# Patient Record
Sex: MALE | Race: WHITE | NOT HISPANIC OR LATINO | Employment: UNEMPLOYED | URBAN - METROPOLITAN AREA
[De-identification: names, ages, dates, MRNs, and addresses within clinical notes are randomized per-mention and may not be internally consistent; named-entity substitution may affect disease eponyms.]

---

## 2021-02-18 ENCOUNTER — HOSPITAL ENCOUNTER (INPATIENT)
Facility: HOSPITAL | Age: 41
LOS: 9 days | Discharge: HOME | DRG: 432 | End: 2021-02-27
Attending: EMERGENCY MEDICINE | Admitting: HOSPITALIST
Payer: MEDICAID

## 2021-02-18 DIAGNOSIS — Z01.818 ENCOUNTER FOR PRE-TRANSPLANT EVALUATION FOR LIVER TRANSPLANT: ICD-10-CM

## 2021-02-18 DIAGNOSIS — K70.11 ALCOHOLIC HEPATITIS WITH ASCITES: ICD-10-CM

## 2021-02-18 DIAGNOSIS — G89.29 CHRONIC BACK PAIN, UNSPECIFIED BACK LOCATION, UNSPECIFIED BACK PAIN LATERALITY: Chronic | ICD-10-CM

## 2021-02-18 DIAGNOSIS — K74.60 HEPATIC CIRRHOSIS, UNSPECIFIED HEPATIC CIRRHOSIS TYPE, UNSPECIFIED WHETHER ASCITES PRESENT: ICD-10-CM

## 2021-02-18 DIAGNOSIS — K72.90 DECOMPENSATED HEPATIC CIRRHOSIS: ICD-10-CM

## 2021-02-18 DIAGNOSIS — Z01.818 PRE-TRANSPLANT EVALUATION FOR CHRONIC LIVER DISEASE: ICD-10-CM

## 2021-02-18 DIAGNOSIS — K74.60 DECOMPENSATED HEPATIC CIRRHOSIS: ICD-10-CM

## 2021-02-18 DIAGNOSIS — R07.9 CHEST PAIN: ICD-10-CM

## 2021-02-18 DIAGNOSIS — F10.20 ALCOHOL USE DISORDER, SEVERE, DEPENDENCE: Chronic | ICD-10-CM

## 2021-02-18 DIAGNOSIS — J98.4 CAVITARY LESION OF LUNG: ICD-10-CM

## 2021-02-18 DIAGNOSIS — R10.13 EPIGASTRIC PAIN: ICD-10-CM

## 2021-02-18 DIAGNOSIS — M54.9 CHRONIC BACK PAIN, UNSPECIFIED BACK LOCATION, UNSPECIFIED BACK PAIN LATERALITY: Chronic | ICD-10-CM

## 2021-02-18 LAB
A1AT SERPL-MCNC: 182 MG/DL (ref 100–190)
ALBUMIN SERPL BCP-MCNC: 2.6 G/DL (ref 3.5–5.2)
ALP SERPL-CCNC: 183 U/L (ref 55–135)
ALT SERPL W/O P-5'-P-CCNC: 61 U/L (ref 10–44)
AMMONIA PLAS-SCNC: 47 UMOL/L (ref 10–50)
AMORPH CRY UR QL COMP ASSIST: ABNORMAL
ANION GAP SERPL CALC-SCNC: 11 MMOL/L (ref 8–16)
AST SERPL-CCNC: 154 U/L (ref 10–40)
BACTERIA #/AREA URNS AUTO: ABNORMAL /HPF
BASOPHILS # BLD AUTO: 0.04 K/UL (ref 0–0.2)
BASOPHILS NFR BLD: 0.4 % (ref 0–1.9)
BILIRUB SERPL-MCNC: 25.4 MG/DL (ref 0.1–1)
BILIRUB UR QL STRIP: ABNORMAL
BUN SERPL-MCNC: 18 MG/DL (ref 6–20)
CALCIUM SERPL-MCNC: 8.5 MG/DL (ref 8.7–10.5)
CERULOPLASMIN SERPL-MCNC: 27 MG/DL (ref 15–45)
CHLORIDE SERPL-SCNC: 87 MMOL/L (ref 95–110)
CK SERPL-CCNC: 19 U/L (ref 20–200)
CLARITY UR REFRACT.AUTO: ABNORMAL
CO2 SERPL-SCNC: 32 MMOL/L (ref 23–29)
COLOR UR AUTO: ABNORMAL
CREAT SERPL-MCNC: 0.9 MG/DL (ref 0.5–1.4)
CTP QC/QA: YES
DIFFERENTIAL METHOD: ABNORMAL
EOSINOPHIL # BLD AUTO: 0 K/UL (ref 0–0.5)
EOSINOPHIL NFR BLD: 0.3 % (ref 0–8)
ERYTHROCYTE [DISTWIDTH] IN BLOOD BY AUTOMATED COUNT: 18.8 % (ref 11.5–14.5)
EST. GFR  (AFRICAN AMERICAN): >60 ML/MIN/1.73 M^2
EST. GFR  (NON AFRICAN AMERICAN): >60 ML/MIN/1.73 M^2
ETHANOL SERPL-MCNC: <10 MG/DL
GLUCOSE SERPL-MCNC: 107 MG/DL (ref 70–110)
GLUCOSE UR QL STRIP: NEGATIVE
HCT VFR BLD AUTO: 34.9 % (ref 40–54)
HGB BLD-MCNC: 12.4 G/DL (ref 14–18)
HGB UR QL STRIP: NEGATIVE
IGG SERPL-MCNC: 1798 MG/DL (ref 650–1600)
IMM GRANULOCYTES # BLD AUTO: 0.05 K/UL (ref 0–0.04)
IMM GRANULOCYTES NFR BLD AUTO: 0.5 % (ref 0–0.5)
INR PPP: 1.5 (ref 0.8–1.2)
INR PPP: 1.6 (ref 0.8–1.2)
IRON SERPL-MCNC: 121 UG/DL (ref 45–160)
KETONES UR QL STRIP: NEGATIVE
LACTATE SERPL-SCNC: 0.7 MMOL/L (ref 0.5–2.2)
LEUKOCYTE ESTERASE UR QL STRIP: NEGATIVE
LIPASE SERPL-CCNC: 109 U/L (ref 4–60)
LYMPHOCYTES # BLD AUTO: 1.3 K/UL (ref 1–4.8)
LYMPHOCYTES NFR BLD: 14.6 % (ref 18–48)
MCH RBC QN AUTO: 37.5 PG (ref 27–31)
MCHC RBC AUTO-ENTMCNC: 35.5 G/DL (ref 32–36)
MCV RBC AUTO: 105 FL (ref 82–98)
MICROSCOPIC COMMENT: ABNORMAL
MONOCYTES # BLD AUTO: 1.8 K/UL (ref 0.3–1)
MONOCYTES NFR BLD: 19.6 % (ref 4–15)
NEUTROPHILS # BLD AUTO: 5.9 K/UL (ref 1.8–7.7)
NEUTROPHILS NFR BLD: 64.6 % (ref 38–73)
NITRITE UR QL STRIP: POSITIVE
NRBC BLD-RTO: 0 /100 WBC
PH UR STRIP: 6 [PH] (ref 5–8)
PLATELET # BLD AUTO: 85 K/UL (ref 150–350)
PMV BLD AUTO: 11.2 FL (ref 9.2–12.9)
POTASSIUM SERPL-SCNC: 3.5 MMOL/L (ref 3.5–5.1)
PROCALCITONIN SERPL IA-MCNC: 0.22 NG/ML
PROT SERPL-MCNC: 7.8 G/DL (ref 6–8.4)
PROT UR QL STRIP: NEGATIVE
PROTHROMBIN TIME: 16.4 SEC (ref 9–12.5)
PROTHROMBIN TIME: 16.9 SEC (ref 9–12.5)
RBC # BLD AUTO: 3.31 M/UL (ref 4.6–6.2)
SARS-COV-2 RDRP RESP QL NAA+PROBE: NEGATIVE
SATURATED IRON: 100 % (ref 20–50)
SODIUM SERPL-SCNC: 130 MMOL/L (ref 136–145)
SP GR UR STRIP: 1.02 (ref 1–1.03)
TOTAL IRON BINDING CAPACITY: 121 UG/DL (ref 250–450)
TRANSFERRIN SERPL-MCNC: 82 MG/DL (ref 200–375)
TSH SERPL DL<=0.005 MIU/L-ACNC: 1.18 UIU/ML (ref 0.4–4)
URN SPEC COLLECT METH UR: ABNORMAL
WBC # BLD AUTO: 9.15 K/UL (ref 3.9–12.7)

## 2021-02-18 PROCEDURE — 96374 THER/PROPH/DIAG INJ IV PUSH: CPT

## 2021-02-18 PROCEDURE — 84145 PROCALCITONIN (PCT): CPT

## 2021-02-18 PROCEDURE — 82550 ASSAY OF CK (CPK): CPT

## 2021-02-18 PROCEDURE — 81001 URINALYSIS AUTO W/SCOPE: CPT

## 2021-02-18 PROCEDURE — 83605 ASSAY OF LACTIC ACID: CPT

## 2021-02-18 PROCEDURE — 99285 EMERGENCY DEPT VISIT HI MDM: CPT | Mod: 25

## 2021-02-18 PROCEDURE — 82105 ALPHA-FETOPROTEIN SERUM: CPT

## 2021-02-18 PROCEDURE — 93005 ELECTROCARDIOGRAM TRACING: CPT

## 2021-02-18 PROCEDURE — 83540 ASSAY OF IRON: CPT

## 2021-02-18 PROCEDURE — 63600175 PHARM REV CODE 636 W HCPCS: Performed by: STUDENT IN AN ORGANIZED HEALTH CARE EDUCATION/TRAINING PROGRAM

## 2021-02-18 PROCEDURE — 86256 FLUORESCENT ANTIBODY TITER: CPT | Mod: 91

## 2021-02-18 PROCEDURE — 82103 ALPHA-1-ANTITRYPSIN TOTAL: CPT

## 2021-02-18 PROCEDURE — 93010 EKG 12-LEAD: ICD-10-PCS | Mod: ,,, | Performed by: INTERNAL MEDICINE

## 2021-02-18 PROCEDURE — 82390 ASSAY OF CERULOPLASMIN: CPT

## 2021-02-18 PROCEDURE — 85025 COMPLETE CBC W/AUTO DIFF WBC: CPT

## 2021-02-18 PROCEDURE — 82784 ASSAY IGA/IGD/IGG/IGM EACH: CPT

## 2021-02-18 PROCEDURE — 82140 ASSAY OF AMMONIA: CPT

## 2021-02-18 PROCEDURE — 82077 ASSAY SPEC XCP UR&BREATH IA: CPT

## 2021-02-18 PROCEDURE — 83690 ASSAY OF LIPASE: CPT

## 2021-02-18 PROCEDURE — U0002 COVID-19 LAB TEST NON-CDC: HCPCS | Performed by: STUDENT IN AN ORGANIZED HEALTH CARE EDUCATION/TRAINING PROGRAM

## 2021-02-18 PROCEDURE — 86703 HIV-1/HIV-2 1 RESULT ANTBDY: CPT

## 2021-02-18 PROCEDURE — 80074 ACUTE HEPATITIS PANEL: CPT

## 2021-02-18 PROCEDURE — 12000002 HC ACUTE/MED SURGE SEMI-PRIVATE ROOM

## 2021-02-18 PROCEDURE — 99284 EMERGENCY DEPT VISIT MOD MDM: CPT | Mod: CS,,, | Performed by: EMERGENCY MEDICINE

## 2021-02-18 PROCEDURE — 84443 ASSAY THYROID STIM HORMONE: CPT

## 2021-02-18 PROCEDURE — 93010 ELECTROCARDIOGRAM REPORT: CPT | Mod: ,,, | Performed by: INTERNAL MEDICINE

## 2021-02-18 PROCEDURE — 86038 ANTINUCLEAR ANTIBODIES: CPT

## 2021-02-18 PROCEDURE — 85610 PROTHROMBIN TIME: CPT

## 2021-02-18 PROCEDURE — 99284 PR EMERGENCY DEPT VISIT,LEVEL IV: ICD-10-PCS | Mod: CS,,, | Performed by: EMERGENCY MEDICINE

## 2021-02-18 PROCEDURE — 85610 PROTHROMBIN TIME: CPT | Mod: 91

## 2021-02-18 PROCEDURE — 80321 ALCOHOLS BIOMARKERS 1OR 2: CPT

## 2021-02-18 PROCEDURE — 82728 ASSAY OF FERRITIN: CPT

## 2021-02-18 PROCEDURE — 80053 COMPREHEN METABOLIC PANEL: CPT

## 2021-02-18 PROCEDURE — 86235 NUCLEAR ANTIGEN ANTIBODY: CPT

## 2021-02-18 PROCEDURE — 87040 BLOOD CULTURE FOR BACTERIA: CPT

## 2021-02-18 RX ORDER — GLUCAGON 1 MG
1 KIT INJECTION
Status: DISCONTINUED | OUTPATIENT
Start: 2021-02-18 | End: 2021-02-23

## 2021-02-18 RX ORDER — INSULIN ASPART 100 [IU]/ML
0-5 INJECTION, SOLUTION INTRAVENOUS; SUBCUTANEOUS
Status: DISCONTINUED | OUTPATIENT
Start: 2021-02-18 | End: 2021-02-23

## 2021-02-18 RX ORDER — IBUPROFEN 200 MG
24 TABLET ORAL
Status: DISCONTINUED | OUTPATIENT
Start: 2021-02-18 | End: 2021-02-23

## 2021-02-18 RX ORDER — CEFTRIAXONE 1 G/1
1 INJECTION, POWDER, FOR SOLUTION INTRAMUSCULAR; INTRAVENOUS
Status: COMPLETED | OUTPATIENT
Start: 2021-02-18 | End: 2021-02-18

## 2021-02-18 RX ORDER — CEFTRIAXONE 1 G/1
1 INJECTION, POWDER, FOR SOLUTION INTRAMUSCULAR; INTRAVENOUS
Status: DISCONTINUED | OUTPATIENT
Start: 2021-02-19 | End: 2021-02-19

## 2021-02-18 RX ORDER — LACTULOSE 10 G/15ML
20 SOLUTION ORAL 3 TIMES DAILY
Status: DISCONTINUED | OUTPATIENT
Start: 2021-02-19 | End: 2021-02-19

## 2021-02-18 RX ORDER — IBUPROFEN 200 MG
16 TABLET ORAL
Status: DISCONTINUED | OUTPATIENT
Start: 2021-02-18 | End: 2021-02-23

## 2021-02-18 RX ORDER — SODIUM CHLORIDE 0.9 % (FLUSH) 0.9 %
10 SYRINGE (ML) INJECTION
Status: DISCONTINUED | OUTPATIENT
Start: 2021-02-18 | End: 2021-02-27 | Stop reason: HOSPADM

## 2021-02-18 RX ADMIN — CEFTRIAXONE 1 G: 1 INJECTION, POWDER, FOR SOLUTION INTRAMUSCULAR; INTRAVENOUS at 09:02

## 2021-02-19 PROBLEM — G89.29 CHRONIC BACK PAIN: Chronic | Status: ACTIVE | Noted: 2021-02-19

## 2021-02-19 PROBLEM — K31.89 PORTAL HYPERTENSIVE GASTROPATHY: Status: ACTIVE | Noted: 2021-02-19

## 2021-02-19 PROBLEM — D69.6 THROMBOCYTOPENIA: Status: ACTIVE | Noted: 2021-02-19

## 2021-02-19 PROBLEM — I85.10 SECONDARY ESOPHAGEAL VARICES WITHOUT BLEEDING: Status: ACTIVE | Noted: 2021-02-19

## 2021-02-19 PROBLEM — K70.11 ALCOHOLIC HEPATITIS WITH ASCITES: Status: ACTIVE | Noted: 2021-02-19

## 2021-02-19 PROBLEM — R10.13 EPIGASTRIC PAIN: Status: RESOLVED | Noted: 2021-02-18 | Resolved: 2021-02-19

## 2021-02-19 PROBLEM — M54.9 CHRONIC BACK PAIN: Chronic | Status: ACTIVE | Noted: 2021-02-19

## 2021-02-19 PROBLEM — F10.20 ALCOHOL USE DISORDER, SEVERE, DEPENDENCE: Chronic | Status: ACTIVE | Noted: 2021-02-19

## 2021-02-19 PROBLEM — Z01.818 ENCOUNTER FOR PRE-TRANSPLANT EVALUATION FOR LIVER TRANSPLANT: Status: ACTIVE | Noted: 2021-02-19

## 2021-02-19 PROBLEM — D53.9 MACROCYTIC ANEMIA: Status: ACTIVE | Noted: 2021-02-19

## 2021-02-19 PROBLEM — F17.200 TOBACCO USE DISORDER: Status: ACTIVE | Noted: 2021-02-19

## 2021-02-19 PROBLEM — K76.6 PORTAL HYPERTENSIVE GASTROPATHY: Status: ACTIVE | Noted: 2021-02-19

## 2021-02-19 LAB
AFP SERPL-MCNC: 3.6 NG/ML (ref 0–8.4)
ALBUMIN SERPL BCP-MCNC: 2.3 G/DL (ref 3.5–5.2)
ALP SERPL-CCNC: 161 U/L (ref 55–135)
ALT SERPL W/O P-5'-P-CCNC: 57 U/L (ref 10–44)
ANA SER QL IF: NORMAL
ANION GAP SERPL CALC-SCNC: 10 MMOL/L (ref 8–16)
AST SERPL-CCNC: 131 U/L (ref 10–40)
BASOPHILS # BLD AUTO: 0.06 K/UL (ref 0–0.2)
BASOPHILS NFR BLD: 0.8 % (ref 0–1.9)
BILIRUB SERPL-MCNC: 24.5 MG/DL (ref 0.1–1)
BUN SERPL-MCNC: 16 MG/DL (ref 6–20)
CALCIUM SERPL-MCNC: 8.5 MG/DL (ref 8.7–10.5)
CHLORIDE SERPL-SCNC: 89 MMOL/L (ref 95–110)
CO2 SERPL-SCNC: 34 MMOL/L (ref 23–29)
CREAT SERPL-MCNC: 0.8 MG/DL (ref 0.5–1.4)
DIFFERENTIAL METHOD: ABNORMAL
EOSINOPHIL # BLD AUTO: 0.1 K/UL (ref 0–0.5)
EOSINOPHIL NFR BLD: 0.7 % (ref 0–8)
ERYTHROCYTE [DISTWIDTH] IN BLOOD BY AUTOMATED COUNT: 18.6 % (ref 11.5–14.5)
EST. GFR  (AFRICAN AMERICAN): >60 ML/MIN/1.73 M^2
EST. GFR  (NON AFRICAN AMERICAN): >60 ML/MIN/1.73 M^2
FERRITIN SERPL-MCNC: 1244 NG/ML (ref 20–300)
GLUCOSE SERPL-MCNC: 89 MG/DL (ref 70–110)
HAV IGM SERPL QL IA: NEGATIVE
HBV CORE IGM SERPL QL IA: NEGATIVE
HBV SURFACE AB SER-ACNC: POSITIVE M[IU]/ML
HBV SURFACE AG SERPL QL IA: NEGATIVE
HCT VFR BLD AUTO: 33.1 % (ref 40–54)
HCV AB SERPL QL IA: NEGATIVE
HEPATITIS A ANTIBODY, IGG: NEGATIVE
HGB BLD-MCNC: 11.8 G/DL (ref 14–18)
HIV 1+2 AB+HIV1 P24 AG SERPL QL IA: NEGATIVE
IMM GRANULOCYTES # BLD AUTO: 0.06 K/UL (ref 0–0.04)
IMM GRANULOCYTES NFR BLD AUTO: 0.8 % (ref 0–0.5)
LYMPHOCYTES # BLD AUTO: 1.2 K/UL (ref 1–4.8)
LYMPHOCYTES NFR BLD: 16.3 % (ref 18–48)
MAGNESIUM SERPL-MCNC: 1.8 MG/DL (ref 1.6–2.6)
MCH RBC QN AUTO: 37.8 PG (ref 27–31)
MCHC RBC AUTO-ENTMCNC: 35.6 G/DL (ref 32–36)
MCV RBC AUTO: 106 FL (ref 82–98)
MONOCYTES # BLD AUTO: 1.3 K/UL (ref 0.3–1)
MONOCYTES NFR BLD: 16.8 % (ref 4–15)
NEUTROPHILS # BLD AUTO: 4.8 K/UL (ref 1.8–7.7)
NEUTROPHILS NFR BLD: 64.6 % (ref 38–73)
NRBC BLD-RTO: 0 /100 WBC
PHOSPHATE SERPL-MCNC: 3.2 MG/DL (ref 2.7–4.5)
PLATELET # BLD AUTO: 74 K/UL (ref 150–350)
PMV BLD AUTO: 12.1 FL (ref 9.2–12.9)
POCT GLUCOSE: 107 MG/DL (ref 70–110)
POCT GLUCOSE: 112 MG/DL (ref 70–110)
POCT GLUCOSE: 96 MG/DL (ref 70–110)
POTASSIUM SERPL-SCNC: 3.3 MMOL/L (ref 3.5–5.1)
PROT SERPL-MCNC: 7.1 G/DL (ref 6–8.4)
RBC # BLD AUTO: 3.12 M/UL (ref 4.6–6.2)
SODIUM SERPL-SCNC: 133 MMOL/L (ref 136–145)
WBC # BLD AUTO: 7.48 K/UL (ref 3.9–12.7)

## 2021-02-19 PROCEDURE — 94761 N-INVAS EAR/PLS OXIMETRY MLT: CPT

## 2021-02-19 PROCEDURE — 86706 HEP B SURFACE ANTIBODY: CPT

## 2021-02-19 PROCEDURE — 86790 VIRUS ANTIBODY NOS: CPT

## 2021-02-19 PROCEDURE — 99232 PR SUBSEQUENT HOSPITAL CARE,LEVL II: ICD-10-PCS | Mod: ,,, | Performed by: HOSPITALIST

## 2021-02-19 PROCEDURE — 99223 1ST HOSP IP/OBS HIGH 75: CPT | Mod: AF,HB,, | Performed by: PSYCHIATRY & NEUROLOGY

## 2021-02-19 PROCEDURE — 99223 PR INITIAL HOSPITAL CARE,LEVL III: ICD-10-PCS | Mod: AF,HB,, | Performed by: PSYCHIATRY & NEUROLOGY

## 2021-02-19 PROCEDURE — S4991 NICOTINE PATCH NONLEGEND: HCPCS | Performed by: HOSPITALIST

## 2021-02-19 PROCEDURE — 87040 BLOOD CULTURE FOR BACTERIA: CPT

## 2021-02-19 PROCEDURE — 36415 COLL VENOUS BLD VENIPUNCTURE: CPT

## 2021-02-19 PROCEDURE — 11000001 HC ACUTE MED/SURG PRIVATE ROOM

## 2021-02-19 PROCEDURE — 80053 COMPREHEN METABOLIC PANEL: CPT

## 2021-02-19 PROCEDURE — 83735 ASSAY OF MAGNESIUM: CPT

## 2021-02-19 PROCEDURE — 25000003 PHARM REV CODE 250: Performed by: INTERNAL MEDICINE

## 2021-02-19 PROCEDURE — 25000003 PHARM REV CODE 250: Performed by: FAMILY MEDICINE

## 2021-02-19 PROCEDURE — 99232 SBSQ HOSP IP/OBS MODERATE 35: CPT | Mod: ,,, | Performed by: HOSPITALIST

## 2021-02-19 PROCEDURE — 84100 ASSAY OF PHOSPHORUS: CPT

## 2021-02-19 PROCEDURE — 25000003 PHARM REV CODE 250: Performed by: HOSPITALIST

## 2021-02-19 PROCEDURE — 85025 COMPLETE CBC W/AUTO DIFF WBC: CPT

## 2021-02-19 RX ORDER — IBUPROFEN 200 MG
1 TABLET ORAL DAILY
Status: DISCONTINUED | OUTPATIENT
Start: 2021-02-19 | End: 2021-02-27 | Stop reason: HOSPADM

## 2021-02-19 RX ORDER — OXYCODONE HYDROCHLORIDE 5 MG/1
5 TABLET ORAL ONCE
Status: COMPLETED | OUTPATIENT
Start: 2021-02-19 | End: 2021-02-19

## 2021-02-19 RX ORDER — MIDODRINE HYDROCHLORIDE 5 MG/1
10 TABLET ORAL 3 TIMES DAILY
Status: DISCONTINUED | OUTPATIENT
Start: 2021-02-19 | End: 2021-02-21

## 2021-02-19 RX ORDER — POTASSIUM CHLORIDE 20 MEQ/1
40 TABLET, EXTENDED RELEASE ORAL ONCE
Status: COMPLETED | OUTPATIENT
Start: 2021-02-19 | End: 2021-02-19

## 2021-02-19 RX ORDER — FUROSEMIDE 40 MG/1
40 TABLET ORAL DAILY
Status: DISCONTINUED | OUTPATIENT
Start: 2021-02-19 | End: 2021-02-23

## 2021-02-19 RX ORDER — LORAZEPAM 1 MG/1
1 TABLET ORAL EVERY 4 HOURS PRN
Status: DISCONTINUED | OUTPATIENT
Start: 2021-02-19 | End: 2021-02-22

## 2021-02-19 RX ORDER — THIAMINE HCL 100 MG
100 TABLET ORAL DAILY
Status: DISCONTINUED | OUTPATIENT
Start: 2021-02-19 | End: 2021-02-27 | Stop reason: HOSPADM

## 2021-02-19 RX ORDER — SPIRONOLACTONE 100 MG/1
100 TABLET, FILM COATED ORAL DAILY
Status: DISCONTINUED | OUTPATIENT
Start: 2021-02-19 | End: 2021-02-23

## 2021-02-19 RX ORDER — FOLIC ACID 1 MG/1
1 TABLET ORAL DAILY
Status: DISCONTINUED | OUTPATIENT
Start: 2021-02-19 | End: 2021-02-27 | Stop reason: HOSPADM

## 2021-02-19 RX ADMIN — MIDODRINE HYDROCHLORIDE 10 MG: 5 TABLET ORAL at 08:02

## 2021-02-19 RX ADMIN — SPIRONOLACTONE 100 MG: 100 TABLET ORAL at 05:02

## 2021-02-19 RX ADMIN — POTASSIUM CHLORIDE 40 MEQ: 1500 TABLET, EXTENDED RELEASE ORAL at 05:02

## 2021-02-19 RX ADMIN — LACTULOSE 20 G: 20 SOLUTION ORAL at 04:02

## 2021-02-19 RX ADMIN — Medication 100 MG: at 05:02

## 2021-02-19 RX ADMIN — MIDODRINE HYDROCHLORIDE 10 MG: 5 TABLET ORAL at 05:02

## 2021-02-19 RX ADMIN — FOLIC ACID 1 MG: 1 TABLET ORAL at 05:02

## 2021-02-19 RX ADMIN — FUROSEMIDE 40 MG: 40 TABLET ORAL at 05:02

## 2021-02-19 RX ADMIN — LACTULOSE 20 G: 20 SOLUTION ORAL at 08:02

## 2021-02-19 RX ADMIN — NICOTINE 1 PATCH: 14 PATCH TRANSDERMAL at 05:02

## 2021-02-19 RX ADMIN — OXYCODONE 5 MG: 5 TABLET ORAL at 12:02

## 2021-02-20 LAB
ALBUMIN SERPL BCP-MCNC: 2.1 G/DL (ref 3.5–5.2)
ALP SERPL-CCNC: 150 U/L (ref 55–135)
ALT SERPL W/O P-5'-P-CCNC: 50 U/L (ref 10–44)
AMPHET+METHAMPHET UR QL: NEGATIVE
ANION GAP SERPL CALC-SCNC: 7 MMOL/L (ref 8–16)
AST SERPL-CCNC: 121 U/L (ref 10–40)
BARBITURATES UR QL SCN>200 NG/ML: NEGATIVE
BASOPHILS # BLD AUTO: 0.08 K/UL (ref 0–0.2)
BASOPHILS NFR BLD: 1 % (ref 0–1.9)
BENZODIAZ UR QL SCN>200 NG/ML: NEGATIVE
BILIRUB SERPL-MCNC: 24.5 MG/DL (ref 0.1–1)
BUN SERPL-MCNC: 17 MG/DL (ref 6–20)
BZE UR QL SCN: NEGATIVE
CALCIUM SERPL-MCNC: 7.8 MG/DL (ref 8.7–10.5)
CANNABINOIDS UR QL SCN: NEGATIVE
CHLORIDE SERPL-SCNC: 92 MMOL/L (ref 95–110)
CO2 SERPL-SCNC: 31 MMOL/L (ref 23–29)
CREAT SERPL-MCNC: 0.8 MG/DL (ref 0.5–1.4)
CREAT UR-MCNC: 44 MG/DL (ref 23–375)
DIFFERENTIAL METHOD: ABNORMAL
EOSINOPHIL # BLD AUTO: 0.1 K/UL (ref 0–0.5)
EOSINOPHIL NFR BLD: 1 % (ref 0–8)
ERYTHROCYTE [DISTWIDTH] IN BLOOD BY AUTOMATED COUNT: 18.5 % (ref 11.5–14.5)
EST. GFR  (AFRICAN AMERICAN): >60 ML/MIN/1.73 M^2
EST. GFR  (NON AFRICAN AMERICAN): >60 ML/MIN/1.73 M^2
ETHANOL UR-MCNC: <10 MG/DL
GLUCOSE SERPL-MCNC: 91 MG/DL (ref 70–110)
HCT VFR BLD AUTO: 32.1 % (ref 40–54)
HGB BLD-MCNC: 11.5 G/DL (ref 14–18)
IMM GRANULOCYTES # BLD AUTO: 0.07 K/UL (ref 0–0.04)
IMM GRANULOCYTES NFR BLD AUTO: 0.9 % (ref 0–0.5)
INR PPP: 1.7 (ref 0.8–1.2)
LACTATE SERPL-SCNC: 1.2 MMOL/L (ref 0.5–2.2)
LYMPHOCYTES # BLD AUTO: 1.1 K/UL (ref 1–4.8)
LYMPHOCYTES NFR BLD: 14.2 % (ref 18–48)
MAGNESIUM SERPL-MCNC: 1.8 MG/DL (ref 1.6–2.6)
MCH RBC QN AUTO: 37.8 PG (ref 27–31)
MCHC RBC AUTO-ENTMCNC: 35.8 G/DL (ref 32–36)
MCV RBC AUTO: 106 FL (ref 82–98)
METHADONE UR QL SCN>300 NG/ML: NEGATIVE
MITOCHONDRIA AB TITR SER IF: NORMAL {TITER}
MONOCYTES # BLD AUTO: 1.6 K/UL (ref 0.3–1)
MONOCYTES NFR BLD: 20.4 % (ref 4–15)
NEUTROPHILS # BLD AUTO: 5 K/UL (ref 1.8–7.7)
NEUTROPHILS NFR BLD: 62.5 % (ref 38–73)
NRBC BLD-RTO: 0 /100 WBC
OPIATES UR QL SCN: NEGATIVE
PCP UR QL SCN>25 NG/ML: NEGATIVE
PHOSPHATE SERPL-MCNC: 3.1 MG/DL (ref 2.7–4.5)
PLATELET # BLD AUTO: 92 K/UL (ref 150–350)
PMV BLD AUTO: 11.2 FL (ref 9.2–12.9)
POCT GLUCOSE: 107 MG/DL (ref 70–110)
POCT GLUCOSE: 134 MG/DL (ref 70–110)
POTASSIUM SERPL-SCNC: 3.6 MMOL/L (ref 3.5–5.1)
PROT SERPL-MCNC: 6.5 G/DL (ref 6–8.4)
PROTHROMBIN TIME: 17.8 SEC (ref 9–12.5)
RBC # BLD AUTO: 3.04 M/UL (ref 4.6–6.2)
SODIUM SERPL-SCNC: 130 MMOL/L (ref 136–145)
TOXICOLOGY INFORMATION: NORMAL
WBC # BLD AUTO: 8.03 K/UL (ref 3.9–12.7)

## 2021-02-20 PROCEDURE — 63600175 PHARM REV CODE 636 W HCPCS: Performed by: HOSPITALIST

## 2021-02-20 PROCEDURE — 87040 BLOOD CULTURE FOR BACTERIA: CPT | Mod: 59

## 2021-02-20 PROCEDURE — 80053 COMPREHEN METABOLIC PANEL: CPT

## 2021-02-20 PROCEDURE — 83735 ASSAY OF MAGNESIUM: CPT

## 2021-02-20 PROCEDURE — 80307 DRUG TEST PRSMV CHEM ANLYZR: CPT

## 2021-02-20 PROCEDURE — 25000003 PHARM REV CODE 250: Performed by: HOSPITALIST

## 2021-02-20 PROCEDURE — S4991 NICOTINE PATCH NONLEGEND: HCPCS | Performed by: HOSPITALIST

## 2021-02-20 PROCEDURE — 84100 ASSAY OF PHOSPHORUS: CPT

## 2021-02-20 PROCEDURE — 99232 SBSQ HOSP IP/OBS MODERATE 35: CPT | Mod: ,,, | Performed by: HOSPITALIST

## 2021-02-20 PROCEDURE — 85025 COMPLETE CBC W/AUTO DIFF WBC: CPT

## 2021-02-20 PROCEDURE — 25000003 PHARM REV CODE 250: Performed by: FAMILY MEDICINE

## 2021-02-20 PROCEDURE — 85610 PROTHROMBIN TIME: CPT

## 2021-02-20 PROCEDURE — 83605 ASSAY OF LACTIC ACID: CPT

## 2021-02-20 PROCEDURE — 11000001 HC ACUTE MED/SURG PRIVATE ROOM

## 2021-02-20 PROCEDURE — 99232 PR SUBSEQUENT HOSPITAL CARE,LEVL II: ICD-10-PCS | Mod: ,,, | Performed by: HOSPITALIST

## 2021-02-20 PROCEDURE — 36415 COLL VENOUS BLD VENIPUNCTURE: CPT

## 2021-02-20 RX ORDER — ACETAMINOPHEN 500 MG
500 TABLET ORAL EVERY 8 HOURS PRN
Status: DISCONTINUED | OUTPATIENT
Start: 2021-02-20 | End: 2021-02-27 | Stop reason: HOSPADM

## 2021-02-20 RX ORDER — CALCIUM CARBONATE 200(500)MG
500 TABLET,CHEWABLE ORAL 3 TIMES DAILY PRN
Status: DISCONTINUED | OUTPATIENT
Start: 2021-02-20 | End: 2021-02-27 | Stop reason: HOSPADM

## 2021-02-20 RX ADMIN — Medication 100 MG: at 09:02

## 2021-02-20 RX ADMIN — LORAZEPAM 1 MG: 1 TABLET ORAL at 10:02

## 2021-02-20 RX ADMIN — MIDODRINE HYDROCHLORIDE 10 MG: 5 TABLET ORAL at 03:02

## 2021-02-20 RX ADMIN — NICOTINE 1 PATCH: 14 PATCH TRANSDERMAL at 09:02

## 2021-02-20 RX ADMIN — MIDODRINE HYDROCHLORIDE 10 MG: 5 TABLET ORAL at 08:02

## 2021-02-20 RX ADMIN — CEFTRIAXONE 2 G: 2 INJECTION, POWDER, FOR SOLUTION INTRAMUSCULAR; INTRAVENOUS at 11:02

## 2021-02-20 RX ADMIN — FUROSEMIDE 40 MG: 40 TABLET ORAL at 09:02

## 2021-02-20 RX ADMIN — CALCIUM CARBONATE (ANTACID) CHEW TAB 500 MG 500 MG: 500 CHEW TAB at 09:02

## 2021-02-20 RX ADMIN — SPIRONOLACTONE 100 MG: 100 TABLET ORAL at 09:02

## 2021-02-20 RX ADMIN — FOLIC ACID 1 MG: 1 TABLET ORAL at 09:02

## 2021-02-20 RX ADMIN — MIDODRINE HYDROCHLORIDE 10 MG: 5 TABLET ORAL at 09:02

## 2021-02-21 PROBLEM — K76.82 HEPATIC ENCEPHALOPATHY: Status: ACTIVE | Noted: 2021-02-21

## 2021-02-21 LAB
ALBUMIN SERPL BCP-MCNC: 2 G/DL (ref 3.5–5.2)
ALP SERPL-CCNC: 177 U/L (ref 55–135)
ALT SERPL W/O P-5'-P-CCNC: 54 U/L (ref 10–44)
ANION GAP SERPL CALC-SCNC: 9 MMOL/L (ref 8–16)
AST SERPL-CCNC: 121 U/L (ref 10–40)
BILIRUB SERPL-MCNC: 22.6 MG/DL (ref 0.1–1)
BUN SERPL-MCNC: 16 MG/DL (ref 6–20)
CALCIUM SERPL-MCNC: 8 MG/DL (ref 8.7–10.5)
CHLORIDE SERPL-SCNC: 93 MMOL/L (ref 95–110)
CO2 SERPL-SCNC: 29 MMOL/L (ref 23–29)
CREAT SERPL-MCNC: 0.8 MG/DL (ref 0.5–1.4)
EST. GFR  (AFRICAN AMERICAN): >60 ML/MIN/1.73 M^2
EST. GFR  (NON AFRICAN AMERICAN): >60 ML/MIN/1.73 M^2
GLUCOSE SERPL-MCNC: 109 MG/DL (ref 70–110)
INR PPP: 1.6 (ref 0.8–1.2)
MAGNESIUM SERPL-MCNC: 1.9 MG/DL (ref 1.6–2.6)
PHOSPHATE SERPL-MCNC: 2.6 MG/DL (ref 2.7–4.5)
POCT GLUCOSE: 123 MG/DL (ref 70–110)
POCT GLUCOSE: 127 MG/DL (ref 70–110)
POCT GLUCOSE: 203 MG/DL (ref 70–110)
POTASSIUM SERPL-SCNC: 3.3 MMOL/L (ref 3.5–5.1)
PROT SERPL-MCNC: 6.7 G/DL (ref 6–8.4)
PROTHROMBIN TIME: 16.9 SEC (ref 9–12.5)
SODIUM SERPL-SCNC: 131 MMOL/L (ref 136–145)

## 2021-02-21 PROCEDURE — 99232 SBSQ HOSP IP/OBS MODERATE 35: CPT | Mod: ,,, | Performed by: HOSPITALIST

## 2021-02-21 PROCEDURE — S4991 NICOTINE PATCH NONLEGEND: HCPCS | Performed by: HOSPITALIST

## 2021-02-21 PROCEDURE — 36415 COLL VENOUS BLD VENIPUNCTURE: CPT

## 2021-02-21 PROCEDURE — 11000001 HC ACUTE MED/SURG PRIVATE ROOM

## 2021-02-21 PROCEDURE — 25500020 PHARM REV CODE 255: Performed by: HOSPITALIST

## 2021-02-21 PROCEDURE — 99232 PR SUBSEQUENT HOSPITAL CARE,LEVL II: ICD-10-PCS | Mod: ,,, | Performed by: HOSPITALIST

## 2021-02-21 PROCEDURE — 85610 PROTHROMBIN TIME: CPT

## 2021-02-21 PROCEDURE — 25000003 PHARM REV CODE 250: Performed by: HOSPITALIST

## 2021-02-21 PROCEDURE — 84100 ASSAY OF PHOSPHORUS: CPT

## 2021-02-21 PROCEDURE — 80053 COMPREHEN METABOLIC PANEL: CPT

## 2021-02-21 PROCEDURE — 63600175 PHARM REV CODE 636 W HCPCS: Performed by: HOSPITALIST

## 2021-02-21 PROCEDURE — 83735 ASSAY OF MAGNESIUM: CPT

## 2021-02-21 PROCEDURE — 25000003 PHARM REV CODE 250: Performed by: FAMILY MEDICINE

## 2021-02-21 RX ORDER — POTASSIUM CHLORIDE 20 MEQ/1
40 TABLET, EXTENDED RELEASE ORAL ONCE
Status: COMPLETED | OUTPATIENT
Start: 2021-02-21 | End: 2021-02-21

## 2021-02-21 RX ORDER — MIDODRINE HYDROCHLORIDE 5 MG/1
15 TABLET ORAL 3 TIMES DAILY
Status: DISCONTINUED | OUTPATIENT
Start: 2021-02-21 | End: 2021-02-27 | Stop reason: HOSPADM

## 2021-02-21 RX ORDER — TRAMADOL HYDROCHLORIDE 50 MG/1
50 TABLET ORAL ONCE
Status: DISCONTINUED | OUTPATIENT
Start: 2021-02-21 | End: 2021-02-22

## 2021-02-21 RX ORDER — LACTULOSE 10 G/15ML
20 SOLUTION ORAL 3 TIMES DAILY
Status: DISCONTINUED | OUTPATIENT
Start: 2021-02-21 | End: 2021-02-24

## 2021-02-21 RX ADMIN — FOLIC ACID 1 MG: 1 TABLET ORAL at 08:02

## 2021-02-21 RX ADMIN — RIFAXIMIN 550 MG: 550 TABLET ORAL at 04:02

## 2021-02-21 RX ADMIN — MIDODRINE HYDROCHLORIDE 15 MG: 5 TABLET ORAL at 09:02

## 2021-02-21 RX ADMIN — MIDODRINE HYDROCHLORIDE 10 MG: 5 TABLET ORAL at 08:02

## 2021-02-21 RX ADMIN — LACTULOSE 20 G: 20 SOLUTION ORAL at 09:02

## 2021-02-21 RX ADMIN — POTASSIUM CHLORIDE 40 MEQ: 1500 TABLET, EXTENDED RELEASE ORAL at 10:02

## 2021-02-21 RX ADMIN — LORAZEPAM 1 MG: 1 TABLET ORAL at 09:02

## 2021-02-21 RX ADMIN — IOHEXOL 75 ML: 350 INJECTION, SOLUTION INTRAVENOUS at 01:02

## 2021-02-21 RX ADMIN — NICOTINE 1 PATCH: 14 PATCH TRANSDERMAL at 08:02

## 2021-02-21 RX ADMIN — ACETAMINOPHEN 500 MG: 500 TABLET ORAL at 10:02

## 2021-02-21 RX ADMIN — LACTULOSE 20 G: 20 SOLUTION ORAL at 04:02

## 2021-02-21 RX ADMIN — Medication 100 MG: at 08:02

## 2021-02-21 RX ADMIN — SPIRONOLACTONE 100 MG: 100 TABLET ORAL at 08:02

## 2021-02-21 RX ADMIN — CEFTRIAXONE 2 G: 2 INJECTION, POWDER, FOR SOLUTION INTRAMUSCULAR; INTRAVENOUS at 10:02

## 2021-02-21 RX ADMIN — FUROSEMIDE 40 MG: 40 TABLET ORAL at 08:02

## 2021-02-22 LAB
ABO + RH BLD: NORMAL
ALBUMIN SERPL BCP-MCNC: 2 G/DL (ref 3.5–5.2)
ALP SERPL-CCNC: 156 U/L (ref 55–135)
ALT SERPL W/O P-5'-P-CCNC: 50 U/L (ref 10–44)
ANION GAP SERPL CALC-SCNC: 10 MMOL/L (ref 8–16)
ANISOCYTOSIS BLD QL SMEAR: SLIGHT
AST SERPL-CCNC: 110 U/L (ref 10–40)
BASOPHILS # BLD AUTO: 0.09 K/UL (ref 0–0.2)
BASOPHILS NFR BLD: 0.7 % (ref 0–1.9)
BILIRUB SERPL-MCNC: 23.3 MG/DL (ref 0.1–1)
BLD GP AB SCN CELLS X3 SERPL QL: NORMAL
BUN SERPL-MCNC: 17 MG/DL (ref 6–20)
BURR CELLS BLD QL SMEAR: ABNORMAL
CALCIUM SERPL-MCNC: 7.9 MG/DL (ref 8.7–10.5)
CHLORIDE SERPL-SCNC: 90 MMOL/L (ref 95–110)
CO2 SERPL-SCNC: 27 MMOL/L (ref 23–29)
COMPLEXED PSA SERPL-MCNC: 0.29 NG/ML (ref 0–4)
CREAT SERPL-MCNC: 0.7 MG/DL (ref 0.5–1.4)
DIFFERENTIAL METHOD: ABNORMAL
EOSINOPHIL # BLD AUTO: 0.1 K/UL (ref 0–0.5)
EOSINOPHIL NFR BLD: 0.8 % (ref 0–8)
ERYTHROCYTE [DISTWIDTH] IN BLOOD BY AUTOMATED COUNT: 18.6 % (ref 11.5–14.5)
EST. GFR  (AFRICAN AMERICAN): >60 ML/MIN/1.73 M^2
EST. GFR  (NON AFRICAN AMERICAN): >60 ML/MIN/1.73 M^2
GLUCOSE SERPL-MCNC: 92 MG/DL (ref 70–110)
HCT VFR BLD AUTO: 28.9 % (ref 40–54)
HGB BLD-MCNC: 10.3 G/DL (ref 14–18)
HYPOCHROMIA BLD QL SMEAR: ABNORMAL
IMM GRANULOCYTES # BLD AUTO: 0.12 K/UL (ref 0–0.04)
IMM GRANULOCYTES NFR BLD AUTO: 1 % (ref 0–0.5)
LYMPHOCYTES # BLD AUTO: 1.7 K/UL (ref 1–4.8)
LYMPHOCYTES NFR BLD: 13.2 % (ref 18–48)
MAGNESIUM SERPL-MCNC: 1.8 MG/DL (ref 1.6–2.6)
MCH RBC QN AUTO: 37.9 PG (ref 27–31)
MCHC RBC AUTO-ENTMCNC: 35.6 G/DL (ref 32–36)
MCV RBC AUTO: 106 FL (ref 82–98)
MONOCYTES # BLD AUTO: 2.1 K/UL (ref 0.3–1)
MONOCYTES NFR BLD: 16.9 % (ref 4–15)
NEUTROPHILS # BLD AUTO: 8.5 K/UL (ref 1.8–7.7)
NEUTROPHILS NFR BLD: 67.4 % (ref 38–73)
NRBC BLD-RTO: 0 /100 WBC
PHOSPHATE SERPL-MCNC: 2.9 MG/DL (ref 2.7–4.5)
PLATELET # BLD AUTO: 71 K/UL (ref 150–350)
PLATELET BLD QL SMEAR: ABNORMAL
PMV BLD AUTO: 12.3 FL (ref 9.2–12.9)
POCT GLUCOSE: 100 MG/DL (ref 70–110)
POCT GLUCOSE: 118 MG/DL (ref 70–110)
POCT GLUCOSE: 153 MG/DL (ref 70–110)
POCT GLUCOSE: 96 MG/DL (ref 70–110)
POIKILOCYTOSIS BLD QL SMEAR: SLIGHT
POLYCHROMASIA BLD QL SMEAR: ABNORMAL
POTASSIUM SERPL-SCNC: 3.5 MMOL/L (ref 3.5–5.1)
PROT SERPL-MCNC: 6.6 G/DL (ref 6–8.4)
RBC # BLD AUTO: 2.72 M/UL (ref 4.6–6.2)
SMOOTH MUSCLE AB TITR SER IF: NORMAL {TITER}
SODIUM SERPL-SCNC: 127 MMOL/L (ref 136–145)
TARGETS BLD QL SMEAR: ABNORMAL
TOXIC GRANULES BLD QL SMEAR: PRESENT
WBC # BLD AUTO: 12.63 K/UL (ref 3.9–12.7)

## 2021-02-22 PROCEDURE — 84100 ASSAY OF PHOSPHORUS: CPT

## 2021-02-22 PROCEDURE — 86480 TB TEST CELL IMMUN MEASURE: CPT

## 2021-02-22 PROCEDURE — 99232 PR SUBSEQUENT HOSPITAL CARE,LEVL II: ICD-10-PCS | Mod: ,,, | Performed by: HOSPITALIST

## 2021-02-22 PROCEDURE — 99232 SBSQ HOSP IP/OBS MODERATE 35: CPT | Mod: ,,, | Performed by: HOSPITALIST

## 2021-02-22 PROCEDURE — 86644 CMV ANTIBODY: CPT

## 2021-02-22 PROCEDURE — 83036 HEMOGLOBIN GLYCOSYLATED A1C: CPT

## 2021-02-22 PROCEDURE — 25000003 PHARM REV CODE 250: Performed by: HOSPITALIST

## 2021-02-22 PROCEDURE — 86900 BLOOD TYPING SEROLOGIC ABO: CPT

## 2021-02-22 PROCEDURE — 86787 VARICELLA-ZOSTER ANTIBODY: CPT

## 2021-02-22 PROCEDURE — 83735 ASSAY OF MAGNESIUM: CPT

## 2021-02-22 PROCEDURE — 84153 ASSAY OF PSA TOTAL: CPT

## 2021-02-22 PROCEDURE — 86790 VIRUS ANTIBODY NOS: CPT

## 2021-02-22 PROCEDURE — 63600175 PHARM REV CODE 636 W HCPCS: Performed by: HOSPITALIST

## 2021-02-22 PROCEDURE — 80053 COMPREHEN METABOLIC PANEL: CPT

## 2021-02-22 PROCEDURE — 86682 HELMINTH ANTIBODY: CPT

## 2021-02-22 PROCEDURE — S4991 NICOTINE PATCH NONLEGEND: HCPCS | Performed by: HOSPITALIST

## 2021-02-22 PROCEDURE — 11000001 HC ACUTE MED/SURG PRIVATE ROOM

## 2021-02-22 PROCEDURE — 36415 COLL VENOUS BLD VENIPUNCTURE: CPT

## 2021-02-22 PROCEDURE — 85025 COMPLETE CBC W/AUTO DIFF WBC: CPT

## 2021-02-22 PROCEDURE — 86592 SYPHILIS TEST NON-TREP QUAL: CPT

## 2021-02-22 PROCEDURE — 99233 PR SUBSEQUENT HOSPITAL CARE,LEVL III: ICD-10-PCS | Mod: ,,, | Performed by: STUDENT IN AN ORGANIZED HEALTH CARE EDUCATION/TRAINING PROGRAM

## 2021-02-22 PROCEDURE — 86704 HEP B CORE ANTIBODY TOTAL: CPT

## 2021-02-22 PROCEDURE — 99233 SBSQ HOSP IP/OBS HIGH 50: CPT | Mod: ,,, | Performed by: STUDENT IN AN ORGANIZED HEALTH CARE EDUCATION/TRAINING PROGRAM

## 2021-02-22 RX ORDER — TRAMADOL HYDROCHLORIDE 50 MG/1
50 TABLET ORAL EVERY 8 HOURS PRN
Status: DISCONTINUED | OUTPATIENT
Start: 2021-02-22 | End: 2021-02-27 | Stop reason: HOSPADM

## 2021-02-22 RX ADMIN — MIDODRINE HYDROCHLORIDE 15 MG: 5 TABLET ORAL at 09:02

## 2021-02-22 RX ADMIN — LACTULOSE 20 G: 20 SOLUTION ORAL at 09:02

## 2021-02-22 RX ADMIN — RIFAXIMIN 550 MG: 550 TABLET ORAL at 09:02

## 2021-02-22 RX ADMIN — CEFTRIAXONE 2 G: 2 INJECTION, POWDER, FOR SOLUTION INTRAMUSCULAR; INTRAVENOUS at 10:02

## 2021-02-22 RX ADMIN — LACTULOSE 20 G: 20 SOLUTION ORAL at 03:02

## 2021-02-22 RX ADMIN — FOLIC ACID 1 MG: 1 TABLET ORAL at 09:02

## 2021-02-22 RX ADMIN — NICOTINE 1 PATCH: 14 PATCH TRANSDERMAL at 09:02

## 2021-02-22 RX ADMIN — SPIRONOLACTONE 100 MG: 100 TABLET ORAL at 09:02

## 2021-02-22 RX ADMIN — FUROSEMIDE 40 MG: 40 TABLET ORAL at 09:02

## 2021-02-22 RX ADMIN — MIDODRINE HYDROCHLORIDE 15 MG: 5 TABLET ORAL at 03:02

## 2021-02-22 RX ADMIN — Medication 100 MG: at 09:02

## 2021-02-23 PROBLEM — E87.1 HYPONATREMIA: Status: ACTIVE | Noted: 2021-02-23

## 2021-02-23 LAB
ALBUMIN SERPL BCP-MCNC: 2 G/DL (ref 3.5–5.2)
ALP SERPL-CCNC: 156 U/L (ref 55–135)
ALT SERPL W/O P-5'-P-CCNC: 49 U/L (ref 10–44)
ANION GAP SERPL CALC-SCNC: 8 MMOL/L (ref 8–16)
ANISOCYTOSIS BLD QL SMEAR: SLIGHT
ASCENDING AORTA: 3.14 CM
AST SERPL-CCNC: 109 U/L (ref 10–40)
BACTERIA BLD CULT: NORMAL
BASOPHILS # BLD AUTO: 0.1 K/UL (ref 0–0.2)
BASOPHILS NFR BLD: 0.6 % (ref 0–1.9)
BILIRUB SERPL-MCNC: 24.6 MG/DL (ref 0.1–1)
BSA FOR ECHO PROCEDURE: 2.12 M2
BUN SERPL-MCNC: 18 MG/DL (ref 6–20)
BURR CELLS BLD QL SMEAR: ABNORMAL
CALCIUM SERPL-MCNC: 8 MG/DL (ref 8.7–10.5)
CHLORIDE SERPL-SCNC: 87 MMOL/L (ref 95–110)
CMV IGG SERPL QL IA: NORMAL
CO2 SERPL-SCNC: 28 MMOL/L (ref 23–29)
CREAT SERPL-MCNC: 0.8 MG/DL (ref 0.5–1.4)
CV ECHO LV RWT: 0.38 CM
CV STRESS BASE HR: 85 BPM
DIASTOLIC BLOOD PRESSURE: 68 MMHG
DIFFERENTIAL METHOD: ABNORMAL
DOP CALC LVOT AREA: 3.7 CM2
DOP CALC LVOT DIAMETER: 2.16 CM
DOP CALC LVOT PEAK VEL: 1.17 M/S
DOP CALC LVOT STROKE VOLUME: 73.98 CM3
DOP CALCLVOT PEAK VEL VTI: 20.2 CM
E WAVE DECELERATION TIME: 258.31 MSEC
E/A RATIO: 1.22
E/E' RATIO: 6.2 M/S
ECHO LV POSTERIOR WALL: 0.88 CM (ref 0.6–1.1)
EOSINOPHIL # BLD AUTO: 0.1 K/UL (ref 0–0.5)
EOSINOPHIL NFR BLD: 0.6 % (ref 0–8)
ERYTHROCYTE [DISTWIDTH] IN BLOOD BY AUTOMATED COUNT: 18 % (ref 11.5–14.5)
EST. GFR  (AFRICAN AMERICAN): >60 ML/MIN/1.73 M^2
EST. GFR  (NON AFRICAN AMERICAN): >60 ML/MIN/1.73 M^2
ESTIMATED AVG GLUCOSE: ABNORMAL MG/DL (ref 68–131)
FRACTIONAL SHORTENING: 38 % (ref 28–44)
GLUCOSE SERPL-MCNC: 91 MG/DL (ref 70–110)
HBA1C MFR BLD: <4 % (ref 4–5.6)
HBV CORE AB SERPL QL IA: NEGATIVE
HCT VFR BLD AUTO: 27.6 % (ref 40–54)
HEPATITIS A ANTIBODY, IGG: NEGATIVE
HGB BLD-MCNC: 10.3 G/DL (ref 14–18)
HYPOCHROMIA BLD QL SMEAR: ABNORMAL
IMM GRANULOCYTES # BLD AUTO: 0.15 K/UL (ref 0–0.04)
IMM GRANULOCYTES NFR BLD AUTO: 0.9 % (ref 0–0.5)
INR PPP: 1.6 (ref 0.8–1.2)
INTERVENTRICULAR SEPTUM: 0.86 CM (ref 0.6–1.1)
IVRT: 117.03 MSEC
LA MAJOR: 5.37 CM
LA MINOR: 5.48 CM
LA WIDTH: 4.67 CM
LEFT ATRIUM SIZE: 4.3 CM
LEFT ATRIUM VOLUME INDEX: 43.5 ML/M2
LEFT ATRIUM VOLUME: 92.59 CM3
LEFT INTERNAL DIMENSION IN SYSTOLE: 2.92 CM (ref 2.1–4)
LEFT VENTRICLE DIASTOLIC VOLUME INDEX: 47.63 ML/M2
LEFT VENTRICLE DIASTOLIC VOLUME: 101.46 ML
LEFT VENTRICLE MASS INDEX: 64 G/M2
LEFT VENTRICLE SYSTOLIC VOLUME INDEX: 15.4 ML/M2
LEFT VENTRICLE SYSTOLIC VOLUME: 32.77 ML
LEFT VENTRICULAR INTERNAL DIMENSION IN DIASTOLE: 4.68 CM (ref 3.5–6)
LEFT VENTRICULAR MASS: 135.47 G
LV LATERAL E/E' RATIO: 5.64 M/S
LV SEPTAL E/E' RATIO: 6.89 M/S
LYMPHOCYTES # BLD AUTO: 1.9 K/UL (ref 1–4.8)
LYMPHOCYTES NFR BLD: 11.8 % (ref 18–48)
MAGNESIUM SERPL-MCNC: 1.8 MG/DL (ref 1.6–2.6)
MCH RBC QN AUTO: 39 PG (ref 27–31)
MCHC RBC AUTO-ENTMCNC: 37.3 G/DL (ref 32–36)
MCV RBC AUTO: 105 FL (ref 82–98)
MONOCYTES # BLD AUTO: 2.2 K/UL (ref 0.3–1)
MONOCYTES NFR BLD: 13.3 % (ref 4–15)
MV A" WAVE DURATION": 7.42 MSEC
MV PEAK A VEL: 0.51 M/S
MV PEAK E VEL: 0.62 M/S
MV STENOSIS PRESSURE HALF TIME: 74.91 MS
MV VALVE AREA P 1/2 METHOD: 2.94 CM2
NEUTROPHILS # BLD AUTO: 11.9 K/UL (ref 1.8–7.7)
NEUTROPHILS NFR BLD: 72.8 % (ref 38–73)
NRBC BLD-RTO: 0 /100 WBC
OHS CV CPX 1 MINUTE RECOVERY HEART RATE: 136 BPM
OHS CV CPX 85 PERCENT MAX PREDICTED HEART RATE MALE: 150
OHS CV CPX MAX PREDICTED HEART RATE: 177
OHS CV CPX PATIENT IS FEMALE: 0
OHS CV CPX PATIENT IS MALE: 1
OHS CV CPX PEAK DIASTOLIC BLOOD PRESSURE: 24 MMHG
OHS CV CPX PEAK HEAR RATE: 141 BPM
OHS CV CPX PEAK RATE PRESSURE PRODUCT: NORMAL
OHS CV CPX PEAK SYSTOLIC BLOOD PRESSURE: 79 MMHG
OHS CV CPX PERCENT MAX PREDICTED HEART RATE ACHIEVED: 80
OHS CV CPX RATE PRESSURE PRODUCT PRESENTING: NORMAL
OVALOCYTES BLD QL SMEAR: ABNORMAL
PHOSPHATE SERPL-MCNC: 3.7 MG/DL (ref 2.7–4.5)
PISA TR MAX VEL: 2.76 M/S
PLATELET # BLD AUTO: 81 K/UL (ref 150–350)
PMV BLD AUTO: 12 FL (ref 9.2–12.9)
POCT GLUCOSE: 119 MG/DL (ref 70–110)
POIKILOCYTOSIS BLD QL SMEAR: SLIGHT
POLYCHROMASIA BLD QL SMEAR: ABNORMAL
POTASSIUM SERPL-SCNC: 3.7 MMOL/L (ref 3.5–5.1)
PROT SERPL-MCNC: 6.4 G/DL (ref 6–8.4)
PROTHROMBIN TIME: 17.1 SEC (ref 9–12.5)
PULM VEIN S/D RATIO: 1.27
PV PEAK D VEL: 0.6 M/S
PV PEAK S VEL: 0.76 M/S
RA MAJOR: 4.96 CM
RA PRESSURE: 3 MMHG
RA WIDTH: 4.3 CM
RBC # BLD AUTO: 2.64 M/UL (ref 4.6–6.2)
RIGHT VENTRICULAR END-DIASTOLIC DIMENSION: 4.25 CM
RPR SER QL: NORMAL
RV TISSUE DOPPLER FREE WALL SYSTOLIC VELOCITY 1 (APICAL 4 CHAMBER VIEW): 17.24 CM/S
SINUS: 4.19 CM
SODIUM SERPL-SCNC: 123 MMOL/L (ref 136–145)
STJ: 2.81 CM
SYSTOLIC BLOOD PRESSURE: 123 MMHG
TARGETS BLD QL SMEAR: ABNORMAL
TDI LATERAL: 0.11 M/S
TDI SEPTAL: 0.09 M/S
TDI: 0.1 M/S
TR MAX PG: 30 MMHG
TRICUSPID ANNULAR PLANE SYSTOLIC EXCURSION: 2.7 CM
TV REST PULMONARY ARTERY PRESSURE: 33 MMHG
WBC # BLD AUTO: 16.4 K/UL (ref 3.9–12.7)

## 2021-02-23 PROCEDURE — 84100 ASSAY OF PHOSPHORUS: CPT

## 2021-02-23 PROCEDURE — S4991 NICOTINE PATCH NONLEGEND: HCPCS | Performed by: HOSPITALIST

## 2021-02-23 PROCEDURE — 85610 PROTHROMBIN TIME: CPT

## 2021-02-23 PROCEDURE — 36415 COLL VENOUS BLD VENIPUNCTURE: CPT

## 2021-02-23 PROCEDURE — 25000003 PHARM REV CODE 250: Performed by: HOSPITALIST

## 2021-02-23 PROCEDURE — 85025 COMPLETE CBC W/AUTO DIFF WBC: CPT

## 2021-02-23 PROCEDURE — 83735 ASSAY OF MAGNESIUM: CPT

## 2021-02-23 PROCEDURE — 11000001 HC ACUTE MED/SURG PRIVATE ROOM

## 2021-02-23 PROCEDURE — 80053 COMPREHEN METABOLIC PANEL: CPT

## 2021-02-23 PROCEDURE — 99232 SBSQ HOSP IP/OBS MODERATE 35: CPT | Mod: ,,, | Performed by: HOSPITALIST

## 2021-02-23 PROCEDURE — 63600175 PHARM REV CODE 636 W HCPCS: Performed by: HOSPITALIST

## 2021-02-23 PROCEDURE — 99232 PR SUBSEQUENT HOSPITAL CARE,LEVL II: ICD-10-PCS | Mod: ,,, | Performed by: HOSPITALIST

## 2021-02-23 RX ORDER — PANTOPRAZOLE SODIUM 40 MG/1
40 TABLET, DELAYED RELEASE ORAL DAILY
COMMUNITY
Start: 2021-02-15 | End: 2021-03-05 | Stop reason: SDUPTHER

## 2021-02-23 RX ORDER — DOBUTAMINE HYDROCHLORIDE 400 MG/100ML
10 INJECTION INTRAVENOUS
Status: COMPLETED | OUTPATIENT
Start: 2021-02-23 | End: 2021-02-23

## 2021-02-23 RX ORDER — ATROPINE SULFATE 0.1 MG/ML
1 INJECTION INTRAVENOUS
Status: COMPLETED | OUTPATIENT
Start: 2021-02-23 | End: 2021-02-23

## 2021-02-23 RX ORDER — FUROSEMIDE 40 MG/1
40 TABLET ORAL DAILY
Status: ON HOLD | COMMUNITY
Start: 2021-02-15 | End: 2021-03-12 | Stop reason: SDUPTHER

## 2021-02-23 RX ORDER — SPIRONOLACTONE 100 MG/1
100 TABLET, FILM COATED ORAL DAILY
Status: ON HOLD | COMMUNITY
Start: 2021-02-15 | End: 2021-04-19 | Stop reason: HOSPADM

## 2021-02-23 RX ORDER — TIZANIDINE 4 MG/1
4 TABLET ORAL 3 TIMES DAILY
Status: ON HOLD | COMMUNITY
Start: 2021-02-01 | End: 2021-04-22 | Stop reason: HOSPADM

## 2021-02-23 RX ORDER — LORAZEPAM 1 MG/1
TABLET ORAL
Status: ON HOLD | COMMUNITY
End: 2021-03-12 | Stop reason: HOSPADM

## 2021-02-23 RX ORDER — PREDNISOLONE SODIUM PHOSPHATE 15 MG/5ML
SOLUTION ORAL
Status: ON HOLD | COMMUNITY
Start: 2021-02-15 | End: 2021-02-27 | Stop reason: HOSPADM

## 2021-02-23 RX ORDER — FOLIC ACID 1 MG/1
1000 TABLET ORAL DAILY
COMMUNITY
Start: 2021-02-15 | End: 2021-03-05 | Stop reason: SDUPTHER

## 2021-02-23 RX ADMIN — CEFTRIAXONE 2 G: 2 INJECTION, POWDER, FOR SOLUTION INTRAMUSCULAR; INTRAVENOUS at 01:02

## 2021-02-23 RX ADMIN — LACTULOSE 20 G: 20 SOLUTION ORAL at 10:02

## 2021-02-23 RX ADMIN — MIDODRINE HYDROCHLORIDE 15 MG: 5 TABLET ORAL at 04:02

## 2021-02-23 RX ADMIN — FOLIC ACID 1 MG: 1 TABLET ORAL at 09:02

## 2021-02-23 RX ADMIN — ATROPINE SULFATE 1 MG: 0.1 INJECTION PARENTERAL at 03:02

## 2021-02-23 RX ADMIN — MIDODRINE HYDROCHLORIDE 15 MG: 5 TABLET ORAL at 09:02

## 2021-02-23 RX ADMIN — RIFAXIMIN 550 MG: 550 TABLET ORAL at 09:02

## 2021-02-23 RX ADMIN — DOBUTAMINE HYDROCHLORIDE 10 MCG/KG/MIN: 400 INJECTION INTRAVENOUS at 03:02

## 2021-02-23 RX ADMIN — RIFAXIMIN 550 MG: 550 TABLET ORAL at 10:02

## 2021-02-23 RX ADMIN — MIDODRINE HYDROCHLORIDE 15 MG: 5 TABLET ORAL at 10:02

## 2021-02-23 RX ADMIN — LACTULOSE 20 G: 20 SOLUTION ORAL at 09:02

## 2021-02-23 RX ADMIN — NICOTINE 1 PATCH: 14 PATCH TRANSDERMAL at 09:02

## 2021-02-23 RX ADMIN — TRAMADOL HYDROCHLORIDE 50 MG: 50 TABLET, COATED ORAL at 01:02

## 2021-02-23 RX ADMIN — Medication 100 MG: at 09:02

## 2021-02-23 RX ADMIN — LACTULOSE 20 G: 20 SOLUTION ORAL at 04:02

## 2021-02-24 ENCOUNTER — DOCUMENTATION ONLY (OUTPATIENT)
Dept: TRANSPLANT | Facility: CLINIC | Age: 41
End: 2021-02-24

## 2021-02-24 ENCOUNTER — TELEPHONE (OUTPATIENT)
Dept: TRANSPLANT | Facility: HOSPITAL | Age: 41
End: 2021-02-24

## 2021-02-24 LAB
ABO + RH BLD: NORMAL
ALBUMIN SERPL BCP-MCNC: 1.9 G/DL (ref 3.5–5.2)
ALP SERPL-CCNC: 162 U/L (ref 55–135)
ALT SERPL W/O P-5'-P-CCNC: 53 U/L (ref 10–44)
ANION GAP SERPL CALC-SCNC: 8 MMOL/L (ref 8–16)
AST SERPL-CCNC: 113 U/L (ref 10–40)
BACTERIA BLD CULT: NORMAL
BASOPHILS # BLD AUTO: 0.08 K/UL (ref 0–0.2)
BASOPHILS NFR BLD: 0.5 % (ref 0–1.9)
BILIRUB SERPL-MCNC: 24.6 MG/DL (ref 0.1–1)
BLD GP AB SCN CELLS X3 SERPL QL: NORMAL
BUN SERPL-MCNC: 21 MG/DL (ref 6–20)
CALCIUM SERPL-MCNC: 8.5 MG/DL (ref 8.7–10.5)
CHLORIDE SERPL-SCNC: 86 MMOL/L (ref 95–110)
CO2 SERPL-SCNC: 28 MMOL/L (ref 23–29)
CREAT SERPL-MCNC: 0.7 MG/DL (ref 0.5–1.4)
CRP SERPL-MCNC: 31.9 MG/L (ref 0–8.2)
DIFFERENTIAL METHOD: ABNORMAL
EOSINOPHIL # BLD AUTO: 0.1 K/UL (ref 0–0.5)
EOSINOPHIL NFR BLD: 0.6 % (ref 0–8)
ERYTHROCYTE [DISTWIDTH] IN BLOOD BY AUTOMATED COUNT: 17.7 % (ref 11.5–14.5)
ERYTHROCYTE [SEDIMENTATION RATE] IN BLOOD BY WESTERGREN METHOD: 69 MM/HR (ref 0–23)
EST. GFR  (AFRICAN AMERICAN): >60 ML/MIN/1.73 M^2
EST. GFR  (NON AFRICAN AMERICAN): >60 ML/MIN/1.73 M^2
GAMMA INTERFERON BACKGROUND BLD IA-ACNC: 0.04 IU/ML
GLUCOSE SERPL-MCNC: 112 MG/DL (ref 70–110)
HCT VFR BLD AUTO: 27.2 % (ref 40–54)
HGB BLD-MCNC: 9.8 G/DL (ref 14–18)
IMM GRANULOCYTES # BLD AUTO: 0.17 K/UL (ref 0–0.04)
IMM GRANULOCYTES NFR BLD AUTO: 1.1 % (ref 0–0.5)
INR PPP: 1.5 (ref 0.8–1.2)
LYMPHOCYTES # BLD AUTO: 1.8 K/UL (ref 1–4.8)
LYMPHOCYTES NFR BLD: 11.8 % (ref 18–48)
M TB IFN-G CD4+ BCKGRND COR BLD-ACNC: 0 IU/ML
MCH RBC QN AUTO: 38.1 PG (ref 27–31)
MCHC RBC AUTO-ENTMCNC: 36 G/DL (ref 32–36)
MCV RBC AUTO: 106 FL (ref 82–98)
MITOGEN IGNF BCKGRD COR BLD-ACNC: 0.01 IU/ML
MONOCYTES # BLD AUTO: 1.9 K/UL (ref 0.3–1)
MONOCYTES NFR BLD: 12.9 % (ref 4–15)
NEUTROPHILS # BLD AUTO: 11 K/UL (ref 1.8–7.7)
NEUTROPHILS NFR BLD: 73.1 % (ref 38–73)
NRBC BLD-RTO: 0 /100 WBC
PLATELET # BLD AUTO: 90 K/UL (ref 150–350)
PMV BLD AUTO: 12.4 FL (ref 9.2–12.9)
POTASSIUM SERPL-SCNC: 3.8 MMOL/L (ref 3.5–5.1)
PROT SERPL-MCNC: 6.6 G/DL (ref 6–8.4)
PROTHROMBIN TIME: 15.7 SEC (ref 9–12.5)
RBC # BLD AUTO: 2.57 M/UL (ref 4.6–6.2)
SODIUM SERPL-SCNC: 122 MMOL/L (ref 136–145)
TB GOLD PLUS: ABNORMAL
TB2 - NIL: 0 IU/ML
VARICELLA INTERPRETATION: NEGATIVE
VARICELLA ZOSTER IGG: 0.53 ISR (ref 0–0.9)
WBC # BLD AUTO: 15.03 K/UL (ref 3.9–12.7)

## 2021-02-24 PROCEDURE — 11000001 HC ACUTE MED/SURG PRIVATE ROOM

## 2021-02-24 PROCEDURE — S4991 NICOTINE PATCH NONLEGEND: HCPCS | Performed by: HOSPITALIST

## 2021-02-24 PROCEDURE — 85025 COMPLETE CBC W/AUTO DIFF WBC: CPT

## 2021-02-24 PROCEDURE — 80053 COMPREHEN METABOLIC PANEL: CPT

## 2021-02-24 PROCEDURE — 99232 PR SUBSEQUENT HOSPITAL CARE,LEVL II: ICD-10-PCS | Mod: ,,, | Performed by: HOSPITALIST

## 2021-02-24 PROCEDURE — 25000003 PHARM REV CODE 250: Performed by: HOSPITALIST

## 2021-02-24 PROCEDURE — 85652 RBC SED RATE AUTOMATED: CPT

## 2021-02-24 PROCEDURE — 99232 SBSQ HOSP IP/OBS MODERATE 35: CPT | Mod: ,,, | Performed by: HOSPITALIST

## 2021-02-24 PROCEDURE — 85610 PROTHROMBIN TIME: CPT

## 2021-02-24 PROCEDURE — 36415 COLL VENOUS BLD VENIPUNCTURE: CPT

## 2021-02-24 PROCEDURE — 86900 BLOOD TYPING SEROLOGIC ABO: CPT

## 2021-02-24 PROCEDURE — 86140 C-REACTIVE PROTEIN: CPT

## 2021-02-24 PROCEDURE — 63600175 PHARM REV CODE 636 W HCPCS: Performed by: HOSPITALIST

## 2021-02-24 RX ORDER — CIPROFLOXACIN 500 MG/1
500 TABLET ORAL EVERY 24 HOURS
Status: DISCONTINUED | OUTPATIENT
Start: 2021-02-25 | End: 2021-02-26

## 2021-02-24 RX ORDER — ONDANSETRON 4 MG/1
4 TABLET, FILM COATED ORAL EVERY 8 HOURS PRN
Status: DISCONTINUED | OUTPATIENT
Start: 2021-02-24 | End: 2021-02-27 | Stop reason: HOSPADM

## 2021-02-24 RX ORDER — LACTULOSE 10 G/15ML
30 SOLUTION ORAL 3 TIMES DAILY
Status: DISCONTINUED | OUTPATIENT
Start: 2021-02-24 | End: 2021-02-25

## 2021-02-24 RX ADMIN — MIDODRINE HYDROCHLORIDE 15 MG: 5 TABLET ORAL at 09:02

## 2021-02-24 RX ADMIN — Medication 100 MG: at 09:02

## 2021-02-24 RX ADMIN — RIFAXIMIN 550 MG: 550 TABLET ORAL at 09:02

## 2021-02-24 RX ADMIN — LACTULOSE 30 G: 20 SOLUTION ORAL at 09:02

## 2021-02-24 RX ADMIN — LACTULOSE 20 G: 20 SOLUTION ORAL at 09:02

## 2021-02-24 RX ADMIN — MIDODRINE HYDROCHLORIDE 15 MG: 5 TABLET ORAL at 05:02

## 2021-02-24 RX ADMIN — CEFTRIAXONE 2 G: 2 INJECTION, POWDER, FOR SOLUTION INTRAMUSCULAR; INTRAVENOUS at 11:02

## 2021-02-24 RX ADMIN — LACTULOSE 30 G: 20 SOLUTION ORAL at 05:02

## 2021-02-24 RX ADMIN — ACETAMINOPHEN 500 MG: 500 TABLET ORAL at 03:02

## 2021-02-24 RX ADMIN — FOLIC ACID 1 MG: 1 TABLET ORAL at 09:02

## 2021-02-24 RX ADMIN — NICOTINE 1 PATCH: 14 PATCH TRANSDERMAL at 09:02

## 2021-02-25 LAB
ALBUMIN SERPL BCP-MCNC: 2.1 G/DL (ref 3.5–5.2)
ALBUMIN SERPL BCP-MCNC: 2.2 G/DL (ref 3.5–5.2)
ALP SERPL-CCNC: 156 U/L (ref 55–135)
ALP SERPL-CCNC: 168 U/L (ref 55–135)
ALT SERPL W/O P-5'-P-CCNC: 52 U/L (ref 10–44)
ALT SERPL W/O P-5'-P-CCNC: 53 U/L (ref 10–44)
ANION GAP SERPL CALC-SCNC: 12 MMOL/L (ref 8–16)
ANION GAP SERPL CALC-SCNC: 9 MMOL/L (ref 8–16)
AST SERPL-CCNC: 121 U/L (ref 10–40)
AST SERPL-CCNC: 130 U/L (ref 10–40)
BACTERIA BLD CULT: NORMAL
BACTERIA BLD CULT: NORMAL
BASOPHILS # BLD AUTO: 0.08 K/UL (ref 0–0.2)
BASOPHILS NFR BLD: 0.5 % (ref 0–1.9)
BILIRUB SERPL-MCNC: 24.9 MG/DL (ref 0.1–1)
BILIRUB SERPL-MCNC: 25.3 MG/DL (ref 0.1–1)
BUN SERPL-MCNC: 24 MG/DL (ref 6–20)
BUN SERPL-MCNC: 24 MG/DL (ref 6–20)
CALCIUM SERPL-MCNC: 8.4 MG/DL (ref 8.7–10.5)
CALCIUM SERPL-MCNC: 8.7 MG/DL (ref 8.7–10.5)
CHLORIDE SERPL-SCNC: 87 MMOL/L (ref 95–110)
CHLORIDE SERPL-SCNC: 88 MMOL/L (ref 95–110)
CO2 SERPL-SCNC: 25 MMOL/L (ref 23–29)
CO2 SERPL-SCNC: 28 MMOL/L (ref 23–29)
CREAT SERPL-MCNC: 0.9 MG/DL (ref 0.5–1.4)
CREAT SERPL-MCNC: 1 MG/DL (ref 0.5–1.4)
CV STRESS BASE HR: 77 BPM
DIASTOLIC BLOOD PRESSURE: 76 MMHG
DIFFERENTIAL METHOD: ABNORMAL
EOSINOPHIL # BLD AUTO: 0.1 K/UL (ref 0–0.5)
EOSINOPHIL NFR BLD: 0.7 % (ref 0–8)
ERYTHROCYTE [DISTWIDTH] IN BLOOD BY AUTOMATED COUNT: 17.5 % (ref 11.5–14.5)
EST. GFR  (AFRICAN AMERICAN): >60 ML/MIN/1.73 M^2
EST. GFR  (AFRICAN AMERICAN): >60 ML/MIN/1.73 M^2
EST. GFR  (NON AFRICAN AMERICAN): >60 ML/MIN/1.73 M^2
EST. GFR  (NON AFRICAN AMERICAN): >60 ML/MIN/1.73 M^2
GLUCOSE SERPL-MCNC: 100 MG/DL (ref 70–110)
GLUCOSE SERPL-MCNC: 102 MG/DL (ref 70–110)
HCT VFR BLD AUTO: 27.7 % (ref 40–54)
HGB BLD-MCNC: 10.2 G/DL (ref 14–18)
IMM GRANULOCYTES # BLD AUTO: 0.15 K/UL (ref 0–0.04)
IMM GRANULOCYTES NFR BLD AUTO: 1 % (ref 0–0.5)
INR PPP: 1.5 (ref 0.8–1.2)
LACTATE SERPL-SCNC: 1.7 MMOL/L (ref 0.5–2.2)
LYMPHOCYTES # BLD AUTO: 1.8 K/UL (ref 1–4.8)
LYMPHOCYTES NFR BLD: 12.1 % (ref 18–48)
MCH RBC QN AUTO: 39.1 PG (ref 27–31)
MCHC RBC AUTO-ENTMCNC: 36.8 G/DL (ref 32–36)
MCV RBC AUTO: 106 FL (ref 82–98)
MONOCYTES # BLD AUTO: 1.8 K/UL (ref 0.3–1)
MONOCYTES NFR BLD: 12.2 % (ref 4–15)
NEUTROPHILS # BLD AUTO: 10.9 K/UL (ref 1.8–7.7)
NEUTROPHILS NFR BLD: 73.5 % (ref 38–73)
NRBC BLD-RTO: 0 /100 WBC
OHS CV CPX 1 MINUTE RECOVERY HEART RATE: 89 BPM
OHS CV CPX 85 PERCENT MAX PREDICTED HEART RATE MALE: 150
OHS CV CPX MAX PREDICTED HEART RATE: 177
OHS CV CPX PATIENT IS FEMALE: 0
OHS CV CPX PATIENT IS MALE: 1
OHS CV CPX PEAK DIASTOLIC BLOOD PRESSURE: 76 MMHG
OHS CV CPX PEAK HEAR RATE: 88 BPM
OHS CV CPX PEAK RATE PRESSURE PRODUCT: NORMAL
OHS CV CPX PEAK SYSTOLIC BLOOD PRESSURE: 131 MMHG
OHS CV CPX PERCENT MAX PREDICTED HEART RATE ACHIEVED: 50
OHS CV CPX RATE PRESSURE PRODUCT PRESENTING: NORMAL
PLATELET # BLD AUTO: 117 K/UL (ref 150–350)
PMV BLD AUTO: 11.2 FL (ref 9.2–12.9)
POTASSIUM SERPL-SCNC: 3.6 MMOL/L (ref 3.5–5.1)
POTASSIUM SERPL-SCNC: 3.7 MMOL/L (ref 3.5–5.1)
PROT SERPL-MCNC: 6.6 G/DL (ref 6–8.4)
PROT SERPL-MCNC: 6.6 G/DL (ref 6–8.4)
PROTHROMBIN TIME: 16.1 SEC (ref 9–12.5)
RBC # BLD AUTO: 2.61 M/UL (ref 4.6–6.2)
SODIUM SERPL-SCNC: 124 MMOL/L (ref 136–145)
SODIUM SERPL-SCNC: 125 MMOL/L (ref 136–145)
SYSTOLIC BLOOD PRESSURE: 131 MMHG
WBC # BLD AUTO: 14.88 K/UL (ref 3.9–12.7)

## 2021-02-25 PROCEDURE — 83605 ASSAY OF LACTIC ACID: CPT | Mod: NTX

## 2021-02-25 PROCEDURE — 20600001 HC STEP DOWN PRIVATE ROOM: Mod: NTX

## 2021-02-25 PROCEDURE — 81001 URINALYSIS AUTO W/SCOPE: CPT | Mod: NTX

## 2021-02-25 PROCEDURE — 99223 1ST HOSP IP/OBS HIGH 75: CPT | Mod: NTX,,, | Performed by: PHYSICIAN ASSISTANT

## 2021-02-25 PROCEDURE — 81256 HFE GENE: CPT | Mod: NTX

## 2021-02-25 PROCEDURE — 25000003 PHARM REV CODE 250: Performed by: HOSPITALIST

## 2021-02-25 PROCEDURE — 25000003 PHARM REV CODE 250: Mod: NTX | Performed by: HOSPITALIST

## 2021-02-25 PROCEDURE — 87040 BLOOD CULTURE FOR BACTERIA: CPT | Mod: 59,NTX

## 2021-02-25 PROCEDURE — 99233 PR SUBSEQUENT HOSPITAL CARE,LEVL III: ICD-10-PCS | Mod: ,,, | Performed by: HOSPITALIST

## 2021-02-25 PROCEDURE — 86481 TB AG RESPONSE T-CELL SUSP: CPT | Mod: NTX

## 2021-02-25 PROCEDURE — 85610 PROTHROMBIN TIME: CPT | Mod: NTX

## 2021-02-25 PROCEDURE — 80053 COMPREHEN METABOLIC PANEL: CPT | Mod: NTX

## 2021-02-25 PROCEDURE — S4991 NICOTINE PATCH NONLEGEND: HCPCS | Performed by: HOSPITALIST

## 2021-02-25 PROCEDURE — 99223 PR INITIAL HOSPITAL CARE,LEVL III: ICD-10-PCS | Mod: NTX,,, | Performed by: PHYSICIAN ASSISTANT

## 2021-02-25 PROCEDURE — 36415 COLL VENOUS BLD VENIPUNCTURE: CPT | Mod: NTX

## 2021-02-25 PROCEDURE — 86481 TB AG RESPONSE T-CELL SUSP: CPT | Mod: 91,NTX

## 2021-02-25 PROCEDURE — 80053 COMPREHEN METABOLIC PANEL: CPT | Mod: 91,NTX

## 2021-02-25 PROCEDURE — 85025 COMPLETE CBC W/AUTO DIFF WBC: CPT | Mod: NTX

## 2021-02-25 PROCEDURE — 99233 SBSQ HOSP IP/OBS HIGH 50: CPT | Mod: ,,, | Performed by: HOSPITALIST

## 2021-02-25 PROCEDURE — 63600175 PHARM REV CODE 636 W HCPCS: Performed by: HOSPITALIST

## 2021-02-25 RX ORDER — LACTULOSE 10 G/15ML
15 SOLUTION ORAL 3 TIMES DAILY
Status: DISCONTINUED | OUTPATIENT
Start: 2021-02-25 | End: 2021-02-26

## 2021-02-25 RX ORDER — REGADENOSON 0.08 MG/ML
0.4 INJECTION, SOLUTION INTRAVENOUS ONCE
Status: COMPLETED | OUTPATIENT
Start: 2021-02-25 | End: 2021-02-25

## 2021-02-25 RX ADMIN — NICOTINE 1 PATCH: 14 PATCH TRANSDERMAL at 12:02

## 2021-02-25 RX ADMIN — ACETAMINOPHEN 500 MG: 500 TABLET ORAL at 01:02

## 2021-02-25 RX ADMIN — REGADENOSON 0.4 MG: 0.08 INJECTION, SOLUTION INTRAVENOUS at 10:02

## 2021-02-25 RX ADMIN — MIDODRINE HYDROCHLORIDE 15 MG: 5 TABLET ORAL at 02:02

## 2021-02-25 RX ADMIN — Medication 100 MG: at 12:02

## 2021-02-25 RX ADMIN — LACTULOSE 15 G: 20 SOLUTION ORAL at 09:02

## 2021-02-25 RX ADMIN — FOLIC ACID 1 MG: 1 TABLET ORAL at 12:02

## 2021-02-25 RX ADMIN — MIDODRINE HYDROCHLORIDE 15 MG: 5 TABLET ORAL at 09:02

## 2021-02-25 RX ADMIN — LACTULOSE 15 G: 20 SOLUTION ORAL at 02:02

## 2021-02-25 RX ADMIN — CIPROFLOXACIN 500 MG: 500 TABLET, FILM COATED ORAL at 12:02

## 2021-02-26 DIAGNOSIS — Z01.818 PRE-TRANSPLANT EVALUATION FOR CHRONIC LIVER DISEASE: Primary | ICD-10-CM

## 2021-02-26 PROBLEM — J98.4 CAVITARY LESION OF LUNG: Status: ACTIVE | Noted: 2021-02-26

## 2021-02-26 LAB
ALBUMIN SERPL BCP-MCNC: 2.3 G/DL (ref 3.5–5.2)
ALP SERPL-CCNC: 161 U/L (ref 55–135)
ALT SERPL W/O P-5'-P-CCNC: 54 U/L (ref 10–44)
AMORPH CRY UR QL COMP ASSIST: ABNORMAL
ANION GAP SERPL CALC-SCNC: 7 MMOL/L (ref 8–16)
ANISOCYTOSIS BLD QL SMEAR: SLIGHT
AST SERPL-CCNC: 129 U/L (ref 10–40)
BACTERIA #/AREA URNS AUTO: ABNORMAL /HPF
BASOPHILS # BLD AUTO: 0.1 K/UL (ref 0–0.2)
BASOPHILS NFR BLD: 0.7 % (ref 0–1.9)
BILIRUB SERPL-MCNC: 24.6 MG/DL (ref 0.1–1)
BILIRUB UR QL STRIP: ABNORMAL
BUN SERPL-MCNC: 32 MG/DL (ref 6–20)
CALCIUM SERPL-MCNC: 8.8 MG/DL (ref 8.7–10.5)
CHLORIDE SERPL-SCNC: 91 MMOL/L (ref 95–110)
CLARITY UR REFRACT.AUTO: ABNORMAL
CO2 SERPL-SCNC: 30 MMOL/L (ref 23–29)
COLOR UR AUTO: ABNORMAL
CREAT SERPL-MCNC: 1.3 MG/DL (ref 0.5–1.4)
DIFFERENTIAL METHOD: ABNORMAL
EOSINOPHIL # BLD AUTO: 0.1 K/UL (ref 0–0.5)
EOSINOPHIL NFR BLD: 0.6 % (ref 0–8)
ERYTHROCYTE [DISTWIDTH] IN BLOOD BY AUTOMATED COUNT: 17.5 % (ref 11.5–14.5)
EST. GFR  (AFRICAN AMERICAN): >60 ML/MIN/1.73 M^2
EST. GFR  (NON AFRICAN AMERICAN): >60 ML/MIN/1.73 M^2
GLUCOSE SERPL-MCNC: 114 MG/DL (ref 70–110)
GLUCOSE UR QL STRIP: ABNORMAL
HCT VFR BLD AUTO: 27.8 % (ref 40–54)
HGB BLD-MCNC: 10 G/DL (ref 14–18)
HGB UR QL STRIP: NEGATIVE
HYALINE CASTS UR QL AUTO: 0 /LPF
HYPOCHROMIA BLD QL SMEAR: ABNORMAL
IMM GRANULOCYTES # BLD AUTO: 0.2 K/UL (ref 0–0.04)
IMM GRANULOCYTES NFR BLD AUTO: 1.4 % (ref 0–0.5)
INR PPP: 1.6 (ref 0.8–1.2)
KETONES UR QL STRIP: NEGATIVE
LEUKOCYTE ESTERASE UR QL STRIP: NEGATIVE
LYMPHOCYTES # BLD AUTO: 1.7 K/UL (ref 1–4.8)
LYMPHOCYTES NFR BLD: 11.6 % (ref 18–48)
MCH RBC QN AUTO: 39.1 PG (ref 27–31)
MCHC RBC AUTO-ENTMCNC: 36 G/DL (ref 32–36)
MCV RBC AUTO: 109 FL (ref 82–98)
MICROSCOPIC COMMENT: ABNORMAL
MONOCYTES # BLD AUTO: 2.1 K/UL (ref 0.3–1)
MONOCYTES NFR BLD: 14.7 % (ref 4–15)
NEUTROPHILS # BLD AUTO: 10.1 K/UL (ref 1.8–7.7)
NEUTROPHILS NFR BLD: 71 % (ref 38–73)
NITRITE UR QL STRIP: NEGATIVE
NRBC BLD-RTO: 0 /100 WBC
OVALOCYTES BLD QL SMEAR: ABNORMAL
PH UR STRIP: 5 [PH] (ref 5–8)
PHOSPHATIDYLETHANOL (PETH): 421 NG/ML
PLATELET # BLD AUTO: 130 K/UL (ref 150–350)
PLATELET BLD QL SMEAR: ABNORMAL
PMV BLD AUTO: 11 FL (ref 9.2–12.9)
POIKILOCYTOSIS BLD QL SMEAR: SLIGHT
POLYCHROMASIA BLD QL SMEAR: ABNORMAL
POTASSIUM SERPL-SCNC: 4.7 MMOL/L (ref 3.5–5.1)
PROT SERPL-MCNC: 7.2 G/DL (ref 6–8.4)
PROT UR QL STRIP: ABNORMAL
PROTHROMBIN TIME: 16.7 SEC (ref 9–12.5)
RBC # BLD AUTO: 2.56 M/UL (ref 4.6–6.2)
RBC #/AREA URNS AUTO: 0 /HPF (ref 0–4)
SODIUM SERPL-SCNC: 128 MMOL/L (ref 136–145)
SP GR UR STRIP: 1.03 (ref 1–1.03)
SQUAMOUS #/AREA URNS AUTO: 1 /HPF
STRONGYLOIDES ANTIBODY IGG: NEGATIVE
TARGETS BLD QL SMEAR: ABNORMAL
URN SPEC COLLECT METH UR: ABNORMAL
WBC # BLD AUTO: 14.18 K/UL (ref 3.9–12.7)
WBC #/AREA URNS AUTO: 0 /HPF (ref 0–5)

## 2021-02-26 PROCEDURE — 87449 NOS EACH ORGANISM AG IA: CPT | Mod: NTX

## 2021-02-26 PROCEDURE — 86777 TOXOPLASMA ANTIBODY: CPT | Mod: NTX

## 2021-02-26 PROCEDURE — 99222 PR INITIAL HOSPITAL CARE,LEVL II: ICD-10-PCS | Mod: NTX,,, | Performed by: INTERNAL MEDICINE

## 2021-02-26 PROCEDURE — 25000003 PHARM REV CODE 250: Mod: NTX | Performed by: INTERNAL MEDICINE

## 2021-02-26 PROCEDURE — 99233 PR SUBSEQUENT HOSPITAL CARE,LEVL III: ICD-10-PCS | Mod: NTX,,, | Performed by: HOSPITALIST

## 2021-02-26 PROCEDURE — 99232 PR SUBSEQUENT HOSPITAL CARE,LEVL II: ICD-10-PCS | Mod: AF,HB,NTX, | Performed by: PSYCHIATRY & NEUROLOGY

## 2021-02-26 PROCEDURE — 25000003 PHARM REV CODE 250: Mod: NTX | Performed by: HOSPITALIST

## 2021-02-26 PROCEDURE — 90670 PCV13 VACCINE IM: CPT | Mod: NTX | Performed by: PHYSICIAN ASSISTANT

## 2021-02-26 PROCEDURE — 86403 PARTICLE AGGLUT ANTBDY SCRN: CPT | Mod: NTX

## 2021-02-26 PROCEDURE — 90471 IMMUNIZATION ADMIN: CPT | Mod: NTX | Performed by: PHYSICIAN ASSISTANT

## 2021-02-26 PROCEDURE — 99233 SBSQ HOSP IP/OBS HIGH 50: CPT | Mod: NTX,,, | Performed by: HOSPITALIST

## 2021-02-26 PROCEDURE — 87070 CULTURE OTHR SPECIMN AEROBIC: CPT | Mod: NTX

## 2021-02-26 PROCEDURE — 87385 HISTOPLASMA CAPSUL AG IA: CPT | Mod: NTX

## 2021-02-26 PROCEDURE — 20600001 HC STEP DOWN PRIVATE ROOM: Mod: NTX

## 2021-02-26 PROCEDURE — 36415 COLL VENOUS BLD VENIPUNCTURE: CPT | Mod: NTX

## 2021-02-26 PROCEDURE — 87385 HISTOPLASMA CAPSUL AG IA: CPT | Mod: 91,NTX

## 2021-02-26 PROCEDURE — 85610 PROTHROMBIN TIME: CPT | Mod: NTX

## 2021-02-26 PROCEDURE — 87449 NOS EACH ORGANISM AG IA: CPT | Mod: 91,NTX

## 2021-02-26 PROCEDURE — 99232 SBSQ HOSP IP/OBS MODERATE 35: CPT | Mod: AF,HB,NTX, | Performed by: PSYCHIATRY & NEUROLOGY

## 2021-02-26 PROCEDURE — S4991 NICOTINE PATCH NONLEGEND: HCPCS | Mod: NTX | Performed by: HOSPITALIST

## 2021-02-26 PROCEDURE — 99222 1ST HOSP IP/OBS MODERATE 55: CPT | Mod: NTX,,, | Performed by: INTERNAL MEDICINE

## 2021-02-26 PROCEDURE — 80053 COMPREHEN METABOLIC PANEL: CPT | Mod: NTX

## 2021-02-26 PROCEDURE — 87205 SMEAR GRAM STAIN: CPT | Mod: NTX

## 2021-02-26 PROCEDURE — 85025 COMPLETE CBC W/AUTO DIFF WBC: CPT | Mod: NTX

## 2021-02-26 PROCEDURE — 87556 M.TUBERCULO DNA AMP PROBE: CPT | Mod: NTX

## 2021-02-26 PROCEDURE — 63600175 PHARM REV CODE 636 W HCPCS: Mod: NTX | Performed by: PHYSICIAN ASSISTANT

## 2021-02-26 RX ORDER — LACTULOSE 10 G/15ML
30 SOLUTION ORAL 3 TIMES DAILY
Status: DISCONTINUED | OUTPATIENT
Start: 2021-02-26 | End: 2021-02-27 | Stop reason: HOSPADM

## 2021-02-26 RX ORDER — AMOXICILLIN AND CLAVULANATE POTASSIUM 875; 125 MG/1; MG/1
1 TABLET, FILM COATED ORAL EVERY 12 HOURS
Status: DISCONTINUED | OUTPATIENT
Start: 2021-02-27 | End: 2021-02-27 | Stop reason: HOSPADM

## 2021-02-26 RX ORDER — HYDROXYZINE HYDROCHLORIDE 25 MG/1
25 TABLET, FILM COATED ORAL ONCE
Status: COMPLETED | OUTPATIENT
Start: 2021-02-26 | End: 2021-02-26

## 2021-02-26 RX ADMIN — MIDODRINE HYDROCHLORIDE 15 MG: 5 TABLET ORAL at 02:02

## 2021-02-26 RX ADMIN — PNEUMOCOCCAL 13-VALENT CONJUGATE VACCINE 0.5 ML: 2.2; 2.2; 2.2; 2.2; 2.2; 4.4; 2.2; 2.2; 2.2; 2.2; 2.2; 2.2; 2.2 INJECTION, SUSPENSION INTRAMUSCULAR at 03:02

## 2021-02-26 RX ADMIN — FOLIC ACID 1 MG: 1 TABLET ORAL at 08:02

## 2021-02-26 RX ADMIN — LACTULOSE 30 G: 20 SOLUTION ORAL at 08:02

## 2021-02-26 RX ADMIN — Medication 100 MG: at 08:02

## 2021-02-26 RX ADMIN — MIDODRINE HYDROCHLORIDE 15 MG: 5 TABLET ORAL at 08:02

## 2021-02-26 RX ADMIN — NICOTINE 1 PATCH: 14 PATCH TRANSDERMAL at 08:02

## 2021-02-26 RX ADMIN — HYDROXYZINE HYDROCHLORIDE 25 MG: 25 TABLET, FILM COATED ORAL at 04:02

## 2021-02-26 RX ADMIN — CIPROFLOXACIN 500 MG: 500 TABLET, FILM COATED ORAL at 08:02

## 2021-02-26 RX ADMIN — LACTULOSE 15 G: 20 SOLUTION ORAL at 08:02

## 2021-02-26 RX ADMIN — LACTULOSE 30 G: 20 SOLUTION ORAL at 02:02

## 2021-02-26 RX ADMIN — TRAMADOL HYDROCHLORIDE 50 MG: 50 TABLET, COATED ORAL at 08:02

## 2021-02-27 VITALS
BODY MASS INDEX: 26.14 KG/M2 | DIASTOLIC BLOOD PRESSURE: 59 MMHG | RESPIRATION RATE: 20 BRPM | HEART RATE: 91 BPM | TEMPERATURE: 99 F | HEIGHT: 74 IN | WEIGHT: 203.69 LBS | OXYGEN SATURATION: 96 % | SYSTOLIC BLOOD PRESSURE: 125 MMHG

## 2021-02-27 LAB
ALBUMIN SERPL BCP-MCNC: 1.9 G/DL (ref 3.5–5.2)
ALP SERPL-CCNC: 156 U/L (ref 55–135)
ALT SERPL W/O P-5'-P-CCNC: 47 U/L (ref 10–44)
ANION GAP SERPL CALC-SCNC: 15 MMOL/L (ref 8–16)
ANISOCYTOSIS BLD QL SMEAR: SLIGHT
AST SERPL-CCNC: 128 U/L (ref 10–40)
BASOPHILS # BLD AUTO: 0.12 K/UL (ref 0–0.2)
BASOPHILS NFR BLD: 1 % (ref 0–1.9)
BILIRUB SERPL-MCNC: 20.2 MG/DL (ref 0.1–1)
BUN SERPL-MCNC: 27 MG/DL (ref 6–20)
BURR CELLS BLD QL SMEAR: ABNORMAL
CALCIUM SERPL-MCNC: 8 MG/DL (ref 8.7–10.5)
CHLORIDE SERPL-SCNC: 90 MMOL/L (ref 95–110)
CO2 SERPL-SCNC: 18 MMOL/L (ref 23–29)
CREAT SERPL-MCNC: 0.8 MG/DL (ref 0.5–1.4)
CRYPTOC AG SER QL LA: NEGATIVE
DIFFERENTIAL METHOD: ABNORMAL
EOSINOPHIL # BLD AUTO: 0.1 K/UL (ref 0–0.5)
EOSINOPHIL NFR BLD: 0.9 % (ref 0–8)
ERYTHROCYTE [DISTWIDTH] IN BLOOD BY AUTOMATED COUNT: 17.7 % (ref 11.5–14.5)
EST. GFR  (AFRICAN AMERICAN): >60 ML/MIN/1.73 M^2
EST. GFR  (NON AFRICAN AMERICAN): >60 ML/MIN/1.73 M^2
GLUCOSE SERPL-MCNC: 93 MG/DL (ref 70–110)
HCT VFR BLD AUTO: 24 % (ref 40–54)
HGB BLD-MCNC: 8.8 G/DL (ref 14–18)
HYPOCHROMIA BLD QL SMEAR: ABNORMAL
IMM GRANULOCYTES # BLD AUTO: 0.13 K/UL (ref 0–0.04)
IMM GRANULOCYTES NFR BLD AUTO: 1.1 % (ref 0–0.5)
INR PPP: 1.6 (ref 0.8–1.2)
LYMPHOCYTES # BLD AUTO: 1.5 K/UL (ref 1–4.8)
LYMPHOCYTES NFR BLD: 12.6 % (ref 18–48)
M TB IFN-G BLD-IMP: NEGATIVE
M TB IFN-G BLD-IMP: NEGATIVE
MCH RBC QN AUTO: 38.6 PG (ref 27–31)
MCHC RBC AUTO-ENTMCNC: 36.7 G/DL (ref 32–36)
MCV RBC AUTO: 105 FL (ref 82–98)
MONOCYTES # BLD AUTO: 1.5 K/UL (ref 0.3–1)
MONOCYTES NFR BLD: 12.7 % (ref 4–15)
NEUTROPHILS # BLD AUTO: 8.4 K/UL (ref 1.8–7.7)
NEUTROPHILS NFR BLD: 71.7 % (ref 38–73)
NRBC BLD-RTO: 0 /100 WBC
PLATELET # BLD AUTO: 103 K/UL (ref 150–350)
PLATELET BLD QL SMEAR: ABNORMAL
PMV BLD AUTO: 11.5 FL (ref 9.2–12.9)
POIKILOCYTOSIS BLD QL SMEAR: SLIGHT
POLYCHROMASIA BLD QL SMEAR: ABNORMAL
POTASSIUM SERPL-SCNC: 3.4 MMOL/L (ref 3.5–5.1)
PROT SERPL-MCNC: 5.9 G/DL (ref 6–8.4)
PROTHROMBIN TIME: 16.8 SEC (ref 9–12.5)
RBC # BLD AUTO: 2.28 M/UL (ref 4.6–6.2)
SODIUM SERPL-SCNC: 123 MMOL/L (ref 136–145)
TARGETS BLD QL SMEAR: ABNORMAL
WBC # BLD AUTO: 11.26 K/UL (ref 3.9–12.7)

## 2021-02-27 PROCEDURE — 80053 COMPREHEN METABOLIC PANEL: CPT | Mod: NTX

## 2021-02-27 PROCEDURE — 85025 COMPLETE CBC W/AUTO DIFF WBC: CPT | Mod: NTX

## 2021-02-27 PROCEDURE — 99239 HOSP IP/OBS DSCHRG MGMT >30: CPT | Mod: NTX,,, | Performed by: HOSPITALIST

## 2021-02-27 PROCEDURE — 85610 PROTHROMBIN TIME: CPT | Mod: NTX

## 2021-02-27 PROCEDURE — 99239 PR HOSPITAL DISCHARGE DAY,>30 MIN: ICD-10-PCS | Mod: NTX,,, | Performed by: HOSPITALIST

## 2021-02-27 PROCEDURE — 36415 COLL VENOUS BLD VENIPUNCTURE: CPT | Mod: NTX

## 2021-02-27 RX ORDER — LACTULOSE 10 G/15ML
30 SOLUTION ORAL; RECTAL 3 TIMES DAILY
Qty: 1350 ML | Refills: 3 | Status: SHIPPED | OUTPATIENT
Start: 2021-02-27 | End: 2021-03-05 | Stop reason: SDUPTHER

## 2021-02-27 RX ORDER — AMOXICILLIN AND CLAVULANATE POTASSIUM 875; 125 MG/1; MG/1
1 TABLET, FILM COATED ORAL EVERY 12 HOURS
Qty: 28 TABLET | Refills: 0 | Status: SHIPPED | OUTPATIENT
Start: 2021-02-27 | End: 2021-03-13

## 2021-02-27 RX ORDER — MIDODRINE HYDROCHLORIDE 10 MG/1
15 TABLET ORAL 3 TIMES DAILY
Qty: 90 TABLET | Refills: 11 | Status: SHIPPED | OUTPATIENT
Start: 2021-02-27 | End: 2021-03-05 | Stop reason: SDUPTHER

## 2021-02-27 RX ORDER — HYDROXYZINE PAMOATE 25 MG/1
25 CAPSULE ORAL 4 TIMES DAILY
Qty: 30 CAPSULE | Refills: 0 | Status: ON HOLD | OUTPATIENT
Start: 2021-02-27 | End: 2021-04-22 | Stop reason: HOSPADM

## 2021-02-28 LAB
BACTERIA SPEC AEROBE CULT: NORMAL
BACTERIA SPEC AEROBE CULT: NORMAL
GRAM STN SPEC: NORMAL

## 2021-03-01 ENCOUNTER — TELEPHONE (OUTPATIENT)
Dept: TRANSPLANT | Facility: CLINIC | Age: 41
End: 2021-03-01

## 2021-03-01 ENCOUNTER — PATIENT OUTREACH (OUTPATIENT)
Dept: ADMINISTRATIVE | Facility: CLINIC | Age: 41
End: 2021-03-01

## 2021-03-01 DIAGNOSIS — K70.10 ACUTE ALCOHOLIC HEPATITIS: Primary | ICD-10-CM

## 2021-03-01 DIAGNOSIS — R91.1 SOLITARY PULMONARY NODULE: ICD-10-CM

## 2021-03-01 DIAGNOSIS — Z76.82 AWAITING ORGAN TRANSPLANT STATUS: Primary | ICD-10-CM

## 2021-03-01 LAB
BACTERIA SPEC AEROBE CULT: NORMAL
BACTERIA SPEC AEROBE CULT: NORMAL
GRAM STN SPEC: NORMAL

## 2021-03-02 ENCOUNTER — TELEPHONE (OUTPATIENT)
Dept: TRANSPLANT | Facility: CLINIC | Age: 41
End: 2021-03-02

## 2021-03-02 ENCOUNTER — PATIENT MESSAGE (OUTPATIENT)
Dept: TRANSPLANT | Facility: CLINIC | Age: 41
End: 2021-03-02

## 2021-03-02 ENCOUNTER — HISTORICAL (OUTPATIENT)
Dept: ADMINISTRATIVE | Facility: HOSPITAL | Age: 41
End: 2021-03-02

## 2021-03-02 LAB
ALBUMIN SERPL-MCNC: 2.2 GM/DL (ref 3.5–5)
ALBUMIN/GLOB SERPL: 0.5 RATIO (ref 1.1–2)
ALP SERPL-CCNC: 152 UNIT/L (ref 40–150)
ALT SERPL-CCNC: 60 UNIT/L (ref 0–55)
ANISOCYTOSIS BLD QL SMEAR: 1
AST SERPL-CCNC: 167 UNIT/L (ref 5–34)
BACTERIA BLD CULT: NORMAL
BACTERIA BLD CULT: NORMAL
BILIRUB SERPL-MCNC: 20.9 MG/DL
BILIRUBIN DIRECT+TOT PNL SERPL-MCNC: 13.2 MG/DL (ref 0–0.5)
BILIRUBIN DIRECT+TOT PNL SERPL-MCNC: 7.7 MG/DL (ref 0–0.8)
BUN SERPL-MCNC: 20.4 MG/DL (ref 8.9–20.6)
CALCIUM SERPL-MCNC: 8.5 MG/DL (ref 8.4–10.2)
CHLORIDE SERPL-SCNC: 93 MMOL/L (ref 98–107)
CO2 SERPL-SCNC: 30 MMOL/L (ref 22–29)
CREAT SERPL-MCNC: 1 MG/DL (ref 0.73–1.18)
ERYTHROCYTE [DISTWIDTH] IN BLOOD BY AUTOMATED COUNT: 17.8 % (ref 11.5–17)
GLOBULIN SER-MCNC: 4.6 GM/DL (ref 2.4–3.5)
GLUCOSE SERPL-MCNC: 98 MG/DL (ref 74–100)
HCT VFR BLD AUTO: 28 % (ref 42–52)
HGB BLD-MCNC: 9.8 GM/DL (ref 14–18)
HYPOCHROMIA BLD QL SMEAR: 1
INR PPP: 1.8 (ref 0–1.3)
MACROCYTES BLD QL SMEAR: 1 /MCL
MCH RBC QN AUTO: 39.4 PG (ref 27–31)
MCHC RBC AUTO-ENTMCNC: 35 GM/DL (ref 33–36)
MCV RBC AUTO: 112.4 FL (ref 80–94)
PLATELET # BLD AUTO: 141 X10(3)/MCL (ref 130–400)
PLATELET # BLD EST: ADEQUATE 10*3/UL
PMV BLD AUTO: 10.5 FL (ref 7.4–10.4)
POLYCHROMASIA BLD QL SMEAR: 1
POTASSIUM SERPL-SCNC: 3.6 MMOL/L (ref 3.5–5.1)
PROT SERPL-MCNC: 6.8 GM/DL (ref 6.4–8.3)
PROTHROMBIN TIME: 20.8 SECOND(S) (ref 11.1–13.7)
RBC # BLD AUTO: 2.49 X10(6)/MCL (ref 4.7–6.1)
RBC MORPH BLD: ABNORMAL
SODIUM SERPL-SCNC: 131 MMOL/L (ref 136–145)
WBC # SPEC AUTO: 11.6 X10(3)/MCL (ref 4.5–11.5)

## 2021-03-03 ENCOUNTER — TELEPHONE (OUTPATIENT)
Dept: TRANSPLANT | Facility: CLINIC | Age: 41
End: 2021-03-03

## 2021-03-03 LAB
1,3 BETA GLUCAN SER-MCNC: 39 PG/ML
FUNGITELL COMMENTS: NEGATIVE
T GONDII IGG SER QL IA: NORMAL
T GONDII IGG SERPL IA-ACNC: <5 IU/ML (ref 0–6.4)

## 2021-03-04 ENCOUNTER — TELEPHONE (OUTPATIENT)
Dept: TRANSPLANT | Facility: CLINIC | Age: 41
End: 2021-03-04

## 2021-03-04 LAB
GENETICIST REVIEW: NORMAL
HFE GENE MUT ANL BLD/T: NORMAL
HFE RELEASED BY: NORMAL
HFE RESULT SUMMARY: NORMAL
HISTOPLASMA AG INTERP.: NEGATIVE
HISTOPLASMA AG VALUE: 0 NG/ML
HISTOPLASMA ANTIGEN URINE: NEGATIVE
HISTOPLASMA RESULT: NORMAL NG/ML
M TB CMPLX DNA SPEC QL NAA+PROBE: NEGATIVE
REF LAB TEST METHOD: NORMAL
SPECIMEN SOURCE: NORMAL
SPECIMEN SOURCE: NORMAL
SPECIMEN,  HEMOCHROMATOSIS: NORMAL

## 2021-03-05 ENCOUNTER — NURSE TRIAGE (OUTPATIENT)
Dept: ADMINISTRATIVE | Facility: CLINIC | Age: 41
End: 2021-03-05

## 2021-03-05 ENCOUNTER — TELEPHONE (OUTPATIENT)
Dept: TRANSPLANT | Facility: CLINIC | Age: 41
End: 2021-03-05

## 2021-03-05 DIAGNOSIS — K74.60 DECOMPENSATED HEPATIC CIRRHOSIS: Primary | ICD-10-CM

## 2021-03-05 DIAGNOSIS — K72.90 DECOMPENSATED HEPATIC CIRRHOSIS: Primary | ICD-10-CM

## 2021-03-05 DIAGNOSIS — Z01.818 PRE-TRANSPLANT EVALUATION FOR CHRONIC LIVER DISEASE: Primary | ICD-10-CM

## 2021-03-05 DIAGNOSIS — Z01.818 PRE-TRANSPLANT EVALUATION FOR CHRONIC LIVER DISEASE: ICD-10-CM

## 2021-03-05 RX ORDER — PANTOPRAZOLE SODIUM 40 MG/1
40 TABLET, DELAYED RELEASE ORAL DAILY
Qty: 30 TABLET | Refills: 2 | Status: ON HOLD | OUTPATIENT
Start: 2021-03-05 | End: 2021-04-19 | Stop reason: HOSPADM

## 2021-03-05 RX ORDER — FOLIC ACID 1 MG/1
1000 TABLET ORAL DAILY
Qty: 30 TABLET | Refills: 3 | Status: SHIPPED | OUTPATIENT
Start: 2021-03-05 | End: 2021-03-05 | Stop reason: SDUPTHER

## 2021-03-05 RX ORDER — MIDODRINE HYDROCHLORIDE 10 MG/1
15 TABLET ORAL 3 TIMES DAILY
Qty: 90 TABLET | Refills: 11 | Status: SHIPPED | OUTPATIENT
Start: 2021-03-05 | End: 2021-03-05 | Stop reason: SDUPTHER

## 2021-03-05 RX ORDER — PANTOPRAZOLE SODIUM 40 MG/1
40 TABLET, DELAYED RELEASE ORAL DAILY
Qty: 30 TABLET | Refills: 2 | Status: SHIPPED | OUTPATIENT
Start: 2021-03-05 | End: 2021-03-05 | Stop reason: SDUPTHER

## 2021-03-05 RX ORDER — FOLIC ACID 1 MG/1
1000 TABLET ORAL DAILY
Qty: 30 TABLET | Refills: 3 | Status: ON HOLD | OUTPATIENT
Start: 2021-03-05 | End: 2021-04-26 | Stop reason: SDUPTHER

## 2021-03-05 RX ORDER — LACTULOSE 10 G/15ML
30 SOLUTION ORAL; RECTAL 3 TIMES DAILY
Qty: 1350 ML | Refills: 3 | Status: SHIPPED | OUTPATIENT
Start: 2021-03-05 | End: 2021-03-05 | Stop reason: SDUPTHER

## 2021-03-05 RX ORDER — LACTULOSE 10 G/15ML
30 SOLUTION ORAL; RECTAL 3 TIMES DAILY
Qty: 1350 ML | Refills: 3 | Status: SHIPPED | OUTPATIENT
Start: 2021-03-05 | End: 2021-03-15

## 2021-03-05 RX ORDER — MIDODRINE HYDROCHLORIDE 10 MG/1
15 TABLET ORAL 3 TIMES DAILY
Qty: 90 TABLET | Refills: 11 | Status: ON HOLD | OUTPATIENT
Start: 2021-03-05 | End: 2021-04-19 | Stop reason: HOSPADM

## 2021-03-08 ENCOUNTER — HISTORICAL (OUTPATIENT)
Dept: ADMINISTRATIVE | Facility: HOSPITAL | Age: 41
End: 2021-03-08

## 2021-03-08 ENCOUNTER — TELEPHONE (OUTPATIENT)
Dept: TRANSPLANT | Facility: CLINIC | Age: 41
End: 2021-03-08

## 2021-03-08 LAB
ALBUMIN SERPL-MCNC: 2 GM/DL (ref 3.5–5)
ALBUMIN/GLOB SERPL: 0.4 RATIO (ref 1.1–2)
ALP SERPL-CCNC: 173 UNIT/L (ref 40–150)
ALT SERPL-CCNC: 52 UNIT/L (ref 0–55)
AST SERPL-CCNC: 139 UNIT/L (ref 5–34)
BILIRUB SERPL-MCNC: 16 MG/DL
BILIRUBIN DIRECT+TOT PNL SERPL-MCNC: 10.1 MG/DL (ref 0–0.5)
BILIRUBIN DIRECT+TOT PNL SERPL-MCNC: 5.9 MG/DL (ref 0–0.8)
BUN SERPL-MCNC: 26 MG/DL (ref 8.9–20.6)
CALCIUM SERPL-MCNC: 8.2 MG/DL (ref 8.4–10.2)
CHLORIDE SERPL-SCNC: 91 MMOL/L (ref 98–107)
CO2 SERPL-SCNC: 25 MMOL/L (ref 22–29)
CREAT SERPL-MCNC: 0.97 MG/DL (ref 0.73–1.18)
ERYTHROCYTE [DISTWIDTH] IN BLOOD BY AUTOMATED COUNT: 16.6 % (ref 11.5–17)
GLOBULIN SER-MCNC: 4.8 GM/DL (ref 2.4–3.5)
GLUCOSE SERPL-MCNC: 101 MG/DL (ref 74–100)
HCT VFR BLD AUTO: 26.5 % (ref 42–52)
HGB BLD-MCNC: 9.7 GM/DL (ref 14–18)
INR PPP: 1.8 (ref 0–1.3)
MCH RBC QN AUTO: 41.5 PG (ref 27–31)
MCHC RBC AUTO-ENTMCNC: 36.6 GM/DL (ref 33–36)
MCV RBC AUTO: 113.2 FL (ref 80–94)
PLATELET # BLD AUTO: 114 X10(3)/MCL (ref 130–400)
PMV BLD AUTO: 10.4 FL (ref 9.4–12.4)
POTASSIUM SERPL-SCNC: 4 MMOL/L (ref 3.5–5.1)
PROT SERPL-MCNC: 6.8 GM/DL (ref 6.4–8.3)
PROTHROMBIN TIME: 20.7 SECOND(S) (ref 11.1–13.7)
RBC # BLD AUTO: 2.34 X10(6)/MCL (ref 4.7–6.1)
SODIUM SERPL-SCNC: 128 MMOL/L (ref 136–145)
WBC # SPEC AUTO: 10.5 X10(3)/MCL (ref 4.5–11.5)

## 2021-03-10 ENCOUNTER — TELEPHONE (OUTPATIENT)
Dept: TRANSPLANT | Facility: CLINIC | Age: 41
End: 2021-03-10

## 2021-03-10 LAB
BLASTOMYCES AG INTERP: NEGATIVE
BLASTOMYCES AG RESULT: NORMAL NG/ML

## 2021-03-11 ENCOUNTER — HOSPITAL ENCOUNTER (OUTPATIENT)
Facility: HOSPITAL | Age: 41
Discharge: HOME | End: 2021-03-12
Attending: EMERGENCY MEDICINE | Admitting: EMERGENCY MEDICINE
Payer: MEDICAID

## 2021-03-11 ENCOUNTER — TELEPHONE (OUTPATIENT)
Dept: TRANSPLANT | Facility: CLINIC | Age: 41
End: 2021-03-11

## 2021-03-11 DIAGNOSIS — K82.1 GALLBLADDER HYDROPS: ICD-10-CM

## 2021-03-11 DIAGNOSIS — R06.02 SOB (SHORTNESS OF BREATH): ICD-10-CM

## 2021-03-11 DIAGNOSIS — R10.11 RIGHT UPPER QUADRANT ABDOMINAL PAIN: ICD-10-CM

## 2021-03-11 DIAGNOSIS — K70.31 ALCOHOLIC CIRRHOSIS OF LIVER WITH ASCITES: Primary | ICD-10-CM

## 2021-03-11 LAB
ALBUMIN SERPL BCP-MCNC: 2.1 G/DL (ref 3.5–5.2)
ALP SERPL-CCNC: 208 U/L (ref 55–135)
ALT SERPL W/O P-5'-P-CCNC: 50 U/L (ref 10–44)
ANION GAP SERPL CALC-SCNC: 11 MMOL/L (ref 8–16)
APTT BLDCRRT: 34.3 SEC (ref 21–32)
AST SERPL-CCNC: 136 U/L (ref 10–40)
BASOPHILS # BLD AUTO: 0.07 K/UL (ref 0–0.2)
BASOPHILS NFR BLD: 0.6 % (ref 0–1.9)
BILIRUB SERPL-MCNC: 15.7 MG/DL (ref 0.1–1)
BUN SERPL-MCNC: 28 MG/DL (ref 6–20)
BUN SERPL-MCNC: 28 MG/DL (ref 6–30)
CALCIUM SERPL-MCNC: 8.5 MG/DL (ref 8.7–10.5)
CHLORIDE SERPL-SCNC: 94 MMOL/L (ref 95–110)
CHLORIDE SERPL-SCNC: 95 MMOL/L (ref 95–110)
CO2 SERPL-SCNC: 21 MMOL/L (ref 23–29)
CREAT SERPL-MCNC: 1 MG/DL (ref 0.5–1.4)
CREAT SERPL-MCNC: 1 MG/DL (ref 0.5–1.4)
CTP QC/QA: YES
DIFFERENTIAL METHOD: ABNORMAL
EOSINOPHIL # BLD AUTO: 0.1 K/UL (ref 0–0.5)
EOSINOPHIL NFR BLD: 0.5 % (ref 0–8)
ERYTHROCYTE [DISTWIDTH] IN BLOOD BY AUTOMATED COUNT: 16.2 % (ref 11.5–14.5)
EST. GFR  (AFRICAN AMERICAN): >60 ML/MIN/1.73 M^2
EST. GFR  (NON AFRICAN AMERICAN): >60 ML/MIN/1.73 M^2
GLUCOSE SERPL-MCNC: 107 MG/DL (ref 70–110)
GLUCOSE SERPL-MCNC: 111 MG/DL (ref 70–110)
HCT VFR BLD AUTO: 26.5 % (ref 40–54)
HCT VFR BLD CALC: 26 %PCV (ref 36–54)
HGB BLD-MCNC: 9.3 G/DL (ref 14–18)
IMM GRANULOCYTES # BLD AUTO: 0.11 K/UL (ref 0–0.04)
IMM GRANULOCYTES NFR BLD AUTO: 0.9 % (ref 0–0.5)
INR PPP: 1.5 (ref 0.8–1.2)
LYMPHOCYTES # BLD AUTO: 1.7 K/UL (ref 1–4.8)
LYMPHOCYTES NFR BLD: 14.8 % (ref 18–48)
MCH RBC QN AUTO: 39.4 PG (ref 27–31)
MCHC RBC AUTO-ENTMCNC: 35.1 G/DL (ref 32–36)
MCV RBC AUTO: 112 FL (ref 82–98)
MONOCYTES # BLD AUTO: 1.1 K/UL (ref 0.3–1)
MONOCYTES NFR BLD: 9 % (ref 4–15)
NEUTROPHILS # BLD AUTO: 8.7 K/UL (ref 1.8–7.7)
NEUTROPHILS NFR BLD: 74.2 % (ref 38–73)
NRBC BLD-RTO: 0 /100 WBC
PLATELET # BLD AUTO: 111 K/UL (ref 150–350)
PMV BLD AUTO: 10.2 FL (ref 9.2–12.9)
POC IONIZED CALCIUM: 1.11 MMOL/L (ref 1.06–1.42)
POC TCO2 (MEASURED): 24 MMOL/L (ref 23–29)
POTASSIUM BLD-SCNC: 3.3 MMOL/L (ref 3.5–5.1)
POTASSIUM SERPL-SCNC: 3.4 MMOL/L (ref 3.5–5.1)
PROT SERPL-MCNC: 7.3 G/DL (ref 6–8.4)
PROTHROMBIN TIME: 15.7 SEC (ref 9–12.5)
RBC # BLD AUTO: 2.36 M/UL (ref 4.6–6.2)
SAMPLE: ABNORMAL
SARS-COV-2 RDRP RESP QL NAA+PROBE: NEGATIVE
SODIUM BLD-SCNC: 127 MMOL/L (ref 136–145)
SODIUM SERPL-SCNC: 127 MMOL/L (ref 136–145)
WBC # BLD AUTO: 11.76 K/UL (ref 3.9–12.7)

## 2021-03-11 PROCEDURE — 82330 ASSAY OF CALCIUM: CPT

## 2021-03-11 PROCEDURE — 80053 COMPREHEN METABOLIC PANEL: CPT | Mod: NTX | Performed by: STUDENT IN AN ORGANIZED HEALTH CARE EDUCATION/TRAINING PROGRAM

## 2021-03-11 PROCEDURE — U0002 COVID-19 LAB TEST NON-CDC: HCPCS | Mod: NTX | Performed by: STUDENT IN AN ORGANIZED HEALTH CARE EDUCATION/TRAINING PROGRAM

## 2021-03-11 PROCEDURE — 99220 PR INITIAL OBSERVATION CARE,LEVL III: ICD-10-PCS | Mod: NTX,,, | Performed by: HOSPITALIST

## 2021-03-11 PROCEDURE — 80321 ALCOHOLS BIOMARKERS 1OR 2: CPT | Mod: NTX | Performed by: HOSPITALIST

## 2021-03-11 PROCEDURE — 85025 COMPLETE CBC W/AUTO DIFF WBC: CPT | Mod: NTX | Performed by: STUDENT IN AN ORGANIZED HEALTH CARE EDUCATION/TRAINING PROGRAM

## 2021-03-11 PROCEDURE — 80047 BASIC METABLC PNL IONIZED CA: CPT | Mod: NTX

## 2021-03-11 PROCEDURE — 63600175 PHARM REV CODE 636 W HCPCS: Mod: NTX | Performed by: HOSPITALIST

## 2021-03-11 PROCEDURE — 96376 TX/PRO/DX INJ SAME DRUG ADON: CPT | Mod: NTX

## 2021-03-11 PROCEDURE — G0378 HOSPITAL OBSERVATION PER HR: HCPCS | Mod: NTX

## 2021-03-11 PROCEDURE — 25000003 PHARM REV CODE 250: Mod: NTX | Performed by: HOSPITALIST

## 2021-03-11 PROCEDURE — 96374 THER/PROPH/DIAG INJ IV PUSH: CPT

## 2021-03-11 PROCEDURE — 99285 EMERGENCY DEPT VISIT HI MDM: CPT | Mod: 25,NTX

## 2021-03-11 PROCEDURE — 99284 EMERGENCY DEPT VISIT MOD MDM: CPT | Mod: ,,, | Performed by: EMERGENCY MEDICINE

## 2021-03-11 PROCEDURE — 36415 COLL VENOUS BLD VENIPUNCTURE: CPT | Mod: NTX | Performed by: HOSPITALIST

## 2021-03-11 PROCEDURE — 99284 PR EMERGENCY DEPT VISIT,LEVEL IV: ICD-10-PCS | Mod: ,,, | Performed by: EMERGENCY MEDICINE

## 2021-03-11 PROCEDURE — S4991 NICOTINE PATCH NONLEGEND: HCPCS | Mod: NTX | Performed by: HOSPITALIST

## 2021-03-11 PROCEDURE — 85730 THROMBOPLASTIN TIME PARTIAL: CPT | Mod: NTX | Performed by: STUDENT IN AN ORGANIZED HEALTH CARE EDUCATION/TRAINING PROGRAM

## 2021-03-11 PROCEDURE — 85610 PROTHROMBIN TIME: CPT | Mod: NTX | Performed by: STUDENT IN AN ORGANIZED HEALTH CARE EDUCATION/TRAINING PROGRAM

## 2021-03-11 PROCEDURE — 99220 PR INITIAL OBSERVATION CARE,LEVL III: CPT | Mod: NTX,,, | Performed by: HOSPITALIST

## 2021-03-11 RX ORDER — GLUCAGON 1 MG
1 KIT INJECTION
Status: DISCONTINUED | OUTPATIENT
Start: 2021-03-11 | End: 2021-03-13 | Stop reason: HOSPADM

## 2021-03-11 RX ORDER — TRAMADOL HYDROCHLORIDE 50 MG/1
50 TABLET ORAL EVERY 6 HOURS PRN
Status: DISCONTINUED | OUTPATIENT
Start: 2021-03-11 | End: 2021-03-13 | Stop reason: HOSPADM

## 2021-03-11 RX ORDER — ONDANSETRON 2 MG/ML
4 INJECTION INTRAMUSCULAR; INTRAVENOUS EVERY 8 HOURS PRN
Status: DISCONTINUED | OUTPATIENT
Start: 2021-03-11 | End: 2021-03-13 | Stop reason: HOSPADM

## 2021-03-11 RX ORDER — HYDROXYZINE PAMOATE 25 MG/1
25 CAPSULE ORAL EVERY 6 HOURS PRN
Status: DISCONTINUED | OUTPATIENT
Start: 2021-03-11 | End: 2021-03-13 | Stop reason: HOSPADM

## 2021-03-11 RX ORDER — MIDODRINE HYDROCHLORIDE 5 MG/1
15 TABLET ORAL 3 TIMES DAILY
Status: DISCONTINUED | OUTPATIENT
Start: 2021-03-11 | End: 2021-03-13 | Stop reason: HOSPADM

## 2021-03-11 RX ORDER — IBUPROFEN 200 MG
16 TABLET ORAL
Status: DISCONTINUED | OUTPATIENT
Start: 2021-03-11 | End: 2021-03-13 | Stop reason: HOSPADM

## 2021-03-11 RX ORDER — TIZANIDINE 2 MG/1
4 TABLET ORAL 3 TIMES DAILY
Status: DISCONTINUED | OUTPATIENT
Start: 2021-03-11 | End: 2021-03-11

## 2021-03-11 RX ORDER — TIZANIDINE 4 MG/1
4 TABLET ORAL EVERY 8 HOURS PRN
Status: DISCONTINUED | OUTPATIENT
Start: 2021-03-11 | End: 2021-03-13 | Stop reason: HOSPADM

## 2021-03-11 RX ORDER — FUROSEMIDE 10 MG/ML
80 INJECTION INTRAMUSCULAR; INTRAVENOUS 3 TIMES DAILY
Status: DISCONTINUED | OUTPATIENT
Start: 2021-03-11 | End: 2021-03-11

## 2021-03-11 RX ORDER — TALC
6 POWDER (GRAM) TOPICAL NIGHTLY PRN
Status: DISCONTINUED | OUTPATIENT
Start: 2021-03-11 | End: 2021-03-13 | Stop reason: HOSPADM

## 2021-03-11 RX ORDER — IBUPROFEN 200 MG
24 TABLET ORAL
Status: DISCONTINUED | OUTPATIENT
Start: 2021-03-11 | End: 2021-03-13 | Stop reason: HOSPADM

## 2021-03-11 RX ORDER — ACETAMINOPHEN 325 MG/1
650 TABLET ORAL EVERY 4 HOURS PRN
Status: DISCONTINUED | OUTPATIENT
Start: 2021-03-11 | End: 2021-03-12

## 2021-03-11 RX ORDER — IBUPROFEN 200 MG
1 TABLET ORAL DAILY
Status: DISCONTINUED | OUTPATIENT
Start: 2021-03-11 | End: 2021-03-13 | Stop reason: HOSPADM

## 2021-03-11 RX ORDER — AMOXICILLIN AND CLAVULANATE POTASSIUM 875; 125 MG/1; MG/1
1 TABLET, FILM COATED ORAL EVERY 12 HOURS
Status: DISCONTINUED | OUTPATIENT
Start: 2021-03-11 | End: 2021-03-12

## 2021-03-11 RX ORDER — FOLIC ACID 1 MG/1
1000 TABLET ORAL DAILY
Status: DISCONTINUED | OUTPATIENT
Start: 2021-03-12 | End: 2021-03-13 | Stop reason: HOSPADM

## 2021-03-11 RX ORDER — PANTOPRAZOLE SODIUM 40 MG/1
40 TABLET, DELAYED RELEASE ORAL DAILY
Status: DISCONTINUED | OUTPATIENT
Start: 2021-03-12 | End: 2021-03-13 | Stop reason: HOSPADM

## 2021-03-11 RX ORDER — HYDROXYZINE PAMOATE 25 MG/1
25 CAPSULE ORAL 4 TIMES DAILY
Status: DISCONTINUED | OUTPATIENT
Start: 2021-03-11 | End: 2021-03-11

## 2021-03-11 RX ORDER — SODIUM CHLORIDE 0.9 % (FLUSH) 0.9 %
10 SYRINGE (ML) INJECTION
Status: DISCONTINUED | OUTPATIENT
Start: 2021-03-11 | End: 2021-03-13 | Stop reason: HOSPADM

## 2021-03-11 RX ORDER — SPIRONOLACTONE 25 MG/1
100 TABLET ORAL DAILY
Status: DISCONTINUED | OUTPATIENT
Start: 2021-03-12 | End: 2021-03-13 | Stop reason: HOSPADM

## 2021-03-11 RX ADMIN — FUROSEMIDE 80 MG: 10 INJECTION, SOLUTION INTRAMUSCULAR; INTRAVENOUS at 09:03

## 2021-03-11 RX ADMIN — AMOXICILLIN AND CLAVULANATE POTASSIUM 1 TABLET: 875; 125 TABLET, FILM COATED ORAL at 09:03

## 2021-03-11 RX ADMIN — FUROSEMIDE 80 MG: 10 INJECTION, SOLUTION INTRAMUSCULAR; INTRAVENOUS at 06:03

## 2021-03-11 RX ADMIN — MIDODRINE HYDROCHLORIDE 15 MG: 5 TABLET ORAL at 09:03

## 2021-03-11 RX ADMIN — Medication 1 PATCH: at 08:03

## 2021-03-11 RX ADMIN — POTASSIUM BICARBONATE 50 MEQ: 978 TABLET, EFFERVESCENT ORAL at 05:03

## 2021-03-12 ENCOUNTER — TELEPHONE (OUTPATIENT)
Dept: HEPATOLOGY | Facility: HOSPITAL | Age: 41
End: 2021-03-12

## 2021-03-12 VITALS
HEIGHT: 74 IN | TEMPERATURE: 98 F | BODY MASS INDEX: 26.31 KG/M2 | RESPIRATION RATE: 18 BRPM | DIASTOLIC BLOOD PRESSURE: 53 MMHG | OXYGEN SATURATION: 98 % | WEIGHT: 205 LBS | HEART RATE: 83 BPM | SYSTOLIC BLOOD PRESSURE: 95 MMHG

## 2021-03-12 DIAGNOSIS — K72.90 DECOMPENSATED HEPATIC CIRRHOSIS: Primary | ICD-10-CM

## 2021-03-12 DIAGNOSIS — K70.31 ALCOHOLIC CIRRHOSIS OF LIVER WITH ASCITES: Primary | ICD-10-CM

## 2021-03-12 DIAGNOSIS — K74.60 DECOMPENSATED HEPATIC CIRRHOSIS: Primary | ICD-10-CM

## 2021-03-12 LAB
ALBUMIN SERPL BCP-MCNC: 1.7 G/DL (ref 3.5–5.2)
ALP SERPL-CCNC: 177 U/L (ref 55–135)
ALT SERPL W/O P-5'-P-CCNC: 41 U/L (ref 10–44)
AMPHET+METHAMPHET UR QL: NEGATIVE
ANION GAP SERPL CALC-SCNC: 9 MMOL/L (ref 8–16)
APPEARANCE FLD: CLEAR
AST SERPL-CCNC: 108 U/L (ref 10–40)
BARBITURATES UR QL SCN>200 NG/ML: NEGATIVE
BASOPHILS # BLD AUTO: 0.08 K/UL (ref 0–0.2)
BASOPHILS NFR BLD: 0.7 % (ref 0–1.9)
BENZODIAZ UR QL SCN>200 NG/ML: NEGATIVE
BILIRUB SERPL-MCNC: 13.3 MG/DL (ref 0.1–1)
BILIRUB UR QL STRIP: NEGATIVE
BODY FLD TYPE: NORMAL
BUN SERPL-MCNC: 27 MG/DL (ref 6–20)
BZE UR QL SCN: NEGATIVE
CALCIUM SERPL-MCNC: 7.9 MG/DL (ref 8.7–10.5)
CANNABINOIDS UR QL SCN: NEGATIVE
CHLORIDE SERPL-SCNC: 95 MMOL/L (ref 95–110)
CLARITY UR REFRACT.AUTO: CLEAR
CO2 SERPL-SCNC: 26 MMOL/L (ref 23–29)
COLOR FLD: YELLOW
COLOR UR AUTO: YELLOW
CREAT SERPL-MCNC: 1 MG/DL (ref 0.5–1.4)
CREAT UR-MCNC: 23 MG/DL (ref 23–375)
DIFFERENTIAL METHOD: ABNORMAL
EOSINOPHIL # BLD AUTO: 0.1 K/UL (ref 0–0.5)
EOSINOPHIL NFR BLD: 1.2 % (ref 0–8)
ERYTHROCYTE [DISTWIDTH] IN BLOOD BY AUTOMATED COUNT: 16 % (ref 11.5–14.5)
EST. GFR  (AFRICAN AMERICAN): >60 ML/MIN/1.73 M^2
EST. GFR  (NON AFRICAN AMERICAN): >60 ML/MIN/1.73 M^2
ETHANOL UR-MCNC: <10 MG/DL
GLUCOSE SERPL-MCNC: 109 MG/DL (ref 70–110)
GLUCOSE UR QL STRIP: NEGATIVE
HCT VFR BLD AUTO: 22.6 % (ref 40–54)
HGB BLD-MCNC: 8.1 G/DL (ref 14–18)
HGB UR QL STRIP: NEGATIVE
IMM GRANULOCYTES # BLD AUTO: 0.1 K/UL (ref 0–0.04)
IMM GRANULOCYTES NFR BLD AUTO: 0.9 % (ref 0–0.5)
INR PPP: 1.5 (ref 0.8–1.2)
KETONES UR QL STRIP: NEGATIVE
LACTATE SERPL-SCNC: 1.1 MMOL/L (ref 0.5–2.2)
LEUKOCYTE ESTERASE UR QL STRIP: NEGATIVE
LYMPHOCYTES # BLD AUTO: 1.6 K/UL (ref 1–4.8)
LYMPHOCYTES NFR BLD: 14.5 % (ref 18–48)
LYMPHOCYTES NFR FLD MANUAL: 58 %
MAGNESIUM SERPL-MCNC: 1.6 MG/DL (ref 1.6–2.6)
MCH RBC QN AUTO: 40.3 PG (ref 27–31)
MCHC RBC AUTO-ENTMCNC: 35.8 G/DL (ref 32–36)
MCV RBC AUTO: 112 FL (ref 82–98)
MESOTHL CELL NFR FLD MANUAL: 32 %
METHADONE UR QL SCN>300 NG/ML: NEGATIVE
MONOCYTES # BLD AUTO: 1.1 K/UL (ref 0.3–1)
MONOCYTES NFR BLD: 9.6 % (ref 4–15)
MONOS+MACROS NFR FLD MANUAL: 5 %
NEUTROPHILS # BLD AUTO: 8.2 K/UL (ref 1.8–7.7)
NEUTROPHILS NFR BLD: 73.1 % (ref 38–73)
NEUTROPHILS NFR FLD MANUAL: 5 %
NITRITE UR QL STRIP: NEGATIVE
NRBC BLD-RTO: 0 /100 WBC
OPIATES UR QL SCN: NEGATIVE
PCP UR QL SCN>25 NG/ML: NEGATIVE
PH UR STRIP: 5 [PH] (ref 5–8)
PLATELET # BLD AUTO: 98 K/UL (ref 150–350)
PMV BLD AUTO: 10.1 FL (ref 9.2–12.9)
POTASSIUM SERPL-SCNC: 2.9 MMOL/L (ref 3.5–5.1)
PREALB SERPL-MCNC: 3 MG/DL (ref 20–43)
PROT SERPL-MCNC: 6.1 G/DL (ref 6–8.4)
PROT UR QL STRIP: NEGATIVE
PROTHROMBIN TIME: 16.1 SEC (ref 9–12.5)
RBC # BLD AUTO: 2.01 M/UL (ref 4.6–6.2)
SODIUM SERPL-SCNC: 130 MMOL/L (ref 136–145)
SP GR UR STRIP: 1 (ref 1–1.03)
TOXICOLOGY INFORMATION: NORMAL
URN SPEC COLLECT METH UR: NORMAL
WBC # BLD AUTO: 11.21 K/UL (ref 3.9–12.7)
WBC # FLD: 45 /CU MM

## 2021-03-12 PROCEDURE — 84134 ASSAY OF PREALBUMIN: CPT | Mod: NTX | Performed by: HOSPITALIST

## 2021-03-12 PROCEDURE — 80307 DRUG TEST PRSMV CHEM ANLYZR: CPT | Mod: NTX | Performed by: HOSPITALIST

## 2021-03-12 PROCEDURE — 81003 URINALYSIS AUTO W/O SCOPE: CPT | Mod: NTX,59 | Performed by: HOSPITALIST

## 2021-03-12 PROCEDURE — 87070 CULTURE OTHR SPECIMN AEROBIC: CPT | Mod: NTX,59 | Performed by: HOSPITALIST

## 2021-03-12 PROCEDURE — 36415 COLL VENOUS BLD VENIPUNCTURE: CPT | Mod: NTX | Performed by: HOSPITALIST

## 2021-03-12 PROCEDURE — G0378 HOSPITAL OBSERVATION PER HR: HCPCS | Mod: NTX

## 2021-03-12 PROCEDURE — P9047 ALBUMIN (HUMAN), 25%, 50ML: HCPCS | Mod: JG,NTX | Performed by: HOSPITALIST

## 2021-03-12 PROCEDURE — 87040 BLOOD CULTURE FOR BACTERIA: CPT | Mod: 59,NTX | Performed by: HOSPITALIST

## 2021-03-12 PROCEDURE — 96375 TX/PRO/DX INJ NEW DRUG ADDON: CPT | Mod: NTX,59

## 2021-03-12 PROCEDURE — 80053 COMPREHEN METABOLIC PANEL: CPT | Mod: NTX | Performed by: HOSPITALIST

## 2021-03-12 PROCEDURE — 87075 CULTR BACTERIA EXCEPT BLOOD: CPT | Mod: NTX,59 | Performed by: HOSPITALIST

## 2021-03-12 PROCEDURE — 99204 OFFICE O/P NEW MOD 45 MIN: CPT | Mod: NTX,,, | Performed by: INTERNAL MEDICINE

## 2021-03-12 PROCEDURE — S4991 NICOTINE PATCH NONLEGEND: HCPCS | Mod: NTX | Performed by: HOSPITALIST

## 2021-03-12 PROCEDURE — 85025 COMPLETE CBC W/AUTO DIFF WBC: CPT | Mod: NTX | Performed by: HOSPITALIST

## 2021-03-12 PROCEDURE — 99225 PR SUBSEQUENT OBSERVATION CARE,LEVEL II: ICD-10-PCS | Mod: NTX,,, | Performed by: HOSPITALIST

## 2021-03-12 PROCEDURE — 99225 PR SUBSEQUENT OBSERVATION CARE,LEVEL II: CPT | Mod: NTX,,, | Performed by: HOSPITALIST

## 2021-03-12 PROCEDURE — 99204 PR OFFICE/OUTPT VISIT, NEW, LEVL IV, 45-59 MIN: ICD-10-PCS | Mod: NTX,,, | Performed by: INTERNAL MEDICINE

## 2021-03-12 PROCEDURE — 83735 ASSAY OF MAGNESIUM: CPT | Mod: NTX | Performed by: HOSPITALIST

## 2021-03-12 PROCEDURE — 63600175 PHARM REV CODE 636 W HCPCS: Mod: JG,NTX | Performed by: HOSPITALIST

## 2021-03-12 PROCEDURE — 25000003 PHARM REV CODE 250: Mod: NTX | Performed by: HOSPITALIST

## 2021-03-12 PROCEDURE — 89051 BODY FLUID CELL COUNT: CPT | Mod: NTX | Performed by: HOSPITALIST

## 2021-03-12 PROCEDURE — 25000003 PHARM REV CODE 250: Mod: NTX | Performed by: STUDENT IN AN ORGANIZED HEALTH CARE EDUCATION/TRAINING PROGRAM

## 2021-03-12 PROCEDURE — 83605 ASSAY OF LACTIC ACID: CPT | Mod: NTX | Performed by: HOSPITALIST

## 2021-03-12 PROCEDURE — 96376 TX/PRO/DX INJ SAME DRUG ADON: CPT | Mod: NTX

## 2021-03-12 PROCEDURE — 85610 PROTHROMBIN TIME: CPT | Mod: NTX | Performed by: HOSPITALIST

## 2021-03-12 PROCEDURE — 96372 THER/PROPH/DIAG INJ SC/IM: CPT | Mod: 59,NTX

## 2021-03-12 PROCEDURE — 97530 THERAPEUTIC ACTIVITIES: CPT

## 2021-03-12 RX ORDER — LIDOCAINE HYDROCHLORIDE 10 MG/ML
INJECTION INFILTRATION; PERINEURAL CODE/TRAUMA/SEDATION MEDICATION
Status: COMPLETED | OUTPATIENT
Start: 2021-03-12 | End: 2021-03-12

## 2021-03-12 RX ORDER — LORAZEPAM/0.9% SODIUM CHLORIDE 100MG/0.1L
2 PLASTIC BAG, INJECTION (ML) INTRAVENOUS
Status: COMPLETED | OUTPATIENT
Start: 2021-03-12 | End: 2021-03-12

## 2021-03-12 RX ORDER — ALBUMIN HUMAN 250 G/1000ML
25 SOLUTION INTRAVENOUS ONCE
Status: COMPLETED | OUTPATIENT
Start: 2021-03-12 | End: 2021-03-12

## 2021-03-12 RX ORDER — LACTULOSE 10 G/15ML
15 SOLUTION ORAL 3 TIMES DAILY
Status: DISCONTINUED | OUTPATIENT
Start: 2021-03-12 | End: 2021-03-13 | Stop reason: HOSPADM

## 2021-03-12 RX ORDER — ALBUMIN HUMAN 250 G/1000ML
25 SOLUTION INTRAVENOUS ONCE
Status: DISCONTINUED | OUTPATIENT
Start: 2021-03-12 | End: 2021-03-12

## 2021-03-12 RX ORDER — POTASSIUM CHLORIDE 20 MEQ/1
40 TABLET, EXTENDED RELEASE ORAL EVERY 4 HOURS
Status: COMPLETED | OUTPATIENT
Start: 2021-03-12 | End: 2021-03-12

## 2021-03-12 RX ORDER — FUROSEMIDE 40 MG/1
40 TABLET ORAL 2 TIMES DAILY
Qty: 60 TABLET | Refills: 2 | Status: SHIPPED | OUTPATIENT
Start: 2021-03-12 | End: 2021-05-12

## 2021-03-12 RX ORDER — FUROSEMIDE 10 MG/ML
80 INJECTION INTRAMUSCULAR; INTRAVENOUS 2 TIMES DAILY
Status: DISCONTINUED | OUTPATIENT
Start: 2021-03-12 | End: 2021-03-13 | Stop reason: HOSPADM

## 2021-03-12 RX ORDER — ENOXAPARIN SODIUM 100 MG/ML
40 INJECTION SUBCUTANEOUS EVERY 24 HOURS
Status: DISCONTINUED | OUTPATIENT
Start: 2021-03-12 | End: 2021-03-13 | Stop reason: HOSPADM

## 2021-03-12 RX ORDER — AMOXICILLIN AND CLAVULANATE POTASSIUM 875; 125 MG/1; MG/1
1 TABLET, FILM COATED ORAL EVERY 12 HOURS
Status: DISCONTINUED | OUTPATIENT
Start: 2021-03-12 | End: 2021-03-13 | Stop reason: HOSPADM

## 2021-03-12 RX ORDER — ACETAMINOPHEN 325 MG/1
325 TABLET ORAL EVERY 4 HOURS PRN
Status: DISCONTINUED | OUTPATIENT
Start: 2021-03-12 | End: 2021-03-13 | Stop reason: HOSPADM

## 2021-03-12 RX ADMIN — POTASSIUM CHLORIDE 40 MEQ: 1500 TABLET, EXTENDED RELEASE ORAL at 10:03

## 2021-03-12 RX ADMIN — FUROSEMIDE 80 MG: 10 INJECTION, SOLUTION INTRAMUSCULAR; INTRAVENOUS at 10:03

## 2021-03-12 RX ADMIN — ALBUMIN (HUMAN) 25 G: 25 SOLUTION INTRAVENOUS at 02:03

## 2021-03-12 RX ADMIN — PANTOPRAZOLE SODIUM 40 MG: 40 TABLET, DELAYED RELEASE ORAL at 08:03

## 2021-03-12 RX ADMIN — MIDODRINE HYDROCHLORIDE 15 MG: 5 TABLET ORAL at 02:03

## 2021-03-12 RX ADMIN — FUROSEMIDE 80 MG: 10 INJECTION, SOLUTION INTRAMUSCULAR; INTRAVENOUS at 04:03

## 2021-03-12 RX ADMIN — TIZANIDINE 4 MG: 4 TABLET ORAL at 08:03

## 2021-03-12 RX ADMIN — TRAMADOL HYDROCHLORIDE 50 MG: 50 TABLET, COATED ORAL at 03:03

## 2021-03-12 RX ADMIN — POTASSIUM CHLORIDE 40 MEQ: 1500 TABLET, EXTENDED RELEASE ORAL at 02:03

## 2021-03-12 RX ADMIN — MIDODRINE HYDROCHLORIDE 15 MG: 5 TABLET ORAL at 08:03

## 2021-03-12 RX ADMIN — AMOXICILLIN AND CLAVULANATE POTASSIUM 1 TABLET: 875; 125 TABLET, FILM COATED ORAL at 08:03

## 2021-03-12 RX ADMIN — ENOXAPARIN SODIUM 40 MG: 40 INJECTION SUBCUTANEOUS at 04:03

## 2021-03-12 RX ADMIN — LIDOCAINE HYDROCHLORIDE 5 ML: 10 INJECTION, SOLUTION INFILTRATION; PERINEURAL at 01:03

## 2021-03-12 RX ADMIN — LACTULOSE 15 G: 10 SOLUTION ORAL at 02:03

## 2021-03-12 RX ADMIN — LACTULOSE 15 G: 10 SOLUTION ORAL at 08:03

## 2021-03-12 RX ADMIN — Medication 1 PATCH: at 08:03

## 2021-03-12 RX ADMIN — MAGNESIUM SULFATE 2 G: 2 INJECTION INTRAVENOUS at 10:03

## 2021-03-12 RX ADMIN — FOLIC ACID 1000 MCG: 1 TABLET ORAL at 08:03

## 2021-03-12 RX ADMIN — SPIRONOLACTONE 100 MG: 25 TABLET, FILM COATED ORAL at 08:03

## 2021-03-12 RX ADMIN — MAGNESIUM SULFATE 2 G: 2 INJECTION INTRAVENOUS at 08:03

## 2021-03-12 RX ADMIN — TRAMADOL HYDROCHLORIDE 50 MG: 50 TABLET, COATED ORAL at 08:03

## 2021-03-15 LAB — PHOSPHATIDYLETHANOL (PETH): NEGATIVE NG/ML

## 2021-03-16 LAB — BACTERIA SPEC AEROBE CULT: NO GROWTH

## 2021-03-17 ENCOUNTER — OFFICE VISIT (OUTPATIENT)
Dept: TRANSPLANT | Facility: CLINIC | Age: 41
End: 2021-03-17
Payer: MEDICAID

## 2021-03-17 ENCOUNTER — HOSPITAL ENCOUNTER (OUTPATIENT)
Dept: RADIOLOGY | Facility: HOSPITAL | Age: 41
Discharge: HOME | End: 2021-03-17
Attending: STUDENT IN AN ORGANIZED HEALTH CARE EDUCATION/TRAINING PROGRAM
Payer: MEDICAID

## 2021-03-17 ENCOUNTER — TELEPHONE (OUTPATIENT)
Dept: INTERVENTIONAL RADIOLOGY/VASCULAR | Facility: CLINIC | Age: 41
End: 2021-03-17

## 2021-03-17 ENCOUNTER — SOCIAL WORK (OUTPATIENT)
Dept: TRANSPLANT | Facility: CLINIC | Age: 41
End: 2021-03-17
Payer: MEDICAID

## 2021-03-17 VITALS
OXYGEN SATURATION: 98 % | TEMPERATURE: 98 F | BODY MASS INDEX: 25.38 KG/M2 | RESPIRATION RATE: 18 BRPM | SYSTOLIC BLOOD PRESSURE: 103 MMHG | DIASTOLIC BLOOD PRESSURE: 62 MMHG | WEIGHT: 197.75 LBS | HEART RATE: 97 BPM | HEIGHT: 74 IN

## 2021-03-17 DIAGNOSIS — R91.1 SOLITARY PULMONARY NODULE: ICD-10-CM

## 2021-03-17 DIAGNOSIS — Z76.82 ORGAN TRANSPLANT CANDIDATE: ICD-10-CM

## 2021-03-17 DIAGNOSIS — Z76.82 AWAITING ORGAN TRANSPLANT STATUS: ICD-10-CM

## 2021-03-17 DIAGNOSIS — K76.82 HEPATIC ENCEPHALOPATHY: ICD-10-CM

## 2021-03-17 DIAGNOSIS — R79.89 ELEVATED LFTS: ICD-10-CM

## 2021-03-17 DIAGNOSIS — K70.31 ALCOHOLIC CIRRHOSIS OF LIVER WITH ASCITES: Primary | ICD-10-CM

## 2021-03-17 LAB
BACTERIA BLD CULT: NORMAL
BACTERIA BLD CULT: NORMAL

## 2021-03-17 PROCEDURE — 99999 PR PBB SHADOW E&M-EST. PATIENT-LVL IV: CPT | Mod: PBBFAC,TXP,, | Performed by: STUDENT IN AN ORGANIZED HEALTH CARE EDUCATION/TRAINING PROGRAM

## 2021-03-17 PROCEDURE — 71250 CT CHEST WITHOUT CONTRAST: ICD-10-PCS | Mod: 26,TXP,, | Performed by: RADIOLOGY

## 2021-03-17 PROCEDURE — 99999 PR PBB SHADOW E&M-EST. PATIENT-LVL IV: ICD-10-PCS | Mod: PBBFAC,TXP,, | Performed by: STUDENT IN AN ORGANIZED HEALTH CARE EDUCATION/TRAINING PROGRAM

## 2021-03-17 PROCEDURE — 99215 PR OFFICE/OUTPT VISIT, EST, LEVL V, 40-54 MIN: ICD-10-PCS | Mod: S$PBB,TXP,, | Performed by: STUDENT IN AN ORGANIZED HEALTH CARE EDUCATION/TRAINING PROGRAM

## 2021-03-17 PROCEDURE — 99214 OFFICE O/P EST MOD 30 MIN: CPT | Mod: PBBFAC,25,TXP | Performed by: STUDENT IN AN ORGANIZED HEALTH CARE EDUCATION/TRAINING PROGRAM

## 2021-03-17 PROCEDURE — 71250 CT THORAX DX C-: CPT | Mod: 26,TXP,, | Performed by: RADIOLOGY

## 2021-03-17 PROCEDURE — 99215 OFFICE O/P EST HI 40 MIN: CPT | Mod: S$PBB,TXP,, | Performed by: STUDENT IN AN ORGANIZED HEALTH CARE EDUCATION/TRAINING PROGRAM

## 2021-03-17 PROCEDURE — 71250 CT THORAX DX C-: CPT | Mod: TC,TXP

## 2021-03-18 ENCOUNTER — TELEPHONE (OUTPATIENT)
Dept: TRANSPLANT | Facility: CLINIC | Age: 41
End: 2021-03-18

## 2021-03-19 ENCOUNTER — TELEPHONE (OUTPATIENT)
Dept: TRANSPLANT | Facility: CLINIC | Age: 41
End: 2021-03-19

## 2021-03-19 LAB — BACTERIA SPEC ANAEROBE CULT: NORMAL

## 2021-03-22 ENCOUNTER — PATIENT MESSAGE (OUTPATIENT)
Dept: TRANSPLANT | Facility: CLINIC | Age: 41
End: 2021-03-22

## 2021-03-23 ENCOUNTER — HISTORICAL (OUTPATIENT)
Dept: ADMINISTRATIVE | Facility: HOSPITAL | Age: 41
End: 2021-03-23

## 2021-03-23 ENCOUNTER — TELEPHONE (OUTPATIENT)
Dept: TRANSPLANT | Facility: CLINIC | Age: 41
End: 2021-03-23

## 2021-03-23 LAB
ABS NEUT (OLG): 4.33 X10(3)/MCL (ref 2.1–9.2)
ALBUMIN SERPL-MCNC: 2.2 GM/DL (ref 3.5–5)
ALBUMIN/GLOB SERPL: 0.5 RATIO (ref 1.1–2)
ALP SERPL-CCNC: 184 UNIT/L (ref 40–150)
ALT SERPL-CCNC: 23 UNIT/L (ref 0–55)
AST SERPL-CCNC: 72 UNIT/L (ref 5–34)
BASOPHILS # BLD AUTO: 0.1 X10(3)/MCL (ref 0–0.2)
BASOPHILS NFR BLD AUTO: 1 %
BILIRUB SERPL-MCNC: 10.2 MG/DL
BILIRUBIN DIRECT+TOT PNL SERPL-MCNC: 3.5 MG/DL (ref 0–0.8)
BILIRUBIN DIRECT+TOT PNL SERPL-MCNC: 6.7 MG/DL (ref 0–0.5)
BUN SERPL-MCNC: 14.7 MG/DL (ref 8.9–20.6)
CALCIUM SERPL-MCNC: 8.1 MG/DL (ref 8.4–10.2)
CHLORIDE SERPL-SCNC: 88 MMOL/L (ref 98–107)
CO2 SERPL-SCNC: 30 MMOL/L (ref 22–29)
CREAT SERPL-MCNC: 0.92 MG/DL (ref 0.73–1.18)
EOSINOPHIL # BLD AUTO: 0.1 X10(3)/MCL (ref 0–0.9)
EOSINOPHIL NFR BLD AUTO: 1 %
ERYTHROCYTE [DISTWIDTH] IN BLOOD BY AUTOMATED COUNT: 15.4 % (ref 11.5–17)
GLOBULIN SER-MCNC: 4.4 GM/DL (ref 2.4–3.5)
GLUCOSE SERPL-MCNC: 103 MG/DL (ref 74–100)
HCT VFR BLD AUTO: 24.9 % (ref 42–52)
HGB BLD-MCNC: 8.5 GM/DL (ref 14–18)
INR PPP: 1.7 (ref 0–1.3)
LYMPHOCYTES # BLD AUTO: 1.8 X10(3)/MCL (ref 0.6–4.6)
LYMPHOCYTES NFR BLD AUTO: 25 %
MCH RBC QN AUTO: 39.2 PG (ref 27–31)
MCHC RBC AUTO-ENTMCNC: 34.1 GM/DL (ref 33–36)
MCV RBC AUTO: 114.7 FL (ref 80–94)
MONOCYTES # BLD AUTO: 0.8 X10(3)/MCL (ref 0.1–1.3)
MONOCYTES NFR BLD AUTO: 11 %
NEUTROPHILS # BLD AUTO: 4.33 X10(3)/MCL (ref 2.1–9.2)
NEUTROPHILS NFR BLD AUTO: 61 %
PLATELET # BLD AUTO: 95 X10(3)/MCL (ref 130–400)
PMV BLD AUTO: 10.1 FL (ref 9.4–12.4)
POTASSIUM SERPL-SCNC: 3.9 MMOL/L (ref 3.5–5.1)
PROT SERPL-MCNC: 6.6 GM/DL (ref 6.4–8.3)
PROTHROMBIN TIME: 19.3 SECOND(S) (ref 11.1–13.7)
RBC # BLD AUTO: 2.17 X10(6)/MCL (ref 4.7–6.1)
SODIUM SERPL-SCNC: 129 MMOL/L (ref 136–145)
WBC # SPEC AUTO: 7.1 X10(3)/MCL (ref 4.5–11.5)

## 2021-03-24 ENCOUNTER — TELEPHONE (OUTPATIENT)
Dept: TRANSPLANT | Facility: CLINIC | Age: 41
End: 2021-03-24

## 2021-03-24 ENCOUNTER — COMMITTEE REVIEW (OUTPATIENT)
Dept: TRANSPLANT | Facility: CLINIC | Age: 41
End: 2021-03-24

## 2021-03-25 ENCOUNTER — TELEPHONE (OUTPATIENT)
Dept: TRANSPLANT | Facility: CLINIC | Age: 41
End: 2021-03-25

## 2021-03-26 ENCOUNTER — TELEPHONE (OUTPATIENT)
Dept: TRANSPLANT | Facility: CLINIC | Age: 41
End: 2021-03-26

## 2021-03-27 ENCOUNTER — NURSE TRIAGE (OUTPATIENT)
Dept: ADMINISTRATIVE | Facility: CLINIC | Age: 41
End: 2021-03-27

## 2021-03-30 ENCOUNTER — TELEPHONE (OUTPATIENT)
Dept: TRANSPLANT | Facility: CLINIC | Age: 41
End: 2021-03-30

## 2021-04-01 ENCOUNTER — DOCUMENTATION ONLY (OUTPATIENT)
Dept: TRANSPLANT | Facility: CLINIC | Age: 41
End: 2021-04-01

## 2021-04-05 ENCOUNTER — TELEPHONE (OUTPATIENT)
Dept: TRANSPLANT | Facility: CLINIC | Age: 41
End: 2021-04-05

## 2021-04-06 ENCOUNTER — TELEPHONE (OUTPATIENT)
Dept: TRANSPLANT | Facility: CLINIC | Age: 41
End: 2021-04-06

## 2021-04-07 ENCOUNTER — HISTORICAL (OUTPATIENT)
Dept: ADMINISTRATIVE | Facility: HOSPITAL | Age: 41
End: 2021-04-07

## 2021-04-07 ENCOUNTER — TELEPHONE (OUTPATIENT)
Dept: ENDOSCOPY | Facility: HOSPITAL | Age: 41
End: 2021-04-07

## 2021-04-07 ENCOUNTER — TELEPHONE (OUTPATIENT)
Dept: TRANSPLANT | Facility: CLINIC | Age: 41
End: 2021-04-07

## 2021-04-07 DIAGNOSIS — Z01.818 PRE-OP TESTING: Primary | ICD-10-CM

## 2021-04-07 LAB
ABS NEUT (OLG): 5.01 X10(3)/MCL (ref 2.1–9.2)
ALBUMIN SERPL-MCNC: 2.2 GM/DL (ref 3.5–5)
ALBUMIN/GLOB SERPL: 0.5 RATIO (ref 1.1–2)
ALP SERPL-CCNC: 141 UNIT/L (ref 40–150)
ALT SERPL-CCNC: 18 UNIT/L (ref 0–55)
AST SERPL-CCNC: 64 UNIT/L (ref 5–34)
BASOPHILS # BLD AUTO: 0.1 X10(3)/MCL (ref 0–0.2)
BASOPHILS NFR BLD AUTO: 1 %
BILIRUB SERPL-MCNC: 11.8 MG/DL
BILIRUBIN DIRECT+TOT PNL SERPL-MCNC: 4.4 MG/DL (ref 0–0.8)
BILIRUBIN DIRECT+TOT PNL SERPL-MCNC: 7.4 MG/DL (ref 0–0.5)
BUN SERPL-MCNC: 16.5 MG/DL (ref 8.9–20.6)
CALCIUM SERPL-MCNC: 8.9 MG/DL (ref 8.4–10.2)
CHLORIDE SERPL-SCNC: 88 MMOL/L (ref 98–107)
CO2 SERPL-SCNC: 28 MMOL/L (ref 22–29)
CREAT SERPL-MCNC: 1.06 MG/DL (ref 0.73–1.18)
EOSINOPHIL # BLD AUTO: 0 X10(3)/MCL (ref 0–0.9)
EOSINOPHIL NFR BLD AUTO: 0 %
ERYTHROCYTE [DISTWIDTH] IN BLOOD BY AUTOMATED COUNT: 19.3 % (ref 11.5–17)
GLOBULIN SER-MCNC: 4.1 GM/DL (ref 2.4–3.5)
GLUCOSE SERPL-MCNC: 84 MG/DL (ref 74–100)
HCT VFR BLD AUTO: 28.3 % (ref 42–52)
HGB BLD-MCNC: 9.8 GM/DL (ref 14–18)
INR PPP: 1.8 (ref 0–1.3)
LYMPHOCYTES # BLD AUTO: 1.6 X10(3)/MCL (ref 0.6–4.6)
LYMPHOCYTES NFR BLD AUTO: 20 %
MCH RBC QN AUTO: 38.1 PG (ref 27–31)
MCHC RBC AUTO-ENTMCNC: 34.6 GM/DL (ref 33–36)
MCV RBC AUTO: 110.1 FL (ref 80–94)
MONOCYTES # BLD AUTO: 1.1 X10(3)/MCL (ref 0.1–1.3)
MONOCYTES NFR BLD AUTO: 14 %
NEUTROPHILS # BLD AUTO: 5.01 X10(3)/MCL (ref 2.1–9.2)
NEUTROPHILS NFR BLD AUTO: 64 %
PLATELET # BLD AUTO: 117 X10(3)/MCL (ref 130–400)
PMV BLD AUTO: 9.6 FL (ref 9.4–12.4)
POTASSIUM SERPL-SCNC: 4.1 MMOL/L (ref 3.5–5.1)
PROT SERPL-MCNC: 6.3 GM/DL (ref 6.4–8.3)
PROTHROMBIN TIME: 21.1 SECOND(S) (ref 11.1–13.7)
RBC # BLD AUTO: 2.57 X10(6)/MCL (ref 4.7–6.1)
SODIUM SERPL-SCNC: 129 MMOL/L (ref 136–145)
WBC # SPEC AUTO: 7.8 X10(3)/MCL (ref 4.5–11.5)

## 2021-04-09 ENCOUNTER — TELEPHONE (OUTPATIENT)
Dept: TRANSPLANT | Facility: CLINIC | Age: 41
End: 2021-04-09

## 2021-04-09 ENCOUNTER — DOCUMENTATION ONLY (OUTPATIENT)
Dept: TRANSPLANT | Facility: CLINIC | Age: 41
End: 2021-04-09

## 2021-04-09 LAB
EXT ALBUMIN: 2.2
EXT ALKALINE PHOSPHATASE: 141
EXT ALT: 18
EXT AST: 64
EXT BILIRUBIN TOTAL: 11.8
EXT BUN: 16.5
EXT CALCIUM: 8.9
EXT CHLORIDE: 88
EXT CO2: 28
EXT CREATININE: 1.06 MG/DL
EXT GFR MDRD NON AF AMER: 60
EXT GLUCOSE: 84
EXT HEMATOCRIT: 28.3 (ref 42–52)
EXT HEMOGLOBIN: 9.8 (ref 14–18)
EXT INR: 1.8
EXT PLATELETS: 117 (ref 130–400)
EXT POTASSIUM: 4.1
EXT PROTEIN TOTAL: 6.3
EXT PT: 21.1
EXT SODIUM: 129 MMOL/L
EXT WBC: 7.8 (ref 4.5–11.5)

## 2021-04-10 ENCOUNTER — TELEPHONE (OUTPATIENT)
Dept: TRANSPLANT | Facility: CLINIC | Age: 41
End: 2021-04-10

## 2021-04-10 ENCOUNTER — LAB VISIT (OUTPATIENT)
Dept: TRANSPLANT | Facility: CLINIC | Age: 41
End: 2021-04-10
Payer: MEDICAID

## 2021-04-10 ENCOUNTER — HOSPITAL ENCOUNTER (INPATIENT)
Facility: HOSPITAL | Age: 41
LOS: 16 days | Discharge: HOME | DRG: 006 | End: 2021-04-26
Attending: SURGERY | Admitting: SURGERY
Payer: MEDICAID

## 2021-04-10 DIAGNOSIS — E87.70 HYPERVOLEMIA: ICD-10-CM

## 2021-04-10 DIAGNOSIS — Z29.89 PROPHYLACTIC IMMUNOTHERAPY: ICD-10-CM

## 2021-04-10 DIAGNOSIS — D63.8 ANEMIA OF CHRONIC DISEASE: ICD-10-CM

## 2021-04-10 DIAGNOSIS — E87.1 HYPONATREMIA: ICD-10-CM

## 2021-04-10 DIAGNOSIS — K74.60 DECOMPENSATED HEPATIC CIRRHOSIS: ICD-10-CM

## 2021-04-10 DIAGNOSIS — T38.0X5A ADRENAL CORTICAL STEROIDS CAUSING ADVERSE EFFECT IN THERAPEUTIC USE: ICD-10-CM

## 2021-04-10 DIAGNOSIS — Z01.812 ENCOUNTER FOR PREOPERATIVE SCREENING LABORATORY TESTING FOR COVID-19 VIRUS: Primary | ICD-10-CM

## 2021-04-10 DIAGNOSIS — R00.0 TACHYCARDIA: ICD-10-CM

## 2021-04-10 DIAGNOSIS — Z94.4 STATUS POST LIVER TRANSPLANT: Primary | ICD-10-CM

## 2021-04-10 DIAGNOSIS — R60.1 ANASARCA: ICD-10-CM

## 2021-04-10 DIAGNOSIS — Z01.812 ENCOUNTER FOR PREOPERATIVE SCREENING LABORATORY TESTING FOR COVID-19 VIRUS: ICD-10-CM

## 2021-04-10 DIAGNOSIS — Z11.52 ENCOUNTER FOR PREOPERATIVE SCREENING LABORATORY TESTING FOR COVID-19 VIRUS: ICD-10-CM

## 2021-04-10 DIAGNOSIS — T38.0X5A STEROID-INDUCED HYPERGLYCEMIA: ICD-10-CM

## 2021-04-10 DIAGNOSIS — Z79.60 LONG-TERM USE OF IMMUNOSUPPRESSANT MEDICATION: ICD-10-CM

## 2021-04-10 DIAGNOSIS — D62 ACUTE BLOOD LOSS ANEMIA: ICD-10-CM

## 2021-04-10 DIAGNOSIS — R73.9 STEROID-INDUCED HYPERGLYCEMIA: ICD-10-CM

## 2021-04-10 DIAGNOSIS — E87.70 HYPERVOLEMIA, UNSPECIFIED HYPERVOLEMIA TYPE: ICD-10-CM

## 2021-04-10 DIAGNOSIS — K76.82 HEPATIC ENCEPHALOPATHY: ICD-10-CM

## 2021-04-10 DIAGNOSIS — Z11.52 ENCOUNTER FOR PREOPERATIVE SCREENING LABORATORY TESTING FOR COVID-19 VIRUS: Primary | ICD-10-CM

## 2021-04-10 DIAGNOSIS — L03.115 CELLULITIS OF RIGHT LOWER EXTREMITY: ICD-10-CM

## 2021-04-10 DIAGNOSIS — D69.6 THROMBOCYTOPENIA: ICD-10-CM

## 2021-04-10 DIAGNOSIS — K72.90 DECOMPENSATED HEPATIC CIRRHOSIS: ICD-10-CM

## 2021-04-10 PROBLEM — L03.90 CELLULITIS: Status: ACTIVE | Noted: 2021-04-10

## 2021-04-10 LAB
ABO + RH BLD: NORMAL
ALBUMIN SERPL BCP-MCNC: 2.2 G/DL (ref 3.5–5.2)
ALP SERPL-CCNC: 137 U/L (ref 55–135)
ALT SERPL W/O P-5'-P-CCNC: 19 U/L (ref 10–44)
ANION GAP SERPL CALC-SCNC: 9 MMOL/L (ref 8–16)
AST SERPL-CCNC: 71 U/L (ref 10–40)
BASOPHILS # BLD AUTO: 0.06 K/UL (ref 0–0.2)
BASOPHILS NFR BLD: 0.8 % (ref 0–1.9)
BILIRUB SERPL-MCNC: 12.7 MG/DL (ref 0.1–1)
BILIRUB UR QL STRIP: NEGATIVE
BLD GP AB SCN CELLS X3 SERPL QL: NORMAL
BUN SERPL-MCNC: 21 MG/DL (ref 6–20)
CALCIUM SERPL-MCNC: 8.7 MG/DL (ref 8.7–10.5)
CHLORIDE SERPL-SCNC: 87 MMOL/L (ref 95–110)
CLARITY UR REFRACT.AUTO: CLEAR
CO2 SERPL-SCNC: 29 MMOL/L (ref 23–29)
COLOR UR AUTO: NORMAL
CREAT SERPL-MCNC: 1.1 MG/DL (ref 0.5–1.4)
DIFFERENTIAL METHOD: ABNORMAL
EOSINOPHIL # BLD AUTO: 0 K/UL (ref 0–0.5)
EOSINOPHIL NFR BLD: 0.3 % (ref 0–8)
ERYTHROCYTE [DISTWIDTH] IN BLOOD BY AUTOMATED COUNT: 19.3 % (ref 11.5–14.5)
EST. GFR  (AFRICAN AMERICAN): >60 ML/MIN/1.73 M^2
EST. GFR  (NON AFRICAN AMERICAN): >60 ML/MIN/1.73 M^2
GLUCOSE SERPL-MCNC: 103 MG/DL (ref 70–110)
GLUCOSE UR QL STRIP: NEGATIVE
HCT VFR BLD AUTO: 26.3 % (ref 40–54)
HGB BLD-MCNC: 9.4 G/DL (ref 14–18)
HGB UR QL STRIP: NEGATIVE
IMM GRANULOCYTES # BLD AUTO: 0.05 K/UL (ref 0–0.04)
IMM GRANULOCYTES NFR BLD AUTO: 0.7 % (ref 0–0.5)
INR PPP: 1.6 (ref 0.8–1.2)
KETONES UR QL STRIP: NEGATIVE
LEUKOCYTE ESTERASE UR QL STRIP: NEGATIVE
LYMPHOCYTES # BLD AUTO: 1.6 K/UL (ref 1–4.8)
LYMPHOCYTES NFR BLD: 23 % (ref 18–48)
MCH RBC QN AUTO: 38.2 PG (ref 27–31)
MCHC RBC AUTO-ENTMCNC: 35.7 G/DL (ref 32–36)
MCV RBC AUTO: 107 FL (ref 82–98)
MONOCYTES # BLD AUTO: 1 K/UL (ref 0.3–1)
MONOCYTES NFR BLD: 14.1 % (ref 4–15)
NEUTROPHILS # BLD AUTO: 4.4 K/UL (ref 1.8–7.7)
NEUTROPHILS NFR BLD: 61.1 % (ref 38–73)
NITRITE UR QL STRIP: NEGATIVE
NRBC BLD-RTO: 0 /100 WBC
PH UR STRIP: 6 [PH] (ref 5–8)
PLATELET # BLD AUTO: 114 K/UL (ref 150–450)
PMV BLD AUTO: 9.7 FL (ref 9.2–12.9)
POTASSIUM SERPL-SCNC: 3.6 MMOL/L (ref 3.5–5.1)
PROT SERPL-MCNC: 7.1 G/DL (ref 6–8.4)
PROT UR QL STRIP: NEGATIVE
PROTHROMBIN TIME: 16.7 SEC (ref 9–12.5)
RBC # BLD AUTO: 2.46 M/UL (ref 4.6–6.2)
SARS-COV-2 RDRP RESP QL NAA+PROBE: NEGATIVE
SODIUM SERPL-SCNC: 125 MMOL/L (ref 136–145)
SP GR UR STRIP: 1.01 (ref 1–1.03)
URN SPEC COLLECT METH UR: NORMAL
WBC # BLD AUTO: 7.14 K/UL (ref 3.9–12.7)

## 2021-04-10 PROCEDURE — 25000003 PHARM REV CODE 250: Mod: NTX | Performed by: PHYSICIAN ASSISTANT

## 2021-04-10 PROCEDURE — 85025 COMPLETE CBC W/AUTO DIFF WBC: CPT | Mod: NTX | Performed by: PHYSICIAN ASSISTANT

## 2021-04-10 PROCEDURE — 80053 COMPREHEN METABOLIC PANEL: CPT | Mod: NTX | Performed by: PHYSICIAN ASSISTANT

## 2021-04-10 PROCEDURE — S4991 NICOTINE PATCH NONLEGEND: HCPCS | Mod: NTX | Performed by: PHYSICIAN ASSISTANT

## 2021-04-10 PROCEDURE — 20600001 HC STEP DOWN PRIVATE ROOM: Mod: NTX

## 2021-04-10 PROCEDURE — 36415 COLL VENOUS BLD VENIPUNCTURE: CPT | Mod: NTX | Performed by: PHYSICIAN ASSISTANT

## 2021-04-10 PROCEDURE — U0002 COVID-19 LAB TEST NON-CDC: HCPCS | Mod: TXP | Performed by: SURGERY

## 2021-04-10 PROCEDURE — P9047 ALBUMIN (HUMAN), 25%, 50ML: HCPCS | Mod: JG,NTX | Performed by: PHYSICIAN ASSISTANT

## 2021-04-10 PROCEDURE — 86900 BLOOD TYPING SEROLOGIC ABO: CPT | Mod: NTX | Performed by: PHYSICIAN ASSISTANT

## 2021-04-10 PROCEDURE — 85610 PROTHROMBIN TIME: CPT | Mod: NTX | Performed by: PHYSICIAN ASSISTANT

## 2021-04-10 PROCEDURE — 63600175 PHARM REV CODE 636 W HCPCS: Mod: NTX | Performed by: PHYSICIAN ASSISTANT

## 2021-04-10 PROCEDURE — 25000003 PHARM REV CODE 250: Mod: NTX | Performed by: INTERNAL MEDICINE

## 2021-04-10 PROCEDURE — 81003 URINALYSIS AUTO W/O SCOPE: CPT | Mod: NTX | Performed by: PHYSICIAN ASSISTANT

## 2021-04-10 PROCEDURE — 87040 BLOOD CULTURE FOR BACTERIA: CPT | Mod: NTX | Performed by: PHYSICIAN ASSISTANT

## 2021-04-10 PROCEDURE — 63600175 PHARM REV CODE 636 W HCPCS: Mod: NTX | Performed by: INTERNAL MEDICINE

## 2021-04-10 RX ORDER — FUROSEMIDE 10 MG/ML
20 INJECTION INTRAMUSCULAR; INTRAVENOUS ONCE
Status: DISCONTINUED | OUTPATIENT
Start: 2021-04-10 | End: 2021-04-10

## 2021-04-10 RX ORDER — PANTOPRAZOLE SODIUM 40 MG/1
40 TABLET, DELAYED RELEASE ORAL DAILY
Status: DISCONTINUED | OUTPATIENT
Start: 2021-04-11 | End: 2021-04-12

## 2021-04-10 RX ORDER — MIDODRINE HYDROCHLORIDE 5 MG/1
15 TABLET ORAL 3 TIMES DAILY
Status: DISCONTINUED | OUTPATIENT
Start: 2021-04-10 | End: 2021-04-10

## 2021-04-10 RX ORDER — TRAMADOL HYDROCHLORIDE 50 MG/1
50 TABLET ORAL EVERY 6 HOURS PRN
Status: DISCONTINUED | OUTPATIENT
Start: 2021-04-10 | End: 2021-04-12

## 2021-04-10 RX ORDER — FOLIC ACID 1 MG/1
1000 TABLET ORAL DAILY
Status: DISCONTINUED | OUTPATIENT
Start: 2021-04-11 | End: 2021-04-12

## 2021-04-10 RX ORDER — CEFEPIME HYDROCHLORIDE 1 G/1
1 INJECTION, POWDER, FOR SOLUTION INTRAMUSCULAR; INTRAVENOUS
Status: DISCONTINUED | OUTPATIENT
Start: 2021-04-10 | End: 2021-04-12

## 2021-04-10 RX ORDER — MIDODRINE HYDROCHLORIDE 5 MG/1
10 TABLET ORAL 3 TIMES DAILY
Status: DISCONTINUED | OUTPATIENT
Start: 2021-04-11 | End: 2021-04-12

## 2021-04-10 RX ORDER — HEPARIN SODIUM 5000 [USP'U]/ML
5000 INJECTION, SOLUTION INTRAVENOUS; SUBCUTANEOUS EVERY 8 HOURS
Status: DISCONTINUED | OUTPATIENT
Start: 2021-04-11 | End: 2021-04-26 | Stop reason: HOSPADM

## 2021-04-10 RX ORDER — SODIUM CHLORIDE 0.9 % (FLUSH) 0.9 %
10 SYRINGE (ML) INJECTION
Status: DISCONTINUED | OUTPATIENT
Start: 2021-04-10 | End: 2021-04-26 | Stop reason: HOSPADM

## 2021-04-10 RX ORDER — LACTULOSE 10 G/15ML
20 SOLUTION ORAL 3 TIMES DAILY
Status: DISCONTINUED | OUTPATIENT
Start: 2021-04-10 | End: 2021-04-10

## 2021-04-10 RX ORDER — ALBUMIN HUMAN 250 G/1000ML
25 SOLUTION INTRAVENOUS EVERY 8 HOURS
Status: COMPLETED | OUTPATIENT
Start: 2021-04-10 | End: 2021-04-11

## 2021-04-10 RX ORDER — TALC
6 POWDER (GRAM) TOPICAL NIGHTLY PRN
Status: DISCONTINUED | OUTPATIENT
Start: 2021-04-10 | End: 2021-04-20

## 2021-04-10 RX ORDER — HYDROXYZINE PAMOATE 25 MG/1
25 CAPSULE ORAL EVERY 4 HOURS PRN
Status: DISCONTINUED | OUTPATIENT
Start: 2021-04-10 | End: 2021-04-11

## 2021-04-10 RX ORDER — ONDANSETRON 8 MG/1
8 TABLET, ORALLY DISINTEGRATING ORAL EVERY 8 HOURS PRN
Status: DISCONTINUED | OUTPATIENT
Start: 2021-04-10 | End: 2021-04-24

## 2021-04-10 RX ORDER — IBUPROFEN 200 MG
1 TABLET ORAL DAILY
Status: DISCONTINUED | OUTPATIENT
Start: 2021-04-10 | End: 2021-04-26 | Stop reason: HOSPADM

## 2021-04-10 RX ORDER — POLYETHYLENE GLYCOL 3350 17 G/17G
17 POWDER, FOR SOLUTION ORAL DAILY
Status: DISCONTINUED | OUTPATIENT
Start: 2021-04-11 | End: 2021-04-11

## 2021-04-10 RX ORDER — LACTULOSE 10 G/15ML
20 SOLUTION ORAL 3 TIMES DAILY
Status: DISCONTINUED | OUTPATIENT
Start: 2021-04-11 | End: 2021-04-11

## 2021-04-10 RX ORDER — ACETAMINOPHEN 325 MG/1
650 TABLET ORAL EVERY 8 HOURS PRN
Status: DISCONTINUED | OUTPATIENT
Start: 2021-04-10 | End: 2021-04-14

## 2021-04-10 RX ORDER — SPIRONOLACTONE 50 MG/1
100 TABLET, FILM COATED ORAL DAILY
Status: DISCONTINUED | OUTPATIENT
Start: 2021-04-11 | End: 2021-04-10

## 2021-04-10 RX ORDER — LACTULOSE 10 G/15ML
20 SOLUTION ORAL DAILY
Status: DISCONTINUED | OUTPATIENT
Start: 2021-04-11 | End: 2021-04-10

## 2021-04-10 RX ORDER — TIZANIDINE 4 MG/1
4 TABLET ORAL 3 TIMES DAILY
Status: DISCONTINUED | OUTPATIENT
Start: 2021-04-10 | End: 2021-04-10

## 2021-04-10 RX ADMIN — CEFEPIME 1 G: 1 INJECTION, POWDER, FOR SOLUTION INTRAMUSCULAR; INTRAVENOUS at 08:04

## 2021-04-10 RX ADMIN — MIDODRINE HYDROCHLORIDE 10 MG: 5 TABLET ORAL at 08:04

## 2021-04-10 RX ADMIN — NICOTINE 1 PATCH: 14 PATCH TRANSDERMAL at 10:04

## 2021-04-10 RX ADMIN — VANCOMYCIN HYDROCHLORIDE 2250 MG: 1 INJECTION, POWDER, LYOPHILIZED, FOR SOLUTION INTRAVENOUS at 10:04

## 2021-04-10 RX ADMIN — ALBUMIN (HUMAN) 25 G: 12.5 SOLUTION INTRAVENOUS at 10:04

## 2021-04-11 ENCOUNTER — TELEPHONE (OUTPATIENT)
Dept: TRANSPLANT | Facility: CLINIC | Age: 41
End: 2021-04-11

## 2021-04-11 ENCOUNTER — ANESTHESIA (OUTPATIENT)
Dept: SURGERY | Facility: HOSPITAL | Age: 41
DRG: 006 | End: 2021-04-11
Payer: MEDICAID

## 2021-04-11 ENCOUNTER — ANESTHESIA EVENT (OUTPATIENT)
Dept: SURGERY | Facility: HOSPITAL | Age: 41
DRG: 006 | End: 2021-04-11
Payer: MEDICAID

## 2021-04-11 PROBLEM — Z94.4 STATUS POST LIVER TRANSPLANT: Status: ACTIVE | Noted: 2021-04-11

## 2021-04-11 LAB
ABO + RH BLD: NORMAL
ALBUMIN SERPL BCP-MCNC: 2.3 G/DL (ref 3.5–5.2)
ALBUMIN SERPL BCP-MCNC: 2.4 G/DL (ref 3.5–5.2)
ALBUMIN SERPL BCP-MCNC: 2.4 G/DL (ref 3.5–5.2)
ALBUMIN SERPL BCP-MCNC: 2.5 G/DL (ref 3.5–5.2)
ALBUMIN SERPL BCP-MCNC: 2.6 G/DL (ref 3.5–5.2)
ALLENS TEST: ABNORMAL
ALP SERPL-CCNC: 135 U/L (ref 55–135)
ALP SERPL-CCNC: 268 U/L (ref 55–135)
ALP SERPL-CCNC: 268 U/L (ref 55–135)
ALT SERPL W/O P-5'-P-CCNC: 154 U/L (ref 10–44)
ALT SERPL W/O P-5'-P-CCNC: 16 U/L (ref 10–44)
ALT SERPL W/O P-5'-P-CCNC: 220 U/L (ref 10–44)
ALT SERPL W/O P-5'-P-CCNC: 220 U/L (ref 10–44)
AMYLASE SERPL-CCNC: 35 U/L (ref 20–110)
AMYLASE SERPL-CCNC: 35 U/L (ref 20–110)
ANION GAP SERPL CALC-SCNC: 13 MMOL/L (ref 8–16)
ANION GAP SERPL CALC-SCNC: 13 MMOL/L (ref 8–16)
ANION GAP SERPL CALC-SCNC: 9 MMOL/L (ref 8–16)
APPEARANCE FLD: CLEAR
APTT BLDCRRT: 36.5 SEC (ref 21–32)
APTT BLDCRRT: 37.7 SEC (ref 21–32)
APTT BLDCRRT: 38.2 SEC (ref 21–32)
APTT BLDCRRT: 39.4 SEC (ref 21–32)
APTT BLDCRRT: 41.8 SEC (ref 21–32)
APTT BLDCRRT: 86.1 SEC (ref 21–32)
AST SERPL-CCNC: 1045 U/L (ref 10–40)
AST SERPL-CCNC: 1045 U/L (ref 10–40)
AST SERPL-CCNC: 58 U/L (ref 10–40)
AST SERPL-CCNC: 703 U/L (ref 10–40)
BASOPHILS # BLD AUTO: 0.02 K/UL (ref 0–0.2)
BASOPHILS # BLD AUTO: 0.07 K/UL (ref 0–0.2)
BASOPHILS NFR BLD: 0.2 % (ref 0–1.9)
BASOPHILS NFR BLD: 0.9 % (ref 0–1.9)
BILIRUB DIRECT SERPL-MCNC: 6.3 MG/DL (ref 0.1–0.3)
BILIRUB SERPL-MCNC: 8.9 MG/DL (ref 0.1–1)
BILIRUB SERPL-MCNC: 8.9 MG/DL (ref 0.1–1)
BILIRUB SERPL-MCNC: 9.8 MG/DL (ref 0.1–1)
BILIRUB UR QL STRIP: NEGATIVE
BLD GP AB SCN CELLS X3 SERPL QL: NORMAL
BLD PROD TYP BPU: NORMAL
BLOOD BANK HEPATITIS FREEZE AND HOLD: NORMAL
BLOOD BANK HEPATITIS FREEZE AND HOLD: NORMAL
BLOOD UNIT EXPIRATION DATE: NORMAL
BLOOD UNIT TYPE CODE: 5100
BLOOD UNIT TYPE CODE: 5100
BLOOD UNIT TYPE CODE: 9500
BLOOD UNIT TYPE: NORMAL
BODY FLD TYPE: NORMAL
BUN SERPL-MCNC: 17 MG/DL (ref 6–20)
BUN SERPL-MCNC: 17 MG/DL (ref 6–20)
BUN SERPL-MCNC: 18 MG/DL (ref 6–20)
CA-I BLDV-SCNC: 0.95 MMOL/L (ref 1.06–1.42)
CA-I BLDV-SCNC: 1.02 MMOL/L (ref 1.06–1.42)
CA-I BLDV-SCNC: 1.04 MMOL/L (ref 1.06–1.42)
CA-I BLDV-SCNC: 1.09 MMOL/L (ref 1.06–1.42)
CALCIUM SERPL-MCNC: 8.2 MG/DL (ref 8.7–10.5)
CALCIUM SERPL-MCNC: 8.3 MG/DL (ref 8.7–10.5)
CALCIUM SERPL-MCNC: 8.3 MG/DL (ref 8.7–10.5)
CHLORIDE SERPL-SCNC: 90 MMOL/L (ref 95–110)
CHLORIDE SERPL-SCNC: 92 MMOL/L (ref 95–110)
CHLORIDE SERPL-SCNC: 92 MMOL/L (ref 95–110)
CLARITY UR REFRACT.AUTO: CLEAR
CO2 SERPL-SCNC: 26 MMOL/L (ref 23–29)
CO2 SERPL-SCNC: 26 MMOL/L (ref 23–29)
CO2 SERPL-SCNC: 27 MMOL/L (ref 23–29)
CODING SYSTEM: NORMAL
COLOR FLD: YELLOW
COLOR UR AUTO: NORMAL
CREAT SERPL-MCNC: 0.8 MG/DL (ref 0.5–1.4)
CREAT SERPL-MCNC: 0.8 MG/DL (ref 0.5–1.4)
CREAT SERPL-MCNC: 1 MG/DL (ref 0.5–1.4)
DELSYS: ABNORMAL
DIFFERENTIAL METHOD: ABNORMAL
DIFFERENTIAL METHOD: ABNORMAL
DISPENSE STATUS: NORMAL
EOSINOPHIL # BLD AUTO: 0 K/UL (ref 0–0.5)
EOSINOPHIL # BLD AUTO: 0.1 K/UL (ref 0–0.5)
EOSINOPHIL NFR BLD: 0 % (ref 0–8)
EOSINOPHIL NFR BLD: 1.3 % (ref 0–8)
ERYTHROCYTE [DISTWIDTH] IN BLOOD BY AUTOMATED COUNT: 19.3 % (ref 11.5–14.5)
ERYTHROCYTE [DISTWIDTH] IN BLOOD BY AUTOMATED COUNT: 21.1 % (ref 11.5–14.5)
ERYTHROCYTE [SEDIMENTATION RATE] IN BLOOD BY WESTERGREN METHOD: 18 MM/H
ERYTHROCYTE [SEDIMENTATION RATE] IN BLOOD BY WESTERGREN METHOD: 18 MM/H
EST. GFR  (AFRICAN AMERICAN): >60 ML/MIN/1.73 M^2
EST. GFR  (NON AFRICAN AMERICAN): >60 ML/MIN/1.73 M^2
ESTIMATED AVG GLUCOSE: ABNORMAL MG/DL (ref 68–131)
FIBRINOGEN PPP-MCNC: 113 MG/DL (ref 182–366)
FIBRINOGEN PPP-MCNC: 147 MG/DL (ref 182–366)
FIBRINOGEN PPP-MCNC: 168 MG/DL (ref 182–366)
FIBRINOGEN PPP-MCNC: 178 MG/DL (ref 182–366)
FIBRINOGEN PPP-MCNC: 178 MG/DL (ref 182–366)
FIBRINOGEN PPP-MCNC: 182 MG/DL (ref 182–366)
FIO2: 100
FIO2: 50
GLUCOSE SERPL-MCNC: 102 MG/DL (ref 70–110)
GLUCOSE SERPL-MCNC: 106 MG/DL (ref 70–110)
GLUCOSE SERPL-MCNC: 111 MG/DL (ref 70–110)
GLUCOSE SERPL-MCNC: 112 MG/DL (ref 70–110)
GLUCOSE SERPL-MCNC: 114 MG/DL (ref 70–110)
GLUCOSE SERPL-MCNC: 126 MG/DL (ref 70–110)
GLUCOSE SERPL-MCNC: 127 MG/DL (ref 70–110)
GLUCOSE SERPL-MCNC: 131 MG/DL (ref 70–110)
GLUCOSE SERPL-MCNC: 133 MG/DL (ref 70–110)
GLUCOSE SERPL-MCNC: 134 MG/DL (ref 70–110)
GLUCOSE SERPL-MCNC: 134 MG/DL (ref 70–110)
GLUCOSE SERPL-MCNC: 136 MG/DL (ref 70–110)
GLUCOSE SERPL-MCNC: 138 MG/DL (ref 70–110)
GLUCOSE SERPL-MCNC: 141 MG/DL (ref 70–110)
GLUCOSE SERPL-MCNC: 141 MG/DL (ref 70–110)
GLUCOSE UR QL STRIP: NEGATIVE
GRAM STN SPEC: NORMAL
GRAM STN SPEC: NORMAL
HBA1C MFR BLD: <4 % (ref 4–5.6)
HCO3 UR-SCNC: 28.2 MMOL/L (ref 24–28)
HCO3 UR-SCNC: 28.8 MMOL/L (ref 24–28)
HCO3 UR-SCNC: 29.1 MMOL/L (ref 24–28)
HCO3 UR-SCNC: 29.7 MMOL/L (ref 24–28)
HCO3 UR-SCNC: 29.9 MMOL/L (ref 24–28)
HCO3 UR-SCNC: 30.1 MMOL/L (ref 24–28)
HCO3 UR-SCNC: 30.3 MMOL/L (ref 24–28)
HCO3 UR-SCNC: 30.5 MMOL/L (ref 24–28)
HCO3 UR-SCNC: 30.6 MMOL/L (ref 24–28)
HCO3 UR-SCNC: 31.2 MMOL/L (ref 24–28)
HCO3 UR-SCNC: 32.6 MMOL/L (ref 24–28)
HCT VFR BLD AUTO: 20.9 % (ref 40–54)
HCT VFR BLD AUTO: 23.3 % (ref 40–54)
HCT VFR BLD AUTO: 23.7 % (ref 40–54)
HCT VFR BLD AUTO: 23.8 % (ref 40–54)
HCT VFR BLD AUTO: 26.6 % (ref 40–54)
HCT VFR BLD AUTO: 27.1 % (ref 40–54)
HCT VFR BLD CALC: 21 %PCV (ref 36–54)
HCT VFR BLD CALC: 24 %PCV (ref 36–54)
HCT VFR BLD CALC: 26 %PCV (ref 36–54)
HCT VFR BLD CALC: 28 %PCV (ref 36–54)
HCT VFR BLD CALC: 30 %PCV (ref 36–54)
HGB BLD-MCNC: 7.6 G/DL (ref 14–18)
HGB BLD-MCNC: 8.3 G/DL (ref 14–18)
HGB BLD-MCNC: 8.3 G/DL (ref 14–18)
HGB BLD-MCNC: 8.6 G/DL (ref 14–18)
HGB BLD-MCNC: 9.4 G/DL (ref 14–18)
HGB BLD-MCNC: 9.7 G/DL (ref 14–18)
HGB UR QL STRIP: NEGATIVE
IMM GRANULOCYTES # BLD AUTO: 0.03 K/UL (ref 0–0.04)
IMM GRANULOCYTES # BLD AUTO: 0.15 K/UL (ref 0–0.04)
IMM GRANULOCYTES NFR BLD AUTO: 0.4 % (ref 0–0.5)
IMM GRANULOCYTES NFR BLD AUTO: 1.4 % (ref 0–0.5)
INR PPP: 1.5 (ref 0.8–1.2)
INR PPP: 1.6 (ref 0.8–1.2)
INR PPP: 1.7 (ref 0.8–1.2)
INR PPP: 2 (ref 0.8–1.2)
KETONES UR QL STRIP: NEGATIVE
LACTATE SERPL-SCNC: 0.9 MMOL/L (ref 0.5–2.2)
LDH SERPL L TO P-CCNC: 1.84 MMOL/L (ref 0.36–1.25)
LDH SERPL L TO P-CCNC: 1249 U/L (ref 110–260)
LEUKOCYTE ESTERASE UR QL STRIP: NEGATIVE
LIPASE SERPL-CCNC: 27 U/L (ref 4–60)
LYMPHOCYTES # BLD AUTO: 0.5 K/UL (ref 1–4.8)
LYMPHOCYTES # BLD AUTO: 2.2 K/UL (ref 1–4.8)
LYMPHOCYTES NFR BLD: 28.7 % (ref 18–48)
LYMPHOCYTES NFR BLD: 4.9 % (ref 18–48)
LYMPHOCYTES NFR FLD MANUAL: 46 %
MAGNESIUM SERPL-MCNC: 1.4 MG/DL (ref 1.6–2.6)
MAGNESIUM SERPL-MCNC: 1.4 MG/DL (ref 1.6–2.6)
MAGNESIUM SERPL-MCNC: 1.5 MG/DL (ref 1.6–2.6)
MAGNESIUM SERPL-MCNC: 1.6 MG/DL (ref 1.6–2.6)
MCH RBC QN AUTO: 34.9 PG (ref 27–31)
MCH RBC QN AUTO: 38.1 PG (ref 27–31)
MCHC RBC AUTO-ENTMCNC: 35 G/DL (ref 32–36)
MCHC RBC AUTO-ENTMCNC: 35.8 G/DL (ref 32–36)
MCV RBC AUTO: 109 FL (ref 82–98)
MCV RBC AUTO: 98 FL (ref 82–98)
MESOTHL CELL NFR FLD MANUAL: 34 %
MODE: ABNORMAL
MODE: ABNORMAL
MONOCYTES # BLD AUTO: 0.8 K/UL (ref 0.3–1)
MONOCYTES # BLD AUTO: 1 K/UL (ref 0.3–1)
MONOCYTES NFR BLD: 13 % (ref 4–15)
MONOCYTES NFR BLD: 7.2 % (ref 4–15)
MONOS+MACROS NFR FLD MANUAL: 15 %
NEUTROPHILS # BLD AUTO: 4.3 K/UL (ref 1.8–7.7)
NEUTROPHILS # BLD AUTO: 9.4 K/UL (ref 1.8–7.7)
NEUTROPHILS NFR BLD: 55.7 % (ref 38–73)
NEUTROPHILS NFR BLD: 86.3 % (ref 38–73)
NEUTROPHILS NFR FLD MANUAL: 5 %
NITRITE UR QL STRIP: NEGATIVE
NRBC BLD-RTO: 0 /100 WBC
NRBC BLD-RTO: 0 /100 WBC
PCO2 BLDA: 35.6 MMHG (ref 35–45)
PCO2 BLDA: 38.9 MMHG (ref 35–45)
PCO2 BLDA: 40 MMHG (ref 35–45)
PCO2 BLDA: 41 MMHG (ref 35–45)
PCO2 BLDA: 41 MMHG (ref 35–45)
PCO2 BLDA: 41.9 MMHG (ref 35–45)
PCO2 BLDA: 42.2 MMHG (ref 35–45)
PCO2 BLDA: 43.1 MMHG (ref 35–45)
PCO2 BLDA: 43.4 MMHG (ref 35–45)
PCO2 BLDA: 43.6 MMHG (ref 35–45)
PCO2 BLDA: 45.3 MMHG (ref 35–45)
PEEP: 5
PEEP: 5
PH SMN: 7.45 [PH] (ref 7.35–7.45)
PH SMN: 7.45 [PH] (ref 7.35–7.45)
PH SMN: 7.46 [PH] (ref 7.35–7.45)
PH SMN: 7.46 [PH] (ref 7.35–7.45)
PH SMN: 7.47 [PH] (ref 7.35–7.45)
PH SMN: 7.48 [PH] (ref 7.35–7.45)
PH SMN: 7.48 [PH] (ref 7.35–7.45)
PH SMN: 7.52 [PH] (ref 7.35–7.45)
PH UR STRIP: 7 [PH] (ref 5–8)
PHOSPHATE SERPL-MCNC: 3.4 MG/DL (ref 2.7–4.5)
PLATELET # BLD AUTO: 110 K/UL (ref 150–450)
PLATELET # BLD AUTO: 127 K/UL (ref 150–450)
PLATELET # BLD AUTO: 62 K/UL (ref 150–450)
PLATELET # BLD AUTO: 86 K/UL (ref 150–450)
PLATELET # BLD AUTO: 95 K/UL (ref 150–450)
PLATELET # BLD AUTO: 99 K/UL (ref 150–450)
PLATELET BLD QL SMEAR: ABNORMAL
PMV BLD AUTO: 10.1 FL (ref 9.2–12.9)
PMV BLD AUTO: 9 FL (ref 9.2–12.9)
PMV BLD AUTO: 9.5 FL (ref 9.2–12.9)
PMV BLD AUTO: 9.6 FL (ref 9.2–12.9)
PMV BLD AUTO: 9.7 FL (ref 9.2–12.9)
PMV BLD AUTO: 9.9 FL (ref 9.2–12.9)
PO2 BLDA: 115 MMHG (ref 80–100)
PO2 BLDA: 135 MMHG (ref 80–100)
PO2 BLDA: 137 MMHG (ref 80–100)
PO2 BLDA: 148 MMHG (ref 80–100)
PO2 BLDA: 156 MMHG (ref 80–100)
PO2 BLDA: 161 MMHG (ref 80–100)
PO2 BLDA: 162 MMHG (ref 80–100)
PO2 BLDA: 263 MMHG (ref 80–100)
PO2 BLDA: 275 MMHG (ref 80–100)
PO2 BLDA: 52 MMHG (ref 40–60)
PO2 BLDA: 55 MMHG (ref 40–60)
POC BE: 5 MMOL/L
POC BE: 5 MMOL/L
POC BE: 6 MMOL/L
POC BE: 7 MMOL/L
POC BE: 9 MMOL/L
POC IONIZED CALCIUM: 1.01 MMOL/L (ref 1.06–1.42)
POC IONIZED CALCIUM: 1.03 MMOL/L (ref 1.06–1.42)
POC IONIZED CALCIUM: 1.05 MMOL/L (ref 1.06–1.42)
POC IONIZED CALCIUM: 1.06 MMOL/L (ref 1.06–1.42)
POC IONIZED CALCIUM: 1.08 MMOL/L (ref 1.06–1.42)
POC IONIZED CALCIUM: 1.11 MMOL/L (ref 1.06–1.42)
POC IONIZED CALCIUM: 1.11 MMOL/L (ref 1.06–1.42)
POC IONIZED CALCIUM: 1.16 MMOL/L (ref 1.06–1.42)
POC IONIZED CALCIUM: 1.19 MMOL/L (ref 1.06–1.42)
POC SATURATED O2: 100 % (ref 95–100)
POC SATURATED O2: 88 % (ref 95–100)
POC SATURATED O2: 90 % (ref 95–100)
POC SATURATED O2: 99 % (ref 95–100)
POC TCO2: 29 MMOL/L (ref 24–29)
POC TCO2: 30 MMOL/L (ref 23–27)
POC TCO2: 30 MMOL/L (ref 23–27)
POC TCO2: 31 MMOL/L (ref 23–27)
POC TCO2: 31 MMOL/L (ref 23–27)
POC TCO2: 31 MMOL/L (ref 24–29)
POC TCO2: 32 MMOL/L (ref 23–27)
POC TCO2: 34 MMOL/L (ref 23–27)
POCT GLUCOSE: 127 MG/DL (ref 70–110)
POOLED CRYOPPT GVN BPU: NORMAL
POTASSIUM BLD-SCNC: 3.4 MMOL/L (ref 3.5–5.1)
POTASSIUM BLD-SCNC: 3.5 MMOL/L (ref 3.5–5.1)
POTASSIUM BLD-SCNC: 3.6 MMOL/L (ref 3.5–5.1)
POTASSIUM BLD-SCNC: 3.7 MMOL/L (ref 3.5–5.1)
POTASSIUM BLD-SCNC: 3.7 MMOL/L (ref 3.5–5.1)
POTASSIUM BLD-SCNC: 3.8 MMOL/L (ref 3.5–5.1)
POTASSIUM BLD-SCNC: 3.9 MMOL/L (ref 3.5–5.1)
POTASSIUM SERPL-SCNC: 3.5 MMOL/L (ref 3.5–5.1)
POTASSIUM SERPL-SCNC: 3.6 MMOL/L (ref 3.5–5.1)
POTASSIUM SERPL-SCNC: 3.7 MMOL/L (ref 3.5–5.1)
POTASSIUM SERPL-SCNC: 3.7 MMOL/L (ref 3.5–5.1)
POTASSIUM SERPL-SCNC: 4.1 MMOL/L (ref 3.5–5.1)
PROT SERPL-MCNC: 5.3 G/DL (ref 6–8.4)
PROT SERPL-MCNC: 5.3 G/DL (ref 6–8.4)
PROT SERPL-MCNC: 6.4 G/DL (ref 6–8.4)
PROT UR QL STRIP: NEGATIVE
PROTHROMBIN TIME: 16 SEC (ref 9–12.5)
PROTHROMBIN TIME: 16 SEC (ref 9–12.5)
PROTHROMBIN TIME: 16.1 SEC (ref 9–12.5)
PROTHROMBIN TIME: 16.1 SEC (ref 9–12.5)
PROTHROMBIN TIME: 16.4 SEC (ref 9–12.5)
PROTHROMBIN TIME: 16.7 SEC (ref 9–12.5)
PROTHROMBIN TIME: 17.7 SEC (ref 9–12.5)
PROTHROMBIN TIME: 20.9 SEC (ref 9–12.5)
RBC # BLD AUTO: 2.18 M/UL (ref 4.6–6.2)
RBC # BLD AUTO: 2.78 M/UL (ref 4.6–6.2)
SAMPLE: ABNORMAL
SITE: ABNORMAL
SODIUM BLD-SCNC: 128 MMOL/L (ref 136–145)
SODIUM BLD-SCNC: 129 MMOL/L (ref 136–145)
SODIUM BLD-SCNC: 129 MMOL/L (ref 136–145)
SODIUM BLD-SCNC: 130 MMOL/L (ref 136–145)
SODIUM BLD-SCNC: 131 MMOL/L (ref 136–145)
SODIUM BLD-SCNC: 131 MMOL/L (ref 136–145)
SODIUM BLD-SCNC: 132 MMOL/L (ref 136–145)
SODIUM SERPL-SCNC: 126 MMOL/L (ref 136–145)
SODIUM SERPL-SCNC: 126 MMOL/L (ref 136–145)
SODIUM SERPL-SCNC: 127 MMOL/L (ref 136–145)
SODIUM SERPL-SCNC: 130 MMOL/L (ref 136–145)
SODIUM SERPL-SCNC: 131 MMOL/L (ref 136–145)
SP GR UR STRIP: 1.01 (ref 1–1.03)
TRANS PLATPHERESIS VOL PATIENT: NORMAL ML
TRANS PLATPHERESIS VOL PATIENT: NORMAL ML
TROPONIN I SERPL DL<=0.01 NG/ML-MCNC: 0.01 NG/ML (ref 0–0.03)
URN SPEC COLLECT METH UR: NORMAL
VT: 520
VT: 520
WBC # BLD AUTO: 10.86 K/UL (ref 3.9–12.7)
WBC # BLD AUTO: 7.78 K/UL (ref 3.9–12.7)
WBC # FLD: 52 /CU MM

## 2021-04-11 PROCEDURE — 47135 TRANSPLANTATION OF LIVER: CPT | Mod: ,,, | Performed by: TRANSPLANT SURGERY

## 2021-04-11 PROCEDURE — 82330 ASSAY OF CALCIUM: CPT | Performed by: INTERNAL MEDICINE

## 2021-04-11 PROCEDURE — P9045 ALBUMIN (HUMAN), 5%, 250 ML: HCPCS | Mod: JG | Performed by: STUDENT IN AN ORGANIZED HEALTH CARE EDUCATION/TRAINING PROGRAM

## 2021-04-11 PROCEDURE — 83735 ASSAY OF MAGNESIUM: CPT | Mod: 91 | Performed by: PHYSICIAN ASSISTANT

## 2021-04-11 PROCEDURE — D9220A PRA ANESTHESIA: Mod: CRNA,,, | Performed by: NURSE ANESTHETIST, CERTIFIED REGISTERED

## 2021-04-11 PROCEDURE — 63600175 PHARM REV CODE 636 W HCPCS: Mod: NTX | Performed by: PHYSICIAN ASSISTANT

## 2021-04-11 PROCEDURE — 81300000 HC LIVER ACQUISITION CHARGE

## 2021-04-11 PROCEDURE — 25000003 PHARM REV CODE 250: Mod: NTX | Performed by: INTERNAL MEDICINE

## 2021-04-11 PROCEDURE — 89051 BODY FLUID CELL COUNT: CPT | Performed by: TRANSPLANT SURGERY

## 2021-04-11 PROCEDURE — 85025 COMPLETE CBC W/AUTO DIFF WBC: CPT | Performed by: PHYSICIAN ASSISTANT

## 2021-04-11 PROCEDURE — 82248 BILIRUBIN DIRECT: CPT | Performed by: PHYSICIAN ASSISTANT

## 2021-04-11 PROCEDURE — 93005 ELECTROCARDIOGRAM TRACING: CPT

## 2021-04-11 PROCEDURE — D9220A PRA ANESTHESIA: Mod: ANES,,, | Performed by: ANESTHESIOLOGY

## 2021-04-11 PROCEDURE — 36415 COLL VENOUS BLD VENIPUNCTURE: CPT | Performed by: INTERNAL MEDICINE

## 2021-04-11 PROCEDURE — P9035 PLATELET PHERES LEUKOREDUCED: HCPCS | Performed by: ANESTHESIOLOGY

## 2021-04-11 PROCEDURE — 83735 ASSAY OF MAGNESIUM: CPT | Mod: 91 | Performed by: INTERNAL MEDICINE

## 2021-04-11 PROCEDURE — 36620 PR INSERT CATH,ART,PERCUT,SHORTTERM: ICD-10-PCS | Mod: 59,,, | Performed by: ANESTHESIOLOGY

## 2021-04-11 PROCEDURE — 87102 FUNGUS ISOLATION CULTURE: CPT | Performed by: TRANSPLANT SURGERY

## 2021-04-11 PROCEDURE — 83735 ASSAY OF MAGNESIUM: CPT | Mod: 91 | Performed by: SURGERY

## 2021-04-11 PROCEDURE — P9021 RED BLOOD CELLS UNIT: HCPCS | Performed by: PHYSICIAN ASSISTANT

## 2021-04-11 PROCEDURE — 83605 ASSAY OF LACTIC ACID: CPT | Performed by: SURGERY

## 2021-04-11 PROCEDURE — 63600175 PHARM REV CODE 636 W HCPCS: Performed by: NURSE ANESTHETIST, CERTIFIED REGISTERED

## 2021-04-11 PROCEDURE — 87086 URINE CULTURE/COLONY COUNT: CPT | Performed by: PHYSICIAN ASSISTANT

## 2021-04-11 PROCEDURE — 47147 PR TRANSPLANT,PREP DONOR LIVER/ARTERIAL: ICD-10-PCS | Mod: 51,,, | Performed by: TRANSPLANT SURGERY

## 2021-04-11 PROCEDURE — 81003 URINALYSIS AUTO W/O SCOPE: CPT | Performed by: PHYSICIAN ASSISTANT

## 2021-04-11 PROCEDURE — 99000 SPECIMEN HANDLING OFFICE-LAB: CPT | Performed by: PHYSICIAN ASSISTANT

## 2021-04-11 PROCEDURE — P9047 ALBUMIN (HUMAN), 25%, 50ML: HCPCS | Mod: JG | Performed by: NURSE ANESTHETIST, CERTIFIED REGISTERED

## 2021-04-11 PROCEDURE — 27201423 OPTIME MED/SURG SUP & DEVICES STERILE SUPPLY: Performed by: TRANSPLANT SURGERY

## 2021-04-11 PROCEDURE — 36415 COLL VENOUS BLD VENIPUNCTURE: CPT | Performed by: PHYSICIAN ASSISTANT

## 2021-04-11 PROCEDURE — 86900 BLOOD TYPING SEROLOGIC ABO: CPT | Performed by: PHYSICIAN ASSISTANT

## 2021-04-11 PROCEDURE — 85730 THROMBOPLASTIN TIME PARTIAL: CPT | Mod: 91 | Performed by: INTERNAL MEDICINE

## 2021-04-11 PROCEDURE — 86965 POOLING BLOOD PLATELETS: CPT | Performed by: ANESTHESIOLOGY

## 2021-04-11 PROCEDURE — 63600175 PHARM REV CODE 636 W HCPCS: Mod: JG | Performed by: STUDENT IN AN ORGANIZED HEALTH CARE EDUCATION/TRAINING PROGRAM

## 2021-04-11 PROCEDURE — 88313 SPECIAL STAINS GROUP 2: CPT | Mod: 26,,, | Performed by: PATHOLOGY

## 2021-04-11 PROCEDURE — 85610 PROTHROMBIN TIME: CPT | Mod: 91 | Performed by: SURGERY

## 2021-04-11 PROCEDURE — 83690 ASSAY OF LIPASE: CPT | Performed by: SURGERY

## 2021-04-11 PROCEDURE — 85730 THROMBOPLASTIN TIME PARTIAL: CPT | Performed by: PHYSICIAN ASSISTANT

## 2021-04-11 PROCEDURE — 85049 AUTOMATED PLATELET COUNT: CPT | Mod: 91 | Performed by: INTERNAL MEDICINE

## 2021-04-11 PROCEDURE — 25000003 PHARM REV CODE 250: Performed by: SURGERY

## 2021-04-11 PROCEDURE — P9045 ALBUMIN (HUMAN), 5%, 250 ML: HCPCS | Mod: JG | Performed by: NURSE ANESTHETIST, CERTIFIED REGISTERED

## 2021-04-11 PROCEDURE — 86703 HIV-1/HIV-2 1 RESULT ANTBDY: CPT | Performed by: PHYSICIAN ASSISTANT

## 2021-04-11 PROCEDURE — 80053 COMPREHEN METABOLIC PANEL: CPT | Performed by: PHYSICIAN ASSISTANT

## 2021-04-11 PROCEDURE — 81300030 HC LIVER TRANSPORT, FLIGHT WITHIN 300 MILES

## 2021-04-11 PROCEDURE — 86920 COMPATIBILITY TEST SPIN: CPT | Performed by: PHYSICIAN ASSISTANT

## 2021-04-11 PROCEDURE — 86706 HEP B SURFACE ANTIBODY: CPT | Performed by: PHYSICIAN ASSISTANT

## 2021-04-11 PROCEDURE — 37000008 HC ANESTHESIA 1ST 15 MINUTES: Performed by: TRANSPLANT SURGERY

## 2021-04-11 PROCEDURE — 63600175 PHARM REV CODE 636 W HCPCS: Mod: NTX | Performed by: INTERNAL MEDICINE

## 2021-04-11 PROCEDURE — 99900035 HC TECH TIME PER 15 MIN (STAT)

## 2021-04-11 PROCEDURE — 82803 BLOOD GASES ANY COMBINATION: CPT

## 2021-04-11 PROCEDURE — P9012 CRYOPRECIPITATE EACH UNIT: HCPCS | Performed by: ANESTHESIOLOGY

## 2021-04-11 PROCEDURE — 20000000 HC ICU ROOM

## 2021-04-11 PROCEDURE — 25000003 PHARM REV CODE 250: Performed by: NURSE ANESTHETIST, CERTIFIED REGISTERED

## 2021-04-11 PROCEDURE — 87205 SMEAR GRAM STAIN: CPT | Performed by: TRANSPLANT SURGERY

## 2021-04-11 PROCEDURE — 99223 1ST HOSP IP/OBS HIGH 75: CPT | Mod: NTX,,, | Performed by: PHYSICIAN ASSISTANT

## 2021-04-11 PROCEDURE — 82330 ASSAY OF CALCIUM: CPT

## 2021-04-11 PROCEDURE — 88309 PR  SURG PATH,LEVEL VI: ICD-10-PCS | Mod: 26,,, | Performed by: PATHOLOGY

## 2021-04-11 PROCEDURE — 47143 PR TRANSPLANT,PREP DONOR LIVER, WHOLE: ICD-10-PCS | Mod: 51,,, | Performed by: TRANSPLANT SURGERY

## 2021-04-11 PROCEDURE — 99900026 HC AIRWAY MAINTENANCE (STAT)

## 2021-04-11 PROCEDURE — 63600175 PHARM REV CODE 636 W HCPCS: Mod: JG | Performed by: NURSE ANESTHETIST, CERTIFIED REGISTERED

## 2021-04-11 PROCEDURE — 84295 ASSAY OF SERUM SODIUM: CPT | Mod: 91 | Performed by: INTERNAL MEDICINE

## 2021-04-11 PROCEDURE — 27201041 HC RESERVOIR, CARDIOTOMY

## 2021-04-11 PROCEDURE — 84484 ASSAY OF TROPONIN QUANT: CPT | Performed by: PHYSICIAN ASSISTANT

## 2021-04-11 PROCEDURE — 36000930 HC OR TIME LEV VII 1ST 15 MIN: Performed by: TRANSPLANT SURGERY

## 2021-04-11 PROCEDURE — 47147 PREP DONOR LIVER/ARTERIAL: CPT | Mod: 51,,, | Performed by: TRANSPLANT SURGERY

## 2021-04-11 PROCEDURE — 99223 PR INITIAL HOSPITAL CARE,LEVL III: ICD-10-PCS | Mod: NTX,,, | Performed by: PHYSICIAN ASSISTANT

## 2021-04-11 PROCEDURE — 36620 ARTERIAL: ICD-10-PCS | Mod: 59,,, | Performed by: ANESTHESIOLOGY

## 2021-04-11 PROCEDURE — 83036 HEMOGLOBIN GLYCOSYLATED A1C: CPT | Performed by: PHYSICIAN ASSISTANT

## 2021-04-11 PROCEDURE — 47135 TRANSPLANTATION OF LIVER: CPT | Mod: 80,,, | Performed by: TRANSPLANT SURGERY

## 2021-04-11 PROCEDURE — 85384 FIBRINOGEN ACTIVITY: CPT | Performed by: PHYSICIAN ASSISTANT

## 2021-04-11 PROCEDURE — 85014 HEMATOCRIT: CPT | Mod: 91 | Performed by: INTERNAL MEDICINE

## 2021-04-11 PROCEDURE — 86704 HEP B CORE ANTIBODY TOTAL: CPT | Performed by: PHYSICIAN ASSISTANT

## 2021-04-11 PROCEDURE — 85384 FIBRINOGEN ACTIVITY: CPT | Mod: 91 | Performed by: INTERNAL MEDICINE

## 2021-04-11 PROCEDURE — 84132 ASSAY OF SERUM POTASSIUM: CPT

## 2021-04-11 PROCEDURE — 84100 ASSAY OF PHOSPHORUS: CPT | Performed by: PHYSICIAN ASSISTANT

## 2021-04-11 PROCEDURE — 87340 HEPATITIS B SURFACE AG IA: CPT | Performed by: PHYSICIAN ASSISTANT

## 2021-04-11 PROCEDURE — 88309 TISSUE EXAM BY PATHOLOGIST: CPT | Performed by: PATHOLOGY

## 2021-04-11 PROCEDURE — 83605 ASSAY OF LACTIC ACID: CPT

## 2021-04-11 PROCEDURE — 85730 THROMBOPLASTIN TIME PARTIAL: CPT | Mod: 91 | Performed by: SURGERY

## 2021-04-11 PROCEDURE — 63600175 PHARM REV CODE 636 W HCPCS: Performed by: TRANSPLANT SURGERY

## 2021-04-11 PROCEDURE — 84295 ASSAY OF SERUM SODIUM: CPT

## 2021-04-11 PROCEDURE — 99000 SPECIMEN HANDLING OFFICE-LAB: CPT | Mod: 91 | Performed by: SURGERY

## 2021-04-11 PROCEDURE — 36620 INSERTION CATHETER ARTERY: CPT | Mod: 59,,, | Performed by: ANESTHESIOLOGY

## 2021-04-11 PROCEDURE — 87075 CULTR BACTERIA EXCEPT BLOOD: CPT | Performed by: TRANSPLANT SURGERY

## 2021-04-11 PROCEDURE — 25000003 PHARM REV CODE 250: Performed by: STUDENT IN AN ORGANIZED HEALTH CARE EDUCATION/TRAINING PROGRAM

## 2021-04-11 PROCEDURE — 94761 N-INVAS EAR/PLS OXIMETRY MLT: CPT

## 2021-04-11 PROCEDURE — 83615 LACTATE (LD) (LDH) ENZYME: CPT | Performed by: SURGERY

## 2021-04-11 PROCEDURE — 85610 PROTHROMBIN TIME: CPT | Performed by: PHYSICIAN ASSISTANT

## 2021-04-11 PROCEDURE — 47135 PR TRANSPLANT LIVER,ALLOTRANSPLANT: ICD-10-PCS | Mod: ,,, | Performed by: TRANSPLANT SURGERY

## 2021-04-11 PROCEDURE — P9017 PLASMA 1 DONOR FRZ W/IN 8 HR: HCPCS | Performed by: PHYSICIAN ASSISTANT

## 2021-04-11 PROCEDURE — 88307 TISSUE EXAM BY PATHOLOGIST: CPT | Performed by: PATHOLOGY

## 2021-04-11 PROCEDURE — 85002 BLEEDING TIME TEST: CPT

## 2021-04-11 PROCEDURE — 27100088 HC CELL SAVER

## 2021-04-11 PROCEDURE — 63600175 PHARM REV CODE 636 W HCPCS: Performed by: SURGERY

## 2021-04-11 PROCEDURE — 37799 UNLISTED PX VASCULAR SURGERY: CPT

## 2021-04-11 PROCEDURE — S5010 5% DEXTROSE AND 0.45% SALINE: HCPCS | Performed by: SURGERY

## 2021-04-11 PROCEDURE — 80053 COMPREHEN METABOLIC PANEL: CPT | Mod: 91 | Performed by: SURGERY

## 2021-04-11 PROCEDURE — 93503 SWAN GANZ LINE: ICD-10-PCS | Mod: 59,,, | Performed by: ANESTHESIOLOGY

## 2021-04-11 PROCEDURE — 25000003 PHARM REV CODE 250: Performed by: PHYSICIAN ASSISTANT

## 2021-04-11 PROCEDURE — 86803 HEPATITIS C AB TEST: CPT | Performed by: PHYSICIAN ASSISTANT

## 2021-04-11 PROCEDURE — 87070 CULTURE OTHR SPECIMN AEROBIC: CPT | Performed by: TRANSPLANT SURGERY

## 2021-04-11 PROCEDURE — 37000009 HC ANESTHESIA EA ADD 15 MINS: Performed by: TRANSPLANT SURGERY

## 2021-04-11 PROCEDURE — 93503 INSERT/PLACE HEART CATHETER: CPT | Mod: 59,,, | Performed by: ANESTHESIOLOGY

## 2021-04-11 PROCEDURE — 87522 HEPATITIS C REVRS TRNSCRPJ: CPT | Performed by: PHYSICIAN ASSISTANT

## 2021-04-11 PROCEDURE — 88313 PR  SPECIAL STAINS,GROUP II: ICD-10-PCS | Mod: 26,,, | Performed by: PATHOLOGY

## 2021-04-11 PROCEDURE — 88313 SPECIAL STAINS GROUP 2: CPT | Performed by: PATHOLOGY

## 2021-04-11 PROCEDURE — C1729 CATH, DRAINAGE: HCPCS | Performed by: TRANSPLANT SURGERY

## 2021-04-11 PROCEDURE — 93010 ELECTROCARDIOGRAM REPORT: CPT | Mod: ,,, | Performed by: INTERNAL MEDICINE

## 2021-04-11 PROCEDURE — 36000931 HC OR TIME LEV VII EA ADD 15 MIN: Performed by: TRANSPLANT SURGERY

## 2021-04-11 PROCEDURE — 84132 ASSAY OF SERUM POTASSIUM: CPT | Mod: 91 | Performed by: INTERNAL MEDICINE

## 2021-04-11 PROCEDURE — 88307 TISSUE EXAM BY PATHOLOGIST: CPT | Mod: 26,,, | Performed by: PATHOLOGY

## 2021-04-11 PROCEDURE — 94002 VENT MGMT INPAT INIT DAY: CPT

## 2021-04-11 PROCEDURE — 27000221 HC OXYGEN, UP TO 24 HOURS

## 2021-04-11 PROCEDURE — 82040 ASSAY OF SERUM ALBUMIN: CPT | Mod: 91 | Performed by: INTERNAL MEDICINE

## 2021-04-11 PROCEDURE — 88307 PR  SURG PATH,LEVEL V: ICD-10-PCS | Mod: 26,,, | Performed by: PATHOLOGY

## 2021-04-11 PROCEDURE — 85018 HEMOGLOBIN: CPT | Mod: 91 | Performed by: INTERNAL MEDICINE

## 2021-04-11 PROCEDURE — P9047 ALBUMIN (HUMAN), 25%, 50ML: HCPCS | Mod: JG,NTX | Performed by: PHYSICIAN ASSISTANT

## 2021-04-11 PROCEDURE — 93010 EKG 12-LEAD: ICD-10-PCS | Mod: ,,, | Performed by: INTERNAL MEDICINE

## 2021-04-11 PROCEDURE — 82947 ASSAY GLUCOSE BLOOD QUANT: CPT | Mod: 91 | Performed by: INTERNAL MEDICINE

## 2021-04-11 PROCEDURE — 85014 HEMATOCRIT: CPT

## 2021-04-11 PROCEDURE — 27000191 HC C-V MONITORING

## 2021-04-11 PROCEDURE — 85384 FIBRINOGEN ACTIVITY: CPT | Mod: 91 | Performed by: SURGERY

## 2021-04-11 PROCEDURE — 88309 TISSUE EXAM BY PATHOLOGIST: CPT | Mod: 26,,, | Performed by: PATHOLOGY

## 2021-04-11 PROCEDURE — 85520 HEPARIN ASSAY: CPT

## 2021-04-11 PROCEDURE — 47135 PR TRANSPLANT LIVER,ALLOTRANSPLANT: ICD-10-PCS | Mod: 80,,, | Performed by: TRANSPLANT SURGERY

## 2021-04-11 PROCEDURE — 63600175 PHARM REV CODE 636 W HCPCS: Performed by: PHYSICIAN ASSISTANT

## 2021-04-11 PROCEDURE — 25000003 PHARM REV CODE 250: Performed by: INTERNAL MEDICINE

## 2021-04-11 PROCEDURE — 84460 ALANINE AMINO (ALT) (SGPT): CPT | Performed by: INTERNAL MEDICINE

## 2021-04-11 PROCEDURE — 63600175 PHARM REV CODE 636 W HCPCS: Performed by: INTERNAL MEDICINE

## 2021-04-11 PROCEDURE — 85025 COMPLETE CBC W/AUTO DIFF WBC: CPT | Mod: 91 | Performed by: SURGERY

## 2021-04-11 PROCEDURE — 25000003 PHARM REV CODE 250: Mod: NTX | Performed by: PHYSICIAN ASSISTANT

## 2021-04-11 PROCEDURE — S4991 NICOTINE PATCH NONLEGEND: HCPCS | Mod: NTX | Performed by: PHYSICIAN ASSISTANT

## 2021-04-11 PROCEDURE — D9220A PRA ANESTHESIA: ICD-10-PCS | Mod: ANES,,, | Performed by: ANESTHESIOLOGY

## 2021-04-11 PROCEDURE — D9220A PRA ANESTHESIA: ICD-10-PCS | Mod: CRNA,,, | Performed by: NURSE ANESTHETIST, CERTIFIED REGISTERED

## 2021-04-11 PROCEDURE — 84450 TRANSFERASE (AST) (SGOT): CPT | Performed by: INTERNAL MEDICINE

## 2021-04-11 PROCEDURE — 82150 ASSAY OF AMYLASE: CPT | Performed by: SURGERY

## 2021-04-11 PROCEDURE — 85610 PROTHROMBIN TIME: CPT | Mod: 91 | Performed by: INTERNAL MEDICINE

## 2021-04-11 RX ORDER — ALBUMIN HUMAN 250 G/1000ML
SOLUTION INTRAVENOUS CONTINUOUS PRN
Status: DISCONTINUED | OUTPATIENT
Start: 2021-04-11 | End: 2021-04-11

## 2021-04-11 RX ORDER — HYDROCODONE BITARTRATE AND ACETAMINOPHEN 500; 5 MG/1; MG/1
TABLET ORAL
Status: DISCONTINUED | OUTPATIENT
Start: 2021-04-11 | End: 2021-04-12

## 2021-04-11 RX ORDER — ROCURONIUM BROMIDE 10 MG/ML
INJECTION, SOLUTION INTRAVENOUS
Status: DISCONTINUED | OUTPATIENT
Start: 2021-04-11 | End: 2021-04-11

## 2021-04-11 RX ORDER — HEPARIN SODIUM 5000 [USP'U]/ML
5000 INJECTION, SOLUTION INTRAVENOUS; SUBCUTANEOUS EVERY 8 HOURS
Status: DISCONTINUED | OUTPATIENT
Start: 2021-04-11 | End: 2021-04-11

## 2021-04-11 RX ORDER — HYDROMORPHONE HYDROCHLORIDE 1 MG/ML
0.5 INJECTION, SOLUTION INTRAMUSCULAR; INTRAVENOUS; SUBCUTANEOUS
Status: DISCONTINUED | OUTPATIENT
Start: 2021-04-11 | End: 2021-04-12

## 2021-04-11 RX ORDER — ALBUMIN HUMAN 50 G/1000ML
SOLUTION INTRAVENOUS CONTINUOUS PRN
Status: DISCONTINUED | OUTPATIENT
Start: 2021-04-11 | End: 2021-04-11

## 2021-04-11 RX ORDER — MIDAZOLAM HYDROCHLORIDE 1 MG/ML
INJECTION INTRAMUSCULAR; INTRAVENOUS
Status: DISCONTINUED | OUTPATIENT
Start: 2021-04-11 | End: 2021-04-11

## 2021-04-11 RX ORDER — METHYLPREDNISOLONE SOD SUCC 125 MG
80 VIAL (EA) INJECTION EVERY 12 HOURS
Status: COMPLETED | OUTPATIENT
Start: 2021-04-13 | End: 2021-04-13

## 2021-04-11 RX ORDER — FUROSEMIDE 10 MG/ML
INJECTION INTRAMUSCULAR; INTRAVENOUS
Status: DISCONTINUED | OUTPATIENT
Start: 2021-04-11 | End: 2021-04-11

## 2021-04-11 RX ORDER — PREDNISONE 20 MG/1
20 TABLET ORAL DAILY
Status: COMPLETED | OUTPATIENT
Start: 2021-04-17 | End: 2021-04-23

## 2021-04-11 RX ORDER — NYSTATIN 100000 [USP'U]/ML
500000 SUSPENSION ORAL
Status: DISCONTINUED | OUTPATIENT
Start: 2021-04-12 | End: 2021-04-26 | Stop reason: HOSPADM

## 2021-04-11 RX ORDER — METHYLPREDNISOLONE SOD SUCC 125 MG
100 VIAL (EA) INJECTION EVERY 12 HOURS
Status: COMPLETED | OUTPATIENT
Start: 2021-04-12 | End: 2021-04-12

## 2021-04-11 RX ORDER — FENTANYL CITRATE 50 UG/ML
INJECTION, SOLUTION INTRAMUSCULAR; INTRAVENOUS
Status: DISCONTINUED | OUTPATIENT
Start: 2021-04-11 | End: 2021-04-11

## 2021-04-11 RX ORDER — PROPOFOL 10 MG/ML
VIAL (ML) INTRAVENOUS
Status: DISCONTINUED | OUTPATIENT
Start: 2021-04-11 | End: 2021-04-11

## 2021-04-11 RX ORDER — LIDOCAINE HCL/PF 100 MG/5ML
SYRINGE (ML) INTRAVENOUS
Status: DISCONTINUED | OUTPATIENT
Start: 2021-04-11 | End: 2021-04-11

## 2021-04-11 RX ORDER — PHENYLEPHRINE HYDROCHLORIDE 10 MG/ML
INJECTION INTRAVENOUS
Status: DISCONTINUED | OUTPATIENT
Start: 2021-04-11 | End: 2021-04-11

## 2021-04-11 RX ORDER — PROPOFOL 10 MG/ML
0-50 INJECTION, EMULSION INTRAVENOUS CONTINUOUS
Status: DISCONTINUED | OUTPATIENT
Start: 2021-04-11 | End: 2021-04-12

## 2021-04-11 RX ORDER — VASOPRESSIN 20 [USP'U]/ML
INJECTION, SOLUTION INTRAMUSCULAR; SUBCUTANEOUS
Status: DISCONTINUED | OUTPATIENT
Start: 2021-04-11 | End: 2021-04-11

## 2021-04-11 RX ORDER — MYCOPHENOLATE MOFETIL 200 MG/ML
1000 POWDER, FOR SUSPENSION ORAL 2 TIMES DAILY
Status: DISCONTINUED | OUTPATIENT
Start: 2021-04-11 | End: 2021-04-12

## 2021-04-11 RX ORDER — SULFAMETHOXAZOLE AND TRIMETHOPRIM 400; 80 MG/1; MG/1
1 TABLET ORAL EVERY MORNING
Status: DISCONTINUED | OUTPATIENT
Start: 2021-04-18 | End: 2021-04-26 | Stop reason: HOSPADM

## 2021-04-11 RX ORDER — ALBUMIN HUMAN 50 G/1000ML
SOLUTION INTRAVENOUS
Status: DISPENSED
Start: 2021-04-11 | End: 2021-04-12

## 2021-04-11 RX ORDER — VALGANCICLOVIR 450 MG/1
450 TABLET, FILM COATED ORAL DAILY
Status: DISCONTINUED | OUTPATIENT
Start: 2021-04-21 | End: 2021-04-26 | Stop reason: HOSPADM

## 2021-04-11 RX ORDER — LANOLIN ALCOHOL/MO/W.PET/CERES
400 CREAM (GRAM) TOPICAL ONCE
Status: COMPLETED | OUTPATIENT
Start: 2021-04-11 | End: 2021-04-11

## 2021-04-11 RX ORDER — HEPARIN SODIUM 1000 [USP'U]/ML
INJECTION, SOLUTION INTRAVENOUS; SUBCUTANEOUS
Status: DISCONTINUED | OUTPATIENT
Start: 2021-04-11 | End: 2021-04-11 | Stop reason: HOSPADM

## 2021-04-11 RX ORDER — METHYLPREDNISOLONE SODIUM SUCCINATE 500 MG/8ML
500 INJECTION INTRAMUSCULAR; INTRAVENOUS
Status: DISCONTINUED | OUTPATIENT
Start: 2021-04-11 | End: 2021-04-11 | Stop reason: HOSPADM

## 2021-04-11 RX ORDER — ALBUMIN HUMAN 50 G/1000ML
25 SOLUTION INTRAVENOUS ONCE
Status: COMPLETED | OUTPATIENT
Start: 2021-04-11 | End: 2021-04-11

## 2021-04-11 RX ORDER — HEPARIN SODIUM 1000 [USP'U]/ML
INJECTION, SOLUTION INTRAVENOUS; SUBCUTANEOUS
Status: DISCONTINUED | OUTPATIENT
Start: 2021-04-11 | End: 2021-04-11

## 2021-04-11 RX ORDER — MUPIROCIN 20 MG/G
1 OINTMENT TOPICAL 2 TIMES DAILY
Status: DISCONTINUED | OUTPATIENT
Start: 2021-04-11 | End: 2021-04-16

## 2021-04-11 RX ORDER — ONDANSETRON 2 MG/ML
INJECTION INTRAMUSCULAR; INTRAVENOUS
Status: DISCONTINUED | OUTPATIENT
Start: 2021-04-11 | End: 2021-04-11

## 2021-04-11 RX ORDER — PROTAMINE SULFATE 10 MG/ML
INJECTION, SOLUTION INTRAVENOUS
Status: DISCONTINUED | OUTPATIENT
Start: 2021-04-11 | End: 2021-04-11

## 2021-04-11 RX ORDER — METHYLPREDNISOLONE SOD SUCC 125 MG
60 VIAL (EA) INJECTION EVERY 12 HOURS
Status: COMPLETED | OUTPATIENT
Start: 2021-04-14 | End: 2021-04-14

## 2021-04-11 RX ORDER — FENTANYL CITRATE-0.9 % NACL/PF 10 MCG/ML
0-250 PLASTIC BAG, INJECTION (ML) INTRAVENOUS CONTINUOUS
Status: DISCONTINUED | OUTPATIENT
Start: 2021-04-11 | End: 2021-04-12

## 2021-04-11 RX ORDER — DEXTROSE MONOHYDRATE AND SODIUM CHLORIDE 5; .45 G/100ML; G/100ML
INJECTION, SOLUTION INTRAVENOUS CONTINUOUS
Status: DISCONTINUED | OUTPATIENT
Start: 2021-04-11 | End: 2021-04-13

## 2021-04-11 RX ORDER — MANNITOL 250 MG/ML
INJECTION, SOLUTION INTRAVENOUS
Status: DISCONTINUED | OUTPATIENT
Start: 2021-04-11 | End: 2021-04-11

## 2021-04-11 RX ORDER — MUPIROCIN 20 MG/G
OINTMENT TOPICAL
Status: DISCONTINUED | OUTPATIENT
Start: 2021-04-11 | End: 2021-04-11 | Stop reason: HOSPADM

## 2021-04-11 RX ADMIN — HEPARIN SODIUM 3000 UNITS: 1000 INJECTION, SOLUTION INTRAVENOUS; SUBCUTANEOUS at 04:04

## 2021-04-11 RX ADMIN — ROCURONIUM BROMIDE 20 MG: 10 INJECTION, SOLUTION INTRAVENOUS at 04:04

## 2021-04-11 RX ADMIN — SODIUM CHLORIDE 3 G: 9 INJECTION, SOLUTION INTRAVENOUS at 06:04

## 2021-04-11 RX ADMIN — ROCURONIUM BROMIDE 30 MG: 10 INJECTION, SOLUTION INTRAVENOUS at 03:04

## 2021-04-11 RX ADMIN — FENTANYL CITRATE 50 MCG: 50 INJECTION, SOLUTION INTRAMUSCULAR; INTRAVENOUS at 08:04

## 2021-04-11 RX ADMIN — SODIUM CHLORIDE, SODIUM GLUCONATE, SODIUM ACETATE, POTASSIUM CHLORIDE, MAGNESIUM CHLORIDE, SODIUM PHOSPHATE, DIBASIC, AND POTASSIUM PHOSPHATE: .53; .5; .37; .037; .03; .012; .00082 INJECTION, SOLUTION INTRAVENOUS at 06:04

## 2021-04-11 RX ADMIN — SODIUM CHLORIDE, SODIUM GLUCONATE, SODIUM ACETATE, POTASSIUM CHLORIDE, MAGNESIUM CHLORIDE, SODIUM PHOSPHATE, DIBASIC, AND POTASSIUM PHOSPHATE: .53; .5; .37; .037; .03; .012; .00082 INJECTION, SOLUTION INTRAVENOUS at 03:04

## 2021-04-11 RX ADMIN — ALBUMIN (HUMAN) 25 G: 12.5 SOLUTION INTRAVENOUS at 05:04

## 2021-04-11 RX ADMIN — ALBUMIN (HUMAN): 12.5 SOLUTION INTRAVENOUS at 03:04

## 2021-04-11 RX ADMIN — PHENYLEPHRINE HYDROCHLORIDE 100 MCG: 10 INJECTION INTRAVENOUS at 02:04

## 2021-04-11 RX ADMIN — SODIUM CHLORIDE, SODIUM GLUCONATE, SODIUM ACETATE, POTASSIUM CHLORIDE, MAGNESIUM CHLORIDE, SODIUM PHOSPHATE, DIBASIC, AND POTASSIUM PHOSPHATE: .53; .5; .37; .037; .03; .012; .00082 INJECTION, SOLUTION INTRAVENOUS at 02:04

## 2021-04-11 RX ADMIN — MUPIROCIN 1 G: 20 OINTMENT TOPICAL at 11:04

## 2021-04-11 RX ADMIN — LIDOCAINE HYDROCHLORIDE 100 MG: 20 INJECTION, SOLUTION INTRAVENOUS at 02:04

## 2021-04-11 RX ADMIN — NOREPINEPHRINE BITARTRATE 0.04 MCG/KG/MIN: 1 INJECTION, SOLUTION, CONCENTRATE INTRAVENOUS at 04:04

## 2021-04-11 RX ADMIN — PANTOPRAZOLE SODIUM 40 MG: 40 TABLET, DELAYED RELEASE ORAL at 08:04

## 2021-04-11 RX ADMIN — TRAMADOL HYDROCHLORIDE 50 MG: 50 TABLET, FILM COATED ORAL at 12:04

## 2021-04-11 RX ADMIN — VASOPRESSIN 2 UNITS: 20 INJECTION INTRAVENOUS at 05:04

## 2021-04-11 RX ADMIN — PROTAMINE SULFATE 20 MG: 10 INJECTION, SOLUTION INTRAVENOUS at 07:04

## 2021-04-11 RX ADMIN — VASOPRESSIN 0.04 UNITS/MIN: 20 INJECTION INTRAVENOUS at 11:04

## 2021-04-11 RX ADMIN — SODIUM CHLORIDE, SODIUM GLUCONATE, SODIUM ACETATE, POTASSIUM CHLORIDE, MAGNESIUM CHLORIDE, SODIUM PHOSPHATE, DIBASIC, AND POTASSIUM PHOSPHATE: .53; .5; .37; .037; .03; .012; .00082 INJECTION, SOLUTION INTRAVENOUS at 07:04

## 2021-04-11 RX ADMIN — FOLIC ACID 1000 MCG: 1 TABLET ORAL at 08:04

## 2021-04-11 RX ADMIN — FENTANYL CITRATE 100 MCG: 50 INJECTION, SOLUTION INTRAMUSCULAR; INTRAVENOUS at 02:04

## 2021-04-11 RX ADMIN — PROPOFOL 150 MG: 10 INJECTION, EMULSION INTRAVENOUS at 02:04

## 2021-04-11 RX ADMIN — FUROSEMIDE 50 MG: 10 INJECTION, SOLUTION INTRAMUSCULAR; INTRAVENOUS at 06:04

## 2021-04-11 RX ADMIN — Medication 25 MCG/HR: at 10:04

## 2021-04-11 RX ADMIN — MANNITOL 50 ML: 250 INJECTION, SOLUTION INTRAVENOUS at 06:04

## 2021-04-11 RX ADMIN — Medication 400 MG: at 11:04

## 2021-04-11 RX ADMIN — MIDODRINE HYDROCHLORIDE 10 MG: 5 TABLET ORAL at 08:04

## 2021-04-11 RX ADMIN — CALCIUM CHLORIDE 500 MG: 100 INJECTION, SOLUTION INTRAVENOUS at 05:04

## 2021-04-11 RX ADMIN — PROTAMINE SULFATE 5 MG: 10 INJECTION, SOLUTION INTRAVENOUS at 07:04

## 2021-04-11 RX ADMIN — ROCURONIUM BROMIDE 100 MG: 10 INJECTION, SOLUTION INTRAVENOUS at 02:04

## 2021-04-11 RX ADMIN — ALBUMIN (HUMAN) 25 G: 12.5 SOLUTION INTRAVENOUS at 10:04

## 2021-04-11 RX ADMIN — FUROSEMIDE 100 MG: 10 INJECTION, SOLUTION INTRAMUSCULAR; INTRAVENOUS at 03:04

## 2021-04-11 RX ADMIN — CEFEPIME 1 G: 1 INJECTION, POWDER, FOR SOLUTION INTRAMUSCULAR; INTRAVENOUS at 07:04

## 2021-04-11 RX ADMIN — ROCURONIUM BROMIDE 20 MG: 10 INJECTION, SOLUTION INTRAVENOUS at 08:04

## 2021-04-11 RX ADMIN — MANNITOL 100 ML: 250 INJECTION, SOLUTION INTRAVENOUS at 03:04

## 2021-04-11 RX ADMIN — PROPOFOL 25 MG: 10 INJECTION, EMULSION INTRAVENOUS at 09:04

## 2021-04-11 RX ADMIN — VANCOMYCIN HYDROCHLORIDE 1500 MG: 1.5 INJECTION, POWDER, LYOPHILIZED, FOR SOLUTION INTRAVENOUS at 09:04

## 2021-04-11 RX ADMIN — PHYTONADIONE 10 MG: 10 INJECTION, EMULSION INTRAMUSCULAR; INTRAVENOUS; SUBCUTANEOUS at 11:04

## 2021-04-11 RX ADMIN — LACTULOSE 20 G: 10 SOLUTION ORAL at 08:04

## 2021-04-11 RX ADMIN — CALCIUM CHLORIDE 500 MG: 100 INJECTION, SOLUTION INTRAVENOUS at 04:04

## 2021-04-11 RX ADMIN — TACROLIMUS 1 MG: 1 CAPSULE, GELATIN COATED ORAL at 11:04

## 2021-04-11 RX ADMIN — FENTANYL CITRATE 50 MCG: 50 INJECTION, SOLUTION INTRAMUSCULAR; INTRAVENOUS at 03:04

## 2021-04-11 RX ADMIN — ALBUMIN (HUMAN) 25 G: 12.5 SOLUTION INTRAVENOUS at 01:04

## 2021-04-11 RX ADMIN — METHYLPREDNISOLONE SODIUM SUCCINATE 500 MG: 500 INJECTION, POWDER, FOR SOLUTION INTRAMUSCULAR; INTRAVENOUS at 03:04

## 2021-04-11 RX ADMIN — PROPOFOL 40 MCG/KG/MIN: 10 INJECTION, EMULSION INTRAVENOUS at 10:04

## 2021-04-11 RX ADMIN — VASOPRESSIN 0.04 UNITS/MIN: 20 INJECTION INTRAVENOUS at 03:04

## 2021-04-11 RX ADMIN — TRAMADOL HYDROCHLORIDE 50 MG: 50 TABLET, FILM COATED ORAL at 07:04

## 2021-04-11 RX ADMIN — MIDAZOLAM HYDROCHLORIDE 2 MG: 1 INJECTION, SOLUTION INTRAMUSCULAR; INTRAVENOUS at 02:04

## 2021-04-11 RX ADMIN — FENTANYL CITRATE 50 MCG: 50 INJECTION, SOLUTION INTRAMUSCULAR; INTRAVENOUS at 09:04

## 2021-04-11 RX ADMIN — MYCOPHENOLATE MOFETIL 1000 MG: 200 POWDER, FOR SUSPENSION ORAL at 11:04

## 2021-04-11 RX ADMIN — DEXTROSE AND SODIUM CHLORIDE: 5; .45 INJECTION, SOLUTION INTRAVENOUS at 10:04

## 2021-04-11 RX ADMIN — POLYETHYLENE GLYCOL 3350 17 G: 17 POWDER, FOR SOLUTION ORAL at 08:04

## 2021-04-11 RX ADMIN — ROCURONIUM BROMIDE 30 MG: 10 INJECTION, SOLUTION INTRAVENOUS at 06:04

## 2021-04-11 RX ADMIN — MIDODRINE HYDROCHLORIDE 10 MG: 5 TABLET ORAL at 11:04

## 2021-04-11 RX ADMIN — VANCOMYCIN HYDROCHLORIDE 1500 MG: 1.5 INJECTION, POWDER, LYOPHILIZED, FOR SOLUTION INTRAVENOUS at 11:04

## 2021-04-11 RX ADMIN — NICOTINE 1 PATCH: 14 PATCH TRANSDERMAL at 08:04

## 2021-04-11 RX ADMIN — CALCIUM CHLORIDE 500 MG: 100 INJECTION, SOLUTION INTRAVENOUS at 08:04

## 2021-04-11 RX ADMIN — HYDROMORPHONE HYDROCHLORIDE 0.5 MG: 1 INJECTION, SOLUTION INTRAMUSCULAR; INTRAVENOUS; SUBCUTANEOUS at 10:04

## 2021-04-11 RX ADMIN — HEPARIN SODIUM 5000 UNITS: 5000 INJECTION INTRAVENOUS; SUBCUTANEOUS at 05:04

## 2021-04-11 RX ADMIN — SODIUM CHLORIDE 3 G: 9 INJECTION, SOLUTION INTRAVENOUS at 03:04

## 2021-04-11 RX ADMIN — HEPARIN SODIUM 5000 UNITS: 5000 INJECTION INTRAVENOUS; SUBCUTANEOUS at 11:04

## 2021-04-12 PROBLEM — Z29.89 PROPHYLACTIC IMMUNOTHERAPY: Status: ACTIVE | Noted: 2021-04-12

## 2021-04-12 PROBLEM — T38.0X5A STEROID-INDUCED HYPERGLYCEMIA: Status: ACTIVE | Noted: 2021-04-12

## 2021-04-12 PROBLEM — T38.0X5A ADRENAL CORTICAL STEROIDS CAUSING ADVERSE EFFECT IN THERAPEUTIC USE: Status: ACTIVE | Noted: 2021-04-12

## 2021-04-12 PROBLEM — R73.9 STEROID-INDUCED HYPERGLYCEMIA: Status: ACTIVE | Noted: 2021-04-12

## 2021-04-12 LAB
ALBUMIN SERPL BCP-MCNC: 2.9 G/DL (ref 3.5–5.2)
ALLENS TEST: ABNORMAL
ALLENS TEST: NORMAL
ALP SERPL-CCNC: 216 U/L (ref 55–135)
ALT SERPL W/O P-5'-P-CCNC: 178 U/L (ref 10–44)
ANION GAP SERPL CALC-SCNC: 13 MMOL/L (ref 8–16)
ANION GAP SERPL CALC-SCNC: 7 MMOL/L (ref 8–16)
APTT BLDCRRT: 44.9 SEC (ref 21–32)
AST SERPL-CCNC: 550 U/L (ref 10–40)
AST SERPL-CCNC: 615 U/L (ref 10–40)
AST SERPL-CCNC: 615 U/L (ref 10–40)
AST SERPL-CCNC: 716 U/L (ref 10–40)
AST SERPL-CCNC: 797 U/L (ref 10–40)
AST SERPL-CCNC: 797 U/L (ref 10–40)
AST SERPL-CCNC: 981 U/L (ref 10–40)
BACTERIA UR CULT: NORMAL
BASOPHILS # BLD AUTO: 0 K/UL (ref 0–0.2)
BASOPHILS # BLD AUTO: 0.01 K/UL (ref 0–0.2)
BASOPHILS NFR BLD: 0 % (ref 0–1.9)
BASOPHILS NFR BLD: 0.1 % (ref 0–1.9)
BILIRUB DIRECT SERPL-MCNC: 5.6 MG/DL (ref 0.1–0.3)
BILIRUB SERPL-MCNC: 7.9 MG/DL (ref 0.1–1)
BUN SERPL-MCNC: 18 MG/DL (ref 6–20)
BUN SERPL-MCNC: 22 MG/DL (ref 6–20)
CA-I BLDV-SCNC: 1.1 MMOL/L (ref 1.06–1.42)
CALCIUM SERPL-MCNC: 7.8 MG/DL (ref 8.7–10.5)
CALCIUM SERPL-MCNC: 8 MG/DL (ref 8.7–10.5)
CHLORIDE SERPL-SCNC: 94 MMOL/L (ref 95–110)
CHLORIDE SERPL-SCNC: 96 MMOL/L (ref 95–110)
CO2 SERPL-SCNC: 25 MMOL/L (ref 23–29)
CO2 SERPL-SCNC: 28 MMOL/L (ref 23–29)
CREAT SERPL-MCNC: 0.8 MG/DL (ref 0.5–1.4)
CREAT SERPL-MCNC: 0.8 MG/DL (ref 0.5–1.4)
DELSYS: ABNORMAL
DELSYS: NORMAL
DIFFERENTIAL METHOD: ABNORMAL
EOSINOPHIL # BLD AUTO: 0 K/UL (ref 0–0.5)
EOSINOPHIL NFR BLD: 0 % (ref 0–8)
ERYTHROCYTE [DISTWIDTH] IN BLOOD BY AUTOMATED COUNT: 21.4 % (ref 11.5–14.5)
ERYTHROCYTE [DISTWIDTH] IN BLOOD BY AUTOMATED COUNT: 21.5 % (ref 11.5–14.5)
ERYTHROCYTE [DISTWIDTH] IN BLOOD BY AUTOMATED COUNT: 21.7 % (ref 11.5–14.5)
ERYTHROCYTE [DISTWIDTH] IN BLOOD BY AUTOMATED COUNT: 21.8 % (ref 11.5–14.5)
ERYTHROCYTE [DISTWIDTH] IN BLOOD BY AUTOMATED COUNT: 21.8 % (ref 11.5–14.5)
ERYTHROCYTE [DISTWIDTH] IN BLOOD BY AUTOMATED COUNT: 22.1 % (ref 11.5–14.5)
ERYTHROCYTE [SEDIMENTATION RATE] IN BLOOD BY WESTERGREN METHOD: 18 MM/H
EST. GFR  (AFRICAN AMERICAN): >60 ML/MIN/1.73 M^2
EST. GFR  (AFRICAN AMERICAN): >60 ML/MIN/1.73 M^2
EST. GFR  (NON AFRICAN AMERICAN): >60 ML/MIN/1.73 M^2
EST. GFR  (NON AFRICAN AMERICAN): >60 ML/MIN/1.73 M^2
FIO2: 100
FIO2: 40
FLOW: 2
GLUCOSE SERPL-MCNC: 163 MG/DL (ref 70–110)
GLUCOSE SERPL-MCNC: 196 MG/DL (ref 70–110)
HBV SURFACE AB SER-ACNC: POSITIVE M[IU]/ML
HBV SURFACE AG SERPL QL IA: NEGATIVE
HCO3 UR-SCNC: 29.1 MMOL/L (ref 24–28)
HCO3 UR-SCNC: 30 MMOL/L (ref 24–28)
HCO3 UR-SCNC: 30.1 MMOL/L (ref 24–28)
HCO3 UR-SCNC: 31 MMOL/L (ref 24–28)
HCT VFR BLD AUTO: 24.7 % (ref 40–54)
HCT VFR BLD AUTO: 24.7 % (ref 40–54)
HCT VFR BLD AUTO: 25.5 % (ref 40–54)
HCT VFR BLD AUTO: 25.5 % (ref 40–54)
HCT VFR BLD AUTO: 26.4 % (ref 40–54)
HCT VFR BLD AUTO: 28 % (ref 40–54)
HCV AB SERPL QL IA: NEGATIVE
HGB BLD-MCNC: 8.5 G/DL (ref 14–18)
HGB BLD-MCNC: 8.8 G/DL (ref 14–18)
HGB BLD-MCNC: 9 G/DL (ref 14–18)
HGB BLD-MCNC: 9 G/DL (ref 14–18)
HGB BLD-MCNC: 9.5 G/DL (ref 14–18)
HGB BLD-MCNC: 9.6 G/DL (ref 14–18)
HIV 1+2 AB+HIV1 P24 AG SERPL QL IA: NEGATIVE
IMM GRANULOCYTES # BLD AUTO: 0.05 K/UL (ref 0–0.04)
IMM GRANULOCYTES # BLD AUTO: 0.06 K/UL (ref 0–0.04)
IMM GRANULOCYTES # BLD AUTO: 0.07 K/UL (ref 0–0.04)
IMM GRANULOCYTES # BLD AUTO: 0.1 K/UL (ref 0–0.04)
IMM GRANULOCYTES NFR BLD AUTO: 0.6 % (ref 0–0.5)
IMM GRANULOCYTES NFR BLD AUTO: 0.6 % (ref 0–0.5)
IMM GRANULOCYTES NFR BLD AUTO: 0.7 % (ref 0–0.5)
IMM GRANULOCYTES NFR BLD AUTO: 0.8 % (ref 0–0.5)
INR PPP: 1.3 (ref 0.8–1.2)
INR PPP: 1.4 (ref 0.8–1.2)
INR PPP: 1.5 (ref 0.8–1.2)
LDH SERPL L TO P-CCNC: 0.99 MMOL/L (ref 0.36–1.25)
LYMPHOCYTES # BLD AUTO: 0.4 K/UL (ref 1–4.8)
LYMPHOCYTES # BLD AUTO: 0.5 K/UL (ref 1–4.8)
LYMPHOCYTES # BLD AUTO: 0.5 K/UL (ref 1–4.8)
LYMPHOCYTES # BLD AUTO: 0.6 K/UL (ref 1–4.8)
LYMPHOCYTES NFR BLD: 2.7 % (ref 18–48)
LYMPHOCYTES NFR BLD: 4.4 % (ref 18–48)
LYMPHOCYTES NFR BLD: 4.4 % (ref 18–48)
LYMPHOCYTES NFR BLD: 5.8 % (ref 18–48)
LYMPHOCYTES NFR BLD: 6 % (ref 18–48)
LYMPHOCYTES NFR BLD: 6.3 % (ref 18–48)
MAGNESIUM SERPL-MCNC: 1.6 MG/DL (ref 1.6–2.6)
MAGNESIUM SERPL-MCNC: 2 MG/DL (ref 1.6–2.6)
MCH RBC QN AUTO: 34 PG (ref 27–31)
MCH RBC QN AUTO: 34.4 PG (ref 27–31)
MCH RBC QN AUTO: 35 PG (ref 27–31)
MCH RBC QN AUTO: 35 PG (ref 27–31)
MCH RBC QN AUTO: 35.1 PG (ref 27–31)
MCH RBC QN AUTO: 35.3 PG (ref 27–31)
MCHC RBC AUTO-ENTMCNC: 34.3 G/DL (ref 32–36)
MCHC RBC AUTO-ENTMCNC: 34.4 G/DL (ref 32–36)
MCHC RBC AUTO-ENTMCNC: 35.3 G/DL (ref 32–36)
MCHC RBC AUTO-ENTMCNC: 35.3 G/DL (ref 32–36)
MCHC RBC AUTO-ENTMCNC: 35.6 G/DL (ref 32–36)
MCHC RBC AUTO-ENTMCNC: 36 G/DL (ref 32–36)
MCV RBC AUTO: 100 FL (ref 82–98)
MCV RBC AUTO: 97 FL (ref 82–98)
MCV RBC AUTO: 99 FL (ref 82–98)
MODE: ABNORMAL
MODE: NORMAL
MONOCYTES # BLD AUTO: 0.2 K/UL (ref 0.3–1)
MONOCYTES # BLD AUTO: 0.3 K/UL (ref 0.3–1)
MONOCYTES # BLD AUTO: 0.3 K/UL (ref 0.3–1)
MONOCYTES # BLD AUTO: 0.4 K/UL (ref 0.3–1)
MONOCYTES NFR BLD: 2.4 % (ref 4–15)
MONOCYTES NFR BLD: 2.7 % (ref 4–15)
MONOCYTES NFR BLD: 3.2 % (ref 4–15)
MONOCYTES NFR BLD: 3.2 % (ref 4–15)
MONOCYTES NFR BLD: 3.5 % (ref 4–15)
MONOCYTES NFR BLD: 3.6 % (ref 4–15)
NEUTROPHILS # BLD AUTO: 10.1 K/UL (ref 1.8–7.7)
NEUTROPHILS # BLD AUTO: 10.1 K/UL (ref 1.8–7.7)
NEUTROPHILS # BLD AUTO: 14.1 K/UL (ref 1.8–7.7)
NEUTROPHILS # BLD AUTO: 6.3 K/UL (ref 1.8–7.7)
NEUTROPHILS # BLD AUTO: 6.5 K/UL (ref 1.8–7.7)
NEUTROPHILS # BLD AUTO: 8.5 K/UL (ref 1.8–7.7)
NEUTROPHILS NFR BLD: 89.4 % (ref 38–73)
NEUTROPHILS NFR BLD: 89.9 % (ref 38–73)
NEUTROPHILS NFR BLD: 90.7 % (ref 38–73)
NEUTROPHILS NFR BLD: 91.7 % (ref 38–73)
NEUTROPHILS NFR BLD: 91.7 % (ref 38–73)
NEUTROPHILS NFR BLD: 93.8 % (ref 38–73)
NRBC BLD-RTO: 0 /100 WBC
PCO2 BLDA: 39.7 MMHG (ref 35–45)
PCO2 BLDA: 40.6 MMHG (ref 35–45)
PCO2 BLDA: 40.8 MMHG (ref 35–45)
PCO2 BLDA: 43 MMHG (ref 35–45)
PEEP: 5
PH SMN: 7.45 [PH] (ref 7.35–7.45)
PH SMN: 7.46 [PH] (ref 7.35–7.45)
PH SMN: 7.48 [PH] (ref 7.35–7.45)
PH SMN: 7.5 [PH] (ref 7.35–7.45)
PHOSPHATE SERPL-MCNC: 4.1 MG/DL (ref 2.7–4.5)
PLATELET # BLD AUTO: 82 K/UL (ref 150–450)
PLATELET # BLD AUTO: 84 K/UL (ref 150–450)
PLATELET # BLD AUTO: 84 K/UL (ref 150–450)
PLATELET # BLD AUTO: 88 K/UL (ref 150–450)
PLATELET # BLD AUTO: 88 K/UL (ref 150–450)
PLATELET # BLD AUTO: 89 K/UL (ref 150–450)
PMV BLD AUTO: 10 FL (ref 9.2–12.9)
PMV BLD AUTO: 10.1 FL (ref 9.2–12.9)
PMV BLD AUTO: 10.2 FL (ref 9.2–12.9)
PMV BLD AUTO: 9.2 FL (ref 9.2–12.9)
PMV BLD AUTO: 9.5 FL (ref 9.2–12.9)
PMV BLD AUTO: 9.5 FL (ref 9.2–12.9)
PO2 BLDA: 142 MMHG (ref 80–100)
PO2 BLDA: 143 MMHG (ref 80–100)
PO2 BLDA: 59 MMHG (ref 40–60)
PO2 BLDA: 91 MMHG (ref 80–100)
POC BE: 5 MMOL/L
POC BE: 6 MMOL/L
POC BE: 6 MMOL/L
POC BE: 8 MMOL/L
POC SATURATED O2: 91 % (ref 95–100)
POC SATURATED O2: 97 % (ref 95–100)
POC SATURATED O2: 99 % (ref 95–100)
POC SATURATED O2: 99 % (ref 95–100)
POC TCO2: 30 MMOL/L (ref 23–27)
POC TCO2: 31 MMOL/L (ref 23–27)
POC TCO2: 31 MMOL/L (ref 24–29)
POC TCO2: 32 MMOL/L (ref 23–27)
POCT GLUCOSE: 165 MG/DL (ref 70–110)
POCT GLUCOSE: 174 MG/DL (ref 70–110)
POCT GLUCOSE: 176 MG/DL (ref 70–110)
POCT GLUCOSE: 177 MG/DL (ref 70–110)
POCT GLUCOSE: 186 MG/DL (ref 70–110)
POCT GLUCOSE: 187 MG/DL (ref 70–110)
POCT GLUCOSE: 192 MG/DL (ref 70–110)
POCT GLUCOSE: 194 MG/DL (ref 70–110)
POCT GLUCOSE: 198 MG/DL (ref 70–110)
POCT GLUCOSE: 204 MG/DL (ref 70–110)
POCT GLUCOSE: 209 MG/DL (ref 70–110)
POCT GLUCOSE: 215 MG/DL (ref 70–110)
POCT GLUCOSE: 215 MG/DL (ref 70–110)
POCT GLUCOSE: 224 MG/DL (ref 70–110)
POTASSIUM SERPL-SCNC: 3.1 MMOL/L (ref 3.5–5.1)
POTASSIUM SERPL-SCNC: 3.4 MMOL/L (ref 3.5–5.1)
POTASSIUM SERPL-SCNC: 3.4 MMOL/L (ref 3.5–5.1)
PROT SERPL-MCNC: 5.1 G/DL (ref 6–8.4)
PROTHROMBIN TIME: 14.3 SEC (ref 9–12.5)
PROTHROMBIN TIME: 14.3 SEC (ref 9–12.5)
PROTHROMBIN TIME: 14.4 SEC (ref 9–12.5)
PROTHROMBIN TIME: 15.1 SEC (ref 9–12.5)
PROTHROMBIN TIME: 15.8 SEC (ref 9–12.5)
RBC # BLD AUTO: 2.49 M/UL (ref 4.6–6.2)
RBC # BLD AUTO: 2.5 M/UL (ref 4.6–6.2)
RBC # BLD AUTO: 2.57 M/UL (ref 4.6–6.2)
RBC # BLD AUTO: 2.57 M/UL (ref 4.6–6.2)
RBC # BLD AUTO: 2.71 M/UL (ref 4.6–6.2)
RBC # BLD AUTO: 2.79 M/UL (ref 4.6–6.2)
SAMPLE: ABNORMAL
SAMPLE: NORMAL
SITE: ABNORMAL
SITE: NORMAL
SODIUM SERPL-SCNC: 131 MMOL/L (ref 136–145)
SODIUM SERPL-SCNC: 132 MMOL/L (ref 136–145)
SP02: 99
TACROLIMUS BLD-MCNC: 2.3 NG/ML (ref 5–15)
VANCOMYCIN TROUGH SERPL-MCNC: 17 UG/ML (ref 10–22)
VT: 520
WBC # BLD AUTO: 10.99 K/UL (ref 3.9–12.7)
WBC # BLD AUTO: 10.99 K/UL (ref 3.9–12.7)
WBC # BLD AUTO: 14.99 K/UL (ref 3.9–12.7)
WBC # BLD AUTO: 7.02 K/UL (ref 3.9–12.7)
WBC # BLD AUTO: 7.14 K/UL (ref 3.9–12.7)
WBC # BLD AUTO: 9.48 K/UL (ref 3.9–12.7)

## 2021-04-12 PROCEDURE — 84100 ASSAY OF PHOSPHORUS: CPT | Performed by: PHYSICIAN ASSISTANT

## 2021-04-12 PROCEDURE — 84132 ASSAY OF SERUM POTASSIUM: CPT | Performed by: TRANSPLANT SURGERY

## 2021-04-12 PROCEDURE — P9045 ALBUMIN (HUMAN), 5%, 250 ML: HCPCS | Mod: JG | Performed by: STUDENT IN AN ORGANIZED HEALTH CARE EDUCATION/TRAINING PROGRAM

## 2021-04-12 PROCEDURE — 99232 PR SUBSEQUENT HOSPITAL CARE,LEVL II: ICD-10-PCS | Mod: ,,, | Performed by: ANESTHESIOLOGY

## 2021-04-12 PROCEDURE — 94761 N-INVAS EAR/PLS OXIMETRY MLT: CPT

## 2021-04-12 PROCEDURE — 99222 PR INITIAL HOSPITAL CARE,LEVL II: ICD-10-PCS | Mod: ,,, | Performed by: NURSE PRACTITIONER

## 2021-04-12 PROCEDURE — 85025 COMPLETE CBC W/AUTO DIFF WBC: CPT | Mod: 91 | Performed by: SURGERY

## 2021-04-12 PROCEDURE — 84450 TRANSFERASE (AST) (SGOT): CPT | Performed by: SURGERY

## 2021-04-12 PROCEDURE — 25000003 PHARM REV CODE 250: Performed by: SURGERY

## 2021-04-12 PROCEDURE — 94799 UNLISTED PULMONARY SVC/PX: CPT

## 2021-04-12 PROCEDURE — 63600175 PHARM REV CODE 636 W HCPCS: Performed by: SURGERY

## 2021-04-12 PROCEDURE — S4991 NICOTINE PATCH NONLEGEND: HCPCS | Performed by: PHYSICIAN ASSISTANT

## 2021-04-12 PROCEDURE — 83735 ASSAY OF MAGNESIUM: CPT | Mod: 91 | Performed by: TRANSPLANT SURGERY

## 2021-04-12 PROCEDURE — 99900035 HC TECH TIME PER 15 MIN (STAT)

## 2021-04-12 PROCEDURE — 25000003 PHARM REV CODE 250: Performed by: STUDENT IN AN ORGANIZED HEALTH CARE EDUCATION/TRAINING PROGRAM

## 2021-04-12 PROCEDURE — 63600175 PHARM REV CODE 636 W HCPCS: Performed by: STUDENT IN AN ORGANIZED HEALTH CARE EDUCATION/TRAINING PROGRAM

## 2021-04-12 PROCEDURE — 85610 PROTHROMBIN TIME: CPT | Performed by: PHYSICIAN ASSISTANT

## 2021-04-12 PROCEDURE — 25000003 PHARM REV CODE 250: Performed by: INTERNAL MEDICINE

## 2021-04-12 PROCEDURE — 83735 ASSAY OF MAGNESIUM: CPT | Performed by: PHYSICIAN ASSISTANT

## 2021-04-12 PROCEDURE — 84450 TRANSFERASE (AST) (SGOT): CPT | Mod: 91 | Performed by: SURGERY

## 2021-04-12 PROCEDURE — 85730 THROMBOPLASTIN TIME PARTIAL: CPT | Performed by: SURGERY

## 2021-04-12 PROCEDURE — 20000000 HC ICU ROOM

## 2021-04-12 PROCEDURE — 63600175 PHARM REV CODE 636 W HCPCS: Performed by: INTERNAL MEDICINE

## 2021-04-12 PROCEDURE — 82248 BILIRUBIN DIRECT: CPT | Performed by: SURGERY

## 2021-04-12 PROCEDURE — 82803 BLOOD GASES ANY COMBINATION: CPT

## 2021-04-12 PROCEDURE — 80202 ASSAY OF VANCOMYCIN: CPT | Performed by: INTERNAL MEDICINE

## 2021-04-12 PROCEDURE — 99900026 HC AIRWAY MAINTENANCE (STAT)

## 2021-04-12 PROCEDURE — 99222 1ST HOSP IP/OBS MODERATE 55: CPT | Mod: ,,, | Performed by: NURSE PRACTITIONER

## 2021-04-12 PROCEDURE — 37799 UNLISTED PX VASCULAR SURGERY: CPT

## 2021-04-12 PROCEDURE — 85610 PROTHROMBIN TIME: CPT | Mod: 91 | Performed by: SURGERY

## 2021-04-12 PROCEDURE — S5010 5% DEXTROSE AND 0.45% SALINE: HCPCS | Performed by: SURGERY

## 2021-04-12 PROCEDURE — 63600175 PHARM REV CODE 636 W HCPCS

## 2021-04-12 PROCEDURE — 80048 BASIC METABOLIC PNL TOTAL CA: CPT | Performed by: INTERNAL MEDICINE

## 2021-04-12 PROCEDURE — 99900017 HC EXTUBATION W/PARAMETERS (STAT)

## 2021-04-12 PROCEDURE — 25000003 PHARM REV CODE 250: Performed by: PHYSICIAN ASSISTANT

## 2021-04-12 PROCEDURE — 27000221 HC OXYGEN, UP TO 24 HOURS

## 2021-04-12 PROCEDURE — 80197 ASSAY OF TACROLIMUS: CPT | Performed by: SURGERY

## 2021-04-12 PROCEDURE — 63600175 PHARM REV CODE 636 W HCPCS: Performed by: PHYSICIAN ASSISTANT

## 2021-04-12 PROCEDURE — 80053 COMPREHEN METABOLIC PANEL: CPT | Performed by: SURGERY

## 2021-04-12 PROCEDURE — 99232 SBSQ HOSP IP/OBS MODERATE 35: CPT | Mod: ,,, | Performed by: ANESTHESIOLOGY

## 2021-04-12 PROCEDURE — 82330 ASSAY OF CALCIUM: CPT | Performed by: STUDENT IN AN ORGANIZED HEALTH CARE EDUCATION/TRAINING PROGRAM

## 2021-04-12 RX ORDER — FAMOTIDINE 20 MG/1
20 TABLET, FILM COATED ORAL NIGHTLY
Qty: 30 TABLET | Refills: 11 | Status: SHIPPED | OUTPATIENT
Start: 2021-04-12 | End: 2021-04-23 | Stop reason: HOSPADM

## 2021-04-12 RX ORDER — OXYCODONE HYDROCHLORIDE 10 MG/1
10 TABLET ORAL EVERY 4 HOURS PRN
Status: DISCONTINUED | OUTPATIENT
Start: 2021-04-12 | End: 2021-04-14

## 2021-04-12 RX ORDER — IBUPROFEN 200 MG
24 TABLET ORAL
Status: DISCONTINUED | OUTPATIENT
Start: 2021-04-12 | End: 2021-04-15

## 2021-04-12 RX ORDER — ONDANSETRON 2 MG/ML
INJECTION INTRAMUSCULAR; INTRAVENOUS
Status: COMPLETED
Start: 2021-04-12 | End: 2021-04-12

## 2021-04-12 RX ORDER — INSULIN ASPART 100 [IU]/ML
0-10 INJECTION, SOLUTION INTRAVENOUS; SUBCUTANEOUS
Status: DISCONTINUED | OUTPATIENT
Start: 2021-04-12 | End: 2021-04-15

## 2021-04-12 RX ORDER — PREDNISONE 5 MG/1
TABLET ORAL
Qty: 70 TABLET | Refills: 0 | Status: SHIPPED | OUTPATIENT
Start: 2021-04-12 | End: 2021-06-03 | Stop reason: ALTCHOICE

## 2021-04-12 RX ORDER — IBUPROFEN 200 MG
16 TABLET ORAL
Status: DISCONTINUED | OUTPATIENT
Start: 2021-04-12 | End: 2021-04-15

## 2021-04-12 RX ORDER — MYCOPHENOLATE MOFETIL 250 MG/1
1000 CAPSULE ORAL 2 TIMES DAILY
Status: DISCONTINUED | OUTPATIENT
Start: 2021-04-12 | End: 2021-04-26 | Stop reason: HOSPADM

## 2021-04-12 RX ORDER — SULFAMETHOXAZOLE AND TRIMETHOPRIM 400; 80 MG/1; MG/1
1 TABLET ORAL EVERY MORNING
Qty: 30 TABLET | Refills: 5 | Status: ON HOLD | OUTPATIENT
Start: 2021-04-12 | End: 2021-08-06 | Stop reason: HOSPADM

## 2021-04-12 RX ORDER — MAGNESIUM SULFATE HEPTAHYDRATE 40 MG/ML
2 INJECTION, SOLUTION INTRAVENOUS ONCE
Status: CANCELLED | OUTPATIENT
Start: 2021-04-12 | End: 2021-04-12

## 2021-04-12 RX ORDER — VALGANCICLOVIR 450 MG/1
450 TABLET, FILM COATED ORAL DAILY
Qty: 30 TABLET | Refills: 5 | Status: SHIPPED | OUTPATIENT
Start: 2021-04-12 | End: 2021-07-28 | Stop reason: SINTOL

## 2021-04-12 RX ORDER — CEFEPIME HYDROCHLORIDE 1 G/1
1 INJECTION, POWDER, FOR SOLUTION INTRAMUSCULAR; INTRAVENOUS
Status: DISCONTINUED | OUTPATIENT
Start: 2021-04-12 | End: 2021-04-13

## 2021-04-12 RX ORDER — POTASSIUM CHLORIDE 14.9 MG/ML
60 INJECTION INTRAVENOUS
Status: DISCONTINUED | OUTPATIENT
Start: 2021-04-12 | End: 2021-04-13

## 2021-04-12 RX ORDER — GLUCAGON 1 MG
1 KIT INJECTION
Status: DISCONTINUED | OUTPATIENT
Start: 2021-04-12 | End: 2021-04-15

## 2021-04-12 RX ORDER — TACROLIMUS 1 MG/1
2 CAPSULE ORAL 2 TIMES DAILY
Status: DISCONTINUED | OUTPATIENT
Start: 2021-04-12 | End: 2021-04-13

## 2021-04-12 RX ORDER — BISACODYL 5 MG
10 TABLET, DELAYED RELEASE (ENTERIC COATED) ORAL NIGHTLY
Status: DISCONTINUED | OUTPATIENT
Start: 2021-04-13 | End: 2021-04-17

## 2021-04-12 RX ORDER — FAMOTIDINE 20 MG/1
20 TABLET, FILM COATED ORAL NIGHTLY
Status: DISCONTINUED | OUTPATIENT
Start: 2021-04-12 | End: 2021-04-26 | Stop reason: HOSPADM

## 2021-04-12 RX ORDER — POTASSIUM CHLORIDE 29.8 MG/ML
40 INJECTION INTRAVENOUS
Status: DISCONTINUED | OUTPATIENT
Start: 2021-04-12 | End: 2021-04-13

## 2021-04-12 RX ORDER — MAGNESIUM SULFATE HEPTAHYDRATE 40 MG/ML
2 INJECTION, SOLUTION INTRAVENOUS
Status: DISCONTINUED | OUTPATIENT
Start: 2021-04-12 | End: 2021-04-13

## 2021-04-12 RX ORDER — ALBUMIN HUMAN 50 G/1000ML
50 SOLUTION INTRAVENOUS ONCE
Status: COMPLETED | OUTPATIENT
Start: 2021-04-12 | End: 2021-04-12

## 2021-04-12 RX ORDER — POTASSIUM CHLORIDE 29.8 MG/ML
40 INJECTION INTRAVENOUS EVERY 4 HOURS
Status: COMPLETED | OUTPATIENT
Start: 2021-04-12 | End: 2021-04-12

## 2021-04-12 RX ORDER — HYDROMORPHONE HYDROCHLORIDE 1 MG/ML
0.5 INJECTION, SOLUTION INTRAMUSCULAR; INTRAVENOUS; SUBCUTANEOUS
Status: DISCONTINUED | OUTPATIENT
Start: 2021-04-12 | End: 2021-04-14

## 2021-04-12 RX ORDER — MAGNESIUM SULFATE HEPTAHYDRATE 40 MG/ML
2 INJECTION, SOLUTION INTRAVENOUS ONCE
Status: COMPLETED | OUTPATIENT
Start: 2021-04-12 | End: 2021-04-12

## 2021-04-12 RX ORDER — TACROLIMUS 1 MG/1
6 CAPSULE ORAL EVERY 12 HOURS
Qty: 360 CAPSULE | Refills: 11 | Status: SHIPPED | OUTPATIENT
Start: 2021-04-12 | End: 2021-04-27

## 2021-04-12 RX ORDER — DOCUSATE SODIUM 100 MG/1
100 CAPSULE, LIQUID FILLED ORAL 3 TIMES DAILY
Status: DISCONTINUED | OUTPATIENT
Start: 2021-04-13 | End: 2021-04-17

## 2021-04-12 RX ORDER — NYSTATIN 100000 [USP'U]/ML
500000 SUSPENSION ORAL
Qty: 285 ML | Refills: 0 | Status: SHIPPED | OUTPATIENT
Start: 2021-04-12 | End: 2021-06-03 | Stop reason: ALTCHOICE

## 2021-04-12 RX ORDER — OXYCODONE HYDROCHLORIDE 5 MG/1
5 TABLET ORAL EVERY 4 HOURS PRN
Status: DISCONTINUED | OUTPATIENT
Start: 2021-04-12 | End: 2021-04-14

## 2021-04-12 RX ORDER — ONDANSETRON 2 MG/ML
4 INJECTION INTRAMUSCULAR; INTRAVENOUS ONCE
Status: COMPLETED | OUTPATIENT
Start: 2021-04-12 | End: 2021-04-12

## 2021-04-12 RX ORDER — POTASSIUM CHLORIDE 29.8 MG/ML
80 INJECTION INTRAVENOUS
Status: DISCONTINUED | OUTPATIENT
Start: 2021-04-12 | End: 2021-04-13

## 2021-04-12 RX ORDER — MYCOPHENOLATE MOFETIL 250 MG/1
1000 CAPSULE ORAL 2 TIMES DAILY
Qty: 240 CAPSULE | Refills: 2 | Status: SHIPPED | OUTPATIENT
Start: 2021-04-12 | End: 2021-06-04 | Stop reason: SDUPTHER

## 2021-04-12 RX ORDER — MAGNESIUM SULFATE HEPTAHYDRATE 40 MG/ML
4 INJECTION, SOLUTION INTRAVENOUS
Status: DISCONTINUED | OUTPATIENT
Start: 2021-04-12 | End: 2021-04-13

## 2021-04-12 RX ADMIN — PHYTONADIONE 10 MG: 10 INJECTION, EMULSION INTRAMUSCULAR; INTRAVENOUS; SUBCUTANEOUS at 05:04

## 2021-04-12 RX ADMIN — DEXTROSE AND SODIUM CHLORIDE: 5; .45 INJECTION, SOLUTION INTRAVENOUS at 05:04

## 2021-04-12 RX ADMIN — HEPARIN SODIUM 5000 UNITS: 5000 INJECTION INTRAVENOUS; SUBCUTANEOUS at 06:04

## 2021-04-12 RX ADMIN — MAGNESIUM SULFATE 2 G: 2 INJECTION INTRAVENOUS at 05:04

## 2021-04-12 RX ADMIN — TACROLIMUS 1 MG: 1 CAPSULE, GELATIN COATED ORAL at 08:04

## 2021-04-12 RX ADMIN — MIDODRINE HYDROCHLORIDE 10 MG: 5 TABLET ORAL at 08:04

## 2021-04-12 RX ADMIN — ONDANSETRON 4 MG: 2 INJECTION INTRAMUSCULAR; INTRAVENOUS at 10:04

## 2021-04-12 RX ADMIN — OXYCODONE HYDROCHLORIDE 10 MG: 10 TABLET ORAL at 05:04

## 2021-04-12 RX ADMIN — NYSTATIN 500000 UNITS: 500000 SUSPENSION ORAL at 02:04

## 2021-04-12 RX ADMIN — FAMOTIDINE 20 MG: 20 TABLET ORAL at 09:04

## 2021-04-12 RX ADMIN — VANCOMYCIN HYDROCHLORIDE 1500 MG: 1.5 INJECTION, POWDER, LYOPHILIZED, FOR SOLUTION INTRAVENOUS at 09:04

## 2021-04-12 RX ADMIN — DEXTROSE AND SODIUM CHLORIDE: 5; .45 INJECTION, SOLUTION INTRAVENOUS at 08:04

## 2021-04-12 RX ADMIN — ALBUMIN (HUMAN) 50 G: 12.5 SOLUTION INTRAVENOUS at 12:04

## 2021-04-12 RX ADMIN — MYCOPHENOLATE MOFETIL 1000 MG: 200 POWDER, FOR SUSPENSION ORAL at 09:04

## 2021-04-12 RX ADMIN — HYDROMORPHONE HYDROCHLORIDE 0.5 MG: 1 INJECTION, SOLUTION INTRAMUSCULAR; INTRAVENOUS; SUBCUTANEOUS at 11:04

## 2021-04-12 RX ADMIN — SODIUM CHLORIDE 1.5 UNITS/HR: 9 INJECTION, SOLUTION INTRAVENOUS at 03:04

## 2021-04-12 RX ADMIN — MUPIROCIN 1 G: 20 OINTMENT TOPICAL at 08:04

## 2021-04-12 RX ADMIN — HYDROMORPHONE HYDROCHLORIDE 0.5 MG: 1 INJECTION, SOLUTION INTRAMUSCULAR; INTRAVENOUS; SUBCUTANEOUS at 01:04

## 2021-04-12 RX ADMIN — METHYLPREDNISOLONE SODIUM SUCCINATE 100 MG: 125 INJECTION, POWDER, FOR SOLUTION INTRAMUSCULAR; INTRAVENOUS at 08:04

## 2021-04-12 RX ADMIN — METHYLPREDNISOLONE SODIUM SUCCINATE 100 MG: 125 INJECTION, POWDER, FOR SOLUTION INTRAMUSCULAR; INTRAVENOUS at 09:04

## 2021-04-12 RX ADMIN — MUPIROCIN 1 G: 20 OINTMENT TOPICAL at 09:04

## 2021-04-12 RX ADMIN — PHYTONADIONE 10 MG: 10 INJECTION, EMULSION INTRAMUSCULAR; INTRAVENOUS; SUBCUTANEOUS at 02:04

## 2021-04-12 RX ADMIN — NICOTINE 1 PATCH: 14 PATCH TRANSDERMAL at 08:04

## 2021-04-12 RX ADMIN — AMPICILLIN SODIUM AND SULBACTAM SODIUM 3 G: 2; 1 INJECTION, POWDER, FOR SOLUTION INTRAMUSCULAR; INTRAVENOUS at 07:04

## 2021-04-12 RX ADMIN — OXYCODONE HYDROCHLORIDE 10 MG: 10 TABLET ORAL at 12:04

## 2021-04-12 RX ADMIN — OXYCODONE HYDROCHLORIDE 10 MG: 10 TABLET ORAL at 09:04

## 2021-04-12 RX ADMIN — HEPARIN SODIUM 5000 UNITS: 5000 INJECTION INTRAVENOUS; SUBCUTANEOUS at 02:04

## 2021-04-12 RX ADMIN — HEPARIN SODIUM 5000 UNITS: 5000 INJECTION INTRAVENOUS; SUBCUTANEOUS at 09:04

## 2021-04-12 RX ADMIN — HYDROMORPHONE HYDROCHLORIDE 0.5 MG: 1 INJECTION, SOLUTION INTRAMUSCULAR; INTRAVENOUS; SUBCUTANEOUS at 06:04

## 2021-04-12 RX ADMIN — POTASSIUM CHLORIDE 40 MEQ: 29.8 INJECTION, SOLUTION INTRAVENOUS at 05:04

## 2021-04-12 RX ADMIN — FOLIC ACID 1000 MCG: 1 TABLET ORAL at 08:04

## 2021-04-12 RX ADMIN — HYDROMORPHONE HYDROCHLORIDE 0.5 MG: 1 INJECTION, SOLUTION INTRAMUSCULAR; INTRAVENOUS; SUBCUTANEOUS at 10:04

## 2021-04-12 RX ADMIN — TACROLIMUS 2 MG: 1 CAPSULE ORAL at 06:04

## 2021-04-12 RX ADMIN — NYSTATIN 500000 UNITS: 500000 SUSPENSION ORAL at 09:04

## 2021-04-12 RX ADMIN — CEFEPIME 1 G: 1 INJECTION, POWDER, FOR SOLUTION INTRAMUSCULAR; INTRAVENOUS at 06:04

## 2021-04-12 RX ADMIN — PROPOFOL 50 MCG/KG/MIN: 10 INJECTION, EMULSION INTRAVENOUS at 01:04

## 2021-04-12 RX ADMIN — POTASSIUM CHLORIDE 60 MEQ: 14.9 INJECTION, SOLUTION INTRAVENOUS at 04:04

## 2021-04-12 RX ADMIN — POTASSIUM CHLORIDE 40 MEQ: 29.8 INJECTION, SOLUTION INTRAVENOUS at 09:04

## 2021-04-12 RX ADMIN — PROPOFOL 35 MCG/KG/MIN: 10 INJECTION, EMULSION INTRAVENOUS at 08:04

## 2021-04-12 RX ADMIN — CEFEPIME 1 G: 1 INJECTION, POWDER, FOR SOLUTION INTRAMUSCULAR; INTRAVENOUS at 09:04

## 2021-04-12 RX ADMIN — PROPOFOL 50 MCG/KG/MIN: 10 INJECTION, EMULSION INTRAVENOUS at 04:04

## 2021-04-12 RX ADMIN — AMPICILLIN SODIUM AND SULBACTAM SODIUM 3 G: 2; 1 INJECTION, POWDER, FOR SOLUTION INTRAMUSCULAR; INTRAVENOUS at 01:04

## 2021-04-12 RX ADMIN — NYSTATIN 500000 UNITS: 500000 SUSPENSION ORAL at 07:04

## 2021-04-12 RX ADMIN — MYCOPHENOLATE MOFETIL 1000 MG: 250 CAPSULE ORAL at 09:04

## 2021-04-12 RX ADMIN — CEFEPIME 1 G: 1 INJECTION, POWDER, FOR SOLUTION INTRAMUSCULAR; INTRAVENOUS at 02:04

## 2021-04-13 LAB
ALBUMIN SERPL BCP-MCNC: 2.5 G/DL (ref 3.5–5.2)
ALP SERPL-CCNC: 182 U/L (ref 55–135)
ALT SERPL W/O P-5'-P-CCNC: 147 U/L (ref 10–44)
ANION GAP SERPL CALC-SCNC: 5 MMOL/L (ref 8–16)
APTT BLDCRRT: 34 SEC (ref 21–32)
AST SERPL-CCNC: 376 U/L (ref 10–40)
AST SERPL-CCNC: 376 U/L (ref 10–40)
BASOPHILS # BLD AUTO: 0.01 K/UL (ref 0–0.2)
BASOPHILS NFR BLD: 0.1 % (ref 0–1.9)
BILIRUB DIRECT SERPL-MCNC: 2.6 MG/DL (ref 0.1–0.3)
BILIRUB SERPL-MCNC: 3.6 MG/DL (ref 0.1–1)
BUN SERPL-MCNC: 30 MG/DL (ref 6–20)
CALCIUM SERPL-MCNC: 8.2 MG/DL (ref 8.7–10.5)
CHLORIDE SERPL-SCNC: 98 MMOL/L (ref 95–110)
CO2 SERPL-SCNC: 26 MMOL/L (ref 23–29)
CREAT SERPL-MCNC: 0.9 MG/DL (ref 0.5–1.4)
DIFFERENTIAL METHOD: ABNORMAL
EOSINOPHIL # BLD AUTO: 0 K/UL (ref 0–0.5)
EOSINOPHIL NFR BLD: 0 % (ref 0–8)
ERYTHROCYTE [DISTWIDTH] IN BLOOD BY AUTOMATED COUNT: 21.9 % (ref 11.5–14.5)
EST. GFR  (AFRICAN AMERICAN): >60 ML/MIN/1.73 M^2
EST. GFR  (NON AFRICAN AMERICAN): >60 ML/MIN/1.73 M^2
GLUCOSE SERPL-MCNC: 170 MG/DL (ref 70–110)
HBV CORE AB SERPL QL IA: NEGATIVE
HCT VFR BLD AUTO: 27.3 % (ref 40–54)
HGB BLD-MCNC: 9.4 G/DL (ref 14–18)
IMM GRANULOCYTES # BLD AUTO: 0.08 K/UL (ref 0–0.04)
IMM GRANULOCYTES NFR BLD AUTO: 0.6 % (ref 0–0.5)
INR PPP: 1.2 (ref 0.8–1.2)
LYMPHOCYTES # BLD AUTO: 0.4 K/UL (ref 1–4.8)
LYMPHOCYTES NFR BLD: 2.5 % (ref 18–48)
MAGNESIUM SERPL-MCNC: 2.1 MG/DL (ref 1.6–2.6)
MCH RBC QN AUTO: 34.7 PG (ref 27–31)
MCHC RBC AUTO-ENTMCNC: 34.4 G/DL (ref 32–36)
MCV RBC AUTO: 101 FL (ref 82–98)
MONOCYTES # BLD AUTO: 0.5 K/UL (ref 0.3–1)
MONOCYTES NFR BLD: 3.7 % (ref 4–15)
NEUTROPHILS # BLD AUTO: 13.4 K/UL (ref 1.8–7.7)
NEUTROPHILS NFR BLD: 93.1 % (ref 38–73)
NRBC BLD-RTO: 0 /100 WBC
PHOSPHATE SERPL-MCNC: 4.2 MG/DL (ref 2.7–4.5)
PLATELET # BLD AUTO: 82 K/UL (ref 150–450)
PMV BLD AUTO: 9.9 FL (ref 9.2–12.9)
POCT GLUCOSE: 152 MG/DL (ref 70–110)
POCT GLUCOSE: 162 MG/DL (ref 70–110)
POCT GLUCOSE: 165 MG/DL (ref 70–110)
POCT GLUCOSE: 168 MG/DL (ref 70–110)
POCT GLUCOSE: 170 MG/DL (ref 70–110)
POCT GLUCOSE: 181 MG/DL (ref 70–110)
POTASSIUM SERPL-SCNC: 4.1 MMOL/L (ref 3.5–5.1)
PROT SERPL-MCNC: 4.9 G/DL (ref 6–8.4)
PROTHROMBIN TIME: 13 SEC (ref 9–12.5)
RBC # BLD AUTO: 2.71 M/UL (ref 4.6–6.2)
SODIUM SERPL-SCNC: 129 MMOL/L (ref 136–145)
TACROLIMUS BLD-MCNC: 3.7 NG/ML (ref 5–15)
WBC # BLD AUTO: 14.41 K/UL (ref 3.9–12.7)

## 2021-04-13 PROCEDURE — 85610 PROTHROMBIN TIME: CPT | Performed by: SURGERY

## 2021-04-13 PROCEDURE — 99232 SBSQ HOSP IP/OBS MODERATE 35: CPT | Mod: ,,, | Performed by: NURSE PRACTITIONER

## 2021-04-13 PROCEDURE — 63600175 PHARM REV CODE 636 W HCPCS: Performed by: NURSE PRACTITIONER

## 2021-04-13 PROCEDURE — 80197 ASSAY OF TACROLIMUS: CPT | Performed by: SURGERY

## 2021-04-13 PROCEDURE — 82248 BILIRUBIN DIRECT: CPT | Performed by: TRANSPLANT SURGERY

## 2021-04-13 PROCEDURE — 85025 COMPLETE CBC W/AUTO DIFF WBC: CPT | Performed by: SURGERY

## 2021-04-13 PROCEDURE — 25000003 PHARM REV CODE 250: Performed by: SURGERY

## 2021-04-13 PROCEDURE — 85730 THROMBOPLASTIN TIME PARTIAL: CPT | Performed by: SURGERY

## 2021-04-13 PROCEDURE — 80053 COMPREHEN METABOLIC PANEL: CPT | Performed by: TRANSPLANT SURGERY

## 2021-04-13 PROCEDURE — 63600175 PHARM REV CODE 636 W HCPCS: Performed by: STUDENT IN AN ORGANIZED HEALTH CARE EDUCATION/TRAINING PROGRAM

## 2021-04-13 PROCEDURE — 99900035 HC TECH TIME PER 15 MIN (STAT)

## 2021-04-13 PROCEDURE — 84100 ASSAY OF PHOSPHORUS: CPT | Performed by: PHYSICIAN ASSISTANT

## 2021-04-13 PROCEDURE — 25000003 PHARM REV CODE 250: Performed by: NURSE PRACTITIONER

## 2021-04-13 PROCEDURE — 27000221 HC OXYGEN, UP TO 24 HOURS

## 2021-04-13 PROCEDURE — 25000003 PHARM REV CODE 250: Performed by: STUDENT IN AN ORGANIZED HEALTH CARE EDUCATION/TRAINING PROGRAM

## 2021-04-13 PROCEDURE — 97110 THERAPEUTIC EXERCISES: CPT

## 2021-04-13 PROCEDURE — 20000000 HC ICU ROOM

## 2021-04-13 PROCEDURE — S4991 NICOTINE PATCH NONLEGEND: HCPCS | Performed by: PHYSICIAN ASSISTANT

## 2021-04-13 PROCEDURE — 99232 PR SUBSEQUENT HOSPITAL CARE,LEVL II: ICD-10-PCS | Mod: ,,, | Performed by: ANESTHESIOLOGY

## 2021-04-13 PROCEDURE — 63600175 PHARM REV CODE 636 W HCPCS: Performed by: SURGERY

## 2021-04-13 PROCEDURE — 99233 SBSQ HOSP IP/OBS HIGH 50: CPT | Mod: 24,,, | Performed by: SURGERY

## 2021-04-13 PROCEDURE — 94761 N-INVAS EAR/PLS OXIMETRY MLT: CPT

## 2021-04-13 PROCEDURE — 83735 ASSAY OF MAGNESIUM: CPT | Performed by: PHYSICIAN ASSISTANT

## 2021-04-13 PROCEDURE — S5010 5% DEXTROSE AND 0.45% SALINE: HCPCS | Performed by: SURGERY

## 2021-04-13 PROCEDURE — 97162 PT EVAL MOD COMPLEX 30 MIN: CPT

## 2021-04-13 PROCEDURE — 99232 SBSQ HOSP IP/OBS MODERATE 35: CPT | Mod: ,,, | Performed by: ANESTHESIOLOGY

## 2021-04-13 PROCEDURE — 25000003 PHARM REV CODE 250: Performed by: PHYSICIAN ASSISTANT

## 2021-04-13 PROCEDURE — 63600175 PHARM REV CODE 636 W HCPCS: Performed by: PHYSICIAN ASSISTANT

## 2021-04-13 PROCEDURE — 97530 THERAPEUTIC ACTIVITIES: CPT

## 2021-04-13 PROCEDURE — 99233 PR SUBSEQUENT HOSPITAL CARE,LEVL III: ICD-10-PCS | Mod: 24,,, | Performed by: SURGERY

## 2021-04-13 PROCEDURE — 99232 PR SUBSEQUENT HOSPITAL CARE,LEVL II: ICD-10-PCS | Mod: ,,, | Performed by: NURSE PRACTITIONER

## 2021-04-13 PROCEDURE — 63600175 PHARM REV CODE 636 W HCPCS: Performed by: INTERNAL MEDICINE

## 2021-04-13 RX ORDER — DOXYCYCLINE HYCLATE 100 MG
100 TABLET ORAL EVERY 12 HOURS
Status: COMPLETED | OUTPATIENT
Start: 2021-04-13 | End: 2021-04-20

## 2021-04-13 RX ORDER — CALCIUM CARBONATE 200(500)MG
500 TABLET,CHEWABLE ORAL DAILY PRN
Status: DISCONTINUED | OUTPATIENT
Start: 2021-04-13 | End: 2021-04-26 | Stop reason: HOSPADM

## 2021-04-13 RX ORDER — TACROLIMUS 1 MG/1
4 CAPSULE ORAL 2 TIMES DAILY
Status: DISCONTINUED | OUTPATIENT
Start: 2021-04-13 | End: 2021-04-14

## 2021-04-13 RX ORDER — TACROLIMUS 1 MG/1
2 CAPSULE ORAL ONCE
Status: COMPLETED | OUTPATIENT
Start: 2021-04-13 | End: 2021-04-13

## 2021-04-13 RX ADMIN — HYDROMORPHONE HYDROCHLORIDE 0.5 MG: 1 INJECTION, SOLUTION INTRAMUSCULAR; INTRAVENOUS; SUBCUTANEOUS at 05:04

## 2021-04-13 RX ADMIN — METHYLPREDNISOLONE SODIUM SUCCINATE 80 MG: 125 INJECTION, POWDER, FOR SOLUTION INTRAMUSCULAR; INTRAVENOUS at 09:04

## 2021-04-13 RX ADMIN — MELATONIN TAB 3 MG 6 MG: 3 TAB at 03:04

## 2021-04-13 RX ADMIN — ONDANSETRON 8 MG: 8 TABLET, ORALLY DISINTEGRATING ORAL at 07:04

## 2021-04-13 RX ADMIN — DOCUSATE SODIUM 100 MG: 100 CAPSULE, LIQUID FILLED ORAL at 09:04

## 2021-04-13 RX ADMIN — HYDROMORPHONE HYDROCHLORIDE 0.5 MG: 1 INJECTION, SOLUTION INTRAMUSCULAR; INTRAVENOUS; SUBCUTANEOUS at 12:04

## 2021-04-13 RX ADMIN — TACROLIMUS 2 MG: 1 CAPSULE ORAL at 09:04

## 2021-04-13 RX ADMIN — SODIUM CHLORIDE 1.2 UNITS/HR: 9 INJECTION, SOLUTION INTRAVENOUS at 06:04

## 2021-04-13 RX ADMIN — MYCOPHENOLATE MOFETIL 1000 MG: 250 CAPSULE ORAL at 09:04

## 2021-04-13 RX ADMIN — OXYCODONE HYDROCHLORIDE 10 MG: 10 TABLET ORAL at 10:04

## 2021-04-13 RX ADMIN — HEPARIN SODIUM 5000 UNITS: 5000 INJECTION INTRAVENOUS; SUBCUTANEOUS at 10:04

## 2021-04-13 RX ADMIN — CALCIUM CARBONATE (ANTACID) CHEW TAB 500 MG 500 MG: 500 CHEW TAB at 07:04

## 2021-04-13 RX ADMIN — HEPARIN SODIUM 5000 UNITS: 5000 INJECTION INTRAVENOUS; SUBCUTANEOUS at 05:04

## 2021-04-13 RX ADMIN — HYDROMORPHONE HYDROCHLORIDE 0.5 MG: 1 INJECTION, SOLUTION INTRAMUSCULAR; INTRAVENOUS; SUBCUTANEOUS at 09:04

## 2021-04-13 RX ADMIN — HYDROMORPHONE HYDROCHLORIDE 0.5 MG: 1 INJECTION, SOLUTION INTRAMUSCULAR; INTRAVENOUS; SUBCUTANEOUS at 01:04

## 2021-04-13 RX ADMIN — TACROLIMUS 2 MG: 1 CAPSULE ORAL at 01:04

## 2021-04-13 RX ADMIN — MUPIROCIN 1 G: 20 OINTMENT TOPICAL at 09:04

## 2021-04-13 RX ADMIN — INSULIN ASPART 2 UNITS: 100 INJECTION, SOLUTION INTRAVENOUS; SUBCUTANEOUS at 09:04

## 2021-04-13 RX ADMIN — NYSTATIN 500000 UNITS: 500000 SUSPENSION ORAL at 01:04

## 2021-04-13 RX ADMIN — FAMOTIDINE 20 MG: 20 TABLET ORAL at 02:04

## 2021-04-13 RX ADMIN — HEPARIN SODIUM 5000 UNITS: 5000 INJECTION INTRAVENOUS; SUBCUTANEOUS at 01:04

## 2021-04-13 RX ADMIN — NICOTINE 1 PATCH: 14 PATCH TRANSDERMAL at 09:04

## 2021-04-13 RX ADMIN — TACROLIMUS 4 MG: 1 CAPSULE ORAL at 05:04

## 2021-04-13 RX ADMIN — HYDROMORPHONE HYDROCHLORIDE 0.5 MG: 1 INJECTION, SOLUTION INTRAMUSCULAR; INTRAVENOUS; SUBCUTANEOUS at 06:04

## 2021-04-13 RX ADMIN — CEFEPIME 1 G: 1 INJECTION, POWDER, FOR SOLUTION INTRAMUSCULAR; INTRAVENOUS at 05:04

## 2021-04-13 RX ADMIN — OXYCODONE HYDROCHLORIDE 10 MG: 10 TABLET ORAL at 01:04

## 2021-04-13 RX ADMIN — DOCUSATE SODIUM 100 MG: 100 CAPSULE, LIQUID FILLED ORAL at 02:04

## 2021-04-13 RX ADMIN — HYDROMORPHONE HYDROCHLORIDE 0.5 MG: 1 INJECTION, SOLUTION INTRAMUSCULAR; INTRAVENOUS; SUBCUTANEOUS at 10:04

## 2021-04-13 RX ADMIN — NYSTATIN 500000 UNITS: 500000 SUSPENSION ORAL at 07:04

## 2021-04-13 RX ADMIN — DOXYCYCLINE HYCLATE 100 MG: 100 TABLET, COATED ORAL at 09:04

## 2021-04-13 RX ADMIN — OXYCODONE HYDROCHLORIDE 10 MG: 10 TABLET ORAL at 07:04

## 2021-04-13 RX ADMIN — OXYCODONE HYDROCHLORIDE 10 MG: 10 TABLET ORAL at 03:04

## 2021-04-13 RX ADMIN — FAMOTIDINE 20 MG: 20 TABLET ORAL at 09:04

## 2021-04-13 RX ADMIN — DEXTROSE AND SODIUM CHLORIDE: 5; .45 INJECTION, SOLUTION INTRAVENOUS at 05:04

## 2021-04-13 RX ADMIN — OXYCODONE HYDROCHLORIDE 10 MG: 10 TABLET ORAL at 05:04

## 2021-04-13 RX ADMIN — Medication 10 MG: at 09:04

## 2021-04-13 RX ADMIN — NYSTATIN 500000 UNITS: 500000 SUSPENSION ORAL at 09:04

## 2021-04-13 RX ADMIN — VANCOMYCIN HYDROCHLORIDE 1500 MG: 1.5 INJECTION, POWDER, LYOPHILIZED, FOR SOLUTION INTRAVENOUS at 09:04

## 2021-04-14 ENCOUNTER — ANESTHESIA (OUTPATIENT)
Dept: SURGERY | Facility: HOSPITAL | Age: 41
DRG: 006 | End: 2021-04-14
Payer: MEDICAID

## 2021-04-14 ENCOUNTER — ANESTHESIA EVENT (OUTPATIENT)
Dept: SURGERY | Facility: HOSPITAL | Age: 41
DRG: 006 | End: 2021-04-14
Payer: MEDICAID

## 2021-04-14 LAB
ABO + RH BLD: NORMAL
ALBUMIN SERPL BCP-MCNC: 2.1 G/DL (ref 3.5–5.2)
ALBUMIN SERPL BCP-MCNC: 2.1 G/DL (ref 3.5–5.2)
ALBUMIN SERPL BCP-MCNC: 2.4 G/DL (ref 3.5–5.2)
ALP SERPL-CCNC: 203 U/L (ref 55–135)
ALP SERPL-CCNC: 298 U/L (ref 55–135)
ALP SERPL-CCNC: 298 U/L (ref 55–135)
ALT SERPL W/O P-5'-P-CCNC: 123 U/L (ref 10–44)
ALT SERPL W/O P-5'-P-CCNC: 123 U/L (ref 10–44)
ALT SERPL W/O P-5'-P-CCNC: 127 U/L (ref 10–44)
ANION GAP SERPL CALC-SCNC: 8 MMOL/L (ref 8–16)
APTT BLDCRRT: 26.5 SEC (ref 21–32)
APTT BLDCRRT: 29.9 SEC (ref 21–32)
APTT BLDCRRT: 29.9 SEC (ref 21–32)
AST SERPL-CCNC: 175 U/L (ref 10–40)
AST SERPL-CCNC: 175 U/L (ref 10–40)
AST SERPL-CCNC: 201 U/L (ref 10–40)
BASOPHILS # BLD AUTO: 0.01 K/UL (ref 0–0.2)
BASOPHILS NFR BLD: 0.1 % (ref 0–1.9)
BILIRUB DIRECT SERPL-MCNC: 2.5 MG/DL (ref 0.1–0.3)
BILIRUB SERPL-MCNC: 3.5 MG/DL (ref 0.1–1)
BILIRUB SERPL-MCNC: 4 MG/DL (ref 0.1–1)
BILIRUB SERPL-MCNC: 4 MG/DL (ref 0.1–1)
BLD GP AB SCN CELLS X3 SERPL QL: NORMAL
BLD PROD TYP BPU: NORMAL
BLOOD UNIT EXPIRATION DATE: NORMAL
BLOOD UNIT TYPE CODE: 5100
BLOOD UNIT TYPE CODE: 9500
BLOOD UNIT TYPE: NORMAL
BUN SERPL-MCNC: 36 MG/DL (ref 6–20)
BUN SERPL-MCNC: 36 MG/DL (ref 6–20)
BUN SERPL-MCNC: 38 MG/DL (ref 6–20)
CALCIUM SERPL-MCNC: 7.8 MG/DL (ref 8.7–10.5)
CALCIUM SERPL-MCNC: 7.8 MG/DL (ref 8.7–10.5)
CALCIUM SERPL-MCNC: 8.4 MG/DL (ref 8.7–10.5)
CHLORIDE SERPL-SCNC: 96 MMOL/L (ref 95–110)
CHLORIDE SERPL-SCNC: 97 MMOL/L (ref 95–110)
CHLORIDE SERPL-SCNC: 97 MMOL/L (ref 95–110)
CO2 SERPL-SCNC: 25 MMOL/L (ref 23–29)
CO2 SERPL-SCNC: 26 MMOL/L (ref 23–29)
CO2 SERPL-SCNC: 26 MMOL/L (ref 23–29)
CODING SYSTEM: NORMAL
CREAT SERPL-MCNC: 0.8 MG/DL (ref 0.5–1.4)
CREAT SERPL-MCNC: 0.8 MG/DL (ref 0.5–1.4)
CREAT SERPL-MCNC: 1 MG/DL (ref 0.5–1.4)
DIFFERENTIAL METHOD: ABNORMAL
DISPENSE STATUS: NORMAL
EOSINOPHIL # BLD AUTO: 0 K/UL (ref 0–0.5)
EOSINOPHIL NFR BLD: 0 % (ref 0–8)
ERYTHROCYTE [DISTWIDTH] IN BLOOD BY AUTOMATED COUNT: 20.6 % (ref 11.5–14.5)
EST. GFR  (AFRICAN AMERICAN): >60 ML/MIN/1.73 M^2
EST. GFR  (NON AFRICAN AMERICAN): >60 ML/MIN/1.73 M^2
GLUCOSE SERPL-MCNC: 137 MG/DL (ref 70–110)
GLUCOSE SERPL-MCNC: 137 MG/DL (ref 70–110)
GLUCOSE SERPL-MCNC: 143 MG/DL (ref 70–110)
GRAM STN SPEC: NORMAL
GRAM STN SPEC: NORMAL
HCT VFR BLD AUTO: 31.1 % (ref 40–54)
HCT VFR BLD AUTO: 32.8 % (ref 40–54)
HCT VFR BLD AUTO: 32.8 % (ref 40–54)
HEPATITIS C VIRUS (HCV) RNA DETECTION/QUANTIFICATION RT-PCR: NORMAL IU/ML
HGB BLD-MCNC: 10.7 G/DL (ref 14–18)
HGB BLD-MCNC: 11.2 G/DL (ref 14–18)
HGB BLD-MCNC: 11.2 G/DL (ref 14–18)
IMM GRANULOCYTES # BLD AUTO: 0.07 K/UL (ref 0–0.04)
IMM GRANULOCYTES # BLD AUTO: 0.07 K/UL (ref 0–0.04)
IMM GRANULOCYTES # BLD AUTO: 0.1 K/UL (ref 0–0.04)
IMM GRANULOCYTES NFR BLD AUTO: 0.6 % (ref 0–0.5)
IMM GRANULOCYTES NFR BLD AUTO: 0.6 % (ref 0–0.5)
IMM GRANULOCYTES NFR BLD AUTO: 0.8 % (ref 0–0.5)
INR PPP: 1 (ref 0.8–1.2)
INR PPP: 1.1 (ref 0.8–1.2)
INR PPP: 1.1 (ref 0.8–1.2)
LACTATE SERPL-SCNC: 1.1 MMOL/L (ref 0.5–2.2)
LACTATE SERPL-SCNC: 1.1 MMOL/L (ref 0.5–2.2)
LYMPHOCYTES # BLD AUTO: 0.2 K/UL (ref 1–4.8)
LYMPHOCYTES # BLD AUTO: 0.6 K/UL (ref 1–4.8)
LYMPHOCYTES # BLD AUTO: 0.6 K/UL (ref 1–4.8)
LYMPHOCYTES NFR BLD: 1.6 % (ref 18–48)
LYMPHOCYTES NFR BLD: 4.5 % (ref 18–48)
LYMPHOCYTES NFR BLD: 4.5 % (ref 18–48)
MAGNESIUM SERPL-MCNC: 2 MG/DL (ref 1.6–2.6)
MAGNESIUM SERPL-MCNC: 2.1 MG/DL (ref 1.6–2.6)
MCH RBC QN AUTO: 34.7 PG (ref 27–31)
MCH RBC QN AUTO: 35.2 PG (ref 27–31)
MCH RBC QN AUTO: 35.2 PG (ref 27–31)
MCHC RBC AUTO-ENTMCNC: 34.1 G/DL (ref 32–36)
MCHC RBC AUTO-ENTMCNC: 34.1 G/DL (ref 32–36)
MCHC RBC AUTO-ENTMCNC: 34.4 G/DL (ref 32–36)
MCV RBC AUTO: 101 FL (ref 82–98)
MCV RBC AUTO: 103 FL (ref 82–98)
MCV RBC AUTO: 103 FL (ref 82–98)
MONOCYTES # BLD AUTO: 0.6 K/UL (ref 0.3–1)
MONOCYTES # BLD AUTO: 0.8 K/UL (ref 0.3–1)
MONOCYTES # BLD AUTO: 0.8 K/UL (ref 0.3–1)
MONOCYTES NFR BLD: 4.4 % (ref 4–15)
MONOCYTES NFR BLD: 6.4 % (ref 4–15)
MONOCYTES NFR BLD: 6.4 % (ref 4–15)
NEUTROPHILS # BLD AUTO: 10.8 K/UL (ref 1.8–7.7)
NEUTROPHILS # BLD AUTO: 10.8 K/UL (ref 1.8–7.7)
NEUTROPHILS # BLD AUTO: 12.4 K/UL (ref 1.8–7.7)
NEUTROPHILS NFR BLD: 88.4 % (ref 38–73)
NEUTROPHILS NFR BLD: 88.4 % (ref 38–73)
NEUTROPHILS NFR BLD: 93.1 % (ref 38–73)
NRBC BLD-RTO: 0 /100 WBC
NUM UNITS TRANS FFP: NORMAL
PHOSPHATE SERPL-MCNC: 4.6 MG/DL (ref 2.7–4.5)
PLATELET # BLD AUTO: 74 K/UL (ref 150–450)
PLATELET # BLD AUTO: 76 K/UL (ref 150–450)
PLATELET # BLD AUTO: 76 K/UL (ref 150–450)
PMV BLD AUTO: 9.6 FL (ref 9.2–12.9)
PMV BLD AUTO: 9.8 FL (ref 9.2–12.9)
PMV BLD AUTO: 9.8 FL (ref 9.2–12.9)
POCT GLUCOSE: 131 MG/DL (ref 70–110)
POCT GLUCOSE: 134 MG/DL (ref 70–110)
POCT GLUCOSE: 136 MG/DL (ref 70–110)
POCT GLUCOSE: 163 MG/DL (ref 70–110)
POCT GLUCOSE: 167 MG/DL (ref 70–110)
POTASSIUM SERPL-SCNC: 4.6 MMOL/L (ref 3.5–5.1)
PROT SERPL-MCNC: 4.4 G/DL (ref 6–8.4)
PROT SERPL-MCNC: 4.4 G/DL (ref 6–8.4)
PROT SERPL-MCNC: 5.1 G/DL (ref 6–8.4)
PROTHROMBIN TIME: 11.4 SEC (ref 9–12.5)
PROTHROMBIN TIME: 11.5 SEC (ref 9–12.5)
PROTHROMBIN TIME: 11.5 SEC (ref 9–12.5)
RBC # BLD AUTO: 3.08 M/UL (ref 4.6–6.2)
RBC # BLD AUTO: 3.18 M/UL (ref 4.6–6.2)
RBC # BLD AUTO: 3.18 M/UL (ref 4.6–6.2)
SODIUM SERPL-SCNC: 129 MMOL/L (ref 136–145)
SODIUM SERPL-SCNC: 131 MMOL/L (ref 136–145)
SODIUM SERPL-SCNC: 131 MMOL/L (ref 136–145)
TACROLIMUS BLD-MCNC: 4.3 NG/ML (ref 5–15)
WBC # BLD AUTO: 12.24 K/UL (ref 3.9–12.7)
WBC # BLD AUTO: 12.24 K/UL (ref 3.9–12.7)
WBC # BLD AUTO: 13.33 K/UL (ref 3.9–12.7)

## 2021-04-14 PROCEDURE — D9220A PRA ANESTHESIA: Mod: CRNA,,, | Performed by: NURSE ANESTHETIST, CERTIFIED REGISTERED

## 2021-04-14 PROCEDURE — 85025 COMPLETE CBC W/AUTO DIFF WBC: CPT | Mod: 91 | Performed by: STUDENT IN AN ORGANIZED HEALTH CARE EDUCATION/TRAINING PROGRAM

## 2021-04-14 PROCEDURE — 85730 THROMBOPLASTIN TIME PARTIAL: CPT | Performed by: SURGERY

## 2021-04-14 PROCEDURE — 37000009 HC ANESTHESIA EA ADD 15 MINS: Performed by: TRANSPLANT SURGERY

## 2021-04-14 PROCEDURE — 99900035 HC TECH TIME PER 15 MIN (STAT)

## 2021-04-14 PROCEDURE — 25000003 PHARM REV CODE 250: Performed by: STUDENT IN AN ORGANIZED HEALTH CARE EDUCATION/TRAINING PROGRAM

## 2021-04-14 PROCEDURE — 63600175 PHARM REV CODE 636 W HCPCS: Performed by: TRANSPLANT SURGERY

## 2021-04-14 PROCEDURE — 87075 CULTR BACTERIA EXCEPT BLOOD: CPT | Performed by: TRANSPLANT SURGERY

## 2021-04-14 PROCEDURE — D9220A PRA ANESTHESIA: ICD-10-PCS | Mod: CRNA,,, | Performed by: NURSE ANESTHETIST, CERTIFIED REGISTERED

## 2021-04-14 PROCEDURE — 37000008 HC ANESTHESIA 1ST 15 MINUTES: Performed by: TRANSPLANT SURGERY

## 2021-04-14 PROCEDURE — 85610 PROTHROMBIN TIME: CPT | Performed by: SURGERY

## 2021-04-14 PROCEDURE — 87102 FUNGUS ISOLATION CULTURE: CPT | Performed by: TRANSPLANT SURGERY

## 2021-04-14 PROCEDURE — 47001 PR NEEDLE BIOPSY LIVER,W OTHR PROC: ICD-10-PCS | Mod: ,,, | Performed by: TRANSPLANT SURGERY

## 2021-04-14 PROCEDURE — C1757 CATH, THROMBECTOMY/EMBOLECT: HCPCS | Performed by: TRANSPLANT SURGERY

## 2021-04-14 PROCEDURE — 35840 EXPLORE ABDOMINAL VESSELS: CPT | Mod: 78,,, | Performed by: TRANSPLANT SURGERY

## 2021-04-14 PROCEDURE — 63600175 PHARM REV CODE 636 W HCPCS: Performed by: NURSE ANESTHETIST, CERTIFIED REGISTERED

## 2021-04-14 PROCEDURE — 25000003 PHARM REV CODE 250: Performed by: NURSE ANESTHETIST, CERTIFIED REGISTERED

## 2021-04-14 PROCEDURE — 63600175 PHARM REV CODE 636 W HCPCS: Performed by: PHYSICIAN ASSISTANT

## 2021-04-14 PROCEDURE — 87205 SMEAR GRAM STAIN: CPT | Performed by: TRANSPLANT SURGERY

## 2021-04-14 PROCEDURE — 82248 BILIRUBIN DIRECT: CPT | Performed by: SURGERY

## 2021-04-14 PROCEDURE — 83735 ASSAY OF MAGNESIUM: CPT | Mod: 91 | Performed by: STUDENT IN AN ORGANIZED HEALTH CARE EDUCATION/TRAINING PROGRAM

## 2021-04-14 PROCEDURE — 63600175 PHARM REV CODE 636 W HCPCS

## 2021-04-14 PROCEDURE — 27201423 OPTIME MED/SURG SUP & DEVICES STERILE SUPPLY: Performed by: TRANSPLANT SURGERY

## 2021-04-14 PROCEDURE — 63600175 PHARM REV CODE 636 W HCPCS: Performed by: STUDENT IN AN ORGANIZED HEALTH CARE EDUCATION/TRAINING PROGRAM

## 2021-04-14 PROCEDURE — S4991 NICOTINE PATCH NONLEGEND: HCPCS | Performed by: PHYSICIAN ASSISTANT

## 2021-04-14 PROCEDURE — 47001 NDL BIOPSY LVR TM OTH MAJ PX: CPT | Mod: ,,, | Performed by: TRANSPLANT SURGERY

## 2021-04-14 PROCEDURE — 80197 ASSAY OF TACROLIMUS: CPT | Performed by: SURGERY

## 2021-04-14 PROCEDURE — 36000707: Performed by: TRANSPLANT SURGERY

## 2021-04-14 PROCEDURE — 94761 N-INVAS EAR/PLS OXIMETRY MLT: CPT

## 2021-04-14 PROCEDURE — 86900 BLOOD TYPING SEROLOGIC ABO: CPT | Performed by: TRANSPLANT SURGERY

## 2021-04-14 PROCEDURE — 85025 COMPLETE CBC W/AUTO DIFF WBC: CPT | Performed by: SURGERY

## 2021-04-14 PROCEDURE — 85730 THROMBOPLASTIN TIME PARTIAL: CPT | Mod: 91 | Performed by: STUDENT IN AN ORGANIZED HEALTH CARE EDUCATION/TRAINING PROGRAM

## 2021-04-14 PROCEDURE — 35840 PR EXPLOR POSTOP BLEED,INFEC,CLOT-ABD: ICD-10-PCS | Mod: 78,,, | Performed by: TRANSPLANT SURGERY

## 2021-04-14 PROCEDURE — 20000000 HC ICU ROOM

## 2021-04-14 PROCEDURE — 80053 COMPREHEN METABOLIC PANEL: CPT | Mod: 91 | Performed by: STUDENT IN AN ORGANIZED HEALTH CARE EDUCATION/TRAINING PROGRAM

## 2021-04-14 PROCEDURE — 87070 CULTURE OTHR SPECIMN AEROBIC: CPT | Performed by: TRANSPLANT SURGERY

## 2021-04-14 PROCEDURE — 83605 ASSAY OF LACTIC ACID: CPT | Performed by: STUDENT IN AN ORGANIZED HEALTH CARE EDUCATION/TRAINING PROGRAM

## 2021-04-14 PROCEDURE — 36000706: Performed by: TRANSPLANT SURGERY

## 2021-04-14 PROCEDURE — 63600175 PHARM REV CODE 636 W HCPCS: Performed by: SURGERY

## 2021-04-14 PROCEDURE — 83735 ASSAY OF MAGNESIUM: CPT | Performed by: PHYSICIAN ASSISTANT

## 2021-04-14 PROCEDURE — 27000221 HC OXYGEN, UP TO 24 HOURS

## 2021-04-14 PROCEDURE — 84100 ASSAY OF PHOSPHORUS: CPT | Performed by: STUDENT IN AN ORGANIZED HEALTH CARE EDUCATION/TRAINING PROGRAM

## 2021-04-14 PROCEDURE — 88307 TISSUE EXAM BY PATHOLOGIST: CPT | Mod: 26,,, | Performed by: PATHOLOGY

## 2021-04-14 PROCEDURE — 25000003 PHARM REV CODE 250: Performed by: SURGERY

## 2021-04-14 PROCEDURE — 99232 PR SUBSEQUENT HOSPITAL CARE,LEVL II: ICD-10-PCS | Mod: ,,, | Performed by: NURSE PRACTITIONER

## 2021-04-14 PROCEDURE — 99232 SBSQ HOSP IP/OBS MODERATE 35: CPT | Mod: 25,,, | Performed by: ANESTHESIOLOGY

## 2021-04-14 PROCEDURE — 85610 PROTHROMBIN TIME: CPT | Mod: 91 | Performed by: STUDENT IN AN ORGANIZED HEALTH CARE EDUCATION/TRAINING PROGRAM

## 2021-04-14 PROCEDURE — 80053 COMPREHEN METABOLIC PANEL: CPT | Performed by: SURGERY

## 2021-04-14 PROCEDURE — 88313 SPECIAL STAINS GROUP 2: CPT | Mod: 26,,, | Performed by: PATHOLOGY

## 2021-04-14 PROCEDURE — 88313 SPECIAL STAINS GROUP 2: CPT | Performed by: PATHOLOGY

## 2021-04-14 PROCEDURE — D9220A PRA ANESTHESIA: ICD-10-PCS | Mod: ANES,,, | Performed by: ANESTHESIOLOGY

## 2021-04-14 PROCEDURE — 25000003 PHARM REV CODE 250: Performed by: TRANSPLANT SURGERY

## 2021-04-14 PROCEDURE — D9220A PRA ANESTHESIA: Mod: ANES,,, | Performed by: ANESTHESIOLOGY

## 2021-04-14 PROCEDURE — 25000003 PHARM REV CODE 250: Performed by: PHYSICIAN ASSISTANT

## 2021-04-14 PROCEDURE — 99232 SBSQ HOSP IP/OBS MODERATE 35: CPT | Mod: ,,, | Performed by: NURSE PRACTITIONER

## 2021-04-14 PROCEDURE — 88313 PR  SPECIAL STAINS,GROUP II: ICD-10-PCS | Mod: 26,,, | Performed by: PATHOLOGY

## 2021-04-14 PROCEDURE — 88307 TISSUE EXAM BY PATHOLOGIST: CPT | Performed by: PATHOLOGY

## 2021-04-14 PROCEDURE — 99232 PR SUBSEQUENT HOSPITAL CARE,LEVL II: ICD-10-PCS | Mod: 25,,, | Performed by: ANESTHESIOLOGY

## 2021-04-14 PROCEDURE — 88307 PR  SURG PATH,LEVEL V: ICD-10-PCS | Mod: 26,,, | Performed by: PATHOLOGY

## 2021-04-14 PROCEDURE — 94799 UNLISTED PULMONARY SVC/PX: CPT

## 2021-04-14 RX ORDER — KETAMINE HCL IN 0.9 % NACL 50 MG/5 ML
SYRINGE (ML) INTRAVENOUS
Status: DISCONTINUED | OUTPATIENT
Start: 2021-04-14 | End: 2021-04-14

## 2021-04-14 RX ORDER — SUCCINYLCHOLINE CHLORIDE 20 MG/ML
INJECTION INTRAMUSCULAR; INTRAVENOUS
Status: DISCONTINUED | OUTPATIENT
Start: 2021-04-14 | End: 2021-04-14

## 2021-04-14 RX ORDER — FUROSEMIDE 10 MG/ML
40 INJECTION INTRAMUSCULAR; INTRAVENOUS ONCE
Status: COMPLETED | OUTPATIENT
Start: 2021-04-14 | End: 2021-04-14

## 2021-04-14 RX ORDER — TACROLIMUS 1 MG/1
2 CAPSULE ORAL ONCE
Status: COMPLETED | OUTPATIENT
Start: 2021-04-14 | End: 2021-04-14

## 2021-04-14 RX ORDER — HEPARIN SODIUM 1000 [USP'U]/ML
INJECTION, SOLUTION INTRAVENOUS; SUBCUTANEOUS
Status: DISCONTINUED | OUTPATIENT
Start: 2021-04-14 | End: 2021-04-14 | Stop reason: HOSPADM

## 2021-04-14 RX ORDER — LIDOCAINE HYDROCHLORIDE 40 MG/ML
INJECTION, SOLUTION RETROBULBAR
Status: DISCONTINUED | OUTPATIENT
Start: 2021-04-14 | End: 2021-04-14 | Stop reason: HOSPADM

## 2021-04-14 RX ORDER — OXYCODONE HYDROCHLORIDE 10 MG/1
10 TABLET ORAL EVERY 4 HOURS PRN
Status: DISCONTINUED | OUTPATIENT
Start: 2021-04-14 | End: 2021-04-25

## 2021-04-14 RX ORDER — ROCURONIUM BROMIDE 10 MG/ML
INJECTION, SOLUTION INTRAVENOUS
Status: DISCONTINUED | OUTPATIENT
Start: 2021-04-14 | End: 2021-04-14

## 2021-04-14 RX ORDER — HYDROMORPHONE HYDROCHLORIDE 1 MG/ML
1 INJECTION, SOLUTION INTRAMUSCULAR; INTRAVENOUS; SUBCUTANEOUS
Status: DISCONTINUED | OUTPATIENT
Start: 2021-04-14 | End: 2021-04-16

## 2021-04-14 RX ORDER — ACETAMINOPHEN 325 MG/1
650 TABLET ORAL EVERY 8 HOURS PRN
Status: DISCONTINUED | OUTPATIENT
Start: 2021-04-14 | End: 2021-04-26 | Stop reason: HOSPADM

## 2021-04-14 RX ORDER — HYDROMORPHONE HYDROCHLORIDE 2 MG/ML
INJECTION, SOLUTION INTRAMUSCULAR; INTRAVENOUS; SUBCUTANEOUS
Status: DISCONTINUED | OUTPATIENT
Start: 2021-04-14 | End: 2021-04-14

## 2021-04-14 RX ORDER — LIDOCAINE HYDROCHLORIDE 20 MG/ML
INJECTION INTRAVENOUS
Status: DISCONTINUED | OUTPATIENT
Start: 2021-04-14 | End: 2021-04-14

## 2021-04-14 RX ORDER — HEPARIN SODIUM 1000 [USP'U]/ML
INJECTION, SOLUTION INTRAVENOUS; SUBCUTANEOUS
Status: DISCONTINUED | OUTPATIENT
Start: 2021-04-14 | End: 2021-04-14

## 2021-04-14 RX ORDER — PROPOFOL 10 MG/ML
VIAL (ML) INTRAVENOUS
Status: DISCONTINUED | OUTPATIENT
Start: 2021-04-14 | End: 2021-04-14

## 2021-04-14 RX ORDER — ONDANSETRON 2 MG/ML
4 INJECTION INTRAMUSCULAR; INTRAVENOUS EVERY 6 HOURS PRN
Status: DISCONTINUED | OUTPATIENT
Start: 2021-04-14 | End: 2021-04-26 | Stop reason: HOSPADM

## 2021-04-14 RX ORDER — METHOCARBAMOL 500 MG/1
500 TABLET, FILM COATED ORAL 3 TIMES DAILY PRN
Status: DISCONTINUED | OUTPATIENT
Start: 2021-04-14 | End: 2021-04-14

## 2021-04-14 RX ORDER — ONDANSETRON 2 MG/ML
INJECTION INTRAMUSCULAR; INTRAVENOUS
Status: DISCONTINUED | OUTPATIENT
Start: 2021-04-14 | End: 2021-04-14

## 2021-04-14 RX ORDER — TACROLIMUS 1 MG/1
6 CAPSULE ORAL 2 TIMES DAILY
Status: DISCONTINUED | OUTPATIENT
Start: 2021-04-14 | End: 2021-04-17

## 2021-04-14 RX ORDER — FENTANYL CITRATE 50 UG/ML
INJECTION, SOLUTION INTRAMUSCULAR; INTRAVENOUS
Status: DISCONTINUED | OUTPATIENT
Start: 2021-04-14 | End: 2021-04-14

## 2021-04-14 RX ORDER — METHOCARBAMOL 500 MG/1
500 TABLET, FILM COATED ORAL 3 TIMES DAILY
Status: DISCONTINUED | OUTPATIENT
Start: 2021-04-14 | End: 2021-04-26 | Stop reason: HOSPADM

## 2021-04-14 RX ORDER — ONDANSETRON 2 MG/ML
INJECTION INTRAMUSCULAR; INTRAVENOUS
Status: COMPLETED
Start: 2021-04-14 | End: 2021-04-14

## 2021-04-14 RX ORDER — MIDAZOLAM HYDROCHLORIDE 1 MG/ML
INJECTION, SOLUTION INTRAMUSCULAR; INTRAVENOUS
Status: DISCONTINUED | OUTPATIENT
Start: 2021-04-14 | End: 2021-04-14

## 2021-04-14 RX ADMIN — MYCOPHENOLATE MOFETIL 1000 MG: 250 CAPSULE ORAL at 08:04

## 2021-04-14 RX ADMIN — DOXYCYCLINE HYCLATE 100 MG: 100 TABLET, COATED ORAL at 08:04

## 2021-04-14 RX ADMIN — OXYCODONE HYDROCHLORIDE 15 MG: 10 TABLET ORAL at 08:04

## 2021-04-14 RX ADMIN — HEPARIN SODIUM 2000 UNITS: 1000 INJECTION, SOLUTION INTRAVENOUS; SUBCUTANEOUS at 04:04

## 2021-04-14 RX ADMIN — SODIUM CHLORIDE 3 G: 9 INJECTION, SOLUTION INTRAVENOUS at 03:04

## 2021-04-14 RX ADMIN — OXYCODONE HYDROCHLORIDE 10 MG: 10 TABLET ORAL at 12:04

## 2021-04-14 RX ADMIN — HYDROMORPHONE HYDROCHLORIDE 0.5 MG: 1 INJECTION, SOLUTION INTRAMUSCULAR; INTRAVENOUS; SUBCUTANEOUS at 11:04

## 2021-04-14 RX ADMIN — METHOCARBAMOL 500 MG: 500 TABLET ORAL at 08:04

## 2021-04-14 RX ADMIN — FENTANYL CITRATE 50 MCG: 50 INJECTION, SOLUTION INTRAMUSCULAR; INTRAVENOUS at 04:04

## 2021-04-14 RX ADMIN — ONDANSETRON 4 MG: 2 INJECTION INTRAMUSCULAR; INTRAVENOUS at 06:04

## 2021-04-14 RX ADMIN — FUROSEMIDE 40 MG: 10 INJECTION, SOLUTION INTRAMUSCULAR; INTRAVENOUS at 10:04

## 2021-04-14 RX ADMIN — FAMOTIDINE 20 MG: 20 TABLET ORAL at 08:04

## 2021-04-14 RX ADMIN — TACROLIMUS 2 MG: 1 CAPSULE ORAL at 10:04

## 2021-04-14 RX ADMIN — FENTANYL CITRATE 25 MCG: 50 INJECTION, SOLUTION INTRAMUSCULAR; INTRAVENOUS at 04:04

## 2021-04-14 RX ADMIN — MUPIROCIN 1 G: 20 OINTMENT TOPICAL at 08:04

## 2021-04-14 RX ADMIN — OXYCODONE HYDROCHLORIDE 10 MG: 10 TABLET ORAL at 04:04

## 2021-04-14 RX ADMIN — NYSTATIN 500000 UNITS: 500000 SUSPENSION ORAL at 09:04

## 2021-04-14 RX ADMIN — HYDROMORPHONE HYDROCHLORIDE 0.5 MG: 1 INJECTION, SOLUTION INTRAMUSCULAR; INTRAVENOUS; SUBCUTANEOUS at 01:04

## 2021-04-14 RX ADMIN — ONDANSETRON 4 MG: 2 INJECTION, SOLUTION INTRAMUSCULAR; INTRAVENOUS at 04:04

## 2021-04-14 RX ADMIN — METHYLPREDNISOLONE SODIUM SUCCINATE 60 MG: 125 INJECTION, POWDER, FOR SOLUTION INTRAMUSCULAR; INTRAVENOUS at 09:04

## 2021-04-14 RX ADMIN — HEPARIN SODIUM 5000 UNITS: 5000 INJECTION INTRAVENOUS; SUBCUTANEOUS at 06:04

## 2021-04-14 RX ADMIN — Medication 30 MG: at 03:04

## 2021-04-14 RX ADMIN — NYSTATIN 500000 UNITS: 500000 SUSPENSION ORAL at 07:04

## 2021-04-14 RX ADMIN — MELATONIN TAB 3 MG 6 MG: 3 TAB at 01:04

## 2021-04-14 RX ADMIN — FENTANYL CITRATE 100 MCG: 50 INJECTION, SOLUTION INTRAMUSCULAR; INTRAVENOUS at 02:04

## 2021-04-14 RX ADMIN — Medication 10 MG: at 08:04

## 2021-04-14 RX ADMIN — LIDOCAINE HYDROCHLORIDE 50 MG: 20 INJECTION, SOLUTION INTRAVENOUS at 02:04

## 2021-04-14 RX ADMIN — CALCIUM CARBONATE (ANTACID) CHEW TAB 500 MG 500 MG: 500 CHEW TAB at 11:04

## 2021-04-14 RX ADMIN — MIDAZOLAM HYDROCHLORIDE 2 MG: 1 INJECTION, SOLUTION INTRAMUSCULAR; INTRAVENOUS at 02:04

## 2021-04-14 RX ADMIN — MELATONIN TAB 3 MG 6 MG: 3 TAB at 11:04

## 2021-04-14 RX ADMIN — TACROLIMUS 4 MG: 1 CAPSULE ORAL at 08:04

## 2021-04-14 RX ADMIN — HYDROMORPHONE HYDROCHLORIDE 0.5 MG: 1 INJECTION, SOLUTION INTRAMUSCULAR; INTRAVENOUS; SUBCUTANEOUS at 06:04

## 2021-04-14 RX ADMIN — OXYCODONE HYDROCHLORIDE 10 MG: 10 TABLET ORAL at 08:04

## 2021-04-14 RX ADMIN — Medication 20 MG: at 03:04

## 2021-04-14 RX ADMIN — PROPOFOL 150 MG: 10 INJECTION, EMULSION INTRAVENOUS at 02:04

## 2021-04-14 RX ADMIN — DOCUSATE SODIUM 100 MG: 100 CAPSULE, LIQUID FILLED ORAL at 08:04

## 2021-04-14 RX ADMIN — HEPARIN SODIUM 5000 UNITS: 5000 INJECTION INTRAVENOUS; SUBCUTANEOUS at 09:04

## 2021-04-14 RX ADMIN — MYCOPHENOLATE MOFETIL 1000 MG: 250 CAPSULE ORAL at 09:04

## 2021-04-14 RX ADMIN — HYDROMORPHONE HYDROCHLORIDE 1 MG: 1 INJECTION, SOLUTION INTRAMUSCULAR; INTRAVENOUS; SUBCUTANEOUS at 09:04

## 2021-04-14 RX ADMIN — ROCURONIUM BROMIDE 50 MG: 10 INJECTION, SOLUTION INTRAVENOUS at 02:04

## 2021-04-14 RX ADMIN — NICOTINE 1 PATCH: 14 PATCH TRANSDERMAL at 09:04

## 2021-04-14 RX ADMIN — METHYLPREDNISOLONE SODIUM SUCCINATE 60 MG: 125 INJECTION, POWDER, FOR SOLUTION INTRAMUSCULAR; INTRAVENOUS at 08:04

## 2021-04-14 RX ADMIN — SUCCINYLCHOLINE CHLORIDE 160 MG: 20 INJECTION, SOLUTION INTRAMUSCULAR; INTRAVENOUS at 02:04

## 2021-04-14 RX ADMIN — OXYCODONE HYDROCHLORIDE 10 MG: 10 TABLET ORAL at 11:04

## 2021-04-14 RX ADMIN — DOXYCYCLINE HYCLATE 100 MG: 100 TABLET, COATED ORAL at 09:04

## 2021-04-14 RX ADMIN — SUGAMMADEX 200 MG: 100 INJECTION, SOLUTION INTRAVENOUS at 04:04

## 2021-04-14 RX ADMIN — HYDROMORPHONE HYDROCHLORIDE 2 MG: 2 INJECTION, SOLUTION INTRAMUSCULAR; INTRAVENOUS; SUBCUTANEOUS at 05:04

## 2021-04-14 RX ADMIN — ROCURONIUM BROMIDE 50 MG: 10 INJECTION, SOLUTION INTRAVENOUS at 03:04

## 2021-04-15 LAB
ALBUMIN SERPL BCP-MCNC: 2 G/DL (ref 3.5–5.2)
ALP SERPL-CCNC: 314 U/L (ref 55–135)
ALT SERPL W/O P-5'-P-CCNC: 114 U/L (ref 10–44)
ANION GAP SERPL CALC-SCNC: 6 MMOL/L (ref 8–16)
APTT BLDCRRT: 25.5 SEC (ref 21–32)
AST SERPL-CCNC: 130 U/L (ref 10–40)
BACTERIA BLD CULT: NORMAL
BACTERIA BLD CULT: NORMAL
BACTERIA SPEC AEROBE CULT: NO GROWTH
BASOPHILS # BLD AUTO: 0.01 K/UL (ref 0–0.2)
BASOPHILS NFR BLD: 0.1 % (ref 0–1.9)
BILIRUB DIRECT SERPL-MCNC: 2.1 MG/DL (ref 0.1–0.3)
BILIRUB SERPL-MCNC: 3.1 MG/DL (ref 0.1–1)
BLD PROD TYP BPU: NORMAL
BLOOD UNIT EXPIRATION DATE: NORMAL
BLOOD UNIT TYPE CODE: 5100
BLOOD UNIT TYPE: NORMAL
BUN SERPL-MCNC: 37 MG/DL (ref 6–20)
CALCIUM SERPL-MCNC: 7.9 MG/DL (ref 8.7–10.5)
CHLORIDE SERPL-SCNC: 97 MMOL/L (ref 95–110)
CO2 SERPL-SCNC: 26 MMOL/L (ref 23–29)
CODING SYSTEM: NORMAL
COMMENT: NORMAL
CREAT SERPL-MCNC: 0.8 MG/DL (ref 0.5–1.4)
DIFFERENTIAL METHOD: ABNORMAL
DISPENSE STATUS: NORMAL
EOSINOPHIL # BLD AUTO: 0 K/UL (ref 0–0.5)
EOSINOPHIL NFR BLD: 0 % (ref 0–8)
ERYTHROCYTE [DISTWIDTH] IN BLOOD BY AUTOMATED COUNT: 20.6 % (ref 11.5–14.5)
EST. GFR  (AFRICAN AMERICAN): >60 ML/MIN/1.73 M^2
EST. GFR  (NON AFRICAN AMERICAN): >60 ML/MIN/1.73 M^2
FINAL PATHOLOGIC DIAGNOSIS: NORMAL
GLUCOSE SERPL-MCNC: 127 MG/DL (ref 70–110)
GROSS: NORMAL
HCT VFR BLD AUTO: 33.1 % (ref 40–54)
HGB BLD-MCNC: 11.4 G/DL (ref 14–18)
IMM GRANULOCYTES # BLD AUTO: 0.09 K/UL (ref 0–0.04)
IMM GRANULOCYTES NFR BLD AUTO: 0.7 % (ref 0–0.5)
INR PPP: 1.1 (ref 0.8–1.2)
LYMPHOCYTES # BLD AUTO: 0.3 K/UL (ref 1–4.8)
LYMPHOCYTES NFR BLD: 2.5 % (ref 18–48)
Lab: NORMAL
MAGNESIUM SERPL-MCNC: 2.1 MG/DL (ref 1.6–2.6)
MCH RBC QN AUTO: 34.8 PG (ref 27–31)
MCHC RBC AUTO-ENTMCNC: 34.4 G/DL (ref 32–36)
MCV RBC AUTO: 101 FL (ref 82–98)
MONOCYTES # BLD AUTO: 0.8 K/UL (ref 0.3–1)
MONOCYTES NFR BLD: 5.9 % (ref 4–15)
NEUTROPHILS # BLD AUTO: 11.9 K/UL (ref 1.8–7.7)
NEUTROPHILS NFR BLD: 90.8 % (ref 38–73)
NRBC BLD-RTO: 0 /100 WBC
NUM UNITS TRANS PACKED RBC: NORMAL
NUM UNITS TRANS PACKED RBC: NORMAL
PLATELET # BLD AUTO: 68 K/UL (ref 150–450)
PMV BLD AUTO: 10.5 FL (ref 9.2–12.9)
POCT GLUCOSE: 111 MG/DL (ref 70–110)
POCT GLUCOSE: 138 MG/DL (ref 70–110)
POCT GLUCOSE: 163 MG/DL (ref 70–110)
POCT GLUCOSE: 164 MG/DL (ref 70–110)
POTASSIUM SERPL-SCNC: 4.7 MMOL/L (ref 3.5–5.1)
PROT SERPL-MCNC: 4.3 G/DL (ref 6–8.4)
PROTHROMBIN TIME: 11.6 SEC (ref 9–12.5)
RBC # BLD AUTO: 3.28 M/UL (ref 4.6–6.2)
SODIUM SERPL-SCNC: 129 MMOL/L (ref 136–145)
TACROLIMUS BLD-MCNC: 5.2 NG/ML (ref 5–15)
TRANS ERYTHROCYTES VOL PATIENT: NORMAL ML
WBC # BLD AUTO: 13.09 K/UL (ref 3.9–12.7)

## 2021-04-15 PROCEDURE — 25000003 PHARM REV CODE 250: Performed by: STUDENT IN AN ORGANIZED HEALTH CARE EDUCATION/TRAINING PROGRAM

## 2021-04-15 PROCEDURE — 80197 ASSAY OF TACROLIMUS: CPT | Performed by: SURGERY

## 2021-04-15 PROCEDURE — 94761 N-INVAS EAR/PLS OXIMETRY MLT: CPT

## 2021-04-15 PROCEDURE — 20600001 HC STEP DOWN PRIVATE ROOM

## 2021-04-15 PROCEDURE — S4991 NICOTINE PATCH NONLEGEND: HCPCS | Performed by: PHYSICIAN ASSISTANT

## 2021-04-15 PROCEDURE — 99232 PR SUBSEQUENT HOSPITAL CARE,LEVL II: ICD-10-PCS | Mod: ,,, | Performed by: ANESTHESIOLOGY

## 2021-04-15 PROCEDURE — 63600175 PHARM REV CODE 636 W HCPCS: Performed by: STUDENT IN AN ORGANIZED HEALTH CARE EDUCATION/TRAINING PROGRAM

## 2021-04-15 PROCEDURE — P9047 ALBUMIN (HUMAN), 25%, 50ML: HCPCS | Mod: JG | Performed by: STUDENT IN AN ORGANIZED HEALTH CARE EDUCATION/TRAINING PROGRAM

## 2021-04-15 PROCEDURE — 25000003 PHARM REV CODE 250: Performed by: PHYSICIAN ASSISTANT

## 2021-04-15 PROCEDURE — 63600175 PHARM REV CODE 636 W HCPCS: Performed by: SURGERY

## 2021-04-15 PROCEDURE — 80053 COMPREHEN METABOLIC PANEL: CPT | Performed by: SURGERY

## 2021-04-15 PROCEDURE — 83735 ASSAY OF MAGNESIUM: CPT | Performed by: PHYSICIAN ASSISTANT

## 2021-04-15 PROCEDURE — 85610 PROTHROMBIN TIME: CPT | Performed by: SURGERY

## 2021-04-15 PROCEDURE — 82248 BILIRUBIN DIRECT: CPT | Performed by: SURGERY

## 2021-04-15 PROCEDURE — 25000003 PHARM REV CODE 250: Performed by: SURGERY

## 2021-04-15 PROCEDURE — 85730 THROMBOPLASTIN TIME PARTIAL: CPT | Performed by: SURGERY

## 2021-04-15 PROCEDURE — 63600175 PHARM REV CODE 636 W HCPCS: Performed by: PHYSICIAN ASSISTANT

## 2021-04-15 PROCEDURE — 85025 COMPLETE CBC W/AUTO DIFF WBC: CPT | Performed by: SURGERY

## 2021-04-15 PROCEDURE — 99232 SBSQ HOSP IP/OBS MODERATE 35: CPT | Mod: ,,, | Performed by: ANESTHESIOLOGY

## 2021-04-15 RX ORDER — IBUPROFEN 200 MG
16 TABLET ORAL
Status: DISCONTINUED | OUTPATIENT
Start: 2021-04-15 | End: 2021-04-23

## 2021-04-15 RX ORDER — GLUCAGON 1 MG
1 KIT INJECTION
Status: DISCONTINUED | OUTPATIENT
Start: 2021-04-15 | End: 2021-04-23

## 2021-04-15 RX ORDER — ALBUMIN HUMAN 250 G/1000ML
25 SOLUTION INTRAVENOUS ONCE
Status: COMPLETED | OUTPATIENT
Start: 2021-04-15 | End: 2021-04-15

## 2021-04-15 RX ORDER — IBUPROFEN 200 MG
24 TABLET ORAL
Status: DISCONTINUED | OUTPATIENT
Start: 2021-04-15 | End: 2021-04-23

## 2021-04-15 RX ORDER — FUROSEMIDE 10 MG/ML
40 INJECTION INTRAMUSCULAR; INTRAVENOUS ONCE
Status: COMPLETED | OUTPATIENT
Start: 2021-04-15 | End: 2021-04-15

## 2021-04-15 RX ORDER — INSULIN ASPART 100 [IU]/ML
1-10 INJECTION, SOLUTION INTRAVENOUS; SUBCUTANEOUS
Status: DISCONTINUED | OUTPATIENT
Start: 2021-04-15 | End: 2021-04-23

## 2021-04-15 RX ADMIN — TACROLIMUS 6 MG: 5 CAPSULE ORAL at 09:04

## 2021-04-15 RX ADMIN — HYDROMORPHONE HYDROCHLORIDE 1 MG: 1 INJECTION, SOLUTION INTRAMUSCULAR; INTRAVENOUS; SUBCUTANEOUS at 09:04

## 2021-04-15 RX ADMIN — DOXYCYCLINE HYCLATE 100 MG: 100 TABLET, COATED ORAL at 08:04

## 2021-04-15 RX ADMIN — OXYCODONE HYDROCHLORIDE 15 MG: 10 TABLET ORAL at 09:04

## 2021-04-15 RX ADMIN — DOCUSATE SODIUM 100 MG: 100 CAPSULE, LIQUID FILLED ORAL at 09:04

## 2021-04-15 RX ADMIN — HYDROMORPHONE HYDROCHLORIDE 1 MG: 1 INJECTION, SOLUTION INTRAMUSCULAR; INTRAVENOUS; SUBCUTANEOUS at 03:04

## 2021-04-15 RX ADMIN — DOXYCYCLINE HYCLATE 100 MG: 100 TABLET, COATED ORAL at 09:04

## 2021-04-15 RX ADMIN — ALBUMIN (HUMAN) 25 G: 12.5 SOLUTION INTRAVENOUS at 10:04

## 2021-04-15 RX ADMIN — HYDROMORPHONE HYDROCHLORIDE 1 MG: 1 INJECTION, SOLUTION INTRAMUSCULAR; INTRAVENOUS; SUBCUTANEOUS at 11:04

## 2021-04-15 RX ADMIN — DOCUSATE SODIUM 100 MG: 100 CAPSULE, LIQUID FILLED ORAL at 02:04

## 2021-04-15 RX ADMIN — MUPIROCIN 1 G: 20 OINTMENT TOPICAL at 08:04

## 2021-04-15 RX ADMIN — TACROLIMUS 6 MG: 5 CAPSULE ORAL at 05:04

## 2021-04-15 RX ADMIN — DOCUSATE SODIUM 100 MG: 100 CAPSULE, LIQUID FILLED ORAL at 08:04

## 2021-04-15 RX ADMIN — HEPARIN SODIUM 5000 UNITS: 5000 INJECTION INTRAVENOUS; SUBCUTANEOUS at 08:04

## 2021-04-15 RX ADMIN — METHYLPREDNISOLONE SODIUM SUCCINATE 40 MG: 40 INJECTION, POWDER, FOR SOLUTION INTRAMUSCULAR; INTRAVENOUS at 09:04

## 2021-04-15 RX ADMIN — HEPARIN SODIUM 5000 UNITS: 5000 INJECTION INTRAVENOUS; SUBCUTANEOUS at 02:04

## 2021-04-15 RX ADMIN — FUROSEMIDE 40 MG: 10 INJECTION, SOLUTION INTRAMUSCULAR; INTRAVENOUS at 10:04

## 2021-04-15 RX ADMIN — NICOTINE 1 PATCH: 14 PATCH TRANSDERMAL at 09:04

## 2021-04-15 RX ADMIN — NYSTATIN 500000 UNITS: 500000 SUSPENSION ORAL at 02:04

## 2021-04-15 RX ADMIN — MUPIROCIN 1 G: 20 OINTMENT TOPICAL at 09:04

## 2021-04-15 RX ADMIN — FAMOTIDINE 20 MG: 20 TABLET ORAL at 08:04

## 2021-04-15 RX ADMIN — OXYCODONE HYDROCHLORIDE 15 MG: 10 TABLET ORAL at 02:04

## 2021-04-15 RX ADMIN — OXYCODONE HYDROCHLORIDE 10 MG: 10 TABLET ORAL at 06:04

## 2021-04-15 RX ADMIN — NYSTATIN 500000 UNITS: 500000 SUSPENSION ORAL at 09:04

## 2021-04-15 RX ADMIN — HEPARIN SODIUM 5000 UNITS: 5000 INJECTION INTRAVENOUS; SUBCUTANEOUS at 06:04

## 2021-04-15 RX ADMIN — MYCOPHENOLATE MOFETIL 1000 MG: 250 CAPSULE ORAL at 08:04

## 2021-04-15 RX ADMIN — Medication 10 MG: at 08:04

## 2021-04-15 RX ADMIN — MYCOPHENOLATE MOFETIL 1000 MG: 250 CAPSULE ORAL at 09:04

## 2021-04-15 RX ADMIN — OXYCODONE HYDROCHLORIDE 15 MG: 10 TABLET ORAL at 11:04

## 2021-04-15 RX ADMIN — HYDROMORPHONE HYDROCHLORIDE 1 MG: 1 INJECTION, SOLUTION INTRAMUSCULAR; INTRAVENOUS; SUBCUTANEOUS at 10:04

## 2021-04-15 RX ADMIN — METHOCARBAMOL 500 MG: 500 TABLET ORAL at 02:04

## 2021-04-15 RX ADMIN — METHOCARBAMOL 500 MG: 500 TABLET ORAL at 08:04

## 2021-04-15 RX ADMIN — HYDROMORPHONE HYDROCHLORIDE 1 MG: 1 INJECTION, SOLUTION INTRAMUSCULAR; INTRAVENOUS; SUBCUTANEOUS at 12:04

## 2021-04-15 RX ADMIN — METHOCARBAMOL 500 MG: 500 TABLET ORAL at 09:04

## 2021-04-15 RX ADMIN — HYDROMORPHONE HYDROCHLORIDE 1 MG: 1 INJECTION, SOLUTION INTRAMUSCULAR; INTRAVENOUS; SUBCUTANEOUS at 05:04

## 2021-04-15 RX ADMIN — METHYLPREDNISOLONE SODIUM SUCCINATE 40 MG: 40 INJECTION, POWDER, FOR SOLUTION INTRAMUSCULAR; INTRAVENOUS at 08:04

## 2021-04-15 RX ADMIN — HYDROMORPHONE HYDROCHLORIDE 1 MG: 1 INJECTION, SOLUTION INTRAMUSCULAR; INTRAVENOUS; SUBCUTANEOUS at 04:04

## 2021-04-15 RX ADMIN — HYDROMORPHONE HYDROCHLORIDE 1 MG: 1 INJECTION, SOLUTION INTRAMUSCULAR; INTRAVENOUS; SUBCUTANEOUS at 08:04

## 2021-04-16 PROBLEM — D62 ACUTE BLOOD LOSS ANEMIA: Status: ACTIVE | Noted: 2021-04-16

## 2021-04-16 PROBLEM — K76.82 HEPATIC ENCEPHALOPATHY: Status: RESOLVED | Noted: 2021-02-21 | Resolved: 2021-04-16

## 2021-04-16 PROBLEM — I85.10 SECONDARY ESOPHAGEAL VARICES WITHOUT BLEEDING: Status: RESOLVED | Noted: 2021-02-19 | Resolved: 2021-04-16

## 2021-04-16 PROBLEM — K70.31 ALCOHOLIC CIRRHOSIS OF LIVER WITH ASCITES: Status: RESOLVED | Noted: 2021-03-11 | Resolved: 2021-04-16

## 2021-04-16 PROBLEM — Z79.60 LONG-TERM USE OF IMMUNOSUPPRESSANT MEDICATION: Status: ACTIVE | Noted: 2021-04-16

## 2021-04-16 PROBLEM — L03.90 CELLULITIS: Status: RESOLVED | Noted: 2021-04-10 | Resolved: 2021-04-16

## 2021-04-16 PROBLEM — Z01.818 PRE-TRANSPLANT EVALUATION FOR CHRONIC LIVER DISEASE: Status: RESOLVED | Noted: 2021-02-19 | Resolved: 2021-04-16

## 2021-04-16 LAB
ALBUMIN SERPL BCP-MCNC: 2.3 G/DL (ref 3.5–5.2)
ALP SERPL-CCNC: 212 U/L (ref 55–135)
ALT SERPL W/O P-5'-P-CCNC: 69 U/L (ref 10–44)
ANION GAP SERPL CALC-SCNC: 5 MMOL/L (ref 8–16)
APTT BLDCRRT: 25.6 SEC (ref 21–32)
AST SERPL-CCNC: 48 U/L (ref 10–40)
BASOPHILS # BLD AUTO: 0.01 K/UL (ref 0–0.2)
BASOPHILS NFR BLD: 0.1 % (ref 0–1.9)
BILIRUB DIRECT SERPL-MCNC: 1.8 MG/DL (ref 0.1–0.3)
BILIRUB SERPL-MCNC: 2.7 MG/DL (ref 0.1–1)
BUN SERPL-MCNC: 34 MG/DL (ref 6–20)
CALCIUM SERPL-MCNC: 8.1 MG/DL (ref 8.7–10.5)
CHLORIDE SERPL-SCNC: 95 MMOL/L (ref 95–110)
CO2 SERPL-SCNC: 28 MMOL/L (ref 23–29)
COMMENT: NORMAL
CREAT SERPL-MCNC: 0.7 MG/DL (ref 0.5–1.4)
DIFFERENTIAL METHOD: ABNORMAL
EOSINOPHIL # BLD AUTO: 0 K/UL (ref 0–0.5)
EOSINOPHIL NFR BLD: 0.4 % (ref 0–8)
ERYTHROCYTE [DISTWIDTH] IN BLOOD BY AUTOMATED COUNT: 19.9 % (ref 11.5–14.5)
EST. GFR  (AFRICAN AMERICAN): >60 ML/MIN/1.73 M^2
EST. GFR  (NON AFRICAN AMERICAN): >60 ML/MIN/1.73 M^2
FINAL PATHOLOGIC DIAGNOSIS: NORMAL
GLUCOSE SERPL-MCNC: 124 MG/DL (ref 70–110)
GROSS: NORMAL
HCT VFR BLD AUTO: 32.6 % (ref 40–54)
HGB BLD-MCNC: 11.2 G/DL (ref 14–18)
IMM GRANULOCYTES # BLD AUTO: 0.06 K/UL (ref 0–0.04)
IMM GRANULOCYTES NFR BLD AUTO: 0.5 % (ref 0–0.5)
INR PPP: 0.9 (ref 0.8–1.2)
LYMPHOCYTES # BLD AUTO: 0.9 K/UL (ref 1–4.8)
LYMPHOCYTES NFR BLD: 7.9 % (ref 18–48)
Lab: NORMAL
MAGNESIUM SERPL-MCNC: 1.8 MG/DL (ref 1.6–2.6)
MCH RBC QN AUTO: 35 PG (ref 27–31)
MCHC RBC AUTO-ENTMCNC: 34.4 G/DL (ref 32–36)
MCV RBC AUTO: 102 FL (ref 82–98)
MONOCYTES # BLD AUTO: 1.1 K/UL (ref 0.3–1)
MONOCYTES NFR BLD: 9.5 % (ref 4–15)
NEUTROPHILS # BLD AUTO: 9.3 K/UL (ref 1.8–7.7)
NEUTROPHILS NFR BLD: 81.6 % (ref 38–73)
NRBC BLD-RTO: 0 /100 WBC
PLATELET # BLD AUTO: 67 K/UL (ref 150–450)
PMV BLD AUTO: 10.1 FL (ref 9.2–12.9)
POCT GLUCOSE: 117 MG/DL (ref 70–110)
POCT GLUCOSE: 120 MG/DL (ref 70–110)
POCT GLUCOSE: 123 MG/DL (ref 70–110)
POCT GLUCOSE: 125 MG/DL (ref 70–110)
POCT GLUCOSE: 153 MG/DL (ref 70–110)
POTASSIUM SERPL-SCNC: 4.6 MMOL/L (ref 3.5–5.1)
PROT SERPL-MCNC: 4.5 G/DL (ref 6–8.4)
PROTHROMBIN TIME: 10.3 SEC (ref 9–12.5)
RBC # BLD AUTO: 3.2 M/UL (ref 4.6–6.2)
SODIUM SERPL-SCNC: 128 MMOL/L (ref 136–145)
TACROLIMUS BLD-MCNC: 6.8 NG/ML (ref 5–15)
WBC # BLD AUTO: 11.36 K/UL (ref 3.9–12.7)

## 2021-04-16 PROCEDURE — 97166 OT EVAL MOD COMPLEX 45 MIN: CPT

## 2021-04-16 PROCEDURE — 82248 BILIRUBIN DIRECT: CPT | Performed by: TRANSPLANT SURGERY

## 2021-04-16 PROCEDURE — 63600175 PHARM REV CODE 636 W HCPCS: Performed by: PHYSICIAN ASSISTANT

## 2021-04-16 PROCEDURE — 25000003 PHARM REV CODE 250: Performed by: STUDENT IN AN ORGANIZED HEALTH CARE EDUCATION/TRAINING PROGRAM

## 2021-04-16 PROCEDURE — 63600175 PHARM REV CODE 636 W HCPCS: Mod: JG | Performed by: PHYSICIAN ASSISTANT

## 2021-04-16 PROCEDURE — 25000003 PHARM REV CODE 250: Performed by: PHYSICIAN ASSISTANT

## 2021-04-16 PROCEDURE — 85610 PROTHROMBIN TIME: CPT | Performed by: TRANSPLANT SURGERY

## 2021-04-16 PROCEDURE — 80053 COMPREHEN METABOLIC PANEL: CPT | Performed by: TRANSPLANT SURGERY

## 2021-04-16 PROCEDURE — 63600175 PHARM REV CODE 636 W HCPCS: Performed by: SURGERY

## 2021-04-16 PROCEDURE — 97162 PT EVAL MOD COMPLEX 30 MIN: CPT

## 2021-04-16 PROCEDURE — 97116 GAIT TRAINING THERAPY: CPT

## 2021-04-16 PROCEDURE — 63600175 PHARM REV CODE 636 W HCPCS: Performed by: STUDENT IN AN ORGANIZED HEALTH CARE EDUCATION/TRAINING PROGRAM

## 2021-04-16 PROCEDURE — S4991 NICOTINE PATCH NONLEGEND: HCPCS | Performed by: PHYSICIAN ASSISTANT

## 2021-04-16 PROCEDURE — 25000003 PHARM REV CODE 250: Performed by: SURGERY

## 2021-04-16 PROCEDURE — 85730 THROMBOPLASTIN TIME PARTIAL: CPT | Performed by: TRANSPLANT SURGERY

## 2021-04-16 PROCEDURE — 97535 SELF CARE MNGMENT TRAINING: CPT

## 2021-04-16 PROCEDURE — 99233 PR SUBSEQUENT HOSPITAL CARE,LEVL III: ICD-10-PCS | Mod: 24,,, | Performed by: PHYSICIAN ASSISTANT

## 2021-04-16 PROCEDURE — 20600001 HC STEP DOWN PRIVATE ROOM

## 2021-04-16 PROCEDURE — P9047 ALBUMIN (HUMAN), 25%, 50ML: HCPCS | Mod: JG | Performed by: PHYSICIAN ASSISTANT

## 2021-04-16 PROCEDURE — 85025 COMPLETE CBC W/AUTO DIFF WBC: CPT | Performed by: TRANSPLANT SURGERY

## 2021-04-16 PROCEDURE — 80197 ASSAY OF TACROLIMUS: CPT | Performed by: TRANSPLANT SURGERY

## 2021-04-16 PROCEDURE — 83735 ASSAY OF MAGNESIUM: CPT | Performed by: TRANSPLANT SURGERY

## 2021-04-16 PROCEDURE — 99233 SBSQ HOSP IP/OBS HIGH 50: CPT | Mod: 24,,, | Performed by: PHYSICIAN ASSISTANT

## 2021-04-16 RX ORDER — ALBUMIN HUMAN 250 G/1000ML
25 SOLUTION INTRAVENOUS ONCE
Status: COMPLETED | OUTPATIENT
Start: 2021-04-16 | End: 2021-04-16

## 2021-04-16 RX ORDER — FUROSEMIDE 10 MG/ML
40 INJECTION INTRAMUSCULAR; INTRAVENOUS ONCE
Status: COMPLETED | OUTPATIENT
Start: 2021-04-16 | End: 2021-04-16

## 2021-04-16 RX ORDER — GABAPENTIN 300 MG/1
300 CAPSULE ORAL 3 TIMES DAILY
Status: DISCONTINUED | OUTPATIENT
Start: 2021-04-16 | End: 2021-04-19

## 2021-04-16 RX ORDER — HYDROMORPHONE HYDROCHLORIDE 1 MG/ML
1 INJECTION, SOLUTION INTRAMUSCULAR; INTRAVENOUS; SUBCUTANEOUS EVERY 4 HOURS PRN
Status: DISCONTINUED | OUTPATIENT
Start: 2021-04-16 | End: 2021-04-17

## 2021-04-16 RX ORDER — ACETAMINOPHEN 325 MG/1
650 TABLET ORAL EVERY 6 HOURS
Status: DISCONTINUED | OUTPATIENT
Start: 2021-04-16 | End: 2021-04-23

## 2021-04-16 RX ORDER — LIDOCAINE HYDROCHLORIDE 10 MG/ML
1 INJECTION INFILTRATION; PERINEURAL ONCE
Status: DISCONTINUED | OUTPATIENT
Start: 2021-04-16 | End: 2021-04-20

## 2021-04-16 RX ORDER — BISACODYL 10 MG
10 SUPPOSITORY, RECTAL RECTAL DAILY PRN
Status: DISCONTINUED | OUTPATIENT
Start: 2021-04-16 | End: 2021-04-26 | Stop reason: HOSPADM

## 2021-04-16 RX ADMIN — OXYCODONE HYDROCHLORIDE 15 MG: 10 TABLET ORAL at 02:04

## 2021-04-16 RX ADMIN — DOXYCYCLINE HYCLATE 100 MG: 100 TABLET, COATED ORAL at 08:04

## 2021-04-16 RX ADMIN — HEPARIN SODIUM 5000 UNITS: 5000 INJECTION INTRAVENOUS; SUBCUTANEOUS at 02:04

## 2021-04-16 RX ADMIN — DOCUSATE SODIUM 100 MG: 100 CAPSULE, LIQUID FILLED ORAL at 08:04

## 2021-04-16 RX ADMIN — FUROSEMIDE 40 MG: 10 INJECTION, SOLUTION INTRAMUSCULAR; INTRAVENOUS at 11:04

## 2021-04-16 RX ADMIN — NICOTINE 1 PATCH: 14 PATCH TRANSDERMAL at 10:04

## 2021-04-16 RX ADMIN — METHOCARBAMOL 500 MG: 500 TABLET ORAL at 02:04

## 2021-04-16 RX ADMIN — DOCUSATE SODIUM 100 MG: 100 CAPSULE, LIQUID FILLED ORAL at 02:04

## 2021-04-16 RX ADMIN — DOXYCYCLINE HYCLATE 100 MG: 100 TABLET, COATED ORAL at 09:04

## 2021-04-16 RX ADMIN — HYDROMORPHONE HYDROCHLORIDE 1 MG: 1 INJECTION, SOLUTION INTRAMUSCULAR; INTRAVENOUS; SUBCUTANEOUS at 08:04

## 2021-04-16 RX ADMIN — TACROLIMUS 6 MG: 5 CAPSULE ORAL at 08:04

## 2021-04-16 RX ADMIN — OXYCODONE HYDROCHLORIDE 15 MG: 10 TABLET ORAL at 01:04

## 2021-04-16 RX ADMIN — OXYCODONE HYDROCHLORIDE 15 MG: 10 TABLET ORAL at 10:04

## 2021-04-16 RX ADMIN — GABAPENTIN 300 MG: 300 CAPSULE ORAL at 08:04

## 2021-04-16 RX ADMIN — NYSTATIN 500000 UNITS: 500000 SUSPENSION ORAL at 09:04

## 2021-04-16 RX ADMIN — GABAPENTIN 300 MG: 300 CAPSULE ORAL at 02:04

## 2021-04-16 RX ADMIN — HYDROMORPHONE HYDROCHLORIDE 1 MG: 1 INJECTION, SOLUTION INTRAMUSCULAR; INTRAVENOUS; SUBCUTANEOUS at 03:04

## 2021-04-16 RX ADMIN — METHYLPREDNISOLONE SODIUM SUCCINATE 20 MG: 40 INJECTION, POWDER, FOR SOLUTION INTRAMUSCULAR; INTRAVENOUS at 08:04

## 2021-04-16 RX ADMIN — NYSTATIN 500000 UNITS: 500000 SUSPENSION ORAL at 02:04

## 2021-04-16 RX ADMIN — ALBUMIN (HUMAN) 25 G: 12.5 SOLUTION INTRAVENOUS at 11:04

## 2021-04-16 RX ADMIN — ACETAMINOPHEN 650 MG: 325 TABLET ORAL at 11:04

## 2021-04-16 RX ADMIN — METHOCARBAMOL 500 MG: 500 TABLET ORAL at 10:04

## 2021-04-16 RX ADMIN — MYCOPHENOLATE MOFETIL 1000 MG: 250 CAPSULE ORAL at 08:04

## 2021-04-16 RX ADMIN — METHYLPREDNISOLONE SODIUM SUCCINATE 20 MG: 40 INJECTION, POWDER, FOR SOLUTION INTRAMUSCULAR; INTRAVENOUS at 10:04

## 2021-04-16 RX ADMIN — NYSTATIN 500000 UNITS: 500000 SUSPENSION ORAL at 05:04

## 2021-04-16 RX ADMIN — Medication 10 MG: at 08:04

## 2021-04-16 RX ADMIN — DOCUSATE SODIUM 100 MG: 100 CAPSULE, LIQUID FILLED ORAL at 09:04

## 2021-04-16 RX ADMIN — TACROLIMUS 6 MG: 5 CAPSULE ORAL at 05:04

## 2021-04-16 RX ADMIN — MYCOPHENOLATE MOFETIL 1000 MG: 250 CAPSULE ORAL at 09:04

## 2021-04-16 RX ADMIN — FAMOTIDINE 20 MG: 20 TABLET ORAL at 08:04

## 2021-04-16 RX ADMIN — OXYCODONE HYDROCHLORIDE 15 MG: 10 TABLET ORAL at 06:04

## 2021-04-16 RX ADMIN — CALCIUM CARBONATE (ANTACID) CHEW TAB 500 MG 500 MG: 500 CHEW TAB at 07:04

## 2021-04-16 RX ADMIN — ACETAMINOPHEN 650 MG: 325 TABLET ORAL at 10:04

## 2021-04-16 RX ADMIN — METHOCARBAMOL 500 MG: 500 TABLET ORAL at 08:04

## 2021-04-16 RX ADMIN — HEPARIN SODIUM 5000 UNITS: 5000 INJECTION INTRAVENOUS; SUBCUTANEOUS at 08:04

## 2021-04-16 RX ADMIN — HEPARIN SODIUM 5000 UNITS: 5000 INJECTION INTRAVENOUS; SUBCUTANEOUS at 04:04

## 2021-04-16 RX ADMIN — HYDROMORPHONE HYDROCHLORIDE 1 MG: 1 INJECTION, SOLUTION INTRAMUSCULAR; INTRAVENOUS; SUBCUTANEOUS at 04:04

## 2021-04-16 RX ADMIN — OXYCODONE HYDROCHLORIDE 15 MG: 10 TABLET ORAL at 04:04

## 2021-04-17 ENCOUNTER — NURSE TRIAGE (OUTPATIENT)
Dept: ADMINISTRATIVE | Facility: CLINIC | Age: 41
End: 2021-04-17

## 2021-04-17 LAB
ALBUMIN SERPL BCP-MCNC: 2.1 G/DL (ref 3.5–5.2)
ALP SERPL-CCNC: 183 U/L (ref 55–135)
ALT SERPL W/O P-5'-P-CCNC: 56 U/L (ref 10–44)
ANION GAP SERPL CALC-SCNC: 6 MMOL/L (ref 8–16)
AST SERPL-CCNC: 38 U/L (ref 10–40)
BACTERIA SPEC AEROBE CULT: NO GROWTH
BASOPHILS # BLD AUTO: 0.02 K/UL (ref 0–0.2)
BASOPHILS NFR BLD: 0.2 % (ref 0–1.9)
BILIRUB SERPL-MCNC: 2.3 MG/DL (ref 0.1–1)
BUN SERPL-MCNC: 26 MG/DL (ref 6–20)
CALCIUM SERPL-MCNC: 7.6 MG/DL (ref 8.7–10.5)
CHLORIDE SERPL-SCNC: 97 MMOL/L (ref 95–110)
CO2 SERPL-SCNC: 27 MMOL/L (ref 23–29)
CREAT SERPL-MCNC: 0.7 MG/DL (ref 0.5–1.4)
DIFFERENTIAL METHOD: ABNORMAL
EOSINOPHIL # BLD AUTO: 0.2 K/UL (ref 0–0.5)
EOSINOPHIL NFR BLD: 2.5 % (ref 0–8)
ERYTHROCYTE [DISTWIDTH] IN BLOOD BY AUTOMATED COUNT: 19.3 % (ref 11.5–14.5)
EST. GFR  (AFRICAN AMERICAN): >60 ML/MIN/1.73 M^2
EST. GFR  (NON AFRICAN AMERICAN): >60 ML/MIN/1.73 M^2
GLUCOSE SERPL-MCNC: 132 MG/DL (ref 70–110)
HCT VFR BLD AUTO: 28.4 % (ref 40–54)
HGB BLD-MCNC: 10 G/DL (ref 14–18)
IMM GRANULOCYTES # BLD AUTO: 0.06 K/UL (ref 0–0.04)
IMM GRANULOCYTES NFR BLD AUTO: 0.7 % (ref 0–0.5)
INR PPP: 1 (ref 0.8–1.2)
LYMPHOCYTES # BLD AUTO: 1 K/UL (ref 1–4.8)
LYMPHOCYTES NFR BLD: 12 % (ref 18–48)
MAGNESIUM SERPL-MCNC: 1.7 MG/DL (ref 1.6–2.6)
MCH RBC QN AUTO: 35.5 PG (ref 27–31)
MCHC RBC AUTO-ENTMCNC: 35.2 G/DL (ref 32–36)
MCV RBC AUTO: 101 FL (ref 82–98)
MONOCYTES # BLD AUTO: 1.1 K/UL (ref 0.3–1)
MONOCYTES NFR BLD: 13.4 % (ref 4–15)
NEUTROPHILS # BLD AUTO: 5.8 K/UL (ref 1.8–7.7)
NEUTROPHILS NFR BLD: 71.2 % (ref 38–73)
NRBC BLD-RTO: 0 /100 WBC
PHOSPHATE SERPL-MCNC: 3.7 MG/DL (ref 2.7–4.5)
PLATELET # BLD AUTO: 63 K/UL (ref 150–450)
PMV BLD AUTO: 10.5 FL (ref 9.2–12.9)
POCT GLUCOSE: 112 MG/DL (ref 70–110)
POCT GLUCOSE: 123 MG/DL (ref 70–110)
POCT GLUCOSE: 135 MG/DL (ref 70–110)
POTASSIUM SERPL-SCNC: 4.1 MMOL/L (ref 3.5–5.1)
PROT SERPL-MCNC: 4.1 G/DL (ref 6–8.4)
PROTHROMBIN TIME: 10.9 SEC (ref 9–12.5)
RBC # BLD AUTO: 2.82 M/UL (ref 4.6–6.2)
SODIUM SERPL-SCNC: 130 MMOL/L (ref 136–145)
TACROLIMUS BLD-MCNC: 5 NG/ML (ref 5–15)
WBC # BLD AUTO: 8.16 K/UL (ref 3.9–12.7)

## 2021-04-17 PROCEDURE — 20600001 HC STEP DOWN PRIVATE ROOM

## 2021-04-17 PROCEDURE — 25000003 PHARM REV CODE 250: Performed by: PHYSICIAN ASSISTANT

## 2021-04-17 PROCEDURE — 80053 COMPREHEN METABOLIC PANEL: CPT | Performed by: TRANSPLANT SURGERY

## 2021-04-17 PROCEDURE — P9047 ALBUMIN (HUMAN), 25%, 50ML: HCPCS | Mod: JG | Performed by: CLINICAL NURSE SPECIALIST

## 2021-04-17 PROCEDURE — S4991 NICOTINE PATCH NONLEGEND: HCPCS | Performed by: PHYSICIAN ASSISTANT

## 2021-04-17 PROCEDURE — 63600175 PHARM REV CODE 636 W HCPCS: Performed by: SURGERY

## 2021-04-17 PROCEDURE — 25000003 PHARM REV CODE 250: Performed by: SURGERY

## 2021-04-17 PROCEDURE — 63600175 PHARM REV CODE 636 W HCPCS: Performed by: PHYSICIAN ASSISTANT

## 2021-04-17 PROCEDURE — 83735 ASSAY OF MAGNESIUM: CPT | Performed by: TRANSPLANT SURGERY

## 2021-04-17 PROCEDURE — 25000003 PHARM REV CODE 250: Performed by: STUDENT IN AN ORGANIZED HEALTH CARE EDUCATION/TRAINING PROGRAM

## 2021-04-17 PROCEDURE — 85610 PROTHROMBIN TIME: CPT | Performed by: TRANSPLANT SURGERY

## 2021-04-17 PROCEDURE — 85025 COMPLETE CBC W/AUTO DIFF WBC: CPT | Performed by: TRANSPLANT SURGERY

## 2021-04-17 PROCEDURE — 63600175 PHARM REV CODE 636 W HCPCS: Performed by: CLINICAL NURSE SPECIALIST

## 2021-04-17 PROCEDURE — 25000003 PHARM REV CODE 250: Performed by: CLINICAL NURSE SPECIALIST

## 2021-04-17 PROCEDURE — 63600175 PHARM REV CODE 636 W HCPCS: Performed by: STUDENT IN AN ORGANIZED HEALTH CARE EDUCATION/TRAINING PROGRAM

## 2021-04-17 PROCEDURE — 80197 ASSAY OF TACROLIMUS: CPT | Performed by: TRANSPLANT SURGERY

## 2021-04-17 PROCEDURE — 84100 ASSAY OF PHOSPHORUS: CPT | Performed by: PHYSICIAN ASSISTANT

## 2021-04-17 RX ORDER — FUROSEMIDE 10 MG/ML
40 INJECTION INTRAMUSCULAR; INTRAVENOUS ONCE
Status: COMPLETED | OUTPATIENT
Start: 2021-04-17 | End: 2021-04-17

## 2021-04-17 RX ORDER — TACROLIMUS 1 MG/1
7 CAPSULE ORAL 2 TIMES DAILY
Status: DISCONTINUED | OUTPATIENT
Start: 2021-04-17 | End: 2021-04-18

## 2021-04-17 RX ORDER — ALBUMIN HUMAN 250 G/1000ML
25 SOLUTION INTRAVENOUS ONCE
Status: COMPLETED | OUTPATIENT
Start: 2021-04-17 | End: 2021-04-17

## 2021-04-17 RX ORDER — TACROLIMUS 1 MG/1
1 CAPSULE ORAL ONCE
Status: COMPLETED | OUTPATIENT
Start: 2021-04-17 | End: 2021-04-17

## 2021-04-17 RX ORDER — NAPROXEN SODIUM 220 MG/1
81 TABLET, FILM COATED ORAL DAILY
Status: DISCONTINUED | OUTPATIENT
Start: 2021-04-17 | End: 2021-04-26 | Stop reason: HOSPADM

## 2021-04-17 RX ORDER — HYDROMORPHONE HYDROCHLORIDE 1 MG/ML
1 INJECTION, SOLUTION INTRAMUSCULAR; INTRAVENOUS; SUBCUTANEOUS EVERY 6 HOURS PRN
Status: DISCONTINUED | OUTPATIENT
Start: 2021-04-17 | End: 2021-04-18

## 2021-04-17 RX ADMIN — NYSTATIN 500000 UNITS: 500000 SUSPENSION ORAL at 05:04

## 2021-04-17 RX ADMIN — ALBUMIN (HUMAN) 25 G: 12.5 SOLUTION INTRAVENOUS at 10:04

## 2021-04-17 RX ADMIN — TACROLIMUS 1 MG: 1 CAPSULE ORAL at 12:04

## 2021-04-17 RX ADMIN — DOCUSATE SODIUM 100 MG: 100 CAPSULE, LIQUID FILLED ORAL at 08:04

## 2021-04-17 RX ADMIN — HEPARIN SODIUM 5000 UNITS: 5000 INJECTION INTRAVENOUS; SUBCUTANEOUS at 02:04

## 2021-04-17 RX ADMIN — GABAPENTIN 300 MG: 300 CAPSULE ORAL at 02:04

## 2021-04-17 RX ADMIN — OXYCODONE HYDROCHLORIDE 15 MG: 10 TABLET ORAL at 06:04

## 2021-04-17 RX ADMIN — ACETAMINOPHEN 650 MG: 325 TABLET ORAL at 11:04

## 2021-04-17 RX ADMIN — OXYCODONE HYDROCHLORIDE 15 MG: 10 TABLET ORAL at 05:04

## 2021-04-17 RX ADMIN — METHOCARBAMOL 500 MG: 500 TABLET ORAL at 02:04

## 2021-04-17 RX ADMIN — TACROLIMUS 6 MG: 5 CAPSULE ORAL at 08:04

## 2021-04-17 RX ADMIN — NYSTATIN 500000 UNITS: 500000 SUSPENSION ORAL at 02:04

## 2021-04-17 RX ADMIN — DOXYCYCLINE HYCLATE 100 MG: 100 TABLET, COATED ORAL at 08:04

## 2021-04-17 RX ADMIN — OXYCODONE HYDROCHLORIDE 15 MG: 10 TABLET ORAL at 10:04

## 2021-04-17 RX ADMIN — DOXYCYCLINE HYCLATE 100 MG: 100 TABLET, COATED ORAL at 09:04

## 2021-04-17 RX ADMIN — OXYCODONE HYDROCHLORIDE 15 MG: 10 TABLET ORAL at 02:04

## 2021-04-17 RX ADMIN — METHOCARBAMOL 500 MG: 500 TABLET ORAL at 08:04

## 2021-04-17 RX ADMIN — HEPARIN SODIUM 5000 UNITS: 5000 INJECTION INTRAVENOUS; SUBCUTANEOUS at 09:04

## 2021-04-17 RX ADMIN — FAMOTIDINE 20 MG: 20 TABLET ORAL at 09:04

## 2021-04-17 RX ADMIN — PREDNISONE 20 MG: 20 TABLET ORAL at 08:04

## 2021-04-17 RX ADMIN — HEPARIN SODIUM 5000 UNITS: 5000 INJECTION INTRAVENOUS; SUBCUTANEOUS at 05:04

## 2021-04-17 RX ADMIN — ACETAMINOPHEN 650 MG: 325 TABLET ORAL at 05:04

## 2021-04-17 RX ADMIN — TACROLIMUS 7 MG: 1 CAPSULE ORAL at 05:04

## 2021-04-17 RX ADMIN — HYDROMORPHONE HYDROCHLORIDE 1 MG: 1 INJECTION, SOLUTION INTRAMUSCULAR; INTRAVENOUS; SUBCUTANEOUS at 01:04

## 2021-04-17 RX ADMIN — METHOCARBAMOL 500 MG: 500 TABLET ORAL at 09:04

## 2021-04-17 RX ADMIN — NYSTATIN 500000 UNITS: 500000 SUSPENSION ORAL at 08:04

## 2021-04-17 RX ADMIN — HYDROMORPHONE HYDROCHLORIDE 1 MG: 1 INJECTION, SOLUTION INTRAMUSCULAR; INTRAVENOUS; SUBCUTANEOUS at 08:04

## 2021-04-17 RX ADMIN — GABAPENTIN 300 MG: 300 CAPSULE ORAL at 08:04

## 2021-04-17 RX ADMIN — GABAPENTIN 300 MG: 300 CAPSULE ORAL at 09:04

## 2021-04-17 RX ADMIN — ASPIRIN 81 MG CHEWABLE TABLET 81 MG: 81 TABLET CHEWABLE at 09:04

## 2021-04-17 RX ADMIN — NICOTINE 1 PATCH: 14 PATCH TRANSDERMAL at 08:04

## 2021-04-17 RX ADMIN — FUROSEMIDE 40 MG: 10 INJECTION, SOLUTION INTRAMUSCULAR; INTRAVENOUS at 11:04

## 2021-04-17 RX ADMIN — MYCOPHENOLATE MOFETIL 1000 MG: 250 CAPSULE ORAL at 09:04

## 2021-04-17 RX ADMIN — MYCOPHENOLATE MOFETIL 1000 MG: 250 CAPSULE ORAL at 08:04

## 2021-04-18 LAB
ALBUMIN SERPL BCP-MCNC: 2.1 G/DL (ref 3.5–5.2)
ALP SERPL-CCNC: 168 U/L (ref 55–135)
ALT SERPL W/O P-5'-P-CCNC: 45 U/L (ref 10–44)
ANION GAP SERPL CALC-SCNC: 3 MMOL/L (ref 8–16)
AST SERPL-CCNC: 31 U/L (ref 10–40)
BASOPHILS # BLD AUTO: 0.03 K/UL (ref 0–0.2)
BASOPHILS NFR BLD: 0.4 % (ref 0–1.9)
BILIRUB SERPL-MCNC: 1.9 MG/DL (ref 0.1–1)
BUN SERPL-MCNC: 20 MG/DL (ref 6–20)
CALCIUM SERPL-MCNC: 7.6 MG/DL (ref 8.7–10.5)
CHLORIDE SERPL-SCNC: 98 MMOL/L (ref 95–110)
CO2 SERPL-SCNC: 30 MMOL/L (ref 23–29)
CREAT SERPL-MCNC: 0.7 MG/DL (ref 0.5–1.4)
DIFFERENTIAL METHOD: ABNORMAL
EOSINOPHIL # BLD AUTO: 0.4 K/UL (ref 0–0.5)
EOSINOPHIL NFR BLD: 5.1 % (ref 0–8)
ERYTHROCYTE [DISTWIDTH] IN BLOOD BY AUTOMATED COUNT: 19.3 % (ref 11.5–14.5)
EST. GFR  (AFRICAN AMERICAN): >60 ML/MIN/1.73 M^2
EST. GFR  (NON AFRICAN AMERICAN): >60 ML/MIN/1.73 M^2
GLUCOSE SERPL-MCNC: 135 MG/DL (ref 70–110)
HCT VFR BLD AUTO: 29.2 % (ref 40–54)
HGB BLD-MCNC: 9.9 G/DL (ref 14–18)
IMM GRANULOCYTES # BLD AUTO: 0.09 K/UL (ref 0–0.04)
IMM GRANULOCYTES NFR BLD AUTO: 1.2 % (ref 0–0.5)
INR PPP: 1 (ref 0.8–1.2)
LYMPHOCYTES # BLD AUTO: 1.1 K/UL (ref 1–4.8)
LYMPHOCYTES NFR BLD: 13.9 % (ref 18–48)
MAGNESIUM SERPL-MCNC: 1.5 MG/DL (ref 1.6–2.6)
MCH RBC QN AUTO: 34.7 PG (ref 27–31)
MCHC RBC AUTO-ENTMCNC: 33.9 G/DL (ref 32–36)
MCV RBC AUTO: 103 FL (ref 82–98)
MONOCYTES # BLD AUTO: 1.5 K/UL (ref 0.3–1)
MONOCYTES NFR BLD: 19.6 % (ref 4–15)
NEUTROPHILS # BLD AUTO: 4.6 K/UL (ref 1.8–7.7)
NEUTROPHILS NFR BLD: 59.8 % (ref 38–73)
NRBC BLD-RTO: 0 /100 WBC
PHOSPHATE SERPL-MCNC: 3.1 MG/DL (ref 2.7–4.5)
PLATELET # BLD AUTO: 77 K/UL (ref 150–450)
PMV BLD AUTO: 10.5 FL (ref 9.2–12.9)
POCT GLUCOSE: 134 MG/DL (ref 70–110)
POCT GLUCOSE: 152 MG/DL (ref 70–110)
POCT GLUCOSE: 179 MG/DL (ref 70–110)
POTASSIUM SERPL-SCNC: 4 MMOL/L (ref 3.5–5.1)
PROT SERPL-MCNC: 4.1 G/DL (ref 6–8.4)
PROTHROMBIN TIME: 10.8 SEC (ref 9–12.5)
RBC # BLD AUTO: 2.85 M/UL (ref 4.6–6.2)
SODIUM SERPL-SCNC: 131 MMOL/L (ref 136–145)
TACROLIMUS BLD-MCNC: 5.9 NG/ML (ref 5–15)
WBC # BLD AUTO: 7.7 K/UL (ref 3.9–12.7)

## 2021-04-18 PROCEDURE — 25000003 PHARM REV CODE 250: Performed by: SURGERY

## 2021-04-18 PROCEDURE — 25000003 PHARM REV CODE 250: Performed by: CLINICAL NURSE SPECIALIST

## 2021-04-18 PROCEDURE — P9047 ALBUMIN (HUMAN), 25%, 50ML: HCPCS | Mod: JG | Performed by: CLINICAL NURSE SPECIALIST

## 2021-04-18 PROCEDURE — 85610 PROTHROMBIN TIME: CPT | Performed by: TRANSPLANT SURGERY

## 2021-04-18 PROCEDURE — 25000003 PHARM REV CODE 250: Performed by: STUDENT IN AN ORGANIZED HEALTH CARE EDUCATION/TRAINING PROGRAM

## 2021-04-18 PROCEDURE — 80053 COMPREHEN METABOLIC PANEL: CPT | Performed by: TRANSPLANT SURGERY

## 2021-04-18 PROCEDURE — 87522 HEPATITIS C REVRS TRNSCRPJ: CPT | Performed by: TRANSPLANT SURGERY

## 2021-04-18 PROCEDURE — 86803 HEPATITIS C AB TEST: CPT | Performed by: TRANSPLANT SURGERY

## 2021-04-18 PROCEDURE — 83735 ASSAY OF MAGNESIUM: CPT | Performed by: TRANSPLANT SURGERY

## 2021-04-18 PROCEDURE — 20600001 HC STEP DOWN PRIVATE ROOM

## 2021-04-18 PROCEDURE — 63600175 PHARM REV CODE 636 W HCPCS: Performed by: NURSE PRACTITIONER

## 2021-04-18 PROCEDURE — 84100 ASSAY OF PHOSPHORUS: CPT | Performed by: PHYSICIAN ASSISTANT

## 2021-04-18 PROCEDURE — 25000003 PHARM REV CODE 250: Performed by: TRANSPLANT SURGERY

## 2021-04-18 PROCEDURE — 85025 COMPLETE CBC W/AUTO DIFF WBC: CPT | Performed by: TRANSPLANT SURGERY

## 2021-04-18 PROCEDURE — 63600175 PHARM REV CODE 636 W HCPCS: Mod: JG | Performed by: CLINICAL NURSE SPECIALIST

## 2021-04-18 PROCEDURE — 80197 ASSAY OF TACROLIMUS: CPT | Performed by: TRANSPLANT SURGERY

## 2021-04-18 PROCEDURE — 63600175 PHARM REV CODE 636 W HCPCS: Performed by: STUDENT IN AN ORGANIZED HEALTH CARE EDUCATION/TRAINING PROGRAM

## 2021-04-18 PROCEDURE — S4991 NICOTINE PATCH NONLEGEND: HCPCS | Performed by: PHYSICIAN ASSISTANT

## 2021-04-18 PROCEDURE — 25000003 PHARM REV CODE 250: Performed by: PHYSICIAN ASSISTANT

## 2021-04-18 PROCEDURE — 63600175 PHARM REV CODE 636 W HCPCS: Performed by: PHYSICIAN ASSISTANT

## 2021-04-18 PROCEDURE — 63600175 PHARM REV CODE 636 W HCPCS: Performed by: SURGERY

## 2021-04-18 RX ORDER — SODIUM CHLORIDE 9 MG/ML
INJECTION, SOLUTION INTRAVENOUS
Status: DISCONTINUED | OUTPATIENT
Start: 2021-04-18 | End: 2021-04-26 | Stop reason: HOSPADM

## 2021-04-18 RX ORDER — TACROLIMUS 1 MG/1
1 CAPSULE ORAL ONCE
Status: COMPLETED | OUTPATIENT
Start: 2021-04-18 | End: 2021-04-18

## 2021-04-18 RX ORDER — TACROLIMUS 1 MG/1
8 CAPSULE ORAL 2 TIMES DAILY
Status: DISCONTINUED | OUTPATIENT
Start: 2021-04-18 | End: 2021-04-24

## 2021-04-18 RX ORDER — MAGNESIUM SULFATE HEPTAHYDRATE 40 MG/ML
2 INJECTION, SOLUTION INTRAVENOUS
Status: COMPLETED | OUTPATIENT
Start: 2021-04-18 | End: 2021-04-18

## 2021-04-18 RX ORDER — ALBUMIN HUMAN 250 G/1000ML
25 SOLUTION INTRAVENOUS ONCE
Status: COMPLETED | OUTPATIENT
Start: 2021-04-18 | End: 2021-04-18

## 2021-04-18 RX ORDER — HYDROMORPHONE HYDROCHLORIDE 1 MG/ML
0.5 INJECTION, SOLUTION INTRAMUSCULAR; INTRAVENOUS; SUBCUTANEOUS EVERY 8 HOURS PRN
Status: DISCONTINUED | OUTPATIENT
Start: 2021-04-18 | End: 2021-04-18

## 2021-04-18 RX ORDER — FUROSEMIDE 10 MG/ML
40 INJECTION INTRAMUSCULAR; INTRAVENOUS
Status: DISCONTINUED | OUTPATIENT
Start: 2021-04-18 | End: 2021-04-18

## 2021-04-18 RX ADMIN — FUROSEMIDE 40 MG: 10 INJECTION, SOLUTION INTRAMUSCULAR; INTRAVENOUS at 10:04

## 2021-04-18 RX ADMIN — METHOCARBAMOL 500 MG: 500 TABLET ORAL at 08:04

## 2021-04-18 RX ADMIN — GABAPENTIN 300 MG: 300 CAPSULE ORAL at 08:04

## 2021-04-18 RX ADMIN — PREDNISONE 20 MG: 20 TABLET ORAL at 08:04

## 2021-04-18 RX ADMIN — MAGNESIUM SULFATE 2 G: 2 INJECTION INTRAVENOUS at 10:04

## 2021-04-18 RX ADMIN — SULFAMETHOXAZOLE AND TRIMETHOPRIM 1 TABLET: 400; 80 TABLET ORAL at 08:04

## 2021-04-18 RX ADMIN — OXYCODONE HYDROCHLORIDE 10 MG: 10 TABLET ORAL at 12:04

## 2021-04-18 RX ADMIN — GABAPENTIN 300 MG: 300 CAPSULE ORAL at 02:04

## 2021-04-18 RX ADMIN — SODIUM CHLORIDE: 0.9 INJECTION, SOLUTION INTRAVENOUS at 10:04

## 2021-04-18 RX ADMIN — DOXYCYCLINE HYCLATE 100 MG: 100 TABLET, COATED ORAL at 08:04

## 2021-04-18 RX ADMIN — NYSTATIN 500000 UNITS: 500000 SUSPENSION ORAL at 04:04

## 2021-04-18 RX ADMIN — TACROLIMUS 8 MG: 1 CAPSULE ORAL at 04:04

## 2021-04-18 RX ADMIN — TACROLIMUS 1 MG: 1 CAPSULE ORAL at 12:04

## 2021-04-18 RX ADMIN — ACETAMINOPHEN 650 MG: 325 TABLET ORAL at 11:04

## 2021-04-18 RX ADMIN — OXYCODONE HYDROCHLORIDE 15 MG: 10 TABLET ORAL at 10:04

## 2021-04-18 RX ADMIN — MYCOPHENOLATE MOFETIL 1000 MG: 250 CAPSULE ORAL at 08:04

## 2021-04-18 RX ADMIN — HEPARIN SODIUM 5000 UNITS: 5000 INJECTION INTRAVENOUS; SUBCUTANEOUS at 08:04

## 2021-04-18 RX ADMIN — OXYCODONE HYDROCHLORIDE 10 MG: 10 TABLET ORAL at 04:04

## 2021-04-18 RX ADMIN — ASPIRIN 81 MG CHEWABLE TABLET 81 MG: 81 TABLET CHEWABLE at 08:04

## 2021-04-18 RX ADMIN — TACROLIMUS 7 MG: 1 CAPSULE ORAL at 08:04

## 2021-04-18 RX ADMIN — OXYCODONE HYDROCHLORIDE 15 MG: 10 TABLET ORAL at 02:04

## 2021-04-18 RX ADMIN — MAGNESIUM SULFATE 2 G: 2 INJECTION INTRAVENOUS at 11:04

## 2021-04-18 RX ADMIN — HEPARIN SODIUM 5000 UNITS: 5000 INJECTION INTRAVENOUS; SUBCUTANEOUS at 02:04

## 2021-04-18 RX ADMIN — ALBUMIN (HUMAN) 25 G: 12.5 SOLUTION INTRAVENOUS at 10:04

## 2021-04-18 RX ADMIN — METHOCARBAMOL 500 MG: 500 TABLET ORAL at 02:04

## 2021-04-18 RX ADMIN — NYSTATIN 500000 UNITS: 500000 SUSPENSION ORAL at 08:04

## 2021-04-18 RX ADMIN — ACETAMINOPHEN 650 MG: 325 TABLET ORAL at 04:04

## 2021-04-18 RX ADMIN — OXYCODONE HYDROCHLORIDE 15 MG: 10 TABLET ORAL at 08:04

## 2021-04-18 RX ADMIN — INSULIN ASPART 1 UNITS: 100 INJECTION, SOLUTION INTRAVENOUS; SUBCUTANEOUS at 08:04

## 2021-04-18 RX ADMIN — NICOTINE 1 PATCH: 14 PATCH TRANSDERMAL at 08:04

## 2021-04-18 RX ADMIN — HYDROMORPHONE HYDROCHLORIDE 1 MG: 1 INJECTION, SOLUTION INTRAMUSCULAR; INTRAVENOUS; SUBCUTANEOUS at 05:04

## 2021-04-18 RX ADMIN — HEPARIN SODIUM 5000 UNITS: 5000 INJECTION INTRAVENOUS; SUBCUTANEOUS at 05:04

## 2021-04-19 LAB
ALBUMIN SERPL BCP-MCNC: 2.2 G/DL (ref 3.5–5.2)
ALP SERPL-CCNC: 181 U/L (ref 55–135)
ALT SERPL W/O P-5'-P-CCNC: 47 U/L (ref 10–44)
ANION GAP SERPL CALC-SCNC: 4 MMOL/L (ref 8–16)
AST SERPL-CCNC: 45 U/L (ref 10–40)
BACTERIA SPEC ANAEROBE CULT: NORMAL
BASOPHILS # BLD AUTO: 0.02 K/UL (ref 0–0.2)
BASOPHILS NFR BLD: 0.3 % (ref 0–1.9)
BILIRUB SERPL-MCNC: 1.8 MG/DL (ref 0.1–1)
BUN SERPL-MCNC: 19 MG/DL (ref 6–20)
CALCIUM SERPL-MCNC: 7.6 MG/DL (ref 8.7–10.5)
CHLORIDE SERPL-SCNC: 98 MMOL/L (ref 95–110)
CO2 SERPL-SCNC: 29 MMOL/L (ref 23–29)
CREAT SERPL-MCNC: 0.7 MG/DL (ref 0.5–1.4)
DIFFERENTIAL METHOD: ABNORMAL
EOSINOPHIL # BLD AUTO: 0.4 K/UL (ref 0–0.5)
EOSINOPHIL NFR BLD: 5.4 % (ref 0–8)
ERYTHROCYTE [DISTWIDTH] IN BLOOD BY AUTOMATED COUNT: 19.4 % (ref 11.5–14.5)
EST. GFR  (AFRICAN AMERICAN): >60 ML/MIN/1.73 M^2
EST. GFR  (NON AFRICAN AMERICAN): >60 ML/MIN/1.73 M^2
GLUCOSE SERPL-MCNC: 114 MG/DL (ref 70–110)
HCT VFR BLD AUTO: 30.8 % (ref 40–54)
HGB BLD-MCNC: 10.3 G/DL (ref 14–18)
IMM GRANULOCYTES # BLD AUTO: 0.11 K/UL (ref 0–0.04)
IMM GRANULOCYTES NFR BLD AUTO: 1.6 % (ref 0–0.5)
INR PPP: 1 (ref 0.8–1.2)
LYMPHOCYTES # BLD AUTO: 1.3 K/UL (ref 1–4.8)
LYMPHOCYTES NFR BLD: 20 % (ref 18–48)
MAGNESIUM SERPL-MCNC: 1.8 MG/DL (ref 1.6–2.6)
MCH RBC QN AUTO: 35.2 PG (ref 27–31)
MCHC RBC AUTO-ENTMCNC: 33.4 G/DL (ref 32–36)
MCV RBC AUTO: 105 FL (ref 82–98)
MONOCYTES # BLD AUTO: 1.4 K/UL (ref 0.3–1)
MONOCYTES NFR BLD: 20.7 % (ref 4–15)
NEUTROPHILS # BLD AUTO: 3.5 K/UL (ref 1.8–7.7)
NEUTROPHILS NFR BLD: 52 % (ref 38–73)
NRBC BLD-RTO: 0 /100 WBC
PHOSPHATE SERPL-MCNC: 3 MG/DL (ref 2.7–4.5)
PLATELET # BLD AUTO: 113 K/UL (ref 150–450)
PMV BLD AUTO: 10.1 FL (ref 9.2–12.9)
POCT GLUCOSE: 105 MG/DL (ref 70–110)
POCT GLUCOSE: 118 MG/DL (ref 70–110)
POCT GLUCOSE: 200 MG/DL (ref 70–110)
POTASSIUM SERPL-SCNC: 4.1 MMOL/L (ref 3.5–5.1)
PROT SERPL-MCNC: 4.2 G/DL (ref 6–8.4)
PROTHROMBIN TIME: 10.7 SEC (ref 9–12.5)
RBC # BLD AUTO: 2.93 M/UL (ref 4.6–6.2)
SODIUM SERPL-SCNC: 131 MMOL/L (ref 136–145)
TACROLIMUS BLD-MCNC: 6.9 NG/ML (ref 5–15)
WBC # BLD AUTO: 6.71 K/UL (ref 3.9–12.7)

## 2021-04-19 PROCEDURE — 20600001 HC STEP DOWN PRIVATE ROOM

## 2021-04-19 PROCEDURE — 80053 COMPREHEN METABOLIC PANEL: CPT | Performed by: TRANSPLANT SURGERY

## 2021-04-19 PROCEDURE — 63600175 PHARM REV CODE 636 W HCPCS: Performed by: STUDENT IN AN ORGANIZED HEALTH CARE EDUCATION/TRAINING PROGRAM

## 2021-04-19 PROCEDURE — 25000003 PHARM REV CODE 250: Performed by: NURSE PRACTITIONER

## 2021-04-19 PROCEDURE — 63600175 PHARM REV CODE 636 W HCPCS: Performed by: SURGERY

## 2021-04-19 PROCEDURE — 36415 COLL VENOUS BLD VENIPUNCTURE: CPT | Performed by: PHYSICIAN ASSISTANT

## 2021-04-19 PROCEDURE — 25000003 PHARM REV CODE 250: Performed by: PHYSICIAN ASSISTANT

## 2021-04-19 PROCEDURE — P9047 ALBUMIN (HUMAN), 25%, 50ML: HCPCS | Mod: JG | Performed by: NURSE PRACTITIONER

## 2021-04-19 PROCEDURE — 80197 ASSAY OF TACROLIMUS: CPT | Performed by: TRANSPLANT SURGERY

## 2021-04-19 PROCEDURE — 85025 COMPLETE CBC W/AUTO DIFF WBC: CPT | Performed by: TRANSPLANT SURGERY

## 2021-04-19 PROCEDURE — 25000003 PHARM REV CODE 250: Performed by: SURGERY

## 2021-04-19 PROCEDURE — 97530 THERAPEUTIC ACTIVITIES: CPT

## 2021-04-19 PROCEDURE — 85610 PROTHROMBIN TIME: CPT | Performed by: TRANSPLANT SURGERY

## 2021-04-19 PROCEDURE — 63600175 PHARM REV CODE 636 W HCPCS: Performed by: PHYSICIAN ASSISTANT

## 2021-04-19 PROCEDURE — S4991 NICOTINE PATCH NONLEGEND: HCPCS | Performed by: PHYSICIAN ASSISTANT

## 2021-04-19 PROCEDURE — 84100 ASSAY OF PHOSPHORUS: CPT | Performed by: PHYSICIAN ASSISTANT

## 2021-04-19 PROCEDURE — 25000003 PHARM REV CODE 250: Performed by: STUDENT IN AN ORGANIZED HEALTH CARE EDUCATION/TRAINING PROGRAM

## 2021-04-19 PROCEDURE — 63600175 PHARM REV CODE 636 W HCPCS: Performed by: CLINICAL NURSE SPECIALIST

## 2021-04-19 PROCEDURE — 99233 PR SUBSEQUENT HOSPITAL CARE,LEVL III: ICD-10-PCS | Mod: 24,,, | Performed by: NURSE PRACTITIONER

## 2021-04-19 PROCEDURE — 83735 ASSAY OF MAGNESIUM: CPT | Performed by: TRANSPLANT SURGERY

## 2021-04-19 PROCEDURE — 63600175 PHARM REV CODE 636 W HCPCS: Performed by: NURSE PRACTITIONER

## 2021-04-19 PROCEDURE — 25000003 PHARM REV CODE 250: Performed by: CLINICAL NURSE SPECIALIST

## 2021-04-19 PROCEDURE — 99233 SBSQ HOSP IP/OBS HIGH 50: CPT | Mod: 24,,, | Performed by: NURSE PRACTITIONER

## 2021-04-19 RX ORDER — ASPIRIN 81 MG/1
81 TABLET ORAL DAILY
Qty: 30 TABLET | Refills: 11 | Status: ON HOLD | OUTPATIENT
Start: 2021-04-19 | End: 2023-11-04 | Stop reason: HOSPADM

## 2021-04-19 RX ORDER — METHOCARBAMOL 500 MG/1
500 TABLET, FILM COATED ORAL 3 TIMES DAILY
Qty: 30 TABLET | Refills: 0 | Status: SHIPPED | OUTPATIENT
Start: 2021-04-19 | End: 2021-05-04 | Stop reason: SDUPTHER

## 2021-04-19 RX ORDER — GABAPENTIN 300 MG/1
600 CAPSULE ORAL 3 TIMES DAILY
Qty: 180 CAPSULE | Refills: 11 | Status: SHIPPED | OUTPATIENT
Start: 2021-04-19 | End: 2022-05-13

## 2021-04-19 RX ORDER — MIRTAZAPINE 15 MG/1
15 TABLET, FILM COATED ORAL NIGHTLY PRN
Status: DISCONTINUED | OUTPATIENT
Start: 2021-04-19 | End: 2021-04-26 | Stop reason: HOSPADM

## 2021-04-19 RX ORDER — FUROSEMIDE 10 MG/ML
40 INJECTION INTRAMUSCULAR; INTRAVENOUS EVERY 6 HOURS
Status: COMPLETED | OUTPATIENT
Start: 2021-04-19 | End: 2021-04-19

## 2021-04-19 RX ORDER — ALBUMIN HUMAN 250 G/1000ML
25 SOLUTION INTRAVENOUS EVERY 8 HOURS
Status: DISPENSED | OUTPATIENT
Start: 2021-04-19 | End: 2021-04-20

## 2021-04-19 RX ORDER — GABAPENTIN 300 MG/1
600 CAPSULE ORAL 3 TIMES DAILY
Status: DISCONTINUED | OUTPATIENT
Start: 2021-04-19 | End: 2021-04-26 | Stop reason: HOSPADM

## 2021-04-19 RX ADMIN — ASPIRIN 81 MG CHEWABLE TABLET 81 MG: 81 TABLET CHEWABLE at 09:04

## 2021-04-19 RX ADMIN — ACETAMINOPHEN 650 MG: 325 TABLET ORAL at 12:04

## 2021-04-19 RX ADMIN — FUROSEMIDE 40 MG: 10 INJECTION, SOLUTION INTRAMUSCULAR; INTRAVENOUS at 01:04

## 2021-04-19 RX ADMIN — OXYCODONE HYDROCHLORIDE 15 MG: 10 TABLET ORAL at 02:04

## 2021-04-19 RX ADMIN — MYCOPHENOLATE MOFETIL 1000 MG: 250 CAPSULE ORAL at 08:04

## 2021-04-19 RX ADMIN — NYSTATIN 500000 UNITS: 500000 SUSPENSION ORAL at 09:04

## 2021-04-19 RX ADMIN — TACROLIMUS 8 MG: 1 CAPSULE ORAL at 05:04

## 2021-04-19 RX ADMIN — OXYCODONE HYDROCHLORIDE 15 MG: 10 TABLET ORAL at 06:04

## 2021-04-19 RX ADMIN — METHOCARBAMOL 500 MG: 500 TABLET ORAL at 02:04

## 2021-04-19 RX ADMIN — INSULIN ASPART 2 UNITS: 100 INJECTION, SOLUTION INTRAVENOUS; SUBCUTANEOUS at 05:04

## 2021-04-19 RX ADMIN — GABAPENTIN 300 MG: 300 CAPSULE ORAL at 09:04

## 2021-04-19 RX ADMIN — METHOCARBAMOL 500 MG: 500 TABLET ORAL at 09:04

## 2021-04-19 RX ADMIN — NYSTATIN 500000 UNITS: 500000 SUSPENSION ORAL at 01:04

## 2021-04-19 RX ADMIN — ACETAMINOPHEN 650 MG: 325 TABLET ORAL at 05:04

## 2021-04-19 RX ADMIN — NYSTATIN 500000 UNITS: 500000 SUSPENSION ORAL at 08:04

## 2021-04-19 RX ADMIN — HEPARIN SODIUM 5000 UNITS: 5000 INJECTION INTRAVENOUS; SUBCUTANEOUS at 08:04

## 2021-04-19 RX ADMIN — OXYCODONE HYDROCHLORIDE 15 MG: 10 TABLET ORAL at 08:04

## 2021-04-19 RX ADMIN — DOXYCYCLINE HYCLATE 100 MG: 100 TABLET, COATED ORAL at 08:04

## 2021-04-19 RX ADMIN — HEPARIN SODIUM 5000 UNITS: 5000 INJECTION INTRAVENOUS; SUBCUTANEOUS at 01:04

## 2021-04-19 RX ADMIN — SULFAMETHOXAZOLE AND TRIMETHOPRIM 1 TABLET: 400; 80 TABLET ORAL at 09:04

## 2021-04-19 RX ADMIN — FUROSEMIDE 40 MG: 10 INJECTION, SOLUTION INTRAMUSCULAR; INTRAVENOUS at 05:04

## 2021-04-19 RX ADMIN — GABAPENTIN 600 MG: 300 CAPSULE ORAL at 02:04

## 2021-04-19 RX ADMIN — DOXYCYCLINE HYCLATE 100 MG: 100 TABLET, COATED ORAL at 09:04

## 2021-04-19 RX ADMIN — ACETAMINOPHEN 650 MG: 325 TABLET ORAL at 11:04

## 2021-04-19 RX ADMIN — NICOTINE 1 PATCH: 14 PATCH TRANSDERMAL at 09:04

## 2021-04-19 RX ADMIN — MYCOPHENOLATE MOFETIL 1000 MG: 250 CAPSULE ORAL at 09:04

## 2021-04-19 RX ADMIN — HEPARIN SODIUM 5000 UNITS: 5000 INJECTION INTRAVENOUS; SUBCUTANEOUS at 05:04

## 2021-04-19 RX ADMIN — MIRTAZAPINE 15 MG: 15 TABLET, FILM COATED ORAL at 11:04

## 2021-04-19 RX ADMIN — CALCIUM CARBONATE (ANTACID) CHEW TAB 500 MG 500 MG: 500 CHEW TAB at 10:04

## 2021-04-19 RX ADMIN — OXYCODONE HYDROCHLORIDE 15 MG: 10 TABLET ORAL at 10:04

## 2021-04-19 RX ADMIN — TACROLIMUS 8 MG: 1 CAPSULE ORAL at 09:04

## 2021-04-19 RX ADMIN — GABAPENTIN 600 MG: 300 CAPSULE ORAL at 08:04

## 2021-04-19 RX ADMIN — METHOCARBAMOL 500 MG: 500 TABLET ORAL at 08:04

## 2021-04-19 RX ADMIN — PREDNISONE 20 MG: 20 TABLET ORAL at 09:04

## 2021-04-19 RX ADMIN — ALBUMIN (HUMAN) 25 G: 12.5 SOLUTION INTRAVENOUS at 10:04

## 2021-04-20 ENCOUNTER — PATIENT MESSAGE (OUTPATIENT)
Dept: TRANSPLANT | Facility: CLINIC | Age: 41
End: 2021-04-20

## 2021-04-20 LAB
ALBUMIN SERPL BCP-MCNC: 2.4 G/DL (ref 3.5–5.2)
ALP SERPL-CCNC: 222 U/L (ref 55–135)
ALT SERPL W/O P-5'-P-CCNC: 55 U/L (ref 10–44)
ANION GAP SERPL CALC-SCNC: 5 MMOL/L (ref 8–16)
AST SERPL-CCNC: 47 U/L (ref 10–40)
BASOPHILS # BLD AUTO: 0.02 K/UL (ref 0–0.2)
BASOPHILS NFR BLD: 0.3 % (ref 0–1.9)
BILIRUB SERPL-MCNC: 1.7 MG/DL (ref 0.1–1)
BUN SERPL-MCNC: 21 MG/DL (ref 6–20)
CALCIUM SERPL-MCNC: 7.8 MG/DL (ref 8.7–10.5)
CHLORIDE SERPL-SCNC: 100 MMOL/L (ref 95–110)
CO2 SERPL-SCNC: 29 MMOL/L (ref 23–29)
CREAT SERPL-MCNC: 0.7 MG/DL (ref 0.5–1.4)
DIFFERENTIAL METHOD: ABNORMAL
EOSINOPHIL # BLD AUTO: 0.3 K/UL (ref 0–0.5)
EOSINOPHIL NFR BLD: 4.7 % (ref 0–8)
ERYTHROCYTE [DISTWIDTH] IN BLOOD BY AUTOMATED COUNT: 19.2 % (ref 11.5–14.5)
EST. GFR  (AFRICAN AMERICAN): >60 ML/MIN/1.73 M^2
EST. GFR  (NON AFRICAN AMERICAN): >60 ML/MIN/1.73 M^2
GLUCOSE SERPL-MCNC: 131 MG/DL (ref 70–110)
HCT VFR BLD AUTO: 26.3 % (ref 40–54)
HCV AB SERPL QL IA: ABNORMAL
HGB BLD-MCNC: 9 G/DL (ref 14–18)
IMM GRANULOCYTES # BLD AUTO: 0.11 K/UL (ref 0–0.04)
IMM GRANULOCYTES NFR BLD AUTO: 1.9 % (ref 0–0.5)
INR PPP: 1 (ref 0.8–1.2)
LYMPHOCYTES # BLD AUTO: 1.4 K/UL (ref 1–4.8)
LYMPHOCYTES NFR BLD: 24 % (ref 18–48)
MAGNESIUM SERPL-MCNC: 1.4 MG/DL (ref 1.6–2.6)
MCH RBC QN AUTO: 36 PG (ref 27–31)
MCHC RBC AUTO-ENTMCNC: 34.2 G/DL (ref 32–36)
MCV RBC AUTO: 105 FL (ref 82–98)
MONOCYTES # BLD AUTO: 1.4 K/UL (ref 0.3–1)
MONOCYTES NFR BLD: 23.7 % (ref 4–15)
NEUTROPHILS # BLD AUTO: 2.7 K/UL (ref 1.8–7.7)
NEUTROPHILS NFR BLD: 45.4 % (ref 38–73)
NRBC BLD-RTO: 0 /100 WBC
PHOSPHATE SERPL-MCNC: 3.5 MG/DL (ref 2.7–4.5)
PLATELET # BLD AUTO: 121 K/UL (ref 150–450)
PMV BLD AUTO: 10.1 FL (ref 9.2–12.9)
POCT GLUCOSE: 112 MG/DL (ref 70–110)
POCT GLUCOSE: 123 MG/DL (ref 70–110)
POCT GLUCOSE: 130 MG/DL (ref 70–110)
POCT GLUCOSE: 147 MG/DL (ref 70–110)
POCT GLUCOSE: 229 MG/DL (ref 70–110)
POTASSIUM SERPL-SCNC: 4 MMOL/L (ref 3.5–5.1)
PROT SERPL-MCNC: 4.2 G/DL (ref 6–8.4)
PROTHROMBIN TIME: 10.8 SEC (ref 9–12.5)
RBC # BLD AUTO: 2.5 M/UL (ref 4.6–6.2)
SODIUM SERPL-SCNC: 134 MMOL/L (ref 136–145)
TACROLIMUS BLD-MCNC: 6.9 NG/ML (ref 5–15)
WBC # BLD AUTO: 5.91 K/UL (ref 3.9–12.7)

## 2021-04-20 PROCEDURE — 80053 COMPREHEN METABOLIC PANEL: CPT | Performed by: TRANSPLANT SURGERY

## 2021-04-20 PROCEDURE — 25000003 PHARM REV CODE 250: Performed by: PHYSICIAN ASSISTANT

## 2021-04-20 PROCEDURE — 99231 SBSQ HOSP IP/OBS SF/LOW 25: CPT | Mod: ,,, | Performed by: NURSE PRACTITIONER

## 2021-04-20 PROCEDURE — P9047 ALBUMIN (HUMAN), 25%, 50ML: HCPCS | Mod: JG | Performed by: NURSE PRACTITIONER

## 2021-04-20 PROCEDURE — 25000003 PHARM REV CODE 250: Performed by: NURSE PRACTITIONER

## 2021-04-20 PROCEDURE — 25000003 PHARM REV CODE 250: Performed by: STUDENT IN AN ORGANIZED HEALTH CARE EDUCATION/TRAINING PROGRAM

## 2021-04-20 PROCEDURE — 63600175 PHARM REV CODE 636 W HCPCS: Performed by: CLINICAL NURSE SPECIALIST

## 2021-04-20 PROCEDURE — 20600001 HC STEP DOWN PRIVATE ROOM

## 2021-04-20 PROCEDURE — 36415 COLL VENOUS BLD VENIPUNCTURE: CPT | Performed by: TRANSPLANT SURGERY

## 2021-04-20 PROCEDURE — 80197 ASSAY OF TACROLIMUS: CPT | Performed by: TRANSPLANT SURGERY

## 2021-04-20 PROCEDURE — 83735 ASSAY OF MAGNESIUM: CPT | Performed by: TRANSPLANT SURGERY

## 2021-04-20 PROCEDURE — 85025 COMPLETE CBC W/AUTO DIFF WBC: CPT | Performed by: TRANSPLANT SURGERY

## 2021-04-20 PROCEDURE — 84100 ASSAY OF PHOSPHORUS: CPT | Performed by: PHYSICIAN ASSISTANT

## 2021-04-20 PROCEDURE — S4991 NICOTINE PATCH NONLEGEND: HCPCS | Performed by: PHYSICIAN ASSISTANT

## 2021-04-20 PROCEDURE — 25000003 PHARM REV CODE 250: Performed by: CLINICAL NURSE SPECIALIST

## 2021-04-20 PROCEDURE — 25000003 PHARM REV CODE 250: Performed by: SURGERY

## 2021-04-20 PROCEDURE — 63600175 PHARM REV CODE 636 W HCPCS: Performed by: STUDENT IN AN ORGANIZED HEALTH CARE EDUCATION/TRAINING PROGRAM

## 2021-04-20 PROCEDURE — 63600175 PHARM REV CODE 636 W HCPCS: Performed by: SURGERY

## 2021-04-20 PROCEDURE — 99233 SBSQ HOSP IP/OBS HIGH 50: CPT | Mod: 24,,, | Performed by: NURSE PRACTITIONER

## 2021-04-20 PROCEDURE — 63600175 PHARM REV CODE 636 W HCPCS: Mod: JG | Performed by: NURSE PRACTITIONER

## 2021-04-20 PROCEDURE — 99231 PR SUBSEQUENT HOSPITAL CARE,LEVL I: ICD-10-PCS | Mod: ,,, | Performed by: NURSE PRACTITIONER

## 2021-04-20 PROCEDURE — 99233 PR SUBSEQUENT HOSPITAL CARE,LEVL III: ICD-10-PCS | Mod: 24,,, | Performed by: NURSE PRACTITIONER

## 2021-04-20 PROCEDURE — 63600175 PHARM REV CODE 636 W HCPCS: Performed by: PHYSICIAN ASSISTANT

## 2021-04-20 PROCEDURE — 85610 PROTHROMBIN TIME: CPT | Performed by: TRANSPLANT SURGERY

## 2021-04-20 RX ORDER — ALBUMIN HUMAN 250 G/1000ML
25 SOLUTION INTRAVENOUS EVERY 6 HOURS
Status: COMPLETED | OUTPATIENT
Start: 2021-04-20 | End: 2021-04-20

## 2021-04-20 RX ORDER — FUROSEMIDE 10 MG/ML
40 INJECTION INTRAMUSCULAR; INTRAVENOUS EVERY 6 HOURS
Status: COMPLETED | OUTPATIENT
Start: 2021-04-20 | End: 2021-04-20

## 2021-04-20 RX ORDER — LANOLIN ALCOHOL/MO/W.PET/CERES
400 CREAM (GRAM) TOPICAL 2 TIMES DAILY
Status: DISCONTINUED | OUTPATIENT
Start: 2021-04-20 | End: 2021-04-26 | Stop reason: HOSPADM

## 2021-04-20 RX ADMIN — METHOCARBAMOL 500 MG: 500 TABLET ORAL at 02:04

## 2021-04-20 RX ADMIN — GABAPENTIN 600 MG: 300 CAPSULE ORAL at 08:04

## 2021-04-20 RX ADMIN — ACETAMINOPHEN 650 MG: 325 TABLET ORAL at 05:04

## 2021-04-20 RX ADMIN — NYSTATIN 500000 UNITS: 500000 SUSPENSION ORAL at 08:04

## 2021-04-20 RX ADMIN — OXYCODONE HYDROCHLORIDE 15 MG: 10 TABLET ORAL at 02:04

## 2021-04-20 RX ADMIN — INSULIN ASPART 4 UNITS: 100 INJECTION, SOLUTION INTRAVENOUS; SUBCUTANEOUS at 05:04

## 2021-04-20 RX ADMIN — OXYCODONE HYDROCHLORIDE 15 MG: 10 TABLET ORAL at 05:04

## 2021-04-20 RX ADMIN — SULFAMETHOXAZOLE AND TRIMETHOPRIM 1 TABLET: 400; 80 TABLET ORAL at 08:04

## 2021-04-20 RX ADMIN — HEPARIN SODIUM 5000 UNITS: 5000 INJECTION INTRAVENOUS; SUBCUTANEOUS at 08:04

## 2021-04-20 RX ADMIN — HEPARIN SODIUM 5000 UNITS: 5000 INJECTION INTRAVENOUS; SUBCUTANEOUS at 02:04

## 2021-04-20 RX ADMIN — ACETAMINOPHEN 650 MG: 325 TABLET ORAL at 11:04

## 2021-04-20 RX ADMIN — FUROSEMIDE 40 MG: 10 INJECTION, SOLUTION INTRAMUSCULAR; INTRAVENOUS at 11:04

## 2021-04-20 RX ADMIN — MAGNESIUM OXIDE 400 MG (241.3 MG MAGNESIUM) TABLET 400 MG: TABLET at 11:04

## 2021-04-20 RX ADMIN — NYSTATIN 500000 UNITS: 500000 SUSPENSION ORAL at 02:04

## 2021-04-20 RX ADMIN — TACROLIMUS 8 MG: 1 CAPSULE ORAL at 05:04

## 2021-04-20 RX ADMIN — MAGNESIUM OXIDE 400 MG (241.3 MG MAGNESIUM) TABLET 400 MG: TABLET at 08:04

## 2021-04-20 RX ADMIN — ALBUMIN (HUMAN) 25 G: 12.5 SOLUTION INTRAVENOUS at 11:04

## 2021-04-20 RX ADMIN — METHOCARBAMOL 500 MG: 500 TABLET ORAL at 08:04

## 2021-04-20 RX ADMIN — VANCOMYCIN HYDROCHLORIDE 1500 MG: 1.5 INJECTION, POWDER, LYOPHILIZED, FOR SOLUTION INTRAVENOUS at 05:04

## 2021-04-20 RX ADMIN — PREDNISONE 20 MG: 20 TABLET ORAL at 08:04

## 2021-04-20 RX ADMIN — MYCOPHENOLATE MOFETIL 1000 MG: 250 CAPSULE ORAL at 08:04

## 2021-04-20 RX ADMIN — ALBUMIN (HUMAN) 25 G: 12.5 SOLUTION INTRAVENOUS at 09:04

## 2021-04-20 RX ADMIN — OXYCODONE HYDROCHLORIDE 15 MG: 10 TABLET ORAL at 07:04

## 2021-04-20 RX ADMIN — TACROLIMUS 8 MG: 1 CAPSULE ORAL at 08:04

## 2021-04-20 RX ADMIN — GABAPENTIN 600 MG: 300 CAPSULE ORAL at 02:04

## 2021-04-20 RX ADMIN — ASPIRIN 81 MG CHEWABLE TABLET 81 MG: 81 TABLET CHEWABLE at 08:04

## 2021-04-20 RX ADMIN — MIRTAZAPINE 15 MG: 15 TABLET, FILM COATED ORAL at 11:04

## 2021-04-20 RX ADMIN — OXYCODONE HYDROCHLORIDE 15 MG: 10 TABLET ORAL at 12:04

## 2021-04-20 RX ADMIN — HEPARIN SODIUM 5000 UNITS: 5000 INJECTION INTRAVENOUS; SUBCUTANEOUS at 05:04

## 2021-04-20 RX ADMIN — OXYCODONE HYDROCHLORIDE 15 MG: 10 TABLET ORAL at 09:04

## 2021-04-20 RX ADMIN — NICOTINE 1 PATCH: 14 PATCH TRANSDERMAL at 08:04

## 2021-04-20 RX ADMIN — DOXYCYCLINE HYCLATE 100 MG: 100 TABLET, COATED ORAL at 08:04

## 2021-04-21 PROBLEM — E87.1 HYPONATREMIA: Status: RESOLVED | Noted: 2021-02-23 | Resolved: 2021-04-21

## 2021-04-21 LAB
ALBUMIN SERPL BCP-MCNC: 3 G/DL (ref 3.5–5.2)
ALP SERPL-CCNC: 210 U/L (ref 55–135)
ALT SERPL W/O P-5'-P-CCNC: 54 U/L (ref 10–44)
ANION GAP SERPL CALC-SCNC: 5 MMOL/L (ref 8–16)
AST SERPL-CCNC: 39 U/L (ref 10–40)
BACTERIA SPEC ANAEROBE CULT: NORMAL
BASOPHILS # BLD AUTO: 0.06 K/UL (ref 0–0.2)
BASOPHILS NFR BLD: 0.8 % (ref 0–1.9)
BILIRUB SERPL-MCNC: 1.8 MG/DL (ref 0.1–1)
BUN SERPL-MCNC: 19 MG/DL (ref 6–20)
CALCIUM SERPL-MCNC: 8.5 MG/DL (ref 8.7–10.5)
CHLORIDE SERPL-SCNC: 100 MMOL/L (ref 95–110)
CO2 SERPL-SCNC: 31 MMOL/L (ref 23–29)
CREAT SERPL-MCNC: 0.8 MG/DL (ref 0.5–1.4)
DIFFERENTIAL METHOD: ABNORMAL
EOSINOPHIL # BLD AUTO: 0.2 K/UL (ref 0–0.5)
EOSINOPHIL NFR BLD: 3.1 % (ref 0–8)
ERYTHROCYTE [DISTWIDTH] IN BLOOD BY AUTOMATED COUNT: 19.6 % (ref 11.5–14.5)
EST. GFR  (AFRICAN AMERICAN): >60 ML/MIN/1.73 M^2
EST. GFR  (NON AFRICAN AMERICAN): >60 ML/MIN/1.73 M^2
GLUCOSE SERPL-MCNC: 123 MG/DL (ref 70–110)
HCT VFR BLD AUTO: 28.5 % (ref 40–54)
HEPATITIS C VIRUS (HCV) RNA DETECTION/QUANTIFICATION RT-PCR: NORMAL IU/ML
HGB BLD-MCNC: 9.5 G/DL (ref 14–18)
IMM GRANULOCYTES # BLD AUTO: 0.14 K/UL (ref 0–0.04)
IMM GRANULOCYTES NFR BLD AUTO: 1.8 % (ref 0–0.5)
INR PPP: 1 (ref 0.8–1.2)
LYMPHOCYTES # BLD AUTO: 2.3 K/UL (ref 1–4.8)
LYMPHOCYTES NFR BLD: 29.4 % (ref 18–48)
MAGNESIUM SERPL-MCNC: 1.6 MG/DL (ref 1.6–2.6)
MCH RBC QN AUTO: 34.7 PG (ref 27–31)
MCHC RBC AUTO-ENTMCNC: 33.3 G/DL (ref 32–36)
MCV RBC AUTO: 104 FL (ref 82–98)
MONOCYTES # BLD AUTO: 1.7 K/UL (ref 0.3–1)
MONOCYTES NFR BLD: 21.7 % (ref 4–15)
NEUTROPHILS # BLD AUTO: 3.4 K/UL (ref 1.8–7.7)
NEUTROPHILS NFR BLD: 43.2 % (ref 38–73)
NRBC BLD-RTO: 0 /100 WBC
PHOSPHATE SERPL-MCNC: 4.3 MG/DL (ref 2.7–4.5)
PLATELET # BLD AUTO: 184 K/UL (ref 150–450)
PMV BLD AUTO: 10 FL (ref 9.2–12.9)
POCT GLUCOSE: 113 MG/DL (ref 70–110)
POCT GLUCOSE: 138 MG/DL (ref 70–110)
POCT GLUCOSE: 139 MG/DL (ref 70–110)
POCT GLUCOSE: 278 MG/DL (ref 70–110)
POTASSIUM SERPL-SCNC: 4.2 MMOL/L (ref 3.5–5.1)
PROT SERPL-MCNC: 5.1 G/DL (ref 6–8.4)
PROTHROMBIN TIME: 10.5 SEC (ref 9–12.5)
RBC # BLD AUTO: 2.74 M/UL (ref 4.6–6.2)
SODIUM SERPL-SCNC: 136 MMOL/L (ref 136–145)
TACROLIMUS BLD-MCNC: 6.7 NG/ML (ref 5–15)
WBC # BLD AUTO: 7.75 K/UL (ref 3.9–12.7)

## 2021-04-21 PROCEDURE — 97116 GAIT TRAINING THERAPY: CPT

## 2021-04-21 PROCEDURE — 25000003 PHARM REV CODE 250: Performed by: STUDENT IN AN ORGANIZED HEALTH CARE EDUCATION/TRAINING PROGRAM

## 2021-04-21 PROCEDURE — 97535 SELF CARE MNGMENT TRAINING: CPT

## 2021-04-21 PROCEDURE — 80053 COMPREHEN METABOLIC PANEL: CPT | Performed by: TRANSPLANT SURGERY

## 2021-04-21 PROCEDURE — 85025 COMPLETE CBC W/AUTO DIFF WBC: CPT | Performed by: TRANSPLANT SURGERY

## 2021-04-21 PROCEDURE — 63600175 PHARM REV CODE 636 W HCPCS: Performed by: SURGERY

## 2021-04-21 PROCEDURE — 85610 PROTHROMBIN TIME: CPT | Performed by: TRANSPLANT SURGERY

## 2021-04-21 PROCEDURE — 25000003 PHARM REV CODE 250: Performed by: PHYSICIAN ASSISTANT

## 2021-04-21 PROCEDURE — 25000003 PHARM REV CODE 250: Performed by: CLINICAL NURSE SPECIALIST

## 2021-04-21 PROCEDURE — 20600001 HC STEP DOWN PRIVATE ROOM

## 2021-04-21 PROCEDURE — 99233 PR SUBSEQUENT HOSPITAL CARE,LEVL III: ICD-10-PCS | Mod: 24,,, | Performed by: NURSE PRACTITIONER

## 2021-04-21 PROCEDURE — 63600175 PHARM REV CODE 636 W HCPCS: Performed by: CLINICAL NURSE SPECIALIST

## 2021-04-21 PROCEDURE — 25000003 PHARM REV CODE 250: Performed by: TRANSPLANT SURGERY

## 2021-04-21 PROCEDURE — 99233 SBSQ HOSP IP/OBS HIGH 50: CPT | Mod: 24,,, | Performed by: NURSE PRACTITIONER

## 2021-04-21 PROCEDURE — 80197 ASSAY OF TACROLIMUS: CPT | Performed by: TRANSPLANT SURGERY

## 2021-04-21 PROCEDURE — S4991 NICOTINE PATCH NONLEGEND: HCPCS | Performed by: PHYSICIAN ASSISTANT

## 2021-04-21 PROCEDURE — 83735 ASSAY OF MAGNESIUM: CPT | Performed by: TRANSPLANT SURGERY

## 2021-04-21 PROCEDURE — 63600175 PHARM REV CODE 636 W HCPCS: Performed by: PHYSICIAN ASSISTANT

## 2021-04-21 PROCEDURE — 84100 ASSAY OF PHOSPHORUS: CPT | Performed by: PHYSICIAN ASSISTANT

## 2021-04-21 PROCEDURE — 63600175 PHARM REV CODE 636 W HCPCS: Performed by: STUDENT IN AN ORGANIZED HEALTH CARE EDUCATION/TRAINING PROGRAM

## 2021-04-21 PROCEDURE — 63600175 PHARM REV CODE 636 W HCPCS: Performed by: NURSE PRACTITIONER

## 2021-04-21 PROCEDURE — 25000003 PHARM REV CODE 250: Performed by: SURGERY

## 2021-04-21 PROCEDURE — 25000003 PHARM REV CODE 250: Performed by: NURSE PRACTITIONER

## 2021-04-21 RX ORDER — TACROLIMUS 1 MG/1
9 CAPSULE ORAL 2 TIMES DAILY
Status: CANCELLED | OUTPATIENT
Start: 2021-04-21

## 2021-04-21 RX ORDER — PREDNISONE 10 MG/1
10 TABLET ORAL DAILY
Status: DISCONTINUED | OUTPATIENT
Start: 2021-05-01 | End: 2021-04-26 | Stop reason: HOSPADM

## 2021-04-21 RX ORDER — PREDNISONE 5 MG/1
5 TABLET ORAL DAILY
Status: DISCONTINUED | OUTPATIENT
Start: 2021-05-08 | End: 2021-04-26 | Stop reason: HOSPADM

## 2021-04-21 RX ORDER — ACETAZOLAMIDE 250 MG/1
500 TABLET ORAL ONCE
Status: COMPLETED | OUTPATIENT
Start: 2021-04-21 | End: 2021-04-21

## 2021-04-21 RX ADMIN — HEPARIN SODIUM 5000 UNITS: 5000 INJECTION INTRAVENOUS; SUBCUTANEOUS at 03:04

## 2021-04-21 RX ADMIN — VALGANCICLOVIR 450 MG: 450 TABLET, FILM COATED ORAL at 08:04

## 2021-04-21 RX ADMIN — PREDNISONE 20 MG: 20 TABLET ORAL at 08:04

## 2021-04-21 RX ADMIN — ACETAZOLAMIDE 500 MG: 250 TABLET ORAL at 12:04

## 2021-04-21 RX ADMIN — OXYCODONE HYDROCHLORIDE 15 MG: 10 TABLET ORAL at 05:04

## 2021-04-21 RX ADMIN — ACETAMINOPHEN 650 MG: 325 TABLET ORAL at 12:04

## 2021-04-21 RX ADMIN — MAGNESIUM OXIDE 400 MG (241.3 MG MAGNESIUM) TABLET 400 MG: TABLET at 08:04

## 2021-04-21 RX ADMIN — HEPARIN SODIUM 5000 UNITS: 5000 INJECTION INTRAVENOUS; SUBCUTANEOUS at 08:04

## 2021-04-21 RX ADMIN — GABAPENTIN 600 MG: 300 CAPSULE ORAL at 08:04

## 2021-04-21 RX ADMIN — SODIUM CHLORIDE: 0.9 INJECTION, SOLUTION INTRAVENOUS at 05:04

## 2021-04-21 RX ADMIN — OXYCODONE HYDROCHLORIDE 15 MG: 10 TABLET ORAL at 08:04

## 2021-04-21 RX ADMIN — TACROLIMUS 8 MG: 1 CAPSULE ORAL at 05:04

## 2021-04-21 RX ADMIN — NYSTATIN 500000 UNITS: 500000 SUSPENSION ORAL at 08:04

## 2021-04-21 RX ADMIN — TACROLIMUS 8 MG: 1 CAPSULE ORAL at 08:04

## 2021-04-21 RX ADMIN — METHOCARBAMOL 500 MG: 500 TABLET ORAL at 08:04

## 2021-04-21 RX ADMIN — METHOCARBAMOL 500 MG: 500 TABLET ORAL at 03:04

## 2021-04-21 RX ADMIN — OXYCODONE HYDROCHLORIDE 15 MG: 10 TABLET ORAL at 12:04

## 2021-04-21 RX ADMIN — CALCIUM CARBONATE (ANTACID) CHEW TAB 500 MG 500 MG: 500 CHEW TAB at 08:04

## 2021-04-21 RX ADMIN — NYSTATIN 500000 UNITS: 500000 SUSPENSION ORAL at 03:04

## 2021-04-21 RX ADMIN — MYCOPHENOLATE MOFETIL 1000 MG: 250 CAPSULE ORAL at 08:04

## 2021-04-21 RX ADMIN — ACETAMINOPHEN 650 MG: 325 TABLET ORAL at 05:04

## 2021-04-21 RX ADMIN — ACETAMINOPHEN 650 MG: 325 TABLET ORAL at 11:04

## 2021-04-21 RX ADMIN — NICOTINE 1 PATCH: 14 PATCH TRANSDERMAL at 08:04

## 2021-04-21 RX ADMIN — VANCOMYCIN HYDROCHLORIDE 1500 MG: 1.5 INJECTION, POWDER, LYOPHILIZED, FOR SOLUTION INTRAVENOUS at 05:04

## 2021-04-21 RX ADMIN — MIRTAZAPINE 15 MG: 15 TABLET, FILM COATED ORAL at 11:04

## 2021-04-21 RX ADMIN — SULFAMETHOXAZOLE AND TRIMETHOPRIM 1 TABLET: 400; 80 TABLET ORAL at 08:04

## 2021-04-21 RX ADMIN — HEPARIN SODIUM 5000 UNITS: 5000 INJECTION INTRAVENOUS; SUBCUTANEOUS at 05:04

## 2021-04-21 RX ADMIN — OXYCODONE HYDROCHLORIDE 15 MG: 10 TABLET ORAL at 04:04

## 2021-04-21 RX ADMIN — ASPIRIN 81 MG CHEWABLE TABLET 81 MG: 81 TABLET CHEWABLE at 08:04

## 2021-04-21 RX ADMIN — GABAPENTIN 600 MG: 300 CAPSULE ORAL at 03:04

## 2021-04-21 RX ADMIN — OXYCODONE HYDROCHLORIDE 15 MG: 10 TABLET ORAL at 10:04

## 2021-04-22 ENCOUNTER — CONFERENCE (OUTPATIENT)
Dept: TRANSPLANT | Facility: CLINIC | Age: 41
End: 2021-04-22

## 2021-04-22 LAB
ALBUMIN SERPL BCP-MCNC: 2.5 G/DL (ref 3.5–5.2)
ALP SERPL-CCNC: 191 U/L (ref 55–135)
ALT SERPL W/O P-5'-P-CCNC: 45 U/L (ref 10–44)
ANION GAP SERPL CALC-SCNC: 5 MMOL/L (ref 8–16)
AST SERPL-CCNC: 34 U/L (ref 10–40)
BASOPHILS # BLD AUTO: 0.06 K/UL (ref 0–0.2)
BASOPHILS NFR BLD: 0.8 % (ref 0–1.9)
BILIRUB SERPL-MCNC: 1.6 MG/DL (ref 0.1–1)
BUN SERPL-MCNC: 21 MG/DL (ref 6–20)
CALCIUM SERPL-MCNC: 8.4 MG/DL (ref 8.7–10.5)
CHLORIDE SERPL-SCNC: 101 MMOL/L (ref 95–110)
CO2 SERPL-SCNC: 30 MMOL/L (ref 23–29)
CREAT SERPL-MCNC: 0.8 MG/DL (ref 0.5–1.4)
DIFFERENTIAL METHOD: ABNORMAL
EOSINOPHIL # BLD AUTO: 0.2 K/UL (ref 0–0.5)
EOSINOPHIL NFR BLD: 2.3 % (ref 0–8)
ERYTHROCYTE [DISTWIDTH] IN BLOOD BY AUTOMATED COUNT: 19.3 % (ref 11.5–14.5)
EST. GFR  (AFRICAN AMERICAN): >60 ML/MIN/1.73 M^2
EST. GFR  (NON AFRICAN AMERICAN): >60 ML/MIN/1.73 M^2
GLUCOSE SERPL-MCNC: 104 MG/DL (ref 70–110)
HCT VFR BLD AUTO: 26.5 % (ref 40–54)
HGB BLD-MCNC: 8.7 G/DL (ref 14–18)
IMM GRANULOCYTES # BLD AUTO: 0.09 K/UL (ref 0–0.04)
IMM GRANULOCYTES NFR BLD AUTO: 1.2 % (ref 0–0.5)
INR PPP: 0.9 (ref 0.8–1.2)
LYMPHOCYTES # BLD AUTO: 1.9 K/UL (ref 1–4.8)
LYMPHOCYTES NFR BLD: 25 % (ref 18–48)
MAGNESIUM SERPL-MCNC: 1.6 MG/DL (ref 1.6–2.6)
MCH RBC QN AUTO: 34.5 PG (ref 27–31)
MCHC RBC AUTO-ENTMCNC: 32.8 G/DL (ref 32–36)
MCV RBC AUTO: 105 FL (ref 82–98)
MONOCYTES # BLD AUTO: 1.4 K/UL (ref 0.3–1)
MONOCYTES NFR BLD: 18.4 % (ref 4–15)
NEUTROPHILS # BLD AUTO: 4.1 K/UL (ref 1.8–7.7)
NEUTROPHILS NFR BLD: 52.3 % (ref 38–73)
NRBC BLD-RTO: 0 /100 WBC
PHOSPHATE SERPL-MCNC: 4.7 MG/DL (ref 2.7–4.5)
PLATELET # BLD AUTO: 202 K/UL (ref 150–450)
PMV BLD AUTO: 9.8 FL (ref 9.2–12.9)
POCT GLUCOSE: 110 MG/DL (ref 70–110)
POCT GLUCOSE: 116 MG/DL (ref 70–110)
POCT GLUCOSE: 162 MG/DL (ref 70–110)
POCT GLUCOSE: 177 MG/DL (ref 70–110)
POTASSIUM SERPL-SCNC: 4 MMOL/L (ref 3.5–5.1)
PROT SERPL-MCNC: 4.6 G/DL (ref 6–8.4)
PROTHROMBIN TIME: 10.4 SEC (ref 9–12.5)
RBC # BLD AUTO: 2.52 M/UL (ref 4.6–6.2)
SODIUM SERPL-SCNC: 136 MMOL/L (ref 136–145)
TACROLIMUS BLD-MCNC: 6.5 NG/ML (ref 5–15)
VANCOMYCIN TROUGH SERPL-MCNC: 47.8 UG/ML (ref 10–22)
WBC # BLD AUTO: 7.77 K/UL (ref 3.9–12.7)

## 2021-04-22 PROCEDURE — 25000003 PHARM REV CODE 250: Performed by: PHYSICIAN ASSISTANT

## 2021-04-22 PROCEDURE — 84100 ASSAY OF PHOSPHORUS: CPT | Performed by: PHYSICIAN ASSISTANT

## 2021-04-22 PROCEDURE — 99233 SBSQ HOSP IP/OBS HIGH 50: CPT | Mod: 24,,, | Performed by: CLINICAL NURSE SPECIALIST

## 2021-04-22 PROCEDURE — 25000003 PHARM REV CODE 250: Performed by: CLINICAL NURSE SPECIALIST

## 2021-04-22 PROCEDURE — 25000003 PHARM REV CODE 250: Performed by: SURGERY

## 2021-04-22 PROCEDURE — 63600175 PHARM REV CODE 636 W HCPCS: Performed by: NURSE PRACTITIONER

## 2021-04-22 PROCEDURE — 85025 COMPLETE CBC W/AUTO DIFF WBC: CPT | Performed by: TRANSPLANT SURGERY

## 2021-04-22 PROCEDURE — 80202 ASSAY OF VANCOMYCIN: CPT | Performed by: NURSE PRACTITIONER

## 2021-04-22 PROCEDURE — 80197 ASSAY OF TACROLIMUS: CPT | Performed by: TRANSPLANT SURGERY

## 2021-04-22 PROCEDURE — 97116 GAIT TRAINING THERAPY: CPT | Mod: CQ

## 2021-04-22 PROCEDURE — 80053 COMPREHEN METABOLIC PANEL: CPT | Performed by: TRANSPLANT SURGERY

## 2021-04-22 PROCEDURE — 20600001 HC STEP DOWN PRIVATE ROOM

## 2021-04-22 PROCEDURE — 63600175 PHARM REV CODE 636 W HCPCS: Performed by: CLINICAL NURSE SPECIALIST

## 2021-04-22 PROCEDURE — 99233 PR SUBSEQUENT HOSPITAL CARE,LEVL III: ICD-10-PCS | Mod: 24,,, | Performed by: CLINICAL NURSE SPECIALIST

## 2021-04-22 PROCEDURE — S4991 NICOTINE PATCH NONLEGEND: HCPCS | Performed by: PHYSICIAN ASSISTANT

## 2021-04-22 PROCEDURE — 63600175 PHARM REV CODE 636 W HCPCS: Performed by: PHYSICIAN ASSISTANT

## 2021-04-22 PROCEDURE — 25000003 PHARM REV CODE 250: Performed by: NURSE PRACTITIONER

## 2021-04-22 PROCEDURE — 85610 PROTHROMBIN TIME: CPT | Performed by: TRANSPLANT SURGERY

## 2021-04-22 PROCEDURE — 97530 THERAPEUTIC ACTIVITIES: CPT

## 2021-04-22 PROCEDURE — 83735 ASSAY OF MAGNESIUM: CPT | Performed by: TRANSPLANT SURGERY

## 2021-04-22 PROCEDURE — 63600175 PHARM REV CODE 636 W HCPCS: Performed by: STUDENT IN AN ORGANIZED HEALTH CARE EDUCATION/TRAINING PROGRAM

## 2021-04-22 PROCEDURE — 25000003 PHARM REV CODE 250: Performed by: STUDENT IN AN ORGANIZED HEALTH CARE EDUCATION/TRAINING PROGRAM

## 2021-04-22 PROCEDURE — 25000003 PHARM REV CODE 250: Performed by: TRANSPLANT SURGERY

## 2021-04-22 RX ORDER — ACETAZOLAMIDE 250 MG/1
250 TABLET ORAL ONCE
Status: COMPLETED | OUTPATIENT
Start: 2021-04-22 | End: 2021-04-22

## 2021-04-22 RX ORDER — MIRTAZAPINE 15 MG/1
15 TABLET, FILM COATED ORAL NIGHTLY PRN
Qty: 30 TABLET | Refills: 0 | Status: SHIPPED | OUTPATIENT
Start: 2021-04-22 | End: 2021-04-26 | Stop reason: HOSPADM

## 2021-04-22 RX ORDER — KETOCONAZOLE 200 MG/1
100 TABLET ORAL DAILY
Qty: 15 TABLET | Refills: 11 | Status: SHIPPED | OUTPATIENT
Start: 2021-04-23 | End: 2021-05-27 | Stop reason: ALTCHOICE

## 2021-04-22 RX ORDER — MAGNESIUM SULFATE HEPTAHYDRATE 40 MG/ML
2 INJECTION, SOLUTION INTRAVENOUS ONCE
Status: COMPLETED | OUTPATIENT
Start: 2021-04-22 | End: 2021-04-22

## 2021-04-22 RX ADMIN — MAGNESIUM OXIDE 400 MG (241.3 MG MAGNESIUM) TABLET 400 MG: TABLET at 08:04

## 2021-04-22 RX ADMIN — HEPARIN SODIUM 5000 UNITS: 5000 INJECTION INTRAVENOUS; SUBCUTANEOUS at 06:04

## 2021-04-22 RX ADMIN — METHOCARBAMOL 500 MG: 500 TABLET ORAL at 08:04

## 2021-04-22 RX ADMIN — MYCOPHENOLATE MOFETIL 1000 MG: 250 CAPSULE ORAL at 08:04

## 2021-04-22 RX ADMIN — KETOCONAZOLE 100 MG: 200 TABLET ORAL at 01:04

## 2021-04-22 RX ADMIN — NYSTATIN 500000 UNITS: 500000 SUSPENSION ORAL at 02:04

## 2021-04-22 RX ADMIN — ASPIRIN 81 MG CHEWABLE TABLET 81 MG: 81 TABLET CHEWABLE at 08:04

## 2021-04-22 RX ADMIN — TACROLIMUS 8 MG: 1 CAPSULE ORAL at 08:04

## 2021-04-22 RX ADMIN — MIRTAZAPINE 15 MG: 15 TABLET, FILM COATED ORAL at 08:04

## 2021-04-22 RX ADMIN — HEPARIN SODIUM 5000 UNITS: 5000 INJECTION INTRAVENOUS; SUBCUTANEOUS at 08:04

## 2021-04-22 RX ADMIN — OXYCODONE HYDROCHLORIDE 15 MG: 10 TABLET ORAL at 10:04

## 2021-04-22 RX ADMIN — OXYCODONE HYDROCHLORIDE 15 MG: 10 TABLET ORAL at 03:04

## 2021-04-22 RX ADMIN — ACETAMINOPHEN 650 MG: 325 TABLET ORAL at 11:04

## 2021-04-22 RX ADMIN — SODIUM CHLORIDE: 0.9 INJECTION, SOLUTION INTRAVENOUS at 06:04

## 2021-04-22 RX ADMIN — VANCOMYCIN HYDROCHLORIDE 1500 MG: 1.5 INJECTION, POWDER, LYOPHILIZED, FOR SOLUTION INTRAVENOUS at 06:04

## 2021-04-22 RX ADMIN — TACROLIMUS 8 MG: 1 CAPSULE ORAL at 05:04

## 2021-04-22 RX ADMIN — NICOTINE 1 PATCH: 14 PATCH TRANSDERMAL at 08:04

## 2021-04-22 RX ADMIN — GABAPENTIN 600 MG: 300 CAPSULE ORAL at 08:04

## 2021-04-22 RX ADMIN — SULFAMETHOXAZOLE AND TRIMETHOPRIM 1 TABLET: 400; 80 TABLET ORAL at 08:04

## 2021-04-22 RX ADMIN — ACETAMINOPHEN 650 MG: 325 TABLET ORAL at 06:04

## 2021-04-22 RX ADMIN — OXYCODONE HYDROCHLORIDE 15 MG: 10 TABLET ORAL at 06:04

## 2021-04-22 RX ADMIN — ACETAMINOPHEN 650 MG: 325 TABLET ORAL at 05:04

## 2021-04-22 RX ADMIN — MAGNESIUM SULFATE 2 G: 2 INJECTION INTRAVENOUS at 01:04

## 2021-04-22 RX ADMIN — NYSTATIN 500000 UNITS: 500000 SUSPENSION ORAL at 05:04

## 2021-04-22 RX ADMIN — NYSTATIN 500000 UNITS: 500000 SUSPENSION ORAL at 08:04

## 2021-04-22 RX ADMIN — OXYCODONE HYDROCHLORIDE 15 MG: 10 TABLET ORAL at 02:04

## 2021-04-22 RX ADMIN — HEPARIN SODIUM 5000 UNITS: 5000 INJECTION INTRAVENOUS; SUBCUTANEOUS at 02:04

## 2021-04-22 RX ADMIN — OXYCODONE HYDROCHLORIDE 15 MG: 10 TABLET ORAL at 11:04

## 2021-04-22 RX ADMIN — OXYCODONE HYDROCHLORIDE 15 MG: 10 TABLET ORAL at 08:04

## 2021-04-22 RX ADMIN — GABAPENTIN 600 MG: 300 CAPSULE ORAL at 03:04

## 2021-04-22 RX ADMIN — METHOCARBAMOL 500 MG: 500 TABLET ORAL at 03:04

## 2021-04-22 RX ADMIN — SODIUM CHLORIDE 5 ML/HR: 0.9 INJECTION, SOLUTION INTRAVENOUS at 01:04

## 2021-04-22 RX ADMIN — VALGANCICLOVIR 450 MG: 450 TABLET, FILM COATED ORAL at 08:04

## 2021-04-22 RX ADMIN — INSULIN ASPART 1 UNITS: 100 INJECTION, SOLUTION INTRAVENOUS; SUBCUTANEOUS at 08:04

## 2021-04-22 RX ADMIN — ACETAZOLAMIDE 250 MG: 250 TABLET ORAL at 02:04

## 2021-04-22 RX ADMIN — PREDNISONE 20 MG: 20 TABLET ORAL at 08:04

## 2021-04-23 LAB
ALBUMIN SERPL BCP-MCNC: 2.7 G/DL (ref 3.5–5.2)
ALP SERPL-CCNC: 209 U/L (ref 55–135)
ALT SERPL W/O P-5'-P-CCNC: 44 U/L (ref 10–44)
ANION GAP SERPL CALC-SCNC: 9 MMOL/L (ref 8–16)
AST SERPL-CCNC: 28 U/L (ref 10–40)
BASOPHILS # BLD AUTO: 0.08 K/UL (ref 0–0.2)
BASOPHILS NFR BLD: 0.7 % (ref 0–1.9)
BILIRUB SERPL-MCNC: 1.4 MG/DL (ref 0.1–1)
BUN SERPL-MCNC: 17 MG/DL (ref 6–20)
CALCIUM SERPL-MCNC: 8.5 MG/DL (ref 8.7–10.5)
CHLORIDE SERPL-SCNC: 104 MMOL/L (ref 95–110)
CO2 SERPL-SCNC: 24 MMOL/L (ref 23–29)
CREAT SERPL-MCNC: 0.8 MG/DL (ref 0.5–1.4)
DIFFERENTIAL METHOD: ABNORMAL
EOSINOPHIL # BLD AUTO: 0.2 K/UL (ref 0–0.5)
EOSINOPHIL NFR BLD: 1.4 % (ref 0–8)
ERYTHROCYTE [DISTWIDTH] IN BLOOD BY AUTOMATED COUNT: 19.1 % (ref 11.5–14.5)
EST. GFR  (AFRICAN AMERICAN): >60 ML/MIN/1.73 M^2
EST. GFR  (NON AFRICAN AMERICAN): >60 ML/MIN/1.73 M^2
GLUCOSE SERPL-MCNC: 111 MG/DL (ref 70–110)
HCT VFR BLD AUTO: 27.4 % (ref 40–54)
HGB BLD-MCNC: 9.2 G/DL (ref 14–18)
IMM GRANULOCYTES # BLD AUTO: 0.13 K/UL (ref 0–0.04)
IMM GRANULOCYTES NFR BLD AUTO: 1.2 % (ref 0–0.5)
LYMPHOCYTES # BLD AUTO: 2.1 K/UL (ref 1–4.8)
LYMPHOCYTES NFR BLD: 19.2 % (ref 18–48)
MAGNESIUM SERPL-MCNC: 1.9 MG/DL (ref 1.6–2.6)
MCH RBC QN AUTO: 35.5 PG (ref 27–31)
MCHC RBC AUTO-ENTMCNC: 33.6 G/DL (ref 32–36)
MCV RBC AUTO: 106 FL (ref 82–98)
MONOCYTES # BLD AUTO: 1.5 K/UL (ref 0.3–1)
MONOCYTES NFR BLD: 13.6 % (ref 4–15)
NEUTROPHILS # BLD AUTO: 7.1 K/UL (ref 1.8–7.7)
NEUTROPHILS NFR BLD: 63.9 % (ref 38–73)
NRBC BLD-RTO: 0 /100 WBC
PHOSPHATE SERPL-MCNC: 4.5 MG/DL (ref 2.7–4.5)
PLATELET # BLD AUTO: 249 K/UL (ref 150–450)
PMV BLD AUTO: 9.7 FL (ref 9.2–12.9)
POCT GLUCOSE: 116 MG/DL (ref 70–110)
POTASSIUM SERPL-SCNC: 3.9 MMOL/L (ref 3.5–5.1)
PROT SERPL-MCNC: 5 G/DL (ref 6–8.4)
RBC # BLD AUTO: 2.59 M/UL (ref 4.6–6.2)
SODIUM SERPL-SCNC: 137 MMOL/L (ref 136–145)
TACROLIMUS BLD-MCNC: 9 NG/ML (ref 5–15)
VANCOMYCIN TROUGH SERPL-MCNC: 24.4 UG/ML (ref 10–22)
WBC # BLD AUTO: 11.07 K/UL (ref 3.9–12.7)

## 2021-04-23 PROCEDURE — 63600175 PHARM REV CODE 636 W HCPCS: Performed by: STUDENT IN AN ORGANIZED HEALTH CARE EDUCATION/TRAINING PROGRAM

## 2021-04-23 PROCEDURE — 25000003 PHARM REV CODE 250: Performed by: SURGERY

## 2021-04-23 PROCEDURE — P9047 ALBUMIN (HUMAN), 25%, 50ML: HCPCS | Mod: JG | Performed by: CLINICAL NURSE SPECIALIST

## 2021-04-23 PROCEDURE — 80202 ASSAY OF VANCOMYCIN: CPT | Performed by: TRANSPLANT SURGERY

## 2021-04-23 PROCEDURE — 25000003 PHARM REV CODE 250: Performed by: TRANSPLANT SURGERY

## 2021-04-23 PROCEDURE — 83735 ASSAY OF MAGNESIUM: CPT | Performed by: TRANSPLANT SURGERY

## 2021-04-23 PROCEDURE — 99233 SBSQ HOSP IP/OBS HIGH 50: CPT | Mod: 24,,, | Performed by: CLINICAL NURSE SPECIALIST

## 2021-04-23 PROCEDURE — 63600175 PHARM REV CODE 636 W HCPCS: Performed by: CLINICAL NURSE SPECIALIST

## 2021-04-23 PROCEDURE — 25000003 PHARM REV CODE 250: Performed by: PHYSICIAN ASSISTANT

## 2021-04-23 PROCEDURE — 63600175 PHARM REV CODE 636 W HCPCS: Performed by: NURSE PRACTITIONER

## 2021-04-23 PROCEDURE — 80197 ASSAY OF TACROLIMUS: CPT | Performed by: TRANSPLANT SURGERY

## 2021-04-23 PROCEDURE — 25000003 PHARM REV CODE 250: Performed by: NURSE PRACTITIONER

## 2021-04-23 PROCEDURE — 63600175 PHARM REV CODE 636 W HCPCS: Performed by: PHYSICIAN ASSISTANT

## 2021-04-23 PROCEDURE — 20600001 HC STEP DOWN PRIVATE ROOM

## 2021-04-23 PROCEDURE — 25000003 PHARM REV CODE 250: Performed by: CLINICAL NURSE SPECIALIST

## 2021-04-23 PROCEDURE — 25000003 PHARM REV CODE 250: Performed by: STUDENT IN AN ORGANIZED HEALTH CARE EDUCATION/TRAINING PROGRAM

## 2021-04-23 PROCEDURE — 80053 COMPREHEN METABOLIC PANEL: CPT | Performed by: TRANSPLANT SURGERY

## 2021-04-23 PROCEDURE — 84100 ASSAY OF PHOSPHORUS: CPT | Performed by: PHYSICIAN ASSISTANT

## 2021-04-23 PROCEDURE — 99233 PR SUBSEQUENT HOSPITAL CARE,LEVL III: ICD-10-PCS | Mod: 24,,, | Performed by: CLINICAL NURSE SPECIALIST

## 2021-04-23 PROCEDURE — S4991 NICOTINE PATCH NONLEGEND: HCPCS | Performed by: PHYSICIAN ASSISTANT

## 2021-04-23 PROCEDURE — 85025 COMPLETE CBC W/AUTO DIFF WBC: CPT | Performed by: TRANSPLANT SURGERY

## 2021-04-23 RX ORDER — OXYCODONE HYDROCHLORIDE 10 MG/1
10 TABLET ORAL EVERY 4 HOURS PRN
Qty: 42 TABLET | Refills: 0 | Status: SHIPPED | OUTPATIENT
Start: 2021-04-23 | End: 2021-05-05 | Stop reason: SDUPTHER

## 2021-04-23 RX ORDER — ALBUMIN HUMAN 250 G/1000ML
25 SOLUTION INTRAVENOUS ONCE
Status: COMPLETED | OUTPATIENT
Start: 2021-04-23 | End: 2021-04-23

## 2021-04-23 RX ORDER — FUROSEMIDE 10 MG/ML
40 INJECTION INTRAMUSCULAR; INTRAVENOUS ONCE
Status: COMPLETED | OUTPATIENT
Start: 2021-04-23 | End: 2021-04-23

## 2021-04-23 RX ORDER — DOCUSATE SODIUM 100 MG/1
100 CAPSULE, LIQUID FILLED ORAL 3 TIMES DAILY PRN
Refills: 0 | Status: ON HOLD | COMMUNITY
Start: 2021-04-23 | End: 2021-08-06 | Stop reason: HOSPADM

## 2021-04-23 RX ORDER — VANCOMYCIN HCL IN 5 % DEXTROSE 1G/250ML
1000 PLASTIC BAG, INJECTION (ML) INTRAVENOUS
Status: DISCONTINUED | OUTPATIENT
Start: 2021-04-23 | End: 2021-04-25

## 2021-04-23 RX ORDER — BISACODYL 5 MG
10 TABLET, DELAYED RELEASE (ENTERIC COATED) ORAL DAILY PRN
Refills: 0 | COMMUNITY
Start: 2021-04-23

## 2021-04-23 RX ORDER — ACETAMINOPHEN 500 MG
1 TABLET ORAL DAILY
Qty: 30 TABLET | Refills: 5 | Status: SHIPPED | OUTPATIENT
Start: 2021-04-23 | End: 2021-05-19 | Stop reason: ALTCHOICE

## 2021-04-23 RX ADMIN — METHOCARBAMOL 500 MG: 500 TABLET ORAL at 09:04

## 2021-04-23 RX ADMIN — GABAPENTIN 600 MG: 300 CAPSULE ORAL at 09:04

## 2021-04-23 RX ADMIN — VANCOMYCIN HYDROCHLORIDE 1500 MG: 1.5 INJECTION, POWDER, LYOPHILIZED, FOR SOLUTION INTRAVENOUS at 06:04

## 2021-04-23 RX ADMIN — HEPARIN SODIUM 5000 UNITS: 5000 INJECTION INTRAVENOUS; SUBCUTANEOUS at 09:04

## 2021-04-23 RX ADMIN — VALGANCICLOVIR 450 MG: 450 TABLET, FILM COATED ORAL at 09:04

## 2021-04-23 RX ADMIN — FUROSEMIDE 40 MG: 10 INJECTION, SOLUTION INTRAMUSCULAR; INTRAVENOUS at 01:04

## 2021-04-23 RX ADMIN — PREDNISONE 20 MG: 20 TABLET ORAL at 09:04

## 2021-04-23 RX ADMIN — GABAPENTIN 600 MG: 300 CAPSULE ORAL at 03:04

## 2021-04-23 RX ADMIN — NYSTATIN 500000 UNITS: 500000 SUSPENSION ORAL at 09:04

## 2021-04-23 RX ADMIN — METHOCARBAMOL 500 MG: 500 TABLET ORAL at 03:04

## 2021-04-23 RX ADMIN — KETOCONAZOLE 100 MG: 200 TABLET ORAL at 09:04

## 2021-04-23 RX ADMIN — VANCOMYCIN HYDROCHLORIDE 1000 MG: 1 INJECTION, POWDER, LYOPHILIZED, FOR SOLUTION INTRAVENOUS at 06:04

## 2021-04-23 RX ADMIN — SODIUM CHLORIDE: 0.9 INJECTION, SOLUTION INTRAVENOUS at 06:04

## 2021-04-23 RX ADMIN — ACETAMINOPHEN 650 MG: 325 TABLET ORAL at 05:04

## 2021-04-23 RX ADMIN — HEPARIN SODIUM 5000 UNITS: 5000 INJECTION INTRAVENOUS; SUBCUTANEOUS at 05:04

## 2021-04-23 RX ADMIN — MAGNESIUM OXIDE 400 MG (241.3 MG MAGNESIUM) TABLET 400 MG: TABLET at 09:04

## 2021-04-23 RX ADMIN — NICOTINE 1 PATCH: 14 PATCH TRANSDERMAL at 09:04

## 2021-04-23 RX ADMIN — OXYCODONE HYDROCHLORIDE 15 MG: 10 TABLET ORAL at 05:04

## 2021-04-23 RX ADMIN — TACROLIMUS 8 MG: 1 CAPSULE ORAL at 09:04

## 2021-04-23 RX ADMIN — MYCOPHENOLATE MOFETIL 1000 MG: 250 CAPSULE ORAL at 09:04

## 2021-04-23 RX ADMIN — ASPIRIN 81 MG CHEWABLE TABLET 81 MG: 81 TABLET CHEWABLE at 09:04

## 2021-04-23 RX ADMIN — HEPARIN SODIUM 5000 UNITS: 5000 INJECTION INTRAVENOUS; SUBCUTANEOUS at 03:04

## 2021-04-23 RX ADMIN — ALBUMIN (HUMAN) 25 G: 12.5 SOLUTION INTRAVENOUS at 12:04

## 2021-04-23 RX ADMIN — NYSTATIN 500000 UNITS: 500000 SUSPENSION ORAL at 05:04

## 2021-04-23 RX ADMIN — OXYCODONE HYDROCHLORIDE 15 MG: 10 TABLET ORAL at 11:04

## 2021-04-23 RX ADMIN — NYSTATIN 500000 UNITS: 500000 SUSPENSION ORAL at 01:04

## 2021-04-23 RX ADMIN — OXYCODONE HYDROCHLORIDE 10 MG: 10 TABLET ORAL at 10:04

## 2021-04-23 RX ADMIN — TACROLIMUS 8 MG: 1 CAPSULE ORAL at 05:04

## 2021-04-23 RX ADMIN — SULFAMETHOXAZOLE AND TRIMETHOPRIM 1 TABLET: 400; 80 TABLET ORAL at 09:04

## 2021-04-24 LAB
ALBUMIN SERPL BCP-MCNC: 2.7 G/DL (ref 3.5–5.2)
ALP SERPL-CCNC: 180 U/L (ref 55–135)
ALT SERPL W/O P-5'-P-CCNC: 35 U/L (ref 10–44)
ANION GAP SERPL CALC-SCNC: 8 MMOL/L (ref 8–16)
AST SERPL-CCNC: 21 U/L (ref 10–40)
BASOPHILS # BLD AUTO: 0.07 K/UL (ref 0–0.2)
BASOPHILS NFR BLD: 0.6 % (ref 0–1.9)
BILIRUB SERPL-MCNC: 1.5 MG/DL (ref 0.1–1)
BUN SERPL-MCNC: 17 MG/DL (ref 6–20)
CALCIUM SERPL-MCNC: 8.2 MG/DL (ref 8.7–10.5)
CHLORIDE SERPL-SCNC: 102 MMOL/L (ref 95–110)
CO2 SERPL-SCNC: 25 MMOL/L (ref 23–29)
CREAT SERPL-MCNC: 0.9 MG/DL (ref 0.5–1.4)
DIFFERENTIAL METHOD: ABNORMAL
EOSINOPHIL # BLD AUTO: 0.1 K/UL (ref 0–0.5)
EOSINOPHIL NFR BLD: 0.8 % (ref 0–8)
ERYTHROCYTE [DISTWIDTH] IN BLOOD BY AUTOMATED COUNT: 19.4 % (ref 11.5–14.5)
EST. GFR  (AFRICAN AMERICAN): >60 ML/MIN/1.73 M^2
EST. GFR  (NON AFRICAN AMERICAN): >60 ML/MIN/1.73 M^2
GLUCOSE SERPL-MCNC: 86 MG/DL (ref 70–110)
HCT VFR BLD AUTO: 26 % (ref 40–54)
HGB BLD-MCNC: 8.3 G/DL (ref 14–18)
IMM GRANULOCYTES # BLD AUTO: 0.1 K/UL (ref 0–0.04)
IMM GRANULOCYTES NFR BLD AUTO: 0.8 % (ref 0–0.5)
LYMPHOCYTES # BLD AUTO: 1.9 K/UL (ref 1–4.8)
LYMPHOCYTES NFR BLD: 16.2 % (ref 18–48)
MAGNESIUM SERPL-MCNC: 1.7 MG/DL (ref 1.6–2.6)
MCH RBC QN AUTO: 35.2 PG (ref 27–31)
MCHC RBC AUTO-ENTMCNC: 31.9 G/DL (ref 32–36)
MCV RBC AUTO: 110 FL (ref 82–98)
MONOCYTES # BLD AUTO: 1.6 K/UL (ref 0.3–1)
MONOCYTES NFR BLD: 13 % (ref 4–15)
NEUTROPHILS # BLD AUTO: 8.1 K/UL (ref 1.8–7.7)
NEUTROPHILS NFR BLD: 68.6 % (ref 38–73)
NRBC BLD-RTO: 0 /100 WBC
PHOSPHATE SERPL-MCNC: 4.5 MG/DL (ref 2.7–4.5)
PLATELET # BLD AUTO: 269 K/UL (ref 150–450)
PMV BLD AUTO: 9.6 FL (ref 9.2–12.9)
POTASSIUM SERPL-SCNC: 4.5 MMOL/L (ref 3.5–5.1)
PROT SERPL-MCNC: 4.9 G/DL (ref 6–8.4)
RBC # BLD AUTO: 2.36 M/UL (ref 4.6–6.2)
SODIUM SERPL-SCNC: 135 MMOL/L (ref 136–145)
TACROLIMUS BLD-MCNC: 11.2 NG/ML (ref 5–15)
WBC # BLD AUTO: 11.89 K/UL (ref 3.9–12.7)

## 2021-04-24 PROCEDURE — 25000003 PHARM REV CODE 250: Performed by: CLINICAL NURSE SPECIALIST

## 2021-04-24 PROCEDURE — S4991 NICOTINE PATCH NONLEGEND: HCPCS | Performed by: PHYSICIAN ASSISTANT

## 2021-04-24 PROCEDURE — 63600175 PHARM REV CODE 636 W HCPCS: Performed by: STUDENT IN AN ORGANIZED HEALTH CARE EDUCATION/TRAINING PROGRAM

## 2021-04-24 PROCEDURE — 80197 ASSAY OF TACROLIMUS: CPT | Performed by: TRANSPLANT SURGERY

## 2021-04-24 PROCEDURE — 63600175 PHARM REV CODE 636 W HCPCS: Performed by: PHYSICIAN ASSISTANT

## 2021-04-24 PROCEDURE — 63600175 PHARM REV CODE 636 W HCPCS: Performed by: NURSE PRACTITIONER

## 2021-04-24 PROCEDURE — 84100 ASSAY OF PHOSPHORUS: CPT | Performed by: PHYSICIAN ASSISTANT

## 2021-04-24 PROCEDURE — 85025 COMPLETE CBC W/AUTO DIFF WBC: CPT | Performed by: TRANSPLANT SURGERY

## 2021-04-24 PROCEDURE — 80053 COMPREHEN METABOLIC PANEL: CPT | Performed by: TRANSPLANT SURGERY

## 2021-04-24 PROCEDURE — 63600175 PHARM REV CODE 636 W HCPCS: Performed by: CLINICAL NURSE SPECIALIST

## 2021-04-24 PROCEDURE — 25000003 PHARM REV CODE 250: Performed by: SURGERY

## 2021-04-24 PROCEDURE — 25000003 PHARM REV CODE 250: Performed by: STUDENT IN AN ORGANIZED HEALTH CARE EDUCATION/TRAINING PROGRAM

## 2021-04-24 PROCEDURE — 20600001 HC STEP DOWN PRIVATE ROOM

## 2021-04-24 PROCEDURE — 25000003 PHARM REV CODE 250: Performed by: PHYSICIAN ASSISTANT

## 2021-04-24 PROCEDURE — 83735 ASSAY OF MAGNESIUM: CPT | Performed by: TRANSPLANT SURGERY

## 2021-04-24 PROCEDURE — 36415 COLL VENOUS BLD VENIPUNCTURE: CPT | Performed by: PHYSICIAN ASSISTANT

## 2021-04-24 PROCEDURE — 25000003 PHARM REV CODE 250: Performed by: NURSE PRACTITIONER

## 2021-04-24 RX ORDER — PROCHLORPERAZINE EDISYLATE 5 MG/ML
2.5 INJECTION INTRAMUSCULAR; INTRAVENOUS EVERY 6 HOURS PRN
Status: DISCONTINUED | OUTPATIENT
Start: 2021-04-24 | End: 2021-04-26 | Stop reason: HOSPADM

## 2021-04-24 RX ORDER — SIMETHICONE 80 MG
1 TABLET,CHEWABLE ORAL 3 TIMES DAILY PRN
Status: DISCONTINUED | OUTPATIENT
Start: 2021-04-24 | End: 2021-04-26 | Stop reason: HOSPADM

## 2021-04-24 RX ORDER — FUROSEMIDE 10 MG/ML
40 INJECTION INTRAMUSCULAR; INTRAVENOUS ONCE
Status: COMPLETED | OUTPATIENT
Start: 2021-04-24 | End: 2021-04-24

## 2021-04-24 RX ORDER — TACROLIMUS 1 MG/1
7 CAPSULE ORAL 2 TIMES DAILY
Status: DISCONTINUED | OUTPATIENT
Start: 2021-04-24 | End: 2021-04-25

## 2021-04-24 RX ADMIN — GABAPENTIN 600 MG: 300 CAPSULE ORAL at 08:04

## 2021-04-24 RX ADMIN — ACETAMINOPHEN 650 MG: 325 TABLET ORAL at 01:04

## 2021-04-24 RX ADMIN — OXYCODONE HYDROCHLORIDE 15 MG: 10 TABLET ORAL at 08:04

## 2021-04-24 RX ADMIN — NYSTATIN 500000 UNITS: 500000 SUSPENSION ORAL at 08:04

## 2021-04-24 RX ADMIN — HEPARIN SODIUM 5000 UNITS: 5000 INJECTION INTRAVENOUS; SUBCUTANEOUS at 08:04

## 2021-04-24 RX ADMIN — KETOCONAZOLE 100 MG: 200 TABLET ORAL at 08:04

## 2021-04-24 RX ADMIN — OXYCODONE HYDROCHLORIDE 10 MG: 10 TABLET ORAL at 06:04

## 2021-04-24 RX ADMIN — MAGNESIUM OXIDE 400 MG (241.3 MG MAGNESIUM) TABLET 400 MG: TABLET at 08:04

## 2021-04-24 RX ADMIN — MYCOPHENOLATE MOFETIL 1000 MG: 250 CAPSULE ORAL at 08:04

## 2021-04-24 RX ADMIN — VANCOMYCIN HYDROCHLORIDE 1000 MG: 1 INJECTION, POWDER, LYOPHILIZED, FOR SOLUTION INTRAVENOUS at 06:04

## 2021-04-24 RX ADMIN — NICOTINE 1 PATCH: 14 PATCH TRANSDERMAL at 08:04

## 2021-04-24 RX ADMIN — METHOCARBAMOL 500 MG: 500 TABLET ORAL at 08:04

## 2021-04-24 RX ADMIN — ASPIRIN 81 MG CHEWABLE TABLET 81 MG: 81 TABLET CHEWABLE at 08:04

## 2021-04-24 RX ADMIN — VALGANCICLOVIR 450 MG: 450 TABLET, FILM COATED ORAL at 08:04

## 2021-04-24 RX ADMIN — HEPARIN SODIUM 5000 UNITS: 5000 INJECTION INTRAVENOUS; SUBCUTANEOUS at 06:04

## 2021-04-24 RX ADMIN — TACROLIMUS 8 MG: 1 CAPSULE ORAL at 08:04

## 2021-04-24 RX ADMIN — TACROLIMUS 7 MG: 1 CAPSULE ORAL at 05:04

## 2021-04-24 RX ADMIN — FUROSEMIDE 40 MG: 10 INJECTION, SOLUTION INTRAMUSCULAR; INTRAVENOUS at 12:04

## 2021-04-24 RX ADMIN — HEPARIN SODIUM 5000 UNITS: 5000 INJECTION INTRAVENOUS; SUBCUTANEOUS at 02:04

## 2021-04-24 RX ADMIN — OXYCODONE HYDROCHLORIDE 15 MG: 10 TABLET ORAL at 02:04

## 2021-04-24 RX ADMIN — NYSTATIN 500000 UNITS: 500000 SUSPENSION ORAL at 01:04

## 2021-04-24 RX ADMIN — NYSTATIN 500000 UNITS: 500000 SUSPENSION ORAL at 05:04

## 2021-04-24 RX ADMIN — GABAPENTIN 600 MG: 300 CAPSULE ORAL at 03:04

## 2021-04-24 RX ADMIN — OXYCODONE HYDROCHLORIDE 10 MG: 10 TABLET ORAL at 01:04

## 2021-04-24 RX ADMIN — PREDNISONE 15 MG: 5 TABLET ORAL at 08:04

## 2021-04-24 RX ADMIN — METHOCARBAMOL 500 MG: 500 TABLET ORAL at 03:04

## 2021-04-24 RX ADMIN — SULFAMETHOXAZOLE AND TRIMETHOPRIM 1 TABLET: 400; 80 TABLET ORAL at 08:04

## 2021-04-25 LAB
ALBUMIN SERPL BCP-MCNC: 2.9 G/DL (ref 3.5–5.2)
ALP SERPL-CCNC: 172 U/L (ref 55–135)
ALT SERPL W/O P-5'-P-CCNC: 30 U/L (ref 10–44)
ANION GAP SERPL CALC-SCNC: 7 MMOL/L (ref 8–16)
AST SERPL-CCNC: 17 U/L (ref 10–40)
BASOPHILS # BLD AUTO: 0.08 K/UL (ref 0–0.2)
BASOPHILS NFR BLD: 0.7 % (ref 0–1.9)
BILIRUB SERPL-MCNC: 1.3 MG/DL (ref 0.1–1)
BUN SERPL-MCNC: 22 MG/DL (ref 6–20)
CALCIUM SERPL-MCNC: 8.4 MG/DL (ref 8.7–10.5)
CHLORIDE SERPL-SCNC: 100 MMOL/L (ref 95–110)
CO2 SERPL-SCNC: 24 MMOL/L (ref 23–29)
CREAT SERPL-MCNC: 1 MG/DL (ref 0.5–1.4)
DIFFERENTIAL METHOD: ABNORMAL
EOSINOPHIL # BLD AUTO: 0.1 K/UL (ref 0–0.5)
EOSINOPHIL NFR BLD: 1.1 % (ref 0–8)
ERYTHROCYTE [DISTWIDTH] IN BLOOD BY AUTOMATED COUNT: 19.2 % (ref 11.5–14.5)
EST. GFR  (AFRICAN AMERICAN): >60 ML/MIN/1.73 M^2
EST. GFR  (NON AFRICAN AMERICAN): >60 ML/MIN/1.73 M^2
GLUCOSE SERPL-MCNC: 90 MG/DL (ref 70–110)
HCT VFR BLD AUTO: 26.6 % (ref 40–54)
HGB BLD-MCNC: 8.6 G/DL (ref 14–18)
IMM GRANULOCYTES # BLD AUTO: 0.12 K/UL (ref 0–0.04)
IMM GRANULOCYTES NFR BLD AUTO: 1 % (ref 0–0.5)
LYMPHOCYTES # BLD AUTO: 2 K/UL (ref 1–4.8)
LYMPHOCYTES NFR BLD: 15.9 % (ref 18–48)
MAGNESIUM SERPL-MCNC: 1.8 MG/DL (ref 1.6–2.6)
MCH RBC QN AUTO: 35 PG (ref 27–31)
MCHC RBC AUTO-ENTMCNC: 32.3 G/DL (ref 32–36)
MCV RBC AUTO: 108 FL (ref 82–98)
MONOCYTES # BLD AUTO: 1.5 K/UL (ref 0.3–1)
MONOCYTES NFR BLD: 12.3 % (ref 4–15)
NEUTROPHILS # BLD AUTO: 8.5 K/UL (ref 1.8–7.7)
NEUTROPHILS NFR BLD: 69 % (ref 38–73)
NRBC BLD-RTO: 0 /100 WBC
PHOSPHATE SERPL-MCNC: 5.2 MG/DL (ref 2.7–4.5)
PLATELET # BLD AUTO: 311 K/UL (ref 150–450)
PMV BLD AUTO: 9.4 FL (ref 9.2–12.9)
POTASSIUM SERPL-SCNC: 5.1 MMOL/L (ref 3.5–5.1)
PROT SERPL-MCNC: 5.4 G/DL (ref 6–8.4)
RBC # BLD AUTO: 2.46 M/UL (ref 4.6–6.2)
SODIUM SERPL-SCNC: 131 MMOL/L (ref 136–145)
TACROLIMUS BLD-MCNC: 12.5 NG/ML (ref 5–15)
VANCOMYCIN TROUGH SERPL-MCNC: 23.2 UG/ML (ref 10–22)
WBC # BLD AUTO: 12.23 K/UL (ref 3.9–12.7)

## 2021-04-25 PROCEDURE — 25000003 PHARM REV CODE 250: Performed by: PHYSICIAN ASSISTANT

## 2021-04-25 PROCEDURE — 80202 ASSAY OF VANCOMYCIN: CPT | Performed by: TRANSPLANT SURGERY

## 2021-04-25 PROCEDURE — 63600175 PHARM REV CODE 636 W HCPCS: Performed by: STUDENT IN AN ORGANIZED HEALTH CARE EDUCATION/TRAINING PROGRAM

## 2021-04-25 PROCEDURE — 25000003 PHARM REV CODE 250: Performed by: CLINICAL NURSE SPECIALIST

## 2021-04-25 PROCEDURE — 25000003 PHARM REV CODE 250: Performed by: STUDENT IN AN ORGANIZED HEALTH CARE EDUCATION/TRAINING PROGRAM

## 2021-04-25 PROCEDURE — 20600001 HC STEP DOWN PRIVATE ROOM

## 2021-04-25 PROCEDURE — 63600175 PHARM REV CODE 636 W HCPCS: Performed by: NURSE PRACTITIONER

## 2021-04-25 PROCEDURE — 84100 ASSAY OF PHOSPHORUS: CPT | Performed by: PHYSICIAN ASSISTANT

## 2021-04-25 PROCEDURE — 80197 ASSAY OF TACROLIMUS: CPT | Performed by: TRANSPLANT SURGERY

## 2021-04-25 PROCEDURE — 25000003 PHARM REV CODE 250: Performed by: NURSE PRACTITIONER

## 2021-04-25 PROCEDURE — 63600175 PHARM REV CODE 636 W HCPCS: Performed by: PHYSICIAN ASSISTANT

## 2021-04-25 PROCEDURE — 63600175 PHARM REV CODE 636 W HCPCS: Performed by: CLINICAL NURSE SPECIALIST

## 2021-04-25 PROCEDURE — 80053 COMPREHEN METABOLIC PANEL: CPT | Performed by: TRANSPLANT SURGERY

## 2021-04-25 PROCEDURE — 85025 COMPLETE CBC W/AUTO DIFF WBC: CPT | Performed by: TRANSPLANT SURGERY

## 2021-04-25 PROCEDURE — 83735 ASSAY OF MAGNESIUM: CPT | Performed by: TRANSPLANT SURGERY

## 2021-04-25 PROCEDURE — S4991 NICOTINE PATCH NONLEGEND: HCPCS | Performed by: PHYSICIAN ASSISTANT

## 2021-04-25 PROCEDURE — 25000003 PHARM REV CODE 250: Performed by: SURGERY

## 2021-04-25 RX ORDER — TACROLIMUS 1 MG/1
6 CAPSULE ORAL 2 TIMES DAILY
Status: DISCONTINUED | OUTPATIENT
Start: 2021-04-26 | End: 2021-04-26 | Stop reason: HOSPADM

## 2021-04-25 RX ORDER — OXYCODONE HYDROCHLORIDE 10 MG/1
10 TABLET ORAL EVERY 6 HOURS PRN
Status: DISCONTINUED | OUTPATIENT
Start: 2021-04-25 | End: 2021-04-26 | Stop reason: HOSPADM

## 2021-04-25 RX ADMIN — HEPARIN SODIUM 5000 UNITS: 5000 INJECTION INTRAVENOUS; SUBCUTANEOUS at 09:04

## 2021-04-25 RX ADMIN — TACROLIMUS 7 MG: 1 CAPSULE ORAL at 08:04

## 2021-04-25 RX ADMIN — GABAPENTIN 600 MG: 300 CAPSULE ORAL at 08:04

## 2021-04-25 RX ADMIN — OXYCODONE HYDROCHLORIDE 15 MG: 10 TABLET ORAL at 01:04

## 2021-04-25 RX ADMIN — NYSTATIN 500000 UNITS: 500000 SUSPENSION ORAL at 08:04

## 2021-04-25 RX ADMIN — OXYCODONE HYDROCHLORIDE 10 MG: 10 TABLET ORAL at 04:04

## 2021-04-25 RX ADMIN — KETOCONAZOLE 100 MG: 200 TABLET ORAL at 08:04

## 2021-04-25 RX ADMIN — OXYCODONE HYDROCHLORIDE 10 MG: 10 TABLET ORAL at 07:04

## 2021-04-25 RX ADMIN — VANCOMYCIN HYDROCHLORIDE 1000 MG: 1 INJECTION, POWDER, LYOPHILIZED, FOR SOLUTION INTRAVENOUS at 06:04

## 2021-04-25 RX ADMIN — MYCOPHENOLATE MOFETIL 1000 MG: 250 CAPSULE ORAL at 08:04

## 2021-04-25 RX ADMIN — MAGNESIUM OXIDE 400 MG (241.3 MG MAGNESIUM) TABLET 400 MG: TABLET at 08:04

## 2021-04-25 RX ADMIN — FAMOTIDINE 20 MG: 20 TABLET ORAL at 08:04

## 2021-04-25 RX ADMIN — NYSTATIN 500000 UNITS: 500000 SUSPENSION ORAL at 01:04

## 2021-04-25 RX ADMIN — NYSTATIN 500000 UNITS: 500000 SUSPENSION ORAL at 06:04

## 2021-04-25 RX ADMIN — SULFAMETHOXAZOLE AND TRIMETHOPRIM 1 TABLET: 400; 80 TABLET ORAL at 08:04

## 2021-04-25 RX ADMIN — METHOCARBAMOL 500 MG: 500 TABLET ORAL at 08:04

## 2021-04-25 RX ADMIN — HEPARIN SODIUM 5000 UNITS: 5000 INJECTION INTRAVENOUS; SUBCUTANEOUS at 05:04

## 2021-04-25 RX ADMIN — OXYCODONE HYDROCHLORIDE 15 MG: 10 TABLET ORAL at 12:04

## 2021-04-25 RX ADMIN — VALGANCICLOVIR 450 MG: 450 TABLET, FILM COATED ORAL at 08:04

## 2021-04-25 RX ADMIN — NICOTINE 1 PATCH: 14 PATCH TRANSDERMAL at 08:04

## 2021-04-25 RX ADMIN — GABAPENTIN 600 MG: 300 CAPSULE ORAL at 02:04

## 2021-04-25 RX ADMIN — METHOCARBAMOL 500 MG: 500 TABLET ORAL at 02:04

## 2021-04-25 RX ADMIN — VANCOMYCIN HYDROCHLORIDE 750 MG: 750 INJECTION, POWDER, LYOPHILIZED, FOR SOLUTION INTRAVENOUS at 06:04

## 2021-04-25 RX ADMIN — ASPIRIN 81 MG CHEWABLE TABLET 81 MG: 81 TABLET CHEWABLE at 08:04

## 2021-04-25 RX ADMIN — HEPARIN SODIUM 5000 UNITS: 5000 INJECTION INTRAVENOUS; SUBCUTANEOUS at 02:04

## 2021-04-25 RX ADMIN — PREDNISONE 15 MG: 5 TABLET ORAL at 08:04

## 2021-04-26 ENCOUNTER — PATIENT MESSAGE (OUTPATIENT)
Dept: RESEARCH | Facility: HOSPITAL | Age: 41
End: 2021-04-26

## 2021-04-26 VITALS
HEART RATE: 98 BPM | BODY MASS INDEX: 28.92 KG/M2 | WEIGHT: 225.31 LBS | SYSTOLIC BLOOD PRESSURE: 116 MMHG | HEIGHT: 74 IN | OXYGEN SATURATION: 99 % | TEMPERATURE: 99 F | DIASTOLIC BLOOD PRESSURE: 81 MMHG | RESPIRATION RATE: 16 BRPM

## 2021-04-26 DIAGNOSIS — Z94.4 LIVER REPLACED BY TRANSPLANT: Primary | ICD-10-CM

## 2021-04-26 LAB
ACANTHOCYTES BLD QL SMEAR: ABNORMAL
ALBUMIN SERPL BCP-MCNC: 2.7 G/DL (ref 3.5–5.2)
ALP SERPL-CCNC: 155 U/L (ref 55–135)
ALT SERPL W/O P-5'-P-CCNC: 25 U/L (ref 10–44)
ANION GAP SERPL CALC-SCNC: 6 MMOL/L (ref 8–16)
ANISOCYTOSIS BLD QL SMEAR: ABNORMAL
AST SERPL-CCNC: 16 U/L (ref 10–40)
AUER BODIES BLD QL SMEAR: ABNORMAL
BASO STIPL BLD QL SMEAR: ABNORMAL
BASOPHILS # BLD AUTO: 0.1 K/UL (ref 0–0.2)
BASOPHILS # BLD AUTO: ABNORMAL K/UL (ref 0–0.2)
BASOPHILS NFR BLD: 0.9 % (ref 0–1.9)
BASOPHILS NFR BLD: ABNORMAL % (ref 0–1.9)
BILIRUB SERPL-MCNC: 1.1 MG/DL (ref 0.1–1)
BILIRUB UR QL STRIP: NEGATIVE
BLASTS NFR BLD MANUAL: ABNORMAL %
BUN SERPL-MCNC: 22 MG/DL (ref 6–20)
BURR CELLS BLD QL SMEAR: ABNORMAL
CABOT RINGS BLD QL SMEAR: ABNORMAL
CALCIUM SERPL-MCNC: 8.4 MG/DL (ref 8.7–10.5)
CHLORIDE SERPL-SCNC: 102 MMOL/L (ref 95–110)
CLARITY UR REFRACT.AUTO: CLEAR
CO2 SERPL-SCNC: 24 MMOL/L (ref 23–29)
COLOR UR AUTO: NORMAL
CREAT SERPL-MCNC: 1 MG/DL (ref 0.5–1.4)
DACRYOCYTES BLD QL SMEAR: ABNORMAL
DIFFERENTIAL METHOD: ABNORMAL
DIFFERENTIAL METHOD: ABNORMAL
DOHLE BOD BLD QL SMEAR: ABNORMAL
EOSINOPHIL # BLD AUTO: 0.2 K/UL (ref 0–0.5)
EOSINOPHIL # BLD AUTO: ABNORMAL K/UL (ref 0–0.5)
EOSINOPHIL NFR BLD: 1.9 % (ref 0–8)
EOSINOPHIL NFR BLD: ABNORMAL % (ref 0–8)
ERYTHROCYTE [DISTWIDTH] IN BLOOD BY AUTOMATED COUNT: 18.8 % (ref 11.5–14.5)
ERYTHROCYTE [DISTWIDTH] IN BLOOD BY AUTOMATED COUNT: ABNORMAL % (ref 11.5–14.5)
EST. GFR  (AFRICAN AMERICAN): >60 ML/MIN/1.73 M^2
EST. GFR  (NON AFRICAN AMERICAN): >60 ML/MIN/1.73 M^2
GIANT PLATELETS BLD QL SMEAR: ABNORMAL
GLUCOSE SERPL-MCNC: 107 MG/DL (ref 70–110)
GLUCOSE UR QL STRIP: NEGATIVE
HCT VFR BLD AUTO: 25.3 % (ref 40–54)
HCT VFR BLD AUTO: ABNORMAL % (ref 40–54)
HEINZ BOD BLD QL SMEAR: ABNORMAL
HGB BLD-MCNC: 8.3 G/DL (ref 14–18)
HGB BLD-MCNC: ABNORMAL G/DL (ref 14–18)
HGB C CRY RBC QL MICRO: ABNORMAL
HGB UR QL STRIP: NEGATIVE
HOWELL-JOLLY BOD BLD QL SMEAR: ABNORMAL
HYPOCHROMIA BLD QL SMEAR: ABNORMAL
IMM GRANULOCYTES # BLD AUTO: 0.14 K/UL (ref 0–0.04)
IMM GRANULOCYTES # BLD AUTO: ABNORMAL K/UL (ref 0–0.04)
IMM GRANULOCYTES NFR BLD AUTO: 1.3 % (ref 0–0.5)
IMM GRANULOCYTES NFR BLD AUTO: ABNORMAL % (ref 0–0.5)
KETONES UR QL STRIP: NEGATIVE
LEUKOCYTE ESTERASE UR QL STRIP: NEGATIVE
LYMPHOCYTES # BLD AUTO: 1.7 K/UL (ref 1–4.8)
LYMPHOCYTES # BLD AUTO: ABNORMAL K/UL (ref 1–4.8)
LYMPHOCYTES NFR BLD: 15.5 % (ref 18–48)
LYMPHOCYTES NFR BLD: ABNORMAL % (ref 18–48)
MAGNESIUM SERPL-MCNC: 1.7 MG/DL (ref 1.6–2.6)
MCH RBC QN AUTO: 35.2 PG (ref 27–31)
MCH RBC QN AUTO: ABNORMAL PG (ref 27–31)
MCHC RBC AUTO-ENTMCNC: 32.8 G/DL (ref 32–36)
MCHC RBC AUTO-ENTMCNC: ABNORMAL G/DL (ref 32–36)
MCV RBC AUTO: 107 FL (ref 82–98)
MCV RBC AUTO: ABNORMAL FL (ref 82–98)
MEGAKARYOCYTIC FRAGMENTS: ABNORMAL
METAMYELOCYTES NFR BLD MANUAL: ABNORMAL %
MONOCYTES # BLD AUTO: 1.3 K/UL (ref 0.3–1)
MONOCYTES # BLD AUTO: ABNORMAL K/UL (ref 0.3–1)
MONOCYTES NFR BLD: 12.2 % (ref 4–15)
MONOCYTES NFR BLD: ABNORMAL % (ref 4–15)
MYELOCYTES NFR BLD MANUAL: ABNORMAL %
NEUTROPHILS # BLD AUTO: 7.5 K/UL (ref 1.8–7.7)
NEUTROPHILS # BLD AUTO: ABNORMAL K/UL (ref 1.8–7.7)
NEUTROPHILS NFR BLD: 68.2 % (ref 38–73)
NEUTROPHILS NFR BLD: ABNORMAL % (ref 38–73)
NEUTS BAND NFR BLD MANUAL: ABNORMAL %
NITRITE UR QL STRIP: NEGATIVE
NRBC BLD-RTO: 0 /100 WBC
NRBC BLD-RTO: ABNORMAL /100 WBC
OVALOCYTES BLD QL SMEAR: ABNORMAL
PAPPENHEIMER BOD BLD QL SMEAR: ABNORMAL
PH UR STRIP: 6 [PH] (ref 5–8)
PHOSPHATE SERPL-MCNC: 4.9 MG/DL (ref 2.7–4.5)
PLASMODIUM BLD QL SMEAR: ABNORMAL
PLATELET # BLD AUTO: 281 K/UL (ref 150–450)
PLATELET # BLD AUTO: ABNORMAL K/UL (ref 150–450)
PLATELET BLD QL SMEAR: ABNORMAL
PMV BLD AUTO: 8.9 FL (ref 9.2–12.9)
PMV BLD AUTO: ABNORMAL FL (ref 9.2–12.9)
POIKILOCYTOSIS BLD QL SMEAR: ABNORMAL
POLYCHROMASIA BLD QL SMEAR: ABNORMAL
POTASSIUM SERPL-SCNC: 5.4 MMOL/L (ref 3.5–5.1)
PROMYELOCYTES NFR BLD MANUAL: ABNORMAL %
PROT SERPL-MCNC: 5 G/DL (ref 6–8.4)
PROT UR QL STRIP: NEGATIVE
RBC # BLD AUTO: 2.36 M/UL (ref 4.6–6.2)
RBC # BLD AUTO: ABNORMAL M/UL (ref 4.6–6.2)
RBC AGGLUT BLD QL: ABNORMAL
ROULEAUX BLD QL SMEAR: ABNORMAL
SCHISTOCYTES BLD QL SMEAR: ABNORMAL
SCHISTOCYTES BLD QL SMEAR: ABNORMAL
SICKLE CELLS BLD QL SMEAR: ABNORMAL
SMUDGE CELLS BLD QL SMEAR: ABNORMAL
SODIUM SERPL-SCNC: 132 MMOL/L (ref 136–145)
SP GR UR STRIP: 1 (ref 1–1.03)
SPHEROCYTES BLD QL SMEAR: ABNORMAL
STOMATOCYTES BLD QL SMEAR: ABNORMAL
TACROLIMUS BLD-MCNC: 7.5 NG/ML (ref 5–15)
TARGETS BLD QL SMEAR: ABNORMAL
TOXIC GRANULES BLD QL SMEAR: ABNORMAL
URN SPEC COLLECT METH UR: NORMAL
WBC # BLD AUTO: 10.99 K/UL (ref 3.9–12.7)
WBC # BLD AUTO: ABNORMAL K/UL (ref 3.9–12.7)
WBC NRBC COR # BLD: ABNORMAL K/UL (ref 3.9–12.7)
WBC OTHER NFR BLD MANUAL: ABNORMAL %
WBC TOXIC VACUOLES BLD QL SMEAR: ABNORMAL

## 2021-04-26 PROCEDURE — 36415 COLL VENOUS BLD VENIPUNCTURE: CPT | Performed by: TRANSPLANT SURGERY

## 2021-04-26 PROCEDURE — 80053 COMPREHEN METABOLIC PANEL: CPT | Performed by: TRANSPLANT SURGERY

## 2021-04-26 PROCEDURE — 63600175 PHARM REV CODE 636 W HCPCS: Performed by: STUDENT IN AN ORGANIZED HEALTH CARE EDUCATION/TRAINING PROGRAM

## 2021-04-26 PROCEDURE — 84100 ASSAY OF PHOSPHORUS: CPT | Performed by: PHYSICIAN ASSISTANT

## 2021-04-26 PROCEDURE — 85025 COMPLETE CBC W/AUTO DIFF WBC: CPT | Performed by: TRANSPLANT SURGERY

## 2021-04-26 PROCEDURE — 25000003 PHARM REV CODE 250: Performed by: STUDENT IN AN ORGANIZED HEALTH CARE EDUCATION/TRAINING PROGRAM

## 2021-04-26 PROCEDURE — 25000003 PHARM REV CODE 250: Performed by: NURSE PRACTITIONER

## 2021-04-26 PROCEDURE — 83735 ASSAY OF MAGNESIUM: CPT | Performed by: TRANSPLANT SURGERY

## 2021-04-26 PROCEDURE — 25000003 PHARM REV CODE 250: Performed by: SURGERY

## 2021-04-26 PROCEDURE — 25000003 PHARM REV CODE 250: Performed by: CLINICAL NURSE SPECIALIST

## 2021-04-26 PROCEDURE — 81003 URINALYSIS AUTO W/O SCOPE: CPT | Performed by: NURSE PRACTITIONER

## 2021-04-26 PROCEDURE — 99239 PR HOSPITAL DISCHARGE DAY,>30 MIN: ICD-10-PCS | Mod: 24,,, | Performed by: NURSE PRACTITIONER

## 2021-04-26 PROCEDURE — S4991 NICOTINE PATCH NONLEGEND: HCPCS | Performed by: PHYSICIAN ASSISTANT

## 2021-04-26 PROCEDURE — 80197 ASSAY OF TACROLIMUS: CPT | Performed by: TRANSPLANT SURGERY

## 2021-04-26 PROCEDURE — 63600175 PHARM REV CODE 636 W HCPCS: Performed by: NURSE PRACTITIONER

## 2021-04-26 PROCEDURE — 99239 HOSP IP/OBS DSCHRG MGMT >30: CPT | Mod: 24,,, | Performed by: NURSE PRACTITIONER

## 2021-04-26 PROCEDURE — 25000003 PHARM REV CODE 250: Performed by: PHYSICIAN ASSISTANT

## 2021-04-26 PROCEDURE — 36415 COLL VENOUS BLD VENIPUNCTURE: CPT | Performed by: PHYSICIAN ASSISTANT

## 2021-04-26 RX ORDER — FUROSEMIDE 40 MG/1
40 TABLET ORAL 2 TIMES DAILY
Status: DISCONTINUED | OUTPATIENT
Start: 2021-04-26 | End: 2021-04-26 | Stop reason: HOSPADM

## 2021-04-26 RX ORDER — IBUPROFEN 200 MG
1 TABLET ORAL DAILY
Qty: 28 PATCH | Refills: 2 | Status: SHIPPED | OUTPATIENT
Start: 2021-04-26

## 2021-04-26 RX ORDER — CEPHALEXIN 500 MG/1
500 CAPSULE ORAL EVERY 8 HOURS
Qty: 21 CAPSULE | Refills: 0 | Status: SHIPPED | OUTPATIENT
Start: 2021-04-26 | End: 2021-05-04

## 2021-04-26 RX ORDER — FOLIC ACID 1 MG/1
1 TABLET ORAL DAILY
Qty: 30 TABLET | Refills: 3 | Status: SHIPPED | OUTPATIENT
Start: 2021-04-26 | End: 2022-03-07

## 2021-04-26 RX ADMIN — FUROSEMIDE 40 MG: 40 TABLET ORAL at 10:04

## 2021-04-26 RX ADMIN — ACETAMINOPHEN 650 MG: 325 TABLET ORAL at 01:04

## 2021-04-26 RX ADMIN — MYCOPHENOLATE MOFETIL 1000 MG: 250 CAPSULE ORAL at 09:04

## 2021-04-26 RX ADMIN — ACETAMINOPHEN 650 MG: 325 TABLET ORAL at 12:04

## 2021-04-26 RX ADMIN — VALGANCICLOVIR 450 MG: 450 TABLET, FILM COATED ORAL at 09:04

## 2021-04-26 RX ADMIN — ASPIRIN 81 MG CHEWABLE TABLET 81 MG: 81 TABLET CHEWABLE at 09:04

## 2021-04-26 RX ADMIN — METHOCARBAMOL 500 MG: 500 TABLET ORAL at 03:04

## 2021-04-26 RX ADMIN — NYSTATIN 500000 UNITS: 500000 SUSPENSION ORAL at 09:04

## 2021-04-26 RX ADMIN — KETOCONAZOLE 100 MG: 200 TABLET ORAL at 09:04

## 2021-04-26 RX ADMIN — NYSTATIN 500000 UNITS: 500000 SUSPENSION ORAL at 01:04

## 2021-04-26 RX ADMIN — TACROLIMUS 6 MG: 1 CAPSULE ORAL at 09:04

## 2021-04-26 RX ADMIN — SULFAMETHOXAZOLE AND TRIMETHOPRIM 1 TABLET: 400; 80 TABLET ORAL at 09:04

## 2021-04-26 RX ADMIN — CALCIUM CARBONATE (ANTACID) CHEW TAB 500 MG 500 MG: 500 CHEW TAB at 01:04

## 2021-04-26 RX ADMIN — NICOTINE 1 PATCH: 14 PATCH TRANSDERMAL at 12:04

## 2021-04-26 RX ADMIN — VANCOMYCIN HYDROCHLORIDE 750 MG: 750 INJECTION, POWDER, LYOPHILIZED, FOR SOLUTION INTRAVENOUS at 06:04

## 2021-04-26 RX ADMIN — OXYCODONE HYDROCHLORIDE 10 MG: 10 TABLET ORAL at 01:04

## 2021-04-26 RX ADMIN — PREDNISONE 15 MG: 5 TABLET ORAL at 09:04

## 2021-04-26 RX ADMIN — OXYCODONE HYDROCHLORIDE 10 MG: 10 TABLET ORAL at 07:04

## 2021-04-26 RX ADMIN — GABAPENTIN 600 MG: 300 CAPSULE ORAL at 03:04

## 2021-04-26 RX ADMIN — GABAPENTIN 600 MG: 300 CAPSULE ORAL at 09:04

## 2021-04-26 RX ADMIN — MAGNESIUM OXIDE 400 MG (241.3 MG MAGNESIUM) TABLET 400 MG: TABLET at 09:04

## 2021-04-26 RX ADMIN — METHOCARBAMOL 500 MG: 500 TABLET ORAL at 09:04

## 2021-04-27 ENCOUNTER — LAB VISIT (OUTPATIENT)
Dept: LAB | Facility: HOSPITAL | Age: 41
End: 2021-04-27
Attending: SURGERY
Payer: MEDICAID

## 2021-04-27 ENCOUNTER — CLINICAL SUPPORT (OUTPATIENT)
Dept: TRANSPLANT | Facility: CLINIC | Age: 41
End: 2021-04-27
Payer: MEDICAID

## 2021-04-27 ENCOUNTER — PATIENT MESSAGE (OUTPATIENT)
Dept: TRANSPLANT | Facility: CLINIC | Age: 41
End: 2021-04-27

## 2021-04-27 VITALS
WEIGHT: 233.94 LBS | SYSTOLIC BLOOD PRESSURE: 123 MMHG | RESPIRATION RATE: 16 BRPM | OXYGEN SATURATION: 98 % | OXYGEN SATURATION: 98 % | TEMPERATURE: 98 F | BODY MASS INDEX: 30.02 KG/M2 | DIASTOLIC BLOOD PRESSURE: 72 MMHG | HEART RATE: 115 BPM | HEIGHT: 74 IN | WEIGHT: 233.94 LBS | HEART RATE: 115 BPM | SYSTOLIC BLOOD PRESSURE: 123 MMHG | HEIGHT: 74 IN | DIASTOLIC BLOOD PRESSURE: 72 MMHG | TEMPERATURE: 98 F | RESPIRATION RATE: 16 BRPM | BODY MASS INDEX: 30.02 KG/M2

## 2021-04-27 DIAGNOSIS — Z94.4 LIVER REPLACED BY TRANSPLANT: ICD-10-CM

## 2021-04-27 DIAGNOSIS — Z94.4 LIVER REPLACED BY TRANSPLANT: Primary | ICD-10-CM

## 2021-04-27 LAB
ALBUMIN SERPL BCP-MCNC: 2.7 G/DL (ref 3.5–5.2)
ALP SERPL-CCNC: 142 U/L (ref 55–135)
ALT SERPL W/O P-5'-P-CCNC: 19 U/L (ref 10–44)
ANION GAP SERPL CALC-SCNC: 5 MMOL/L (ref 8–16)
AST SERPL-CCNC: 13 U/L (ref 10–40)
BASOPHILS # BLD AUTO: 0.06 K/UL (ref 0–0.2)
BASOPHILS NFR BLD: 0.6 % (ref 0–1.9)
BILIRUB DIRECT SERPL-MCNC: 0.8 MG/DL (ref 0.1–0.3)
BILIRUB SERPL-MCNC: 1.2 MG/DL (ref 0.1–1)
BUN SERPL-MCNC: 20 MG/DL (ref 6–20)
CALCIUM SERPL-MCNC: 8.7 MG/DL (ref 8.7–10.5)
CHLORIDE SERPL-SCNC: 104 MMOL/L (ref 95–110)
CO2 SERPL-SCNC: 27 MMOL/L (ref 23–29)
CREAT SERPL-MCNC: 0.9 MG/DL (ref 0.5–1.4)
DIFFERENTIAL METHOD: ABNORMAL
EOSINOPHIL # BLD AUTO: 0.1 K/UL (ref 0–0.5)
EOSINOPHIL NFR BLD: 1.4 % (ref 0–8)
ERYTHROCYTE [DISTWIDTH] IN BLOOD BY AUTOMATED COUNT: 18.7 % (ref 11.5–14.5)
EST. GFR  (AFRICAN AMERICAN): >60 ML/MIN/1.73 M^2
EST. GFR  (NON AFRICAN AMERICAN): >60 ML/MIN/1.73 M^2
GLUCOSE SERPL-MCNC: 92 MG/DL (ref 70–110)
HCT VFR BLD AUTO: 25.3 % (ref 40–54)
HGB BLD-MCNC: 8.3 G/DL (ref 14–18)
IMM GRANULOCYTES # BLD AUTO: 0.1 K/UL (ref 0–0.04)
IMM GRANULOCYTES NFR BLD AUTO: 1 % (ref 0–0.5)
LYMPHOCYTES # BLD AUTO: 1.6 K/UL (ref 1–4.8)
LYMPHOCYTES NFR BLD: 15.9 % (ref 18–48)
MCH RBC QN AUTO: 34.6 PG (ref 27–31)
MCHC RBC AUTO-ENTMCNC: 32.8 G/DL (ref 32–36)
MCV RBC AUTO: 105 FL (ref 82–98)
MONOCYTES # BLD AUTO: 1.1 K/UL (ref 0.3–1)
MONOCYTES NFR BLD: 10.9 % (ref 4–15)
NEUTROPHILS # BLD AUTO: 6.9 K/UL (ref 1.8–7.7)
NEUTROPHILS NFR BLD: 70.2 % (ref 38–73)
NRBC BLD-RTO: 0 /100 WBC
PLATELET # BLD AUTO: 313 K/UL (ref 150–450)
PMV BLD AUTO: 8.7 FL (ref 9.2–12.9)
POTASSIUM SERPL-SCNC: 5.3 MMOL/L (ref 3.5–5.1)
PROT SERPL-MCNC: 5.1 G/DL (ref 6–8.4)
RBC # BLD AUTO: 2.4 M/UL (ref 4.6–6.2)
SODIUM SERPL-SCNC: 136 MMOL/L (ref 136–145)
TACROLIMUS BLD-MCNC: 10.4 NG/ML (ref 5–15)
WBC # BLD AUTO: 9.88 K/UL (ref 3.9–12.7)

## 2021-04-27 PROCEDURE — 36415 COLL VENOUS BLD VENIPUNCTURE: CPT | Performed by: SURGERY

## 2021-04-27 PROCEDURE — 99999 PR PBB SHADOW E&M-EST. PATIENT-LVL III: ICD-10-PCS | Mod: PBBFAC,,,

## 2021-04-27 PROCEDURE — 99213 OFFICE O/P EST LOW 20 MIN: CPT | Mod: PBBFAC

## 2021-04-27 PROCEDURE — 99213 OFFICE O/P EST LOW 20 MIN: CPT | Mod: PBBFAC,27

## 2021-04-27 PROCEDURE — 80197 ASSAY OF TACROLIMUS: CPT | Performed by: SURGERY

## 2021-04-27 PROCEDURE — 82248 BILIRUBIN DIRECT: CPT | Performed by: SURGERY

## 2021-04-27 PROCEDURE — 85025 COMPLETE CBC W/AUTO DIFF WBC: CPT | Performed by: SURGERY

## 2021-04-27 PROCEDURE — 99999 PR PBB SHADOW E&M-EST. PATIENT-LVL III: CPT | Mod: PBBFAC,,,

## 2021-04-27 PROCEDURE — 80053 COMPREHEN METABOLIC PANEL: CPT | Performed by: SURGERY

## 2021-04-28 ENCOUNTER — OFFICE VISIT (OUTPATIENT)
Dept: TRANSPLANT | Facility: CLINIC | Age: 41
End: 2021-04-28
Payer: MEDICAID

## 2021-04-28 ENCOUNTER — TELEPHONE (OUTPATIENT)
Dept: TRANSPLANT | Facility: CLINIC | Age: 41
End: 2021-04-28

## 2021-04-28 VITALS
SYSTOLIC BLOOD PRESSURE: 112 MMHG | RESPIRATION RATE: 18 BRPM | BODY MASS INDEX: 30.56 KG/M2 | WEIGHT: 238.13 LBS | DIASTOLIC BLOOD PRESSURE: 62 MMHG | TEMPERATURE: 99 F | HEART RATE: 109 BPM | OXYGEN SATURATION: 100 % | HEIGHT: 74 IN

## 2021-04-28 DIAGNOSIS — Z51.81 ENCOUNTER FOR THERAPEUTIC DRUG MONITORING: Primary | ICD-10-CM

## 2021-04-28 DIAGNOSIS — Z79.899 ENCOUNTER FOR LONG-TERM (CURRENT) USE OF OTHER MEDICATIONS: ICD-10-CM

## 2021-04-28 DIAGNOSIS — Z94.4 LIVER REPLACED BY TRANSPLANT: ICD-10-CM

## 2021-04-28 PROCEDURE — 99999 PR PBB SHADOW E&M-EST. PATIENT-LVL IV: ICD-10-PCS | Mod: PBBFAC,,,

## 2021-04-28 PROCEDURE — 99214 OFFICE O/P EST MOD 30 MIN: CPT | Mod: PBBFAC

## 2021-04-28 PROCEDURE — 99215 OFFICE O/P EST HI 40 MIN: CPT | Mod: 24,S$PBB,, | Performed by: TRANSPLANT SURGERY

## 2021-04-28 PROCEDURE — 99215 PR OFFICE/OUTPT VISIT, EST, LEVL V, 40-54 MIN: ICD-10-PCS | Mod: 24,S$PBB,, | Performed by: TRANSPLANT SURGERY

## 2021-04-28 PROCEDURE — 99999 PR PBB SHADOW E&M-EST. PATIENT-LVL IV: CPT | Mod: PBBFAC,,,

## 2021-04-28 RX ORDER — TACROLIMUS 1 MG/1
5 CAPSULE ORAL EVERY 12 HOURS
Qty: 360 CAPSULE | Refills: 11 | Status: SHIPPED | OUTPATIENT
Start: 2021-04-28 | End: 2021-04-29 | Stop reason: DRUGHIGH

## 2021-04-29 ENCOUNTER — PATIENT MESSAGE (OUTPATIENT)
Dept: TRANSPLANT | Facility: CLINIC | Age: 41
End: 2021-04-29

## 2021-04-29 ENCOUNTER — LAB VISIT (OUTPATIENT)
Dept: LAB | Facility: HOSPITAL | Age: 41
End: 2021-04-29
Attending: SURGERY
Payer: MEDICAID

## 2021-04-29 DIAGNOSIS — Z94.4 LIVER REPLACED BY TRANSPLANT: ICD-10-CM

## 2021-04-29 LAB
ALBUMIN SERPL BCP-MCNC: 2.7 G/DL (ref 3.5–5.2)
ALP SERPL-CCNC: 128 U/L (ref 55–135)
ALT SERPL W/O P-5'-P-CCNC: 13 U/L (ref 10–44)
ANION GAP SERPL CALC-SCNC: 6 MMOL/L (ref 8–16)
AST SERPL-CCNC: 10 U/L (ref 10–40)
BASOPHILS # BLD AUTO: 0.07 K/UL (ref 0–0.2)
BASOPHILS NFR BLD: 0.8 % (ref 0–1.9)
BILIRUB SERPL-MCNC: 1.1 MG/DL (ref 0.1–1)
BUN SERPL-MCNC: 18 MG/DL (ref 6–20)
CALCIUM SERPL-MCNC: 8.7 MG/DL (ref 8.7–10.5)
CHLORIDE SERPL-SCNC: 102 MMOL/L (ref 95–110)
CO2 SERPL-SCNC: 27 MMOL/L (ref 23–29)
CREAT SERPL-MCNC: 0.9 MG/DL (ref 0.5–1.4)
DIFFERENTIAL METHOD: ABNORMAL
EOSINOPHIL # BLD AUTO: 0.2 K/UL (ref 0–0.5)
EOSINOPHIL NFR BLD: 2 % (ref 0–8)
ERYTHROCYTE [DISTWIDTH] IN BLOOD BY AUTOMATED COUNT: 18.8 % (ref 11.5–14.5)
EST. GFR  (AFRICAN AMERICAN): >60 ML/MIN/1.73 M^2
EST. GFR  (NON AFRICAN AMERICAN): >60 ML/MIN/1.73 M^2
GLUCOSE SERPL-MCNC: 93 MG/DL (ref 70–110)
HCT VFR BLD AUTO: 24.2 % (ref 40–54)
HGB BLD-MCNC: 8 G/DL (ref 14–18)
IMM GRANULOCYTES # BLD AUTO: 0.08 K/UL (ref 0–0.04)
IMM GRANULOCYTES NFR BLD AUTO: 0.9 % (ref 0–0.5)
LYMPHOCYTES # BLD AUTO: 1.6 K/UL (ref 1–4.8)
LYMPHOCYTES NFR BLD: 18.6 % (ref 18–48)
MCH RBC QN AUTO: 35.4 PG (ref 27–31)
MCHC RBC AUTO-ENTMCNC: 33.1 G/DL (ref 32–36)
MCV RBC AUTO: 107 FL (ref 82–98)
MONOCYTES # BLD AUTO: 1 K/UL (ref 0.3–1)
MONOCYTES NFR BLD: 12 % (ref 4–15)
NEUTROPHILS # BLD AUTO: 5.7 K/UL (ref 1.8–7.7)
NEUTROPHILS NFR BLD: 65.7 % (ref 38–73)
NRBC BLD-RTO: 0 /100 WBC
PLATELET # BLD AUTO: 335 K/UL (ref 150–450)
PMV BLD AUTO: 8.7 FL (ref 9.2–12.9)
POTASSIUM SERPL-SCNC: 5.8 MMOL/L (ref 3.5–5.1)
PROT SERPL-MCNC: 5.3 G/DL (ref 6–8.4)
RBC # BLD AUTO: 2.26 M/UL (ref 4.6–6.2)
SODIUM SERPL-SCNC: 135 MMOL/L (ref 136–145)
WBC # BLD AUTO: 8.61 K/UL (ref 3.9–12.7)

## 2021-04-29 PROCEDURE — 85025 COMPLETE CBC W/AUTO DIFF WBC: CPT | Performed by: SURGERY

## 2021-04-29 PROCEDURE — 80197 ASSAY OF TACROLIMUS: CPT | Performed by: SURGERY

## 2021-04-29 PROCEDURE — 80053 COMPREHEN METABOLIC PANEL: CPT | Performed by: SURGERY

## 2021-04-29 PROCEDURE — 36415 COLL VENOUS BLD VENIPUNCTURE: CPT | Performed by: SURGERY

## 2021-04-30 ENCOUNTER — TELEPHONE (OUTPATIENT)
Dept: TRANSPLANT | Facility: CLINIC | Age: 41
End: 2021-04-30

## 2021-04-30 ENCOUNTER — PATIENT MESSAGE (OUTPATIENT)
Dept: TRANSPLANT | Facility: CLINIC | Age: 41
End: 2021-04-30

## 2021-04-30 DIAGNOSIS — Z94.4 LIVER REPLACED BY TRANSPLANT: Primary | ICD-10-CM

## 2021-04-30 RX ORDER — TACROLIMUS 1 MG/1
4 CAPSULE ORAL EVERY 12 HOURS
Qty: 240 CAPSULE | Refills: 11 | Status: SHIPPED | OUTPATIENT
Start: 2021-04-30 | End: 2021-04-30 | Stop reason: DRUGHIGH

## 2021-05-02 RX ORDER — TACROLIMUS 1 MG/1
2 CAPSULE ORAL EVERY 12 HOURS
Qty: 120 CAPSULE | Refills: 11 | Status: SHIPPED | OUTPATIENT
Start: 2021-05-02 | End: 2021-05-06 | Stop reason: DRUGHIGH

## 2021-05-03 ENCOUNTER — TELEPHONE (OUTPATIENT)
Dept: TRANSPLANT | Facility: CLINIC | Age: 41
End: 2021-05-03

## 2021-05-03 ENCOUNTER — CLINICAL SUPPORT (OUTPATIENT)
Dept: REHABILITATION | Facility: HOSPITAL | Age: 41
End: 2021-05-03
Attending: NURSE PRACTITIONER
Payer: MEDICAID

## 2021-05-03 DIAGNOSIS — Z94.4 LIVER REPLACED BY TRANSPLANT: Primary | ICD-10-CM

## 2021-05-03 DIAGNOSIS — Z94.4 STATUS POST LIVER TRANSPLANT: ICD-10-CM

## 2021-05-03 DIAGNOSIS — D62 ACUTE BLOOD LOSS ANEMIA: ICD-10-CM

## 2021-05-03 DIAGNOSIS — Z29.89 PROPHYLACTIC IMMUNOTHERAPY: ICD-10-CM

## 2021-05-03 DIAGNOSIS — L03.115 CELLULITIS OF RIGHT LOWER EXTREMITY: ICD-10-CM

## 2021-05-03 DIAGNOSIS — T38.0X5A STEROID-INDUCED HYPERGLYCEMIA: ICD-10-CM

## 2021-05-03 DIAGNOSIS — R29.3 POOR POSTURE: ICD-10-CM

## 2021-05-03 DIAGNOSIS — Z79.60 LONG-TERM USE OF IMMUNOSUPPRESSANT MEDICATION: ICD-10-CM

## 2021-05-03 DIAGNOSIS — R73.9 STEROID-INDUCED HYPERGLYCEMIA: ICD-10-CM

## 2021-05-03 DIAGNOSIS — R53.1 DECREASED STRENGTH: Primary | ICD-10-CM

## 2021-05-03 DIAGNOSIS — Z74.09 IMPAIRED FUNCTIONAL MOBILITY, BALANCE, GAIT, AND ENDURANCE: ICD-10-CM

## 2021-05-03 PROCEDURE — 97110 THERAPEUTIC EXERCISES: CPT

## 2021-05-03 PROCEDURE — 97162 PT EVAL MOD COMPLEX 30 MIN: CPT

## 2021-05-04 ENCOUNTER — SOCIAL WORK (OUTPATIENT)
Dept: TRANSPLANT | Facility: CLINIC | Age: 41
End: 2021-05-04
Payer: MEDICAID

## 2021-05-04 ENCOUNTER — OFFICE VISIT (OUTPATIENT)
Dept: TRANSPLANT | Facility: CLINIC | Age: 41
End: 2021-05-04
Payer: MEDICAID

## 2021-05-04 VITALS
HEIGHT: 74 IN | SYSTOLIC BLOOD PRESSURE: 116 MMHG | WEIGHT: 228.63 LBS | OXYGEN SATURATION: 100 % | DIASTOLIC BLOOD PRESSURE: 73 MMHG | RESPIRATION RATE: 18 BRPM | HEART RATE: 87 BPM | TEMPERATURE: 99 F | BODY MASS INDEX: 29.34 KG/M2

## 2021-05-04 DIAGNOSIS — Z94.4 S/P LIVER TRANSPLANT: Primary | ICD-10-CM

## 2021-05-04 DIAGNOSIS — Z79.60 LONG-TERM USE OF IMMUNOSUPPRESSANT MEDICATION: ICD-10-CM

## 2021-05-04 DIAGNOSIS — G47.00 INSOMNIA, UNSPECIFIED TYPE: ICD-10-CM

## 2021-05-04 DIAGNOSIS — Z51.81 ENCOUNTER FOR THERAPEUTIC DRUG MONITORING: ICD-10-CM

## 2021-05-04 PROBLEM — R53.1 DECREASED STRENGTH: Status: ACTIVE | Noted: 2021-05-04

## 2021-05-04 PROBLEM — R29.3 POOR POSTURE: Status: ACTIVE | Noted: 2021-05-04

## 2021-05-04 PROBLEM — Z74.09 IMPAIRED FUNCTIONAL MOBILITY, BALANCE, GAIT, AND ENDURANCE: Status: ACTIVE | Noted: 2021-05-04

## 2021-05-04 PROCEDURE — 99215 PR OFFICE/OUTPT VISIT, EST, LEVL V, 40-54 MIN: ICD-10-PCS | Mod: 24,S$PBB,, | Performed by: TRANSPLANT SURGERY

## 2021-05-04 PROCEDURE — 99999 PR PBB SHADOW E&M-EST. PATIENT-LVL III: ICD-10-PCS | Mod: PBBFAC,,,

## 2021-05-04 PROCEDURE — 99999 PR PBB SHADOW E&M-EST. PATIENT-LVL III: CPT | Mod: PBBFAC,,,

## 2021-05-04 PROCEDURE — 99215 OFFICE O/P EST HI 40 MIN: CPT | Mod: 24,S$PBB,, | Performed by: TRANSPLANT SURGERY

## 2021-05-04 PROCEDURE — 99213 OFFICE O/P EST LOW 20 MIN: CPT | Mod: PBBFAC

## 2021-05-04 RX ORDER — MIRTAZAPINE 15 MG/1
15 TABLET, FILM COATED ORAL NIGHTLY
Qty: 30 TABLET | Refills: 3 | Status: ON HOLD | OUTPATIENT
Start: 2021-05-04 | End: 2021-08-06 | Stop reason: HOSPADM

## 2021-05-04 RX ORDER — METHOCARBAMOL 500 MG/1
500 TABLET, FILM COATED ORAL 3 TIMES DAILY
Qty: 30 TABLET | Refills: 0 | Status: CANCELLED | OUTPATIENT
Start: 2021-05-04 | End: 2021-05-14

## 2021-05-04 RX ORDER — OXYCODONE HYDROCHLORIDE 10 MG/1
10 TABLET ORAL EVERY 4 HOURS PRN
Qty: 42 TABLET | Refills: 0 | Status: CANCELLED | OUTPATIENT
Start: 2021-05-04

## 2021-05-05 ENCOUNTER — TELEPHONE (OUTPATIENT)
Dept: TRANSPLANT | Facility: CLINIC | Age: 41
End: 2021-05-05

## 2021-05-05 ENCOUNTER — PATIENT MESSAGE (OUTPATIENT)
Dept: TRANSPLANT | Facility: CLINIC | Age: 41
End: 2021-05-05

## 2021-05-05 RX ORDER — METHOCARBAMOL 500 MG/1
500 TABLET, FILM COATED ORAL 3 TIMES DAILY
Qty: 30 TABLET | Refills: 0 | Status: SHIPPED | OUTPATIENT
Start: 2021-05-05 | End: 2021-05-12

## 2021-05-05 RX ORDER — OXYCODONE HYDROCHLORIDE 10 MG/1
10 TABLET ORAL EVERY 4 HOURS PRN
Qty: 42 TABLET | Refills: 0 | Status: SHIPPED | OUTPATIENT
Start: 2021-05-05 | End: 2021-05-12 | Stop reason: SDUPTHER

## 2021-05-05 RX ORDER — METHOCARBAMOL 500 MG/1
500 TABLET, FILM COATED ORAL 3 TIMES DAILY
Qty: 30 TABLET | Refills: 0 | Status: CANCELLED | OUTPATIENT
Start: 2021-05-04 | End: 2021-05-14

## 2021-05-07 ENCOUNTER — TELEPHONE (OUTPATIENT)
Dept: TRANSPLANT | Facility: CLINIC | Age: 41
End: 2021-05-07

## 2021-05-07 RX ORDER — TACROLIMUS 1 MG/1
3 CAPSULE ORAL EVERY 12 HOURS
Qty: 120 CAPSULE | Refills: 11 | Status: SHIPPED | OUTPATIENT
Start: 2021-05-07 | End: 2021-05-19 | Stop reason: DRUGHIGH

## 2021-05-09 LAB — TACROLIMUS BLD-MCNC: 11 NG/ML (ref 5–15)

## 2021-05-10 ENCOUNTER — TELEPHONE (OUTPATIENT)
Dept: TRANSPLANT | Facility: CLINIC | Age: 41
End: 2021-05-10

## 2021-05-10 ENCOUNTER — CLINICAL SUPPORT (OUTPATIENT)
Dept: REHABILITATION | Facility: HOSPITAL | Age: 41
End: 2021-05-10
Payer: MEDICAID

## 2021-05-10 DIAGNOSIS — R53.1 DECREASED STRENGTH: ICD-10-CM

## 2021-05-10 DIAGNOSIS — R29.3 POOR POSTURE: ICD-10-CM

## 2021-05-10 DIAGNOSIS — Z74.09 IMPAIRED FUNCTIONAL MOBILITY, BALANCE, GAIT, AND ENDURANCE: ICD-10-CM

## 2021-05-10 PROCEDURE — 97110 THERAPEUTIC EXERCISES: CPT

## 2021-05-11 ENCOUNTER — OFFICE VISIT (OUTPATIENT)
Dept: TRANSPLANT | Facility: CLINIC | Age: 41
End: 2021-05-11
Payer: MEDICAID

## 2021-05-11 ENCOUNTER — SOCIAL WORK (OUTPATIENT)
Dept: TRANSPLANT | Facility: CLINIC | Age: 41
End: 2021-05-11
Payer: MEDICAID

## 2021-05-11 VITALS
SYSTOLIC BLOOD PRESSURE: 115 MMHG | OXYGEN SATURATION: 100 % | RESPIRATION RATE: 18 BRPM | HEART RATE: 93 BPM | HEIGHT: 74 IN | WEIGHT: 207 LBS | BODY MASS INDEX: 26.56 KG/M2 | TEMPERATURE: 98 F | DIASTOLIC BLOOD PRESSURE: 65 MMHG

## 2021-05-11 DIAGNOSIS — Z79.60 LONG-TERM USE OF IMMUNOSUPPRESSANT MEDICATION: ICD-10-CM

## 2021-05-11 DIAGNOSIS — Z94.4 S/P LIVER TRANSPLANT: Primary | ICD-10-CM

## 2021-05-11 LAB
FUNGUS SPEC CULT: NORMAL
FUNGUS SPEC CULT: NORMAL

## 2021-05-11 PROCEDURE — 99215 OFFICE O/P EST HI 40 MIN: CPT | Mod: 24,S$PBB,, | Performed by: TRANSPLANT SURGERY

## 2021-05-11 PROCEDURE — 99999 PR PBB SHADOW E&M-EST. PATIENT-LVL III: ICD-10-PCS | Mod: PBBFAC,,,

## 2021-05-11 PROCEDURE — 99215 PR OFFICE/OUTPT VISIT, EST, LEVL V, 40-54 MIN: ICD-10-PCS | Mod: 24,S$PBB,, | Performed by: TRANSPLANT SURGERY

## 2021-05-11 PROCEDURE — 99999 PR PBB SHADOW E&M-EST. PATIENT-LVL III: CPT | Mod: PBBFAC,,,

## 2021-05-11 PROCEDURE — 99213 OFFICE O/P EST LOW 20 MIN: CPT | Mod: PBBFAC

## 2021-05-12 ENCOUNTER — PATIENT MESSAGE (OUTPATIENT)
Dept: TRANSPLANT | Facility: CLINIC | Age: 41
End: 2021-05-12

## 2021-05-12 ENCOUNTER — TELEPHONE (OUTPATIENT)
Dept: TRANSPLANT | Facility: CLINIC | Age: 41
End: 2021-05-12

## 2021-05-12 DIAGNOSIS — Z94.4 LIVER REPLACED BY TRANSPLANT: Primary | ICD-10-CM

## 2021-05-12 RX ORDER — FUROSEMIDE 40 MG/1
40 TABLET ORAL DAILY
Qty: 60 TABLET | Refills: 2 | Status: SHIPPED | OUTPATIENT
Start: 2021-05-12 | End: 2021-06-25 | Stop reason: ALTCHOICE

## 2021-05-12 RX ORDER — OXYCODONE HYDROCHLORIDE 10 MG/1
10 TABLET ORAL EVERY 6 HOURS PRN
Qty: 28 TABLET | Refills: 0 | Status: SHIPPED | OUTPATIENT
Start: 2021-05-12 | End: 2021-05-19 | Stop reason: SDUPTHER

## 2021-05-12 RX ORDER — METHOCARBAMOL 500 MG/1
500 TABLET, FILM COATED ORAL 3 TIMES DAILY
Qty: 21 TABLET | Refills: 0 | Status: SHIPPED | OUTPATIENT
Start: 2021-05-12 | End: 2021-05-19 | Stop reason: SDUPTHER

## 2021-05-13 ENCOUNTER — PATIENT MESSAGE (OUTPATIENT)
Dept: ADMINISTRATIVE | Facility: OTHER | Age: 41
End: 2021-05-13

## 2021-05-13 ENCOUNTER — DOCUMENTATION ONLY (OUTPATIENT)
Dept: REHABILITATION | Facility: HOSPITAL | Age: 41
End: 2021-05-13

## 2021-05-14 ENCOUNTER — PATIENT MESSAGE (OUTPATIENT)
Dept: TRANSPLANT | Facility: CLINIC | Age: 41
End: 2021-05-14

## 2021-05-17 ENCOUNTER — TELEPHONE (OUTPATIENT)
Dept: TRANSPLANT | Facility: CLINIC | Age: 41
End: 2021-05-17

## 2021-05-19 DIAGNOSIS — Z79.60 LONG-TERM USE OF IMMUNOSUPPRESSANT MEDICATION: Primary | ICD-10-CM

## 2021-05-19 LAB
EXT ALBUMIN: 4.2
EXT ALKALINE PHOSPHATASE: 116
EXT ALT: 11
EXT AST: 19
EXT BASOPHIL%: 1.2
EXT BILIRUBIN TOTAL: 0.9
EXT BUN: 11
EXT CALCIUM: 9.8
EXT CHLORIDE: 104
EXT CO2: 27
EXT CREATININE: 0.6 MG/DL
EXT EOSINOPHIL%: 3.1
EXT GLUCOSE: 106
EXT HEMATOCRIT: 31.5
EXT HEMOGLOBIN: 10.2
EXT LYMPH%: 18.7
EXT MONOCYTES%: 19
EXT PLATELETS: 157
EXT POTASSIUM: 4.3
EXT PROTEIN TOTAL: 6.2
EXT SEGS%: 57.8
EXT SODIUM: 136 MMOL/L
EXT TACROLIMUS LVL: 4.8
EXT WBC: 4.2

## 2021-05-19 RX ORDER — METHOCARBAMOL 500 MG/1
500 TABLET, FILM COATED ORAL 2 TIMES DAILY
Qty: 20 TABLET | Refills: 0 | Status: SHIPPED | OUTPATIENT
Start: 2021-05-19 | End: 2021-05-29

## 2021-05-19 RX ORDER — TACROLIMUS 1 MG/1
4 CAPSULE ORAL EVERY 12 HOURS
Qty: 240 CAPSULE | Refills: 11 | Status: SHIPPED | OUTPATIENT
Start: 2021-05-19 | End: 2021-06-15 | Stop reason: SDUPTHER

## 2021-05-19 RX ORDER — OXYCODONE HYDROCHLORIDE 10 MG/1
10 TABLET ORAL EVERY 6 HOURS PRN
Qty: 25 TABLET | Refills: 0 | Status: SHIPPED | OUTPATIENT
Start: 2021-05-19 | End: 2021-06-03 | Stop reason: SDUPTHER

## 2021-05-19 RX ORDER — CALCIUM CARBONATE 500(1250)
2 TABLET ORAL DAILY
Qty: 60 TABLET | Refills: 11 | Status: SHIPPED | OUTPATIENT
Start: 2021-05-19

## 2021-05-19 RX ORDER — ERGOCALCIFEROL 1.25 MG/1
50000 CAPSULE ORAL
Qty: 4 CAPSULE | Refills: 6 | Status: SHIPPED | OUTPATIENT
Start: 2021-05-19 | End: 2021-12-08

## 2021-05-20 ENCOUNTER — HOSPITAL ENCOUNTER (OUTPATIENT)
Dept: RADIOLOGY | Facility: HOSPITAL | Age: 41
Discharge: HOME | End: 2021-05-20
Attending: SURGERY
Payer: MEDICAID

## 2021-05-20 DIAGNOSIS — Z94.4 LIVER REPLACED BY TRANSPLANT: ICD-10-CM

## 2021-05-20 PROCEDURE — 76705 ECHO EXAM OF ABDOMEN: CPT | Mod: 26,59,, | Performed by: RADIOLOGY

## 2021-05-20 PROCEDURE — 93975 US LIVER TRANSPLANT POST: ICD-10-PCS | Mod: 26,,, | Performed by: RADIOLOGY

## 2021-05-20 PROCEDURE — 93975 VASCULAR STUDY: CPT | Mod: 26,,, | Performed by: RADIOLOGY

## 2021-05-20 PROCEDURE — 76705 US LIVER TRANSPLANT POST: ICD-10-PCS | Mod: 26,59,, | Performed by: RADIOLOGY

## 2021-05-20 PROCEDURE — 93975 VASCULAR STUDY: CPT | Mod: TC

## 2021-05-21 ENCOUNTER — PATIENT MESSAGE (OUTPATIENT)
Dept: TRANSPLANT | Facility: CLINIC | Age: 41
End: 2021-05-21

## 2021-05-21 ENCOUNTER — PATIENT MESSAGE (OUTPATIENT)
Dept: ENDOSCOPY | Facility: HOSPITAL | Age: 41
End: 2021-05-21

## 2021-05-21 ENCOUNTER — TELEPHONE (OUTPATIENT)
Dept: TRANSPLANT | Facility: CLINIC | Age: 41
End: 2021-05-21

## 2021-05-25 ENCOUNTER — TELEPHONE (OUTPATIENT)
Dept: TRANSPLANT | Facility: CLINIC | Age: 41
End: 2021-05-25

## 2021-05-26 ENCOUNTER — TELEPHONE (OUTPATIENT)
Dept: TRANSPLANT | Facility: CLINIC | Age: 41
End: 2021-05-26

## 2021-05-27 LAB
EXT ALBUMIN: 4.4
EXT ALKALINE PHOSPHATASE: 138
EXT ALT: 22
EXT AST: 28
EXT BASOPHIL%: 1.1
EXT BILIRUBIN TOTAL: 0.9
EXT BUN: 19
EXT CALCIUM: 10.5
EXT CHLORIDE: 103
EXT CO2: 23
EXT CREATININE: 0.65 MG/DL
EXT EOSINOPHIL%: 3.2
EXT GLUCOSE: 101
EXT HEMATOCRIT: 33.1
EXT HEMOGLOBIN: 11
EXT LYMPH%: 30.6
EXT MONOCYTES%: 15.8
EXT PLATELETS: 179
EXT POTASSIUM: 5
EXT PROTEIN TOTAL: 6.7
EXT SEGS%: 49.1
EXT SODIUM: 132 MMOL/L
EXT TACROLIMUS LVL: 10.2
EXT WBC: 4.4

## 2021-06-03 LAB
EXT ALBUMIN: 4.4
EXT ALKALINE PHOSPHATASE: 142
EXT ALT: 24
EXT AST: 27
EXT BASOPHIL%: 0.9
EXT BILIRUBIN TOTAL: 0.8
EXT BUN: 18
EXT CALCIUM: 10.3
EXT CHLORIDE: 98
EXT CO2: 25
EXT CREATININE: 0.86 MG/DL
EXT EOSINOPHIL%: 3.7
EXT GLUCOSE: 119
EXT HEMATOCRIT: 30.6
EXT HEMOGLOBIN: 10.4
EXT LYMPH%: 25.9
EXT MONOCYTES%: 15.5
EXT PLATELETS: 143
EXT POTASSIUM: 4.8
EXT PROTEIN TOTAL: 6.7
EXT SEGS%: 53.1
EXT SODIUM: 127 MMOL/L
EXT TACROLIMUS LVL: 9.8
EXT WBC: 4.6

## 2021-06-04 RX ORDER — OXYCODONE HYDROCHLORIDE 10 MG/1
10 TABLET ORAL EVERY 6 HOURS PRN
Qty: 25 TABLET | Refills: 0 | Status: SHIPPED | OUTPATIENT
Start: 2021-06-04 | End: 2021-06-04

## 2021-06-04 RX ORDER — OXYCODONE HYDROCHLORIDE 10 MG/1
10 TABLET ORAL EVERY 8 HOURS PRN
Qty: 20 TABLET | Refills: 0 | Status: ON HOLD | OUTPATIENT
Start: 2021-06-04 | End: 2021-08-06 | Stop reason: HOSPADM

## 2021-06-04 RX ORDER — METHOCARBAMOL 500 MG/1
500 TABLET, FILM COATED ORAL
Qty: 24 TABLET | Refills: 0 | Status: SHIPPED | OUTPATIENT
Start: 2021-06-04 | End: 2021-06-14

## 2021-06-07 ENCOUNTER — TELEPHONE (OUTPATIENT)
Dept: TRANSPLANT | Facility: CLINIC | Age: 41
End: 2021-06-07

## 2021-06-07 RX ORDER — MYCOPHENOLATE MOFETIL 250 MG/1
500 CAPSULE ORAL 2 TIMES DAILY
Qty: 240 CAPSULE | Refills: 2 | Status: SHIPPED | OUTPATIENT
Start: 2021-06-07 | End: 2021-06-25 | Stop reason: DRUGHIGH

## 2021-06-09 ENCOUNTER — OFFICE VISIT (OUTPATIENT)
Dept: TRANSPLANT | Facility: CLINIC | Age: 41
End: 2021-06-09
Payer: MEDICAID

## 2021-06-09 VITALS
WEIGHT: 181.44 LBS | SYSTOLIC BLOOD PRESSURE: 116 MMHG | TEMPERATURE: 98 F | OXYGEN SATURATION: 100 % | HEIGHT: 74 IN | HEART RATE: 85 BPM | DIASTOLIC BLOOD PRESSURE: 72 MMHG | RESPIRATION RATE: 16 BRPM | BODY MASS INDEX: 23.29 KG/M2

## 2021-06-09 DIAGNOSIS — Z94.4 LIVER TRANSPLANTED: Primary | ICD-10-CM

## 2021-06-09 DIAGNOSIS — Z79.60 LONG-TERM USE OF IMMUNOSUPPRESSANT MEDICATION: ICD-10-CM

## 2021-06-09 DIAGNOSIS — G89.29 CHRONIC BACK PAIN, UNSPECIFIED BACK LOCATION, UNSPECIFIED BACK PAIN LATERALITY: ICD-10-CM

## 2021-06-09 DIAGNOSIS — M54.9 CHRONIC BACK PAIN, UNSPECIFIED BACK LOCATION, UNSPECIFIED BACK PAIN LATERALITY: ICD-10-CM

## 2021-06-09 LAB
EXT ALBUMIN: 4.2
EXT ALKALINE PHOSPHATASE: 131
EXT ALT: 32
EXT AST: 34
EXT BASOPHIL%: 0.7
EXT BILIRUBIN TOTAL: 0.7
EXT BUN: 17
EXT CALCIUM: 9.7
EXT CHLORIDE: 100
EXT CO2: 22
EXT CREATININE: 1.01 MG/DL
EXT EOSINOPHIL%: 4
EXT GLUCOSE: 123
EXT HCV AB: REACTIVE
EXT HCV QUANT: NOT DETECTED
EXT HEMATOCRIT: 30.8
EXT HEMOGLOBIN: 10.3
EXT LYMPH%: 38
EXT MONOCYTES%: 11.4
EXT PLATELETS: 127
EXT POTASSIUM: 4.6
EXT PROTEIN TOTAL: 6.3
EXT SEGS%: 44.9
EXT SODIUM: 128 MMOL/L
EXT TACROLIMUS LVL: NORMAL
EXT WBC: 4

## 2021-06-09 PROCEDURE — 99214 OFFICE O/P EST MOD 30 MIN: CPT | Mod: PBBFAC | Performed by: STUDENT IN AN ORGANIZED HEALTH CARE EDUCATION/TRAINING PROGRAM

## 2021-06-09 PROCEDURE — 99214 PR OFFICE/OUTPT VISIT, EST, LEVL IV, 30-39 MIN: ICD-10-PCS | Mod: S$PBB,,, | Performed by: STUDENT IN AN ORGANIZED HEALTH CARE EDUCATION/TRAINING PROGRAM

## 2021-06-09 PROCEDURE — 99999 PR PBB SHADOW E&M-EST. PATIENT-LVL IV: ICD-10-PCS | Mod: PBBFAC,,, | Performed by: STUDENT IN AN ORGANIZED HEALTH CARE EDUCATION/TRAINING PROGRAM

## 2021-06-09 PROCEDURE — 99214 OFFICE O/P EST MOD 30 MIN: CPT | Mod: S$PBB,,, | Performed by: STUDENT IN AN ORGANIZED HEALTH CARE EDUCATION/TRAINING PROGRAM

## 2021-06-09 PROCEDURE — 99999 PR PBB SHADOW E&M-EST. PATIENT-LVL IV: CPT | Mod: PBBFAC,,, | Performed by: STUDENT IN AN ORGANIZED HEALTH CARE EDUCATION/TRAINING PROGRAM

## 2021-06-10 ENCOUNTER — TELEPHONE (OUTPATIENT)
Dept: TRANSPLANT | Facility: CLINIC | Age: 41
End: 2021-06-10

## 2021-06-14 ENCOUNTER — TELEPHONE (OUTPATIENT)
Dept: TRANSPLANT | Facility: CLINIC | Age: 41
End: 2021-06-14

## 2021-06-15 RX ORDER — TACROLIMUS 1 MG/1
4 CAPSULE ORAL EVERY 12 HOURS
Qty: 240 CAPSULE | Refills: 11 | Status: ON HOLD | OUTPATIENT
Start: 2021-06-15 | End: 2021-08-06 | Stop reason: HOSPADM

## 2021-06-17 ENCOUNTER — TELEPHONE (OUTPATIENT)
Dept: TRANSPLANT | Facility: CLINIC | Age: 41
End: 2021-06-17

## 2021-06-17 ENCOUNTER — PATIENT MESSAGE (OUTPATIENT)
Dept: TRANSPLANT | Facility: CLINIC | Age: 41
End: 2021-06-17

## 2021-06-21 LAB
EXT ALBUMIN: 4
EXT ALKALINE PHOSPHATASE: 162
EXT ALT: 45
EXT AST: 44
EXT BASOPHIL%: 0.8
EXT BILIRUBIN TOTAL: 0.5
EXT BUN: 11
EXT CALCIUM: 9.5
EXT CHLORIDE: 101
EXT CO2: 26
EXT CREATININE: 0.87 MG/DL
EXT EOSINOPHIL%: 2.9
EXT GLUCOSE: 113
EXT HEMATOCRIT: 28.6
EXT HEMOGLOBIN: 9.5
EXT LYMPH%: 50.5
EXT MONOCYTES%: 15.4
EXT PLATELETS: 111
EXT POTASSIUM: 4.7
EXT PROTEIN TOTAL: 6.1
EXT SEGS%: 30.1
EXT SODIUM: 131 MMOL/L
EXT TACROLIMUS LVL: 5.9
EXT WBC: 3.8

## 2021-06-25 DIAGNOSIS — Z94.4 LIVER REPLACED BY TRANSPLANT: Primary | ICD-10-CM

## 2021-06-25 RX ORDER — MYCOPHENOLATE MOFETIL 250 MG/1
1000 CAPSULE ORAL 2 TIMES DAILY
Qty: 120 CAPSULE | Refills: 2 | Status: ON HOLD | OUTPATIENT
Start: 2021-06-25 | End: 2021-08-06 | Stop reason: HOSPADM

## 2021-07-02 LAB
EXT ALBUMIN: 4
EXT ALKALINE PHOSPHATASE: 156
EXT ALT: 17
EXT AST: 23
EXT BASOPHIL%: 0.8
EXT BILIRUBIN TOTAL: 0.5
EXT BUN: 11
EXT CALCIUM: 9.9
EXT CHLORIDE: 104
EXT CO2: 27
EXT CREATININE: 0.88 MG/DL
EXT EOSINOPHIL%: 3.4
EXT GLUCOSE: 114
EXT HEMATOCRIT: 28.1
EXT HEMOGLOBIN: 9.2
EXT LYMPH%: 46.8
EXT MONOCYTES%: 13.5
EXT PLATELETS: 118
EXT POTASSIUM: 4.9
EXT PROTEIN TOTAL: 6.3
EXT SEGS%: 35
EXT SODIUM: 133 MMOL/L
EXT TACROLIMUS LVL: 6.5
EXT WBC: 3.8

## 2021-07-06 ENCOUNTER — TELEPHONE (OUTPATIENT)
Dept: TRANSPLANT | Facility: CLINIC | Age: 41
End: 2021-07-06

## 2021-07-08 ENCOUNTER — TELEPHONE (OUTPATIENT)
Dept: OPTOMETRY | Facility: CLINIC | Age: 41
End: 2021-07-08

## 2021-07-08 ENCOUNTER — TELEPHONE (OUTPATIENT)
Dept: TRANSPLANT | Facility: CLINIC | Age: 41
End: 2021-07-08

## 2021-07-12 ENCOUNTER — OFFICE VISIT (OUTPATIENT)
Dept: TRANSPLANT | Facility: CLINIC | Age: 41
End: 2021-07-12
Payer: MEDICAID

## 2021-07-12 ENCOUNTER — HOSPITAL ENCOUNTER (OUTPATIENT)
Dept: RADIOLOGY | Facility: HOSPITAL | Age: 41
Discharge: HOME | End: 2021-07-12
Attending: STUDENT IN AN ORGANIZED HEALTH CARE EDUCATION/TRAINING PROGRAM
Payer: MEDICAID

## 2021-07-12 VITALS
DIASTOLIC BLOOD PRESSURE: 72 MMHG | OXYGEN SATURATION: 100 % | HEART RATE: 98 BPM | TEMPERATURE: 98 F | RESPIRATION RATE: 18 BRPM | HEIGHT: 74 IN | WEIGHT: 173.94 LBS | BODY MASS INDEX: 22.32 KG/M2 | SYSTOLIC BLOOD PRESSURE: 117 MMHG

## 2021-07-12 DIAGNOSIS — Z94.4 LIVER REPLACED BY TRANSPLANT: ICD-10-CM

## 2021-07-12 DIAGNOSIS — Z79.60 LONG-TERM USE OF IMMUNOSUPPRESSANT MEDICATION: ICD-10-CM

## 2021-07-12 DIAGNOSIS — Z94.4 LIVER TRANSPLANTED: Primary | ICD-10-CM

## 2021-07-12 PROCEDURE — 99999 PR PBB SHADOW E&M-EST. PATIENT-LVL IV: ICD-10-PCS | Mod: PBBFAC,,, | Performed by: STUDENT IN AN ORGANIZED HEALTH CARE EDUCATION/TRAINING PROGRAM

## 2021-07-12 PROCEDURE — 99214 OFFICE O/P EST MOD 30 MIN: CPT | Mod: S$PBB,,, | Performed by: STUDENT IN AN ORGANIZED HEALTH CARE EDUCATION/TRAINING PROGRAM

## 2021-07-12 PROCEDURE — 99214 PR OFFICE/OUTPT VISIT, EST, LEVL IV, 30-39 MIN: ICD-10-PCS | Mod: S$PBB,,, | Performed by: STUDENT IN AN ORGANIZED HEALTH CARE EDUCATION/TRAINING PROGRAM

## 2021-07-12 PROCEDURE — 76705 US LIVER TRANSPLANT POST: ICD-10-PCS | Mod: 26,XS,, | Performed by: RADIOLOGY

## 2021-07-12 PROCEDURE — 76705 ECHO EXAM OF ABDOMEN: CPT | Mod: 26,XS,, | Performed by: RADIOLOGY

## 2021-07-12 PROCEDURE — 99999 PR PBB SHADOW E&M-EST. PATIENT-LVL IV: CPT | Mod: PBBFAC,,, | Performed by: STUDENT IN AN ORGANIZED HEALTH CARE EDUCATION/TRAINING PROGRAM

## 2021-07-12 PROCEDURE — 93975 VASCULAR STUDY: CPT | Mod: TC

## 2021-07-12 PROCEDURE — 93975 US LIVER TRANSPLANT POST: ICD-10-PCS | Mod: 26,,, | Performed by: RADIOLOGY

## 2021-07-12 PROCEDURE — 99214 OFFICE O/P EST MOD 30 MIN: CPT | Mod: PBBFAC,25 | Performed by: STUDENT IN AN ORGANIZED HEALTH CARE EDUCATION/TRAINING PROGRAM

## 2021-07-12 PROCEDURE — 93975 VASCULAR STUDY: CPT | Mod: 26,,, | Performed by: RADIOLOGY

## 2021-07-13 ENCOUNTER — TELEPHONE (OUTPATIENT)
Dept: OPTOMETRY | Facility: CLINIC | Age: 41
End: 2021-07-13

## 2021-07-16 ENCOUNTER — TELEPHONE (OUTPATIENT)
Dept: TRANSPLANT | Facility: CLINIC | Age: 41
End: 2021-07-16

## 2021-07-16 LAB
EXT ALBUMIN: 3.9
EXT ALKALINE PHOSPHATASE: 155
EXT ALT: 10
EXT AST: 19
EXT BASOPHIL%: 0.6
EXT BILIRUBIN TOTAL: 0.5
EXT BUN: 11
EXT CALCIUM: 9.7
EXT CHLORIDE: 101
EXT CO2: 26
EXT CREATININE: 1.11 MG/DL
EXT EOSINOPHIL%: 3.2
EXT GLUCOSE: 106
EXT HEMATOCRIT: 26.6
EXT HEMOGLOBIN: 9
EXT LYMPH%: 50.6
EXT MONOCYTES%: 9.4
EXT PLATELETS: 103
EXT POTASSIUM: 4.8
EXT PROTEIN TOTAL: 6.1
EXT SEGS%: 35
EXT SODIUM: 131 MMOL/L
EXT TACROLIMUS LVL: 7.2
EXT WBC: 3.4

## 2021-07-21 DIAGNOSIS — Z94.4 LIVER REPLACED BY TRANSPLANT: Primary | ICD-10-CM

## 2021-07-23 LAB
EXT ALBUMIN: 4.1
EXT ALKALINE PHOSPHATASE: 138
EXT ALT: 7
EXT AST: 16
EXT BASOPHIL%: 0.5
EXT BILIRUBIN TOTAL: 0.5
EXT BUN: 15
EXT CALCIUM: 10.1
EXT CHLORIDE: 104
EXT CO2: 27
EXT CREATININE: 1.07 MG/DL
EXT EOSINOPHIL%: 4.2
EXT GLUCOSE: 137
EXT HEMATOCRIT: 27.9
EXT HEMOGLOBIN: 9.4
EXT LYMPH%: 80.2
EXT MONOCYTES%: 5.2
EXT PLATELETS: 131
EXT POTASSIUM: 4.6
EXT PROTEIN TOTAL: 6.5
EXT SEGS%: 8.3
EXT SODIUM: 133 MMOL/L
EXT TACROLIMUS LVL: 7.3
EXT WBC: 1.9

## 2021-07-28 ENCOUNTER — TELEPHONE (OUTPATIENT)
Dept: TRANSPLANT | Facility: CLINIC | Age: 41
End: 2021-07-28

## 2021-08-02 ENCOUNTER — TELEPHONE (OUTPATIENT)
Dept: TRANSPLANT | Facility: CLINIC | Age: 41
End: 2021-08-02

## 2021-08-02 LAB
EXT BASOPHIL%: 1.7
EXT EOSINOPHIL%: 1.1
EXT HEMATOCRIT: 28.4
EXT HEMOGLOBIN: 9.5
EXT LYMPH%: 74.4
EXT MONOCYTES%: 7.8
EXT PLATELETS: 137
EXT SEGS%: 11.7
EXT WBC: 1.8

## 2021-08-03 ENCOUNTER — HOSPITAL ENCOUNTER (INPATIENT)
Facility: HOSPITAL | Age: 41
LOS: 3 days | Discharge: HOME | DRG: 809 | End: 2021-08-06
Attending: TRANSPLANT SURGERY | Admitting: TRANSPLANT SURGERY
Payer: MEDICAID

## 2021-08-03 ENCOUNTER — TELEPHONE (OUTPATIENT)
Dept: TRANSPLANT | Facility: CLINIC | Age: 41
End: 2021-08-03

## 2021-08-03 DIAGNOSIS — Z94.4 STATUS POST LIVER TRANSPLANT: ICD-10-CM

## 2021-08-03 DIAGNOSIS — Z91.89 AT RISK FOR OPPORTUNISTIC INFECTIONS: ICD-10-CM

## 2021-08-03 DIAGNOSIS — D61.818 PANCYTOPENIA: ICD-10-CM

## 2021-08-03 DIAGNOSIS — R19.7 DIARRHEA, UNSPECIFIED TYPE: ICD-10-CM

## 2021-08-03 DIAGNOSIS — D63.8 ANEMIA OF CHRONIC DISEASE: ICD-10-CM

## 2021-08-03 DIAGNOSIS — Z79.60 LONG-TERM USE OF IMMUNOSUPPRESSANT MEDICATION: ICD-10-CM

## 2021-08-03 DIAGNOSIS — D70.9 NEUTROPENIA, UNSPECIFIED TYPE: ICD-10-CM

## 2021-08-03 DIAGNOSIS — R13.10 ODYNOPHAGIA: Primary | ICD-10-CM

## 2021-08-03 DIAGNOSIS — D70.9 NEUTROPENIA: ICD-10-CM

## 2021-08-03 DIAGNOSIS — Z29.89 PROPHYLACTIC IMMUNOTHERAPY: ICD-10-CM

## 2021-08-03 LAB
ALBUMIN SERPL BCP-MCNC: 3.9 G/DL (ref 3.5–5.2)
ALP SERPL-CCNC: 125 U/L (ref 55–135)
ALT SERPL W/O P-5'-P-CCNC: 8 U/L (ref 10–44)
ANION GAP SERPL CALC-SCNC: 6 MMOL/L (ref 8–16)
ANISOCYTOSIS BLD QL SMEAR: SLIGHT
AST SERPL-CCNC: 16 U/L (ref 10–40)
BASOPHILS NFR BLD: 0 % (ref 0–1.9)
BILIRUB SERPL-MCNC: 0.4 MG/DL (ref 0.1–1)
BILIRUB UR QL STRIP: NEGATIVE
BUN SERPL-MCNC: 17 MG/DL (ref 6–20)
CALCIUM SERPL-MCNC: 9.6 MG/DL (ref 8.7–10.5)
CHLORIDE SERPL-SCNC: 102 MMOL/L (ref 95–110)
CLARITY UR REFRACT.AUTO: CLEAR
CO2 SERPL-SCNC: 25 MMOL/L (ref 23–29)
COLOR UR AUTO: YELLOW
CREAT SERPL-MCNC: 0.9 MG/DL (ref 0.5–1.4)
DIFFERENTIAL METHOD: ABNORMAL
EOSINOPHIL NFR BLD: 1 % (ref 0–8)
ERYTHROCYTE [DISTWIDTH] IN BLOOD BY AUTOMATED COUNT: 13.1 % (ref 11.5–14.5)
EST. GFR  (AFRICAN AMERICAN): >60 ML/MIN/1.73 M^2
EST. GFR  (NON AFRICAN AMERICAN): >60 ML/MIN/1.73 M^2
GLUCOSE SERPL-MCNC: 109 MG/DL (ref 70–110)
GLUCOSE UR QL STRIP: NEGATIVE
HCT VFR BLD AUTO: 26.1 % (ref 40–54)
HGB BLD-MCNC: 8.9 G/DL (ref 14–18)
HGB UR QL STRIP: NEGATIVE
IMM GRANULOCYTES # BLD AUTO: ABNORMAL K/UL (ref 0–0.04)
IMM GRANULOCYTES NFR BLD AUTO: ABNORMAL % (ref 0–0.5)
INR PPP: 1 (ref 0.8–1.2)
KETONES UR QL STRIP: NEGATIVE
LEUKOCYTE ESTERASE UR QL STRIP: NEGATIVE
LYMPHOCYTES NFR BLD: 67 % (ref 18–48)
MAGNESIUM SERPL-MCNC: 1.9 MG/DL (ref 1.6–2.6)
MCH RBC QN AUTO: 30.7 PG (ref 27–31)
MCHC RBC AUTO-ENTMCNC: 34.1 G/DL (ref 32–36)
MCV RBC AUTO: 90 FL (ref 82–98)
MONOCYTES NFR BLD: 10 % (ref 4–15)
NEUTROPHILS NFR BLD: 22 % (ref 38–73)
NITRITE UR QL STRIP: NEGATIVE
NRBC BLD-RTO: 0 /100 WBC
PH UR STRIP: 5 [PH] (ref 5–8)
PHOSPHATE SERPL-MCNC: 3.6 MG/DL (ref 2.7–4.5)
PLATELET # BLD AUTO: 140 K/UL (ref 150–450)
PLATELET BLD QL SMEAR: ABNORMAL
PMV BLD AUTO: 9.2 FL (ref 9.2–12.9)
POTASSIUM SERPL-SCNC: 4.9 MMOL/L (ref 3.5–5.1)
PROT SERPL-MCNC: 7 G/DL (ref 6–8.4)
PROT UR QL STRIP: NEGATIVE
PROTHROMBIN TIME: 11.2 SEC (ref 9–12.5)
RBC # BLD AUTO: 2.9 M/UL (ref 4.6–6.2)
SARS-COV-2 RDRP RESP QL NAA+PROBE: NEGATIVE
SODIUM SERPL-SCNC: 133 MMOL/L (ref 136–145)
SP GR UR STRIP: 1.02 (ref 1–1.03)
URN SPEC COLLECT METH UR: NORMAL
WBC # BLD AUTO: 1.65 K/UL (ref 3.9–12.7)

## 2021-08-03 PROCEDURE — 80053 COMPREHEN METABOLIC PANEL: CPT | Performed by: NURSE PRACTITIONER

## 2021-08-03 PROCEDURE — 99223 1ST HOSP IP/OBS HIGH 75: CPT | Mod: ,,, | Performed by: NURSE PRACTITIONER

## 2021-08-03 PROCEDURE — 99223 PR INITIAL HOSPITAL CARE,LEVL III: ICD-10-PCS | Mod: ,,, | Performed by: NURSE PRACTITIONER

## 2021-08-03 PROCEDURE — 83735 ASSAY OF MAGNESIUM: CPT | Performed by: NURSE PRACTITIONER

## 2021-08-03 PROCEDURE — 36415 COLL VENOUS BLD VENIPUNCTURE: CPT | Performed by: NURSE PRACTITIONER

## 2021-08-03 PROCEDURE — 20600001 HC STEP DOWN PRIVATE ROOM

## 2021-08-03 PROCEDURE — 81003 URINALYSIS AUTO W/O SCOPE: CPT | Performed by: NURSE PRACTITIONER

## 2021-08-03 PROCEDURE — 99223 PR INITIAL HOSPITAL CARE,LEVL III: ICD-10-PCS | Mod: ,,, | Performed by: INTERNAL MEDICINE

## 2021-08-03 PROCEDURE — 63600175 PHARM REV CODE 636 W HCPCS: Performed by: NURSE PRACTITIONER

## 2021-08-03 PROCEDURE — 86694 HERPES SIMPLEX NES ANTBDY: CPT | Performed by: NURSE PRACTITIONER

## 2021-08-03 PROCEDURE — U0002 COVID-19 LAB TEST NON-CDC: HCPCS | Performed by: NURSE PRACTITIONER

## 2021-08-03 PROCEDURE — 99223 1ST HOSP IP/OBS HIGH 75: CPT | Mod: ,,, | Performed by: INTERNAL MEDICINE

## 2021-08-03 PROCEDURE — 85007 BL SMEAR W/DIFF WBC COUNT: CPT | Performed by: NURSE PRACTITIONER

## 2021-08-03 PROCEDURE — 85027 COMPLETE CBC AUTOMATED: CPT | Performed by: NURSE PRACTITIONER

## 2021-08-03 PROCEDURE — 87040 BLOOD CULTURE FOR BACTERIA: CPT | Performed by: NURSE PRACTITIONER

## 2021-08-03 PROCEDURE — 84100 ASSAY OF PHOSPHORUS: CPT | Performed by: NURSE PRACTITIONER

## 2021-08-03 PROCEDURE — 85610 PROTHROMBIN TIME: CPT | Performed by: NURSE PRACTITIONER

## 2021-08-03 PROCEDURE — 25000003 PHARM REV CODE 250: Performed by: NURSE PRACTITIONER

## 2021-08-03 RX ORDER — ACETAMINOPHEN 325 MG/1
650 TABLET ORAL EVERY 8 HOURS PRN
Status: DISCONTINUED | OUTPATIENT
Start: 2021-08-03 | End: 2021-08-04

## 2021-08-03 RX ORDER — ONDANSETRON 8 MG/1
8 TABLET, ORALLY DISINTEGRATING ORAL EVERY 8 HOURS PRN
Status: DISCONTINUED | OUTPATIENT
Start: 2021-08-03 | End: 2021-08-03

## 2021-08-03 RX ORDER — OXYCODONE HYDROCHLORIDE 5 MG/1
5 TABLET ORAL EVERY 12 HOURS PRN
Status: DISCONTINUED | OUTPATIENT
Start: 2021-08-03 | End: 2021-08-04

## 2021-08-03 RX ORDER — DOCUSATE SODIUM 100 MG/1
100 CAPSULE, LIQUID FILLED ORAL 2 TIMES DAILY PRN
Status: DISCONTINUED | OUTPATIENT
Start: 2021-08-03 | End: 2021-08-03

## 2021-08-03 RX ORDER — HEPARIN SODIUM 5000 [USP'U]/ML
5000 INJECTION, SOLUTION INTRAVENOUS; SUBCUTANEOUS EVERY 8 HOURS
Status: DISCONTINUED | OUTPATIENT
Start: 2021-08-03 | End: 2021-08-06 | Stop reason: HOSPADM

## 2021-08-03 RX ORDER — SODIUM CHLORIDE 0.9 % (FLUSH) 0.9 %
10 SYRINGE (ML) INJECTION
Status: DISCONTINUED | OUTPATIENT
Start: 2021-08-03 | End: 2021-08-06 | Stop reason: HOSPADM

## 2021-08-03 RX ORDER — ONDANSETRON 2 MG/ML
4 INJECTION INTRAMUSCULAR; INTRAVENOUS EVERY 6 HOURS PRN
Status: DISCONTINUED | OUTPATIENT
Start: 2021-08-03 | End: 2021-08-06 | Stop reason: HOSPADM

## 2021-08-03 RX ORDER — TALC
6 POWDER (GRAM) TOPICAL NIGHTLY PRN
Status: DISCONTINUED | OUTPATIENT
Start: 2021-08-03 | End: 2021-08-06 | Stop reason: HOSPADM

## 2021-08-03 RX ORDER — LIDOCAINE HYDROCHLORIDE 10 MG/ML
1 INJECTION, SOLUTION EPIDURAL; INFILTRATION; INTRACAUDAL; PERINEURAL ONCE
Status: DISCONTINUED | OUTPATIENT
Start: 2021-08-03 | End: 2021-08-03

## 2021-08-03 RX ORDER — FOLIC ACID 1 MG/1
1 TABLET ORAL DAILY
Status: DISCONTINUED | OUTPATIENT
Start: 2021-08-04 | End: 2021-08-06 | Stop reason: HOSPADM

## 2021-08-03 RX ORDER — GABAPENTIN 300 MG/1
300 CAPSULE ORAL 2 TIMES DAILY PRN
Status: DISCONTINUED | OUTPATIENT
Start: 2021-08-03 | End: 2021-08-06 | Stop reason: HOSPADM

## 2021-08-03 RX ORDER — TACROLIMUS 1 MG/1
4 CAPSULE ORAL 2 TIMES DAILY
Status: DISCONTINUED | OUTPATIENT
Start: 2021-08-03 | End: 2021-08-05

## 2021-08-03 RX ORDER — MIRTAZAPINE 15 MG/1
15 TABLET, FILM COATED ORAL NIGHTLY
Status: DISCONTINUED | OUTPATIENT
Start: 2021-08-03 | End: 2021-08-03

## 2021-08-03 RX ADMIN — HEPARIN SODIUM 5000 UNITS: 5000 INJECTION INTRAVENOUS; SUBCUTANEOUS at 10:08

## 2021-08-03 RX ADMIN — TACROLIMUS 4 MG: 1 CAPSULE ORAL at 05:08

## 2021-08-03 RX ADMIN — HEPARIN SODIUM 5000 UNITS: 5000 INJECTION INTRAVENOUS; SUBCUTANEOUS at 03:08

## 2021-08-03 RX ADMIN — OXYCODONE 5 MG: 5 TABLET ORAL at 05:08

## 2021-08-03 RX ADMIN — FILGRASTIM-SNDZ 480 MCG: 480 INJECTION, SOLUTION INTRAVENOUS; SUBCUTANEOUS at 04:08

## 2021-08-04 ENCOUNTER — ANESTHESIA EVENT (OUTPATIENT)
Dept: ENDOSCOPY | Facility: HOSPITAL | Age: 41
DRG: 809 | End: 2021-08-04
Payer: MEDICAID

## 2021-08-04 PROBLEM — Z91.89 AT RISK FOR OPPORTUNISTIC INFECTIONS: Status: ACTIVE | Noted: 2021-08-04

## 2021-08-04 LAB
ALBUMIN SERPL BCP-MCNC: 3.7 G/DL (ref 3.5–5.2)
ALP SERPL-CCNC: 128 U/L (ref 55–135)
ALT SERPL W/O P-5'-P-CCNC: 5 U/L (ref 10–44)
ANION GAP SERPL CALC-SCNC: 5 MMOL/L (ref 8–16)
ANISOCYTOSIS BLD QL SMEAR: SLIGHT
AST SERPL-CCNC: 15 U/L (ref 10–40)
BASOPHILS NFR BLD: 1 % (ref 0–1.9)
BILIRUB SERPL-MCNC: 0.6 MG/DL (ref 0.1–1)
BUN SERPL-MCNC: 17 MG/DL (ref 6–20)
CALCIUM SERPL-MCNC: 10 MG/DL (ref 8.7–10.5)
CHLORIDE SERPL-SCNC: 101 MMOL/L (ref 95–110)
CO2 SERPL-SCNC: 26 MMOL/L (ref 23–29)
CREAT SERPL-MCNC: 1 MG/DL (ref 0.5–1.4)
CYTOMEGALOVIRUS DNA: DETECTED
CYTOMEGALOVIRUS LOG (IU/ML): <1.7 LOGIU/ML
CYTOMEGALOVIRUS PCR, QUANT: <50 IU/ML
DIFFERENTIAL METHOD: ABNORMAL
DOHLE BOD BLD QL SMEAR: PRESENT
EOSINOPHIL NFR BLD: 1 % (ref 0–8)
ERYTHROCYTE [DISTWIDTH] IN BLOOD BY AUTOMATED COUNT: 13.1 % (ref 11.5–14.5)
EST. GFR  (AFRICAN AMERICAN): >60 ML/MIN/1.73 M^2
EST. GFR  (NON AFRICAN AMERICAN): >60 ML/MIN/1.73 M^2
GLUCOSE SERPL-MCNC: 106 MG/DL (ref 70–110)
HCT VFR BLD AUTO: 27.6 % (ref 40–54)
HGB BLD-MCNC: 9.1 G/DL (ref 14–18)
IMM GRANULOCYTES # BLD AUTO: ABNORMAL K/UL (ref 0–0.04)
IMM GRANULOCYTES NFR BLD AUTO: ABNORMAL % (ref 0–0.5)
LYMPHOCYTES NFR BLD: 73 % (ref 18–48)
MAGNESIUM SERPL-MCNC: 1.8 MG/DL (ref 1.6–2.6)
MCH RBC QN AUTO: 30.5 PG (ref 27–31)
MCHC RBC AUTO-ENTMCNC: 33 G/DL (ref 32–36)
MCV RBC AUTO: 93 FL (ref 82–98)
MONOCYTES NFR BLD: 4 % (ref 4–15)
NEUTROPHILS NFR BLD: 19 % (ref 38–73)
NEUTS BAND NFR BLD MANUAL: 2 %
NRBC BLD-RTO: 0 /100 WBC
PHOSPHATE SERPL-MCNC: 4 MG/DL (ref 2.7–4.5)
PLATELET # BLD AUTO: 135 K/UL (ref 150–450)
PLATELET BLD QL SMEAR: ABNORMAL
PMV BLD AUTO: 9.2 FL (ref 9.2–12.9)
POIKILOCYTOSIS BLD QL SMEAR: SLIGHT
POTASSIUM SERPL-SCNC: 4.5 MMOL/L (ref 3.5–5.1)
PROT SERPL-MCNC: 6.7 G/DL (ref 6–8.4)
RBC # BLD AUTO: 2.98 M/UL (ref 4.6–6.2)
SMUDGE CELLS BLD QL SMEAR: PRESENT
SODIUM SERPL-SCNC: 132 MMOL/L (ref 136–145)
TACROLIMUS BLD-MCNC: 5.9 NG/ML (ref 5–15)
TACROLIMUS, NORMALIZED: 5.4 NG/ML (ref 5–15)
TOXIC GRANULES BLD QL SMEAR: PRESENT
WBC # BLD AUTO: 1.85 K/UL (ref 3.9–12.7)

## 2021-08-04 PROCEDURE — 99233 PR SUBSEQUENT HOSPITAL CARE,LEVL III: ICD-10-PCS | Mod: ,,, | Performed by: NURSE PRACTITIONER

## 2021-08-04 PROCEDURE — 20600001 HC STEP DOWN PRIVATE ROOM

## 2021-08-04 PROCEDURE — 63600175 PHARM REV CODE 636 W HCPCS: Performed by: NURSE PRACTITIONER

## 2021-08-04 PROCEDURE — 80053 COMPREHEN METABOLIC PANEL: CPT | Performed by: NURSE PRACTITIONER

## 2021-08-04 PROCEDURE — 99233 SBSQ HOSP IP/OBS HIGH 50: CPT | Mod: ,,, | Performed by: NURSE PRACTITIONER

## 2021-08-04 PROCEDURE — 63600175 PHARM REV CODE 636 W HCPCS: Mod: JG | Performed by: NURSE PRACTITIONER

## 2021-08-04 PROCEDURE — 80197 ASSAY OF TACROLIMUS: CPT | Performed by: NURSE PRACTITIONER

## 2021-08-04 PROCEDURE — S4991 NICOTINE PATCH NONLEGEND: HCPCS | Performed by: NURSE PRACTITIONER

## 2021-08-04 PROCEDURE — 25000003 PHARM REV CODE 250: Performed by: NURSE PRACTITIONER

## 2021-08-04 PROCEDURE — 99223 PR INITIAL HOSPITAL CARE,LEVL III: ICD-10-PCS | Mod: ,,, | Performed by: INTERNAL MEDICINE

## 2021-08-04 PROCEDURE — 99223 1ST HOSP IP/OBS HIGH 75: CPT | Mod: ,,, | Performed by: INTERNAL MEDICINE

## 2021-08-04 PROCEDURE — 85027 COMPLETE CBC AUTOMATED: CPT | Performed by: NURSE PRACTITIONER

## 2021-08-04 PROCEDURE — 85007 BL SMEAR W/DIFF WBC COUNT: CPT | Performed by: NURSE PRACTITIONER

## 2021-08-04 PROCEDURE — 84100 ASSAY OF PHOSPHORUS: CPT | Performed by: NURSE PRACTITIONER

## 2021-08-04 PROCEDURE — 36415 COLL VENOUS BLD VENIPUNCTURE: CPT | Performed by: NURSE PRACTITIONER

## 2021-08-04 PROCEDURE — 83735 ASSAY OF MAGNESIUM: CPT | Performed by: NURSE PRACTITIONER

## 2021-08-04 RX ORDER — IBUPROFEN 200 MG
1 TABLET ORAL DAILY
Status: DISCONTINUED | OUTPATIENT
Start: 2021-08-04 | End: 2021-08-06 | Stop reason: HOSPADM

## 2021-08-04 RX ORDER — OXYCODONE HYDROCHLORIDE 5 MG/1
5 TABLET ORAL EVERY 8 HOURS PRN
Status: DISCONTINUED | OUTPATIENT
Start: 2021-08-04 | End: 2021-08-06 | Stop reason: HOSPADM

## 2021-08-04 RX ORDER — FAMOTIDINE 20 MG/1
20 TABLET, FILM COATED ORAL DAILY
Status: DISCONTINUED | OUTPATIENT
Start: 2021-08-04 | End: 2021-08-06 | Stop reason: HOSPADM

## 2021-08-04 RX ORDER — OXYCODONE HYDROCHLORIDE 10 MG/1
10 TABLET ORAL EVERY 8 HOURS PRN
Status: DISCONTINUED | OUTPATIENT
Start: 2021-08-04 | End: 2021-08-06 | Stop reason: HOSPADM

## 2021-08-04 RX ORDER — CETIRIZINE HYDROCHLORIDE 5 MG/1
5 TABLET ORAL DAILY
Status: DISCONTINUED | OUTPATIENT
Start: 2021-08-04 | End: 2021-08-06 | Stop reason: HOSPADM

## 2021-08-04 RX ORDER — ACETAMINOPHEN 325 MG/1
650 TABLET ORAL EVERY 6 HOURS PRN
Status: DISCONTINUED | OUTPATIENT
Start: 2021-08-04 | End: 2021-08-06 | Stop reason: HOSPADM

## 2021-08-04 RX ADMIN — FILGRASTIM-SNDZ 480 MCG: 480 INJECTION, SOLUTION INTRAVENOUS; SUBCUTANEOUS at 11:08

## 2021-08-04 RX ADMIN — GABAPENTIN 300 MG: 300 CAPSULE ORAL at 09:08

## 2021-08-04 RX ADMIN — CETIRIZINE HYDROCHLORIDE 5 MG: 5 TABLET ORAL at 02:08

## 2021-08-04 RX ADMIN — NICOTINE 1 PATCH: 14 PATCH TRANSDERMAL at 02:08

## 2021-08-04 RX ADMIN — ACETAMINOPHEN 650 MG: 325 TABLET ORAL at 09:08

## 2021-08-04 RX ADMIN — MELATONIN TAB 3 MG 6 MG: 3 TAB at 09:08

## 2021-08-04 RX ADMIN — HEPARIN SODIUM 5000 UNITS: 5000 INJECTION INTRAVENOUS; SUBCUTANEOUS at 09:08

## 2021-08-04 RX ADMIN — OXYCODONE HYDROCHLORIDE 10 MG: 10 TABLET ORAL at 09:08

## 2021-08-04 RX ADMIN — TACROLIMUS 4 MG: 1 CAPSULE ORAL at 08:08

## 2021-08-04 RX ADMIN — FOLIC ACID 1 MG: 1 TABLET ORAL at 08:08

## 2021-08-04 RX ADMIN — FAMOTIDINE 20 MG: 20 TABLET, FILM COATED ORAL at 04:08

## 2021-08-04 RX ADMIN — TACROLIMUS 4 MG: 1 CAPSULE ORAL at 05:08

## 2021-08-04 RX ADMIN — OXYCODONE 5 MG: 5 TABLET ORAL at 05:08

## 2021-08-04 RX ADMIN — HEPARIN SODIUM 5000 UNITS: 5000 INJECTION INTRAVENOUS; SUBCUTANEOUS at 02:08

## 2021-08-05 ENCOUNTER — ANESTHESIA (OUTPATIENT)
Dept: ENDOSCOPY | Facility: HOSPITAL | Age: 41
DRG: 809 | End: 2021-08-05
Payer: MEDICAID

## 2021-08-05 VITALS — SYSTOLIC BLOOD PRESSURE: 109 MMHG | DIASTOLIC BLOOD PRESSURE: 73 MMHG

## 2021-08-05 LAB
ALBUMIN SERPL BCP-MCNC: 3.5 G/DL (ref 3.5–5.2)
ALP SERPL-CCNC: 121 U/L (ref 55–135)
ALT SERPL W/O P-5'-P-CCNC: <5 U/L (ref 10–44)
ANION GAP SERPL CALC-SCNC: 9 MMOL/L (ref 8–16)
ANISOCYTOSIS BLD QL SMEAR: SLIGHT
AST SERPL-CCNC: 15 U/L (ref 10–40)
BASOPHILS # BLD AUTO: ABNORMAL K/UL (ref 0–0.2)
BASOPHILS NFR BLD: 3 % (ref 0–1.9)
BILIRUB SERPL-MCNC: 0.6 MG/DL (ref 0.1–1)
BUN SERPL-MCNC: 13 MG/DL (ref 6–20)
CALCIUM SERPL-MCNC: 9.7 MG/DL (ref 8.7–10.5)
CHLORIDE SERPL-SCNC: 103 MMOL/L (ref 95–110)
CO2 SERPL-SCNC: 23 MMOL/L (ref 23–29)
CREAT SERPL-MCNC: 0.9 MG/DL (ref 0.5–1.4)
DIFFERENTIAL METHOD: ABNORMAL
EOSINOPHIL # BLD AUTO: ABNORMAL K/UL (ref 0–0.5)
EOSINOPHIL NFR BLD: 3 % (ref 0–8)
ERYTHROCYTE [DISTWIDTH] IN BLOOD BY AUTOMATED COUNT: 13.1 % (ref 11.5–14.5)
EST. GFR  (AFRICAN AMERICAN): >60 ML/MIN/1.73 M^2
EST. GFR  (NON AFRICAN AMERICAN): >60 ML/MIN/1.73 M^2
GLUCOSE SERPL-MCNC: 104 MG/DL (ref 70–110)
HCT VFR BLD AUTO: 26.5 % (ref 40–54)
HCV AB SERPL QL IA: POSITIVE
HGB BLD-MCNC: 8.9 G/DL (ref 14–18)
HYPOCHROMIA BLD QL SMEAR: ABNORMAL
IMM GRANULOCYTES # BLD AUTO: ABNORMAL K/UL (ref 0–0.04)
IMM GRANULOCYTES NFR BLD AUTO: ABNORMAL % (ref 0–0.5)
LYMPHOCYTES # BLD AUTO: ABNORMAL K/UL (ref 1–4.8)
LYMPHOCYTES NFR BLD: 79 % (ref 18–48)
MAGNESIUM SERPL-MCNC: 1.8 MG/DL (ref 1.6–2.6)
MCH RBC QN AUTO: 30.9 PG (ref 27–31)
MCHC RBC AUTO-ENTMCNC: 33.6 G/DL (ref 32–36)
MCV RBC AUTO: 92 FL (ref 82–98)
MONOCYTES # BLD AUTO: ABNORMAL K/UL (ref 0.3–1)
MONOCYTES NFR BLD: 3 % (ref 4–15)
NEUTROPHILS NFR BLD: 11 % (ref 38–73)
NEUTS BAND NFR BLD MANUAL: 1 %
NRBC BLD-RTO: 0 /100 WBC
OVALOCYTES BLD QL SMEAR: ABNORMAL
PHOSPHATE SERPL-MCNC: 4.7 MG/DL (ref 2.7–4.5)
PLATELET # BLD AUTO: 110 K/UL (ref 150–450)
PMV BLD AUTO: 9.3 FL (ref 9.2–12.9)
POIKILOCYTOSIS BLD QL SMEAR: SLIGHT
POLYCHROMASIA BLD QL SMEAR: ABNORMAL
POTASSIUM SERPL-SCNC: 4.7 MMOL/L (ref 3.5–5.1)
PROT SERPL-MCNC: 6.3 G/DL (ref 6–8.4)
RBC # BLD AUTO: 2.88 M/UL (ref 4.6–6.2)
SODIUM SERPL-SCNC: 135 MMOL/L (ref 136–145)
TACROLIMUS BLD-MCNC: 5.3 NG/ML (ref 5–15)
TACROLIMUS, NORMALIZED: 4.9 NG/ML (ref 5–15)
WBC # BLD AUTO: 2.27 K/UL (ref 3.9–12.7)

## 2021-08-05 PROCEDURE — 25000003 PHARM REV CODE 250: Performed by: NURSE PRACTITIONER

## 2021-08-05 PROCEDURE — 85007 BL SMEAR W/DIFF WBC COUNT: CPT | Performed by: NURSE PRACTITIONER

## 2021-08-05 PROCEDURE — 63600175 PHARM REV CODE 636 W HCPCS: Performed by: NURSE PRACTITIONER

## 2021-08-05 PROCEDURE — 86803 HEPATITIS C AB TEST: CPT | Performed by: TRANSPLANT SURGERY

## 2021-08-05 PROCEDURE — S4991 NICOTINE PATCH NONLEGEND: HCPCS | Performed by: NURSE PRACTITIONER

## 2021-08-05 PROCEDURE — 63600175 PHARM REV CODE 636 W HCPCS: Performed by: NURSE ANESTHETIST, CERTIFIED REGISTERED

## 2021-08-05 PROCEDURE — 43235 PR EGD, FLEX, DIAGNOSTIC: ICD-10-PCS | Mod: ,,, | Performed by: INTERNAL MEDICINE

## 2021-08-05 PROCEDURE — 99233 PR SUBSEQUENT HOSPITAL CARE,LEVL III: ICD-10-PCS | Mod: ,,, | Performed by: NURSE PRACTITIONER

## 2021-08-05 PROCEDURE — 80053 COMPREHEN METABOLIC PANEL: CPT | Performed by: NURSE PRACTITIONER

## 2021-08-05 PROCEDURE — 37000008 HC ANESTHESIA 1ST 15 MINUTES: Performed by: INTERNAL MEDICINE

## 2021-08-05 PROCEDURE — 87522 HEPATITIS C REVRS TRNSCRPJ: CPT | Performed by: TRANSPLANT SURGERY

## 2021-08-05 PROCEDURE — 37000009 HC ANESTHESIA EA ADD 15 MINS: Performed by: INTERNAL MEDICINE

## 2021-08-05 PROCEDURE — 43235 EGD DIAGNOSTIC BRUSH WASH: CPT | Performed by: INTERNAL MEDICINE

## 2021-08-05 PROCEDURE — 80197 ASSAY OF TACROLIMUS: CPT | Performed by: NURSE PRACTITIONER

## 2021-08-05 PROCEDURE — D9220A PRA ANESTHESIA: Mod: CRNA,,, | Performed by: NURSE ANESTHETIST, CERTIFIED REGISTERED

## 2021-08-05 PROCEDURE — 83735 ASSAY OF MAGNESIUM: CPT | Performed by: NURSE PRACTITIONER

## 2021-08-05 PROCEDURE — D9220A PRA ANESTHESIA: ICD-10-PCS | Mod: ANES,,, | Performed by: SURGERY

## 2021-08-05 PROCEDURE — D9220A PRA ANESTHESIA: ICD-10-PCS | Mod: CRNA,,, | Performed by: NURSE ANESTHETIST, CERTIFIED REGISTERED

## 2021-08-05 PROCEDURE — 20600001 HC STEP DOWN PRIVATE ROOM

## 2021-08-05 PROCEDURE — 43235 EGD DIAGNOSTIC BRUSH WASH: CPT | Mod: ,,, | Performed by: INTERNAL MEDICINE

## 2021-08-05 PROCEDURE — D9220A PRA ANESTHESIA: Mod: ANES,,, | Performed by: SURGERY

## 2021-08-05 PROCEDURE — 84100 ASSAY OF PHOSPHORUS: CPT | Performed by: NURSE PRACTITIONER

## 2021-08-05 PROCEDURE — 99233 SBSQ HOSP IP/OBS HIGH 50: CPT | Mod: ,,, | Performed by: NURSE PRACTITIONER

## 2021-08-05 PROCEDURE — 25000003 PHARM REV CODE 250: Performed by: NURSE ANESTHETIST, CERTIFIED REGISTERED

## 2021-08-05 PROCEDURE — 85027 COMPLETE CBC AUTOMATED: CPT | Performed by: NURSE PRACTITIONER

## 2021-08-05 RX ORDER — ALBUTEROL SULFATE 90 UG/1
2 AEROSOL, METERED RESPIRATORY (INHALATION) EVERY 6 HOURS PRN
Status: DISCONTINUED | OUTPATIENT
Start: 2021-08-05 | End: 2021-08-06 | Stop reason: HOSPADM

## 2021-08-05 RX ORDER — PROPOFOL 10 MG/ML
VIAL (ML) INTRAVENOUS
Status: DISCONTINUED | OUTPATIENT
Start: 2021-08-05 | End: 2021-08-05

## 2021-08-05 RX ORDER — SODIUM CHLORIDE 0.9 % (FLUSH) 0.9 %
10 SYRINGE (ML) INJECTION
Status: DISCONTINUED | OUTPATIENT
Start: 2021-08-05 | End: 2021-08-05 | Stop reason: HOSPADM

## 2021-08-05 RX ORDER — ATOVAQUONE 750 MG/5ML
1500 SUSPENSION ORAL DAILY
Status: DISCONTINUED | OUTPATIENT
Start: 2021-08-06 | End: 2021-08-06 | Stop reason: HOSPADM

## 2021-08-05 RX ORDER — LIDOCAINE HYDROCHLORIDE 20 MG/ML
INJECTION INTRAVENOUS
Status: DISCONTINUED | OUTPATIENT
Start: 2021-08-05 | End: 2021-08-05

## 2021-08-05 RX ORDER — SODIUM CHLORIDE 9 MG/ML
INJECTION, SOLUTION INTRAVENOUS CONTINUOUS PRN
Status: DISCONTINUED | OUTPATIENT
Start: 2021-08-05 | End: 2021-08-05

## 2021-08-05 RX ORDER — TACROLIMUS 5 MG/1
5 CAPSULE ORAL 2 TIMES DAILY
Status: DISCONTINUED | OUTPATIENT
Start: 2021-08-05 | End: 2021-08-06 | Stop reason: HOSPADM

## 2021-08-05 RX ORDER — ATOVAQUONE 750 MG/5ML
1500 SUSPENSION ORAL DAILY
Qty: 300 ML | Refills: 2 | Status: SHIPPED | OUTPATIENT
Start: 2021-08-05 | End: 2021-10-09

## 2021-08-05 RX ORDER — PROPOFOL 10 MG/ML
VIAL (ML) INTRAVENOUS CONTINUOUS PRN
Status: DISCONTINUED | OUTPATIENT
Start: 2021-08-05 | End: 2021-08-05

## 2021-08-05 RX ADMIN — PROPOFOL 100 MG: 10 INJECTION, EMULSION INTRAVENOUS at 11:08

## 2021-08-05 RX ADMIN — HEPARIN SODIUM 5000 UNITS: 5000 INJECTION INTRAVENOUS; SUBCUTANEOUS at 10:08

## 2021-08-05 RX ADMIN — GABAPENTIN 300 MG: 300 CAPSULE ORAL at 08:08

## 2021-08-05 RX ADMIN — CETIRIZINE HYDROCHLORIDE 5 MG: 5 TABLET ORAL at 09:08

## 2021-08-05 RX ADMIN — SODIUM CHLORIDE: 0.9 INJECTION, SOLUTION INTRAVENOUS at 10:08

## 2021-08-05 RX ADMIN — NICOTINE 1 PATCH: 14 PATCH TRANSDERMAL at 09:08

## 2021-08-05 RX ADMIN — TACROLIMUS 5 MG: 5 CAPSULE ORAL at 05:08

## 2021-08-05 RX ADMIN — FAMOTIDINE 20 MG: 20 TABLET, FILM COATED ORAL at 09:08

## 2021-08-05 RX ADMIN — GLYCOPYRROLATE 0.1 MG: 0.2 INJECTION, SOLUTION INTRAMUSCULAR; INTRAVITREAL at 11:08

## 2021-08-05 RX ADMIN — MELATONIN TAB 3 MG 6 MG: 3 TAB at 08:08

## 2021-08-05 RX ADMIN — HEPARIN SODIUM 5000 UNITS: 5000 INJECTION INTRAVENOUS; SUBCUTANEOUS at 05:08

## 2021-08-05 RX ADMIN — ACETAMINOPHEN 650 MG: 325 TABLET ORAL at 05:08

## 2021-08-05 RX ADMIN — OXYCODONE HYDROCHLORIDE 10 MG: 10 TABLET ORAL at 09:08

## 2021-08-05 RX ADMIN — OXYCODONE HYDROCHLORIDE 10 MG: 10 TABLET ORAL at 05:08

## 2021-08-05 RX ADMIN — TACROLIMUS 4 MG: 1 CAPSULE ORAL at 09:08

## 2021-08-05 RX ADMIN — LIDOCAINE HYDROCHLORIDE 100 MG: 20 INJECTION, SOLUTION INTRAVENOUS at 11:08

## 2021-08-05 RX ADMIN — PROPOFOL 200 MCG/KG/MIN: 10 INJECTION, EMULSION INTRAVENOUS at 11:08

## 2021-08-05 RX ADMIN — FOLIC ACID 1 MG: 1 TABLET ORAL at 01:08

## 2021-08-05 RX ADMIN — FILGRASTIM-SNDZ 480 MCG: 480 INJECTION, SOLUTION INTRAVENOUS; SUBCUTANEOUS at 02:08

## 2021-08-05 RX ADMIN — ACETAMINOPHEN 650 MG: 325 TABLET ORAL at 08:08

## 2021-08-06 ENCOUNTER — SPECIALTY PHARMACY (OUTPATIENT)
Dept: PHARMACY | Facility: CLINIC | Age: 41
End: 2021-08-06

## 2021-08-06 ENCOUNTER — PATIENT MESSAGE (OUTPATIENT)
Dept: TRANSPLANT | Facility: CLINIC | Age: 41
End: 2021-08-06

## 2021-08-06 VITALS
DIASTOLIC BLOOD PRESSURE: 62 MMHG | BODY MASS INDEX: 22.07 KG/M2 | HEIGHT: 74 IN | RESPIRATION RATE: 20 BRPM | HEART RATE: 99 BPM | WEIGHT: 171.94 LBS | SYSTOLIC BLOOD PRESSURE: 100 MMHG | OXYGEN SATURATION: 96 % | TEMPERATURE: 99 F

## 2021-08-06 PROBLEM — D61.818 PANCYTOPENIA: Status: ACTIVE | Noted: 2021-08-06

## 2021-08-06 PROBLEM — R19.7 DIARRHEA: Status: RESOLVED | Noted: 2021-08-03 | Resolved: 2021-08-06

## 2021-08-06 LAB
ALBUMIN SERPL BCP-MCNC: 3.7 G/DL (ref 3.5–5.2)
ALP SERPL-CCNC: 130 U/L (ref 55–135)
ALT SERPL W/O P-5'-P-CCNC: 5 U/L (ref 10–44)
ANION GAP SERPL CALC-SCNC: 11 MMOL/L (ref 8–16)
ANISOCYTOSIS BLD QL SMEAR: SLIGHT
AST SERPL-CCNC: 15 U/L (ref 10–40)
BASOPHILS # BLD AUTO: 0.04 K/UL (ref 0–0.2)
BASOPHILS NFR BLD: 1.7 % (ref 0–1.9)
BILIRUB SERPL-MCNC: 0.5 MG/DL (ref 0.1–1)
BUN SERPL-MCNC: 15 MG/DL (ref 6–20)
CALCIUM SERPL-MCNC: 9.5 MG/DL (ref 8.7–10.5)
CHLORIDE SERPL-SCNC: 103 MMOL/L (ref 95–110)
CO2 SERPL-SCNC: 22 MMOL/L (ref 23–29)
CREAT SERPL-MCNC: 0.9 MG/DL (ref 0.5–1.4)
DIFFERENTIAL METHOD: ABNORMAL
EOSINOPHIL # BLD AUTO: 0.1 K/UL (ref 0–0.5)
EOSINOPHIL NFR BLD: 2.1 % (ref 0–8)
ERYTHROCYTE [DISTWIDTH] IN BLOOD BY AUTOMATED COUNT: 12.9 % (ref 11.5–14.5)
EST. GFR  (AFRICAN AMERICAN): >60 ML/MIN/1.73 M^2
EST. GFR  (NON AFRICAN AMERICAN): >60 ML/MIN/1.73 M^2
FERRITIN SERPL-MCNC: 2119 NG/ML (ref 20–300)
FOLATE SERPL-MCNC: 15.1 NG/ML (ref 4–24)
GLUCOSE SERPL-MCNC: 115 MG/DL (ref 70–110)
HCT VFR BLD AUTO: 28.2 % (ref 40–54)
HGB BLD-MCNC: 9.2 G/DL (ref 14–18)
HSV AB, IGM BY EIA: 0.49 INDEX
IMM GRANULOCYTES # BLD AUTO: 0.04 K/UL (ref 0–0.04)
IMM GRANULOCYTES NFR BLD AUTO: 1.7 % (ref 0–0.5)
IRON SERPL-MCNC: 90 UG/DL (ref 45–160)
LYMPHOCYTES # BLD AUTO: 1.4 K/UL (ref 1–4.8)
LYMPHOCYTES NFR BLD: 60.3 % (ref 18–48)
MAGNESIUM SERPL-MCNC: 1.8 MG/DL (ref 1.6–2.6)
MCH RBC QN AUTO: 30.7 PG (ref 27–31)
MCHC RBC AUTO-ENTMCNC: 32.6 G/DL (ref 32–36)
MCV RBC AUTO: 94 FL (ref 82–98)
MONOCYTES # BLD AUTO: 0.3 K/UL (ref 0.3–1)
MONOCYTES NFR BLD: 13 % (ref 4–15)
NEUTROPHILS # BLD AUTO: 0.5 K/UL (ref 1.8–7.7)
NEUTROPHILS NFR BLD: 21.2 % (ref 38–73)
NRBC BLD-RTO: 0 /100 WBC
OVALOCYTES BLD QL SMEAR: ABNORMAL
PHOSPHATE SERPL-MCNC: 4.4 MG/DL (ref 2.7–4.5)
PLATELET # BLD AUTO: 127 K/UL (ref 150–450)
PLATELET BLD QL SMEAR: ABNORMAL
PMV BLD AUTO: 9.7 FL (ref 9.2–12.9)
POIKILOCYTOSIS BLD QL SMEAR: SLIGHT
POTASSIUM SERPL-SCNC: 4.2 MMOL/L (ref 3.5–5.1)
PROT SERPL-MCNC: 6.6 G/DL (ref 6–8.4)
RBC # BLD AUTO: 3 M/UL (ref 4.6–6.2)
SATURATED IRON: 36 % (ref 20–50)
SODIUM SERPL-SCNC: 136 MMOL/L (ref 136–145)
TACROLIMUS BLD-MCNC: 6 NG/ML (ref 5–15)
TACROLIMUS, NORMALIZED: 5.5 NG/ML (ref 5–15)
TOTAL IRON BINDING CAPACITY: 252 UG/DL (ref 250–450)
TRANSFERRIN SERPL-MCNC: 170 MG/DL (ref 200–375)
VIT B12 SERPL-MCNC: 595 PG/ML (ref 210–950)
WBC # BLD AUTO: 2.39 K/UL (ref 3.9–12.7)

## 2021-08-06 PROCEDURE — 63600175 PHARM REV CODE 636 W HCPCS: Mod: JG | Performed by: NURSE PRACTITIONER

## 2021-08-06 PROCEDURE — 36415 COLL VENOUS BLD VENIPUNCTURE: CPT | Performed by: NURSE PRACTITIONER

## 2021-08-06 PROCEDURE — 82746 ASSAY OF FOLIC ACID SERUM: CPT | Performed by: NURSE PRACTITIONER

## 2021-08-06 PROCEDURE — 99239 HOSP IP/OBS DSCHRG MGMT >30: CPT | Mod: ,,, | Performed by: NURSE PRACTITIONER

## 2021-08-06 PROCEDURE — 25000003 PHARM REV CODE 250: Performed by: NURSE PRACTITIONER

## 2021-08-06 PROCEDURE — 82955 ASSAY OF G6PD ENZYME: CPT | Performed by: NURSE PRACTITIONER

## 2021-08-06 PROCEDURE — 63600175 PHARM REV CODE 636 W HCPCS: Performed by: NURSE PRACTITIONER

## 2021-08-06 PROCEDURE — 82728 ASSAY OF FERRITIN: CPT | Performed by: NURSE PRACTITIONER

## 2021-08-06 PROCEDURE — 83735 ASSAY OF MAGNESIUM: CPT | Performed by: NURSE PRACTITIONER

## 2021-08-06 PROCEDURE — 80197 ASSAY OF TACROLIMUS: CPT | Performed by: NURSE PRACTITIONER

## 2021-08-06 PROCEDURE — 80053 COMPREHEN METABOLIC PANEL: CPT | Performed by: NURSE PRACTITIONER

## 2021-08-06 PROCEDURE — S4991 NICOTINE PATCH NONLEGEND: HCPCS | Performed by: NURSE PRACTITIONER

## 2021-08-06 PROCEDURE — 84100 ASSAY OF PHOSPHORUS: CPT | Performed by: NURSE PRACTITIONER

## 2021-08-06 PROCEDURE — 83540 ASSAY OF IRON: CPT | Performed by: NURSE PRACTITIONER

## 2021-08-06 PROCEDURE — 82607 VITAMIN B-12: CPT | Performed by: NURSE PRACTITIONER

## 2021-08-06 PROCEDURE — 99239 PR HOSPITAL DISCHARGE DAY,>30 MIN: ICD-10-PCS | Mod: ,,, | Performed by: NURSE PRACTITIONER

## 2021-08-06 PROCEDURE — 85025 COMPLETE CBC W/AUTO DIFF WBC: CPT | Performed by: NURSE PRACTITIONER

## 2021-08-06 RX ORDER — CETIRIZINE HYDROCHLORIDE 5 MG/1
5 TABLET ORAL DAILY PRN
Refills: 0 | COMMUNITY
Start: 2021-08-06

## 2021-08-06 RX ORDER — ALBUTEROL SULFATE 90 UG/1
2 AEROSOL, METERED RESPIRATORY (INHALATION) EVERY 6 HOURS PRN
Qty: 18 G | Refills: 0 | Status: SHIPPED | OUTPATIENT
Start: 2021-08-06 | End: 2022-08-06

## 2021-08-06 RX ORDER — TACROLIMUS 1 MG/1
5 CAPSULE ORAL EVERY 12 HOURS
Qty: 300 CAPSULE | Refills: 11 | Status: SHIPPED | OUTPATIENT
Start: 2021-08-06 | End: 2021-08-06

## 2021-08-06 RX ORDER — TACROLIMUS 1 MG/1
5 CAPSULE ORAL EVERY 12 HOURS
Qty: 300 CAPSULE | Refills: 11 | Status: SHIPPED | OUTPATIENT
Start: 2021-08-06 | End: 2021-08-27

## 2021-08-06 RX ORDER — FILGRASTIM 480 UG/.8ML
480 INJECTION, SOLUTION INTRAVENOUS; SUBCUTANEOUS DAILY
Qty: 4 ML | Refills: 2 | Status: SHIPPED | OUTPATIENT
Start: 2021-08-06 | End: 2021-10-14 | Stop reason: ALTCHOICE

## 2021-08-06 RX ADMIN — ACETAMINOPHEN 650 MG: 325 TABLET ORAL at 03:08

## 2021-08-06 RX ADMIN — FILGRASTIM-SNDZ 480 MCG: 480 INJECTION, SOLUTION INTRAVENOUS; SUBCUTANEOUS at 11:08

## 2021-08-06 RX ADMIN — OXYCODONE HYDROCHLORIDE 10 MG: 10 TABLET ORAL at 03:08

## 2021-08-06 RX ADMIN — ACETAMINOPHEN 650 MG: 325 TABLET ORAL at 11:08

## 2021-08-06 RX ADMIN — CETIRIZINE HYDROCHLORIDE 5 MG: 5 TABLET ORAL at 08:08

## 2021-08-06 RX ADMIN — NICOTINE 1 PATCH: 14 PATCH TRANSDERMAL at 08:08

## 2021-08-06 RX ADMIN — ATOVAQUONE 1500 MG: 750 SUSPENSION ORAL at 08:08

## 2021-08-06 RX ADMIN — HEPARIN SODIUM 5000 UNITS: 5000 INJECTION INTRAVENOUS; SUBCUTANEOUS at 05:08

## 2021-08-06 RX ADMIN — OXYCODONE HYDROCHLORIDE 10 MG: 10 TABLET ORAL at 11:08

## 2021-08-06 RX ADMIN — TACROLIMUS 5 MG: 5 CAPSULE ORAL at 08:08

## 2021-08-06 RX ADMIN — FAMOTIDINE 20 MG: 20 TABLET, FILM COATED ORAL at 08:08

## 2021-08-06 RX ADMIN — FOLIC ACID 1 MG: 1 TABLET ORAL at 08:08

## 2021-08-07 LAB — HEPATITIS C VIRUS (HCV) RNA DETECTION/QUANTIFICATION RT-PCR: NORMAL IU/ML

## 2021-08-08 ENCOUNTER — TELEPHONE (OUTPATIENT)
Dept: TRANSPLANT | Facility: CLINIC | Age: 41
End: 2021-08-08

## 2021-08-08 LAB
BACTERIA BLD CULT: NORMAL
BACTERIA BLD CULT: NORMAL

## 2021-08-09 ENCOUNTER — TELEPHONE (OUTPATIENT)
Dept: TRANSPLANT | Facility: CLINIC | Age: 41
End: 2021-08-09

## 2021-08-09 LAB — G6PD RBC-CCNT: 9.2 U/G HGB (ref 7–20.5)

## 2021-08-12 ENCOUNTER — SPECIALTY PHARMACY (OUTPATIENT)
Dept: PHARMACY | Facility: CLINIC | Age: 41
End: 2021-08-12

## 2021-08-12 LAB
EXT ALBUMIN: 4.1
EXT ALKALINE PHOSPHATASE: 127
EXT ALT: <7
EXT AST: 17
EXT BASOPHIL%: 1.3
EXT BILIRUBIN TOTAL: 0.4
EXT BUN: 13
EXT CALCIUM: 9.8
EXT CHLORIDE: 106
EXT CMV DNA QUANT. BY PCR: NOT DETECTED
EXT CO2: 30
EXT CREATININE: 0.87 MG/DL
EXT EOSINOPHIL%: 1.3
EXT GLUCOSE: 107
EXT HEMATOCRIT: 29
EXT HEMOGLOBIN: 9.5
EXT LYMPH%: 69.6
EXT MONOCYTES%: 19.4
EXT PLATELETS: 140
EXT POTASSIUM: 4.6
EXT PROTEIN TOTAL: 6.5
EXT SEGS%: 7.8
EXT SODIUM: 137 MMOL/L
EXT TACROLIMUS LVL: 5.8
EXT WBC: 3.2

## 2021-08-12 RX ORDER — MYCOPHENOLATE MOFETIL 250 MG/1
CAPSULE ORAL
Qty: 120 CAPSULE | Refills: 2 | Status: SHIPPED | OUTPATIENT
Start: 2021-08-12 | End: 2021-11-12

## 2021-08-12 RX ORDER — LORATADINE 10 MG/1
10 TABLET ORAL DAILY
COMMUNITY

## 2021-08-12 RX ORDER — DIPHENHYDRAMINE HCL 25 MG
25 TABLET ORAL NIGHTLY PRN
Status: ON HOLD | COMMUNITY
End: 2023-12-14 | Stop reason: HOSPADM

## 2021-08-13 ENCOUNTER — PATIENT MESSAGE (OUTPATIENT)
Dept: TRANSPLANT | Facility: CLINIC | Age: 41
End: 2021-08-13

## 2021-08-19 ENCOUNTER — TELEPHONE (OUTPATIENT)
Dept: TRANSPLANT | Facility: CLINIC | Age: 41
End: 2021-08-19

## 2021-08-19 LAB
EXT ALBUMIN: 4.2
EXT ALKALINE PHOSPHATASE: 129
EXT ALT: <7
EXT AST: 23
EXT BASOPHIL%: 1
EXT BILIRUBIN TOTAL: 0.6
EXT BUN: 13
EXT CALCIUM: 10.4
EXT CHLORIDE: 104
EXT CMV DNA QUANT. BY PCR: <200
EXT CO2: 27
EXT CREATININE: 1.04 MG/DL
EXT EOSINOPHIL%: 1.3
EXT GLUCOSE: 106
EXT HEMATOCRIT: 28.9
EXT HEMOGLOBIN: 9.4
EXT LYMPH%: 56.1
EXT MONOCYTES%: 13.6
EXT PLATELETS: 166
EXT POTASSIUM: 4.7
EXT PROTEIN TOTAL: 6.8
EXT SEGS%: 26.3
EXT SODIUM: 136 MMOL/L
EXT TACROLIMUS LVL: 8.1
EXT WBC: 3

## 2021-08-20 ENCOUNTER — TELEPHONE (OUTPATIENT)
Dept: TRANSPLANT | Facility: CLINIC | Age: 41
End: 2021-08-20

## 2021-08-25 ENCOUNTER — PATIENT MESSAGE (OUTPATIENT)
Dept: TRANSPLANT | Facility: CLINIC | Age: 41
End: 2021-08-25

## 2021-08-25 ENCOUNTER — TELEPHONE (OUTPATIENT)
Dept: TRANSPLANT | Facility: CLINIC | Age: 41
End: 2021-08-25

## 2021-08-27 ENCOUNTER — PATIENT MESSAGE (OUTPATIENT)
Dept: TRANSPLANT | Facility: CLINIC | Age: 41
End: 2021-08-27

## 2021-08-27 LAB
EXT ALBUMIN: 4.2
EXT ALKALINE PHOSPHATASE: 133
EXT ALT: <7
EXT AST: 21
EXT BASOPHIL%: 1
EXT BILIRUBIN TOTAL: 0.4
EXT BUN: 12
EXT CALCIUM: 9.7
EXT CHLORIDE: 98
EXT CO2: 26
EXT CREATININE: 1.01 MG/DL
EXT EOSINOPHIL%: 1.4
EXT GLUCOSE: 113
EXT HBV DNA QUANT PCR: NORMAL
EXT HEMATOCRIT: 26.6
EXT HEMOGLOBIN: 8.9
EXT LYMPH%: 34.8
EXT MONOCYTES%: 12.3
EXT PLATELETS: 163
EXT POTASSIUM: 5.1
EXT PROTEIN TOTAL: 6.6
EXT SEGS%: 44.9
EXT SODIUM: 127 MMOL/L
EXT TACROLIMUS LVL: 8.9
EXT WBC: 5

## 2021-09-01 RX ORDER — TACROLIMUS 1 MG/1
CAPSULE ORAL
Qty: 270 CAPSULE | Refills: 11 | Status: SHIPPED | OUTPATIENT
Start: 2021-09-01 | End: 2021-09-22 | Stop reason: DRUGHIGH

## 2021-09-04 ENCOUNTER — PATIENT MESSAGE (OUTPATIENT)
Dept: TRANSPLANT | Facility: CLINIC | Age: 41
End: 2021-09-04

## 2021-09-07 ENCOUNTER — TELEPHONE (OUTPATIENT)
Dept: TRANSPLANT | Facility: CLINIC | Age: 41
End: 2021-09-07

## 2021-09-13 LAB
EXT ALBUMIN: 4.2
EXT ALKALINE PHOSPHATASE: 120
EXT ALT: <7
EXT AST: 19
EXT BASOPHIL%: 1
EXT BILIRUBIN TOTAL: 0.6
EXT BUN: 17
EXT CALCIUM: 9.9
EXT CHLORIDE: 105
EXT CMV DNA QUANT. BY PCR: <200
EXT CO2: 24
EXT CREATININE: 1.09 MG/DL
EXT EOSINOPHIL%: 5.2
EXT GLUCOSE: 106
EXT HCV QUANT: NOT DETECTED
EXT HEMATOCRIT: 27.5
EXT HEMOGLOBIN: 9.2
EXT LYMPH%: 53.5
EXT MONOCYTES%: 11.9
EXT PLATELETS: 151
EXT POTASSIUM: 5.1
EXT PROTEIN TOTAL: 6.8
EXT SEGS%: 27.4
EXT SODIUM: 134 MMOL/L
EXT TACROLIMUS LVL: 4.5
EXT WBC: 4

## 2021-09-17 ENCOUNTER — TELEPHONE (OUTPATIENT)
Dept: TRANSPLANT | Facility: CLINIC | Age: 41
End: 2021-09-17

## 2021-09-17 LAB
EXT ALBUMIN: 4.2
EXT ALKALINE PHOSPHATASE: 133
EXT ALT: <7
EXT AST: 17
EXT BASOPHIL%: 0.7
EXT BILIRUBIN TOTAL: 0.6
EXT BUN: 11
EXT CALCIUM: 10.2
EXT CHLORIDE: 103
EXT CO2: 27
EXT CREATININE: 0.83 MG/DL
EXT EOSINOPHIL%: 4.5
EXT GLUCOSE: 109
EXT HEMATOCRIT: 9.5
EXT HEMOGLOBIN: 28.8
EXT LYMPH%: 43.5
EXT MONOCYTES%: 11.9
EXT PLATELETS: 130
EXT POTASSIUM: 4.6
EXT PROTEIN TOTAL: 6.9
EXT SEGS%: 39
EXT SODIUM: 131 MMOL/L
EXT TACROLIMUS LVL: 10.3
EXT WBC: 4.5

## 2021-09-22 ENCOUNTER — PATIENT MESSAGE (OUTPATIENT)
Dept: TRANSPLANT | Facility: CLINIC | Age: 41
End: 2021-09-22

## 2021-09-22 RX ORDER — TACROLIMUS 1 MG/1
CAPSULE ORAL
Qty: 240 CAPSULE | Refills: 11 | Status: SHIPPED | OUTPATIENT
Start: 2021-09-22 | End: 2021-11-05

## 2021-10-06 ENCOUNTER — TELEPHONE (OUTPATIENT)
Dept: TRANSPLANT | Facility: CLINIC | Age: 41
End: 2021-10-06

## 2021-10-07 LAB
EXT ALBUMIN: 4.4
EXT ALKALINE PHOSPHATASE: 132
EXT ALT: 8
EXT AST: 22
EXT BASOPHIL%: 0.6
EXT BILIRUBIN TOTAL: 0.5
EXT BUN: 10
EXT CALCIUM: 10.2
EXT CHLORIDE: 106
EXT CO2: 29
EXT CREATININE: 0.83 MG/DL
EXT EOSINOPHIL%: 3.5
EXT GLUCOSE: 108
EXT HEMATOCRIT: 31.9
EXT HEMOGLOBIN: 10.5
EXT LYMPH%: 46.8
EXT MONOCYTES%: 13.4
EXT PLATELETS: 130
EXT POTASSIUM: 4.3
EXT PROTEIN TOTAL: 7.3
EXT SEGS%: 35.3
EXT SODIUM: 137 MMOL/L
EXT TACROLIMUS LVL: 6.8
EXT WBC: 4.9

## 2021-10-11 ENCOUNTER — TELEPHONE (OUTPATIENT)
Dept: TRANSPLANT | Facility: CLINIC | Age: 41
End: 2021-10-11

## 2021-10-11 ENCOUNTER — OFFICE VISIT (OUTPATIENT)
Dept: TRANSPLANT | Facility: CLINIC | Age: 41
End: 2021-10-11
Payer: MEDICAID

## 2021-10-11 ENCOUNTER — HOSPITAL ENCOUNTER (OUTPATIENT)
Dept: RADIOLOGY | Facility: HOSPITAL | Age: 41
Discharge: HOME | End: 2021-10-11
Attending: STUDENT IN AN ORGANIZED HEALTH CARE EDUCATION/TRAINING PROGRAM
Payer: MEDICAID

## 2021-10-11 VITALS
HEART RATE: 85 BPM | BODY MASS INDEX: 23.59 KG/M2 | WEIGHT: 174.19 LBS | TEMPERATURE: 97 F | DIASTOLIC BLOOD PRESSURE: 73 MMHG | HEIGHT: 72 IN | RESPIRATION RATE: 16 BRPM | SYSTOLIC BLOOD PRESSURE: 126 MMHG | OXYGEN SATURATION: 99 %

## 2021-10-11 DIAGNOSIS — Z79.60 LONG-TERM USE OF IMMUNOSUPPRESSANT MEDICATION: ICD-10-CM

## 2021-10-11 DIAGNOSIS — Z94.4 LIVER REPLACED BY TRANSPLANT: ICD-10-CM

## 2021-10-11 DIAGNOSIS — Z94.4 LIVER TRANSPLANTED: Primary | ICD-10-CM

## 2021-10-11 PROCEDURE — 93975 VASCULAR STUDY: CPT | Mod: 26,,, | Performed by: RADIOLOGY

## 2021-10-11 PROCEDURE — 76705 ECHO EXAM OF ABDOMEN: CPT | Mod: 26,59,, | Performed by: RADIOLOGY

## 2021-10-11 PROCEDURE — 93975 VASCULAR STUDY: CPT | Mod: TC

## 2021-10-11 PROCEDURE — 99214 OFFICE O/P EST MOD 30 MIN: CPT | Mod: PBBFAC,25 | Performed by: STUDENT IN AN ORGANIZED HEALTH CARE EDUCATION/TRAINING PROGRAM

## 2021-10-11 PROCEDURE — 93975 US LIVER TRANSPLANT POST: ICD-10-PCS | Mod: 26,,, | Performed by: RADIOLOGY

## 2021-10-11 PROCEDURE — 99214 PR OFFICE/OUTPT VISIT, EST, LEVL IV, 30-39 MIN: ICD-10-PCS | Mod: S$PBB,,, | Performed by: STUDENT IN AN ORGANIZED HEALTH CARE EDUCATION/TRAINING PROGRAM

## 2021-10-11 PROCEDURE — 99214 OFFICE O/P EST MOD 30 MIN: CPT | Mod: S$PBB,,, | Performed by: STUDENT IN AN ORGANIZED HEALTH CARE EDUCATION/TRAINING PROGRAM

## 2021-10-11 PROCEDURE — 99999 PR PBB SHADOW E&M-EST. PATIENT-LVL IV: ICD-10-PCS | Mod: PBBFAC,,, | Performed by: STUDENT IN AN ORGANIZED HEALTH CARE EDUCATION/TRAINING PROGRAM

## 2021-10-11 PROCEDURE — 99999 PR PBB SHADOW E&M-EST. PATIENT-LVL IV: CPT | Mod: PBBFAC,,, | Performed by: STUDENT IN AN ORGANIZED HEALTH CARE EDUCATION/TRAINING PROGRAM

## 2021-10-11 PROCEDURE — 76705 US LIVER TRANSPLANT POST: ICD-10-PCS | Mod: 26,59,, | Performed by: RADIOLOGY

## 2021-10-14 ENCOUNTER — PATIENT MESSAGE (OUTPATIENT)
Dept: TRANSPLANT | Facility: CLINIC | Age: 41
End: 2021-10-14
Payer: MEDICAID

## 2021-10-14 ENCOUNTER — TELEPHONE (OUTPATIENT)
Dept: TRANSPLANT | Facility: CLINIC | Age: 41
End: 2021-10-14

## 2021-10-14 DIAGNOSIS — Z94.4 LIVER REPLACED BY TRANSPLANT: Primary | ICD-10-CM

## 2021-11-03 ENCOUNTER — TELEPHONE (OUTPATIENT)
Dept: TRANSPLANT | Facility: CLINIC | Age: 41
End: 2021-11-03
Payer: MEDICAID

## 2021-11-04 LAB
EXT ALBUMIN: 4.1
EXT ALKALINE PHOSPHATASE: 140
EXT ALT: 7
EXT AST: 15
EXT BASOPHIL%: 0.4
EXT BILIRUBIN TOTAL: 0.4
EXT BUN: 12
EXT CALCIUM: 9.8
EXT CHLORIDE: 103
EXT CO2: 28
EXT CREATININE: 0.82 MG/DL
EXT EOSINOPHIL%: 3
EXT GLUCOSE: 122
EXT HCV QUANT: NOT DETECTED
EXT HEMATOCRIT: 32.6
EXT HEMOGLOBIN: 11
EXT LYMPH%: 43.5
EXT MONOCYTES%: 12
EXT PLATELETS: 136
EXT POTASSIUM: 4.6
EXT PROTEIN TOTAL: 7.1
EXT SEGS%: 40.9
EXT SODIUM: 135 MMOL/L
EXT TACROLIMUS LVL: 6.1
EXT WBC: 6.4

## 2021-11-05 ENCOUNTER — PATIENT MESSAGE (OUTPATIENT)
Dept: TRANSPLANT | Facility: CLINIC | Age: 41
End: 2021-11-05
Payer: MEDICAID

## 2021-11-05 DIAGNOSIS — Z94.4 LIVER TRANSPLANTED: Primary | ICD-10-CM

## 2021-11-05 RX ORDER — TACROLIMUS 1 MG/1
CAPSULE ORAL
Qty: 210 CAPSULE | Refills: 11 | Status: SHIPPED | OUTPATIENT
Start: 2021-11-05 | End: 2021-12-21

## 2021-11-08 ENCOUNTER — PATIENT MESSAGE (OUTPATIENT)
Dept: TRANSPLANT | Facility: CLINIC | Age: 41
End: 2021-11-08
Payer: MEDICAID

## 2021-11-11 ENCOUNTER — TELEPHONE (OUTPATIENT)
Dept: TRANSPLANT | Facility: CLINIC | Age: 41
End: 2021-11-11
Payer: MEDICAID

## 2021-11-24 LAB
EXT ALBUMIN: 4.4
EXT ALKALINE PHOSPHATASE: 141
EXT ALT: 11
EXT AST: 20
EXT BASOPHIL%: 0.3
EXT BILIRUBIN TOTAL: 0.5
EXT BUN: 13
EXT CALCIUM: 9.6
EXT CHLORIDE: 104
EXT CO2: 29
EXT CREATININE: 0.82 MG/DL
EXT EOSINOPHIL%: 3.2
EXT GLUCOSE: 106
EXT HCV QUANT: NOT DETECTED
EXT HEMATOCRIT: 32.6
EXT HEMOGLOBIN: 10.9
EXT LYMPH%: 33.9
EXT MONOCYTES%: 11
EXT PLATELETS: 118
EXT POTASSIUM: 4.3
EXT PROTEIN TOTAL: 7
EXT SEGS%: 51.5
EXT SODIUM: 136 MMOL/L
EXT TACROLIMUS LVL: 6.3
EXT WBC: 6.9

## 2021-11-29 ENCOUNTER — HOSPITAL ENCOUNTER (OUTPATIENT)
Dept: RADIOLOGY | Facility: HOSPITAL | Age: 41
Discharge: HOME | End: 2021-11-29
Attending: STUDENT IN AN ORGANIZED HEALTH CARE EDUCATION/TRAINING PROGRAM
Payer: MEDICAID

## 2021-11-29 ENCOUNTER — TELEPHONE (OUTPATIENT)
Dept: TRANSPLANT | Facility: CLINIC | Age: 41
End: 2021-11-29
Payer: MEDICAID

## 2021-11-29 ENCOUNTER — HOSPITAL ENCOUNTER (EMERGENCY)
Facility: HOSPITAL | Age: 41
Discharge: HOME | End: 2021-11-29
Attending: EMERGENCY MEDICINE
Payer: MEDICAID

## 2021-11-29 VITALS
HEART RATE: 95 BPM | RESPIRATION RATE: 18 BRPM | DIASTOLIC BLOOD PRESSURE: 78 MMHG | TEMPERATURE: 99 F | BODY MASS INDEX: 24.38 KG/M2 | OXYGEN SATURATION: 99 % | SYSTOLIC BLOOD PRESSURE: 138 MMHG | WEIGHT: 180 LBS | HEIGHT: 72 IN

## 2021-11-29 DIAGNOSIS — L08.9 INFECTED CYST OF SKIN: ICD-10-CM

## 2021-11-29 DIAGNOSIS — Z79.60 LONG-TERM USE OF IMMUNOSUPPRESSANT MEDICATION: ICD-10-CM

## 2021-11-29 DIAGNOSIS — L02.412 ABSCESS OF LEFT AXILLA: Primary | ICD-10-CM

## 2021-11-29 DIAGNOSIS — Z94.4 HISTORY OF LIVER TRANSPLANT: ICD-10-CM

## 2021-11-29 DIAGNOSIS — L72.9 INFECTED CYST OF SKIN: ICD-10-CM

## 2021-11-29 DIAGNOSIS — E87.1 HYPONATREMIA: ICD-10-CM

## 2021-11-29 DIAGNOSIS — Z94.4 LIVER REPLACED BY TRANSPLANT: ICD-10-CM

## 2021-11-29 LAB
ALBUMIN SERPL BCP-MCNC: 3.9 G/DL (ref 3.5–5.2)
ALP SERPL-CCNC: 128 U/L (ref 55–135)
ALT SERPL W/O P-5'-P-CCNC: 9 U/L (ref 10–44)
ANION GAP SERPL CALC-SCNC: 6 MMOL/L (ref 8–16)
AST SERPL-CCNC: 17 U/L (ref 10–40)
BASOPHILS # BLD AUTO: 0.03 K/UL (ref 0–0.2)
BASOPHILS NFR BLD: 0.4 % (ref 0–1.9)
BILIRUB SERPL-MCNC: 0.5 MG/DL (ref 0.1–1)
BUN SERPL-MCNC: 8 MG/DL (ref 6–20)
CALCIUM SERPL-MCNC: 9.2 MG/DL (ref 8.7–10.5)
CHLORIDE SERPL-SCNC: 97 MMOL/L (ref 95–110)
CO2 SERPL-SCNC: 24 MMOL/L (ref 23–29)
CREAT SERPL-MCNC: 0.7 MG/DL (ref 0.5–1.4)
DIFFERENTIAL METHOD: ABNORMAL
EOSINOPHIL # BLD AUTO: 0.2 K/UL (ref 0–0.5)
EOSINOPHIL NFR BLD: 3 % (ref 0–8)
ERYTHROCYTE [DISTWIDTH] IN BLOOD BY AUTOMATED COUNT: 13.3 % (ref 11.5–14.5)
EST. GFR  (AFRICAN AMERICAN): >60 ML/MIN/1.73 M^2
EST. GFR  (NON AFRICAN AMERICAN): >60 ML/MIN/1.73 M^2
GLUCOSE SERPL-MCNC: 89 MG/DL (ref 70–110)
HCT VFR BLD AUTO: 32.5 % (ref 40–54)
HGB BLD-MCNC: 10.9 G/DL (ref 14–18)
IMM GRANULOCYTES # BLD AUTO: 0.01 K/UL (ref 0–0.04)
IMM GRANULOCYTES NFR BLD AUTO: 0.1 % (ref 0–0.5)
INR PPP: 1 (ref 0.8–1.2)
LYMPHOCYTES # BLD AUTO: 2.3 K/UL (ref 1–4.8)
LYMPHOCYTES NFR BLD: 33 % (ref 18–48)
MCH RBC QN AUTO: 32.1 PG (ref 27–31)
MCHC RBC AUTO-ENTMCNC: 33.5 G/DL (ref 32–36)
MCV RBC AUTO: 96 FL (ref 82–98)
MONOCYTES # BLD AUTO: 0.8 K/UL (ref 0.3–1)
MONOCYTES NFR BLD: 11.3 % (ref 4–15)
NEUTROPHILS # BLD AUTO: 3.6 K/UL (ref 1.8–7.7)
NEUTROPHILS NFR BLD: 52.2 % (ref 38–73)
NRBC BLD-RTO: 0 /100 WBC
PLATELET # BLD AUTO: 140 K/UL (ref 150–450)
PMV BLD AUTO: 9 FL (ref 9.2–12.9)
POTASSIUM SERPL-SCNC: 4.5 MMOL/L (ref 3.5–5.1)
PROT SERPL-MCNC: 6.9 G/DL (ref 6–8.4)
PROTHROMBIN TIME: 10.5 SEC (ref 9–12.5)
RBC # BLD AUTO: 3.4 M/UL (ref 4.6–6.2)
SODIUM SERPL-SCNC: 127 MMOL/L (ref 136–145)
WBC # BLD AUTO: 6.93 K/UL (ref 3.9–12.7)

## 2021-11-29 PROCEDURE — 99284 EMERGENCY DEPT VISIT MOD MDM: CPT | Mod: 25

## 2021-11-29 PROCEDURE — 76705 US LIVER TRANSPLANT POST: ICD-10-PCS | Mod: 26,XS,, | Performed by: INTERNAL MEDICINE

## 2021-11-29 PROCEDURE — 93976 VASCULAR STUDY: CPT | Mod: TC

## 2021-11-29 PROCEDURE — 93976 VASCULAR STUDY: CPT | Mod: 26,,, | Performed by: INTERNAL MEDICINE

## 2021-11-29 PROCEDURE — 99285 PR EMERGENCY DEPT VISIT,LEVEL V: ICD-10-PCS | Mod: 25,,, | Performed by: PHYSICIAN ASSISTANT

## 2021-11-29 PROCEDURE — 99285 EMERGENCY DEPT VISIT HI MDM: CPT | Mod: 25,,, | Performed by: PHYSICIAN ASSISTANT

## 2021-11-29 PROCEDURE — 25000003 PHARM REV CODE 250: Performed by: PHYSICIAN ASSISTANT

## 2021-11-29 PROCEDURE — 85610 PROTHROMBIN TIME: CPT | Performed by: EMERGENCY MEDICINE

## 2021-11-29 PROCEDURE — 76705 ECHO EXAM OF ABDOMEN: CPT | Mod: 26,XS,, | Performed by: INTERNAL MEDICINE

## 2021-11-29 PROCEDURE — 76705 ECHO EXAM OF ABDOMEN: CPT | Mod: TC

## 2021-11-29 PROCEDURE — 96360 HYDRATION IV INFUSION INIT: CPT | Mod: 59

## 2021-11-29 PROCEDURE — 80197 ASSAY OF TACROLIMUS: CPT | Performed by: EMERGENCY MEDICINE

## 2021-11-29 PROCEDURE — 10061 I&D ABSCESS COMP/MULTIPLE: CPT | Mod: 54,,, | Performed by: PHYSICIAN ASSISTANT

## 2021-11-29 PROCEDURE — 87070 CULTURE OTHR SPECIMN AEROBIC: CPT | Performed by: PHYSICIAN ASSISTANT

## 2021-11-29 PROCEDURE — 80053 COMPREHEN METABOLIC PANEL: CPT | Performed by: EMERGENCY MEDICINE

## 2021-11-29 PROCEDURE — 85025 COMPLETE CBC W/AUTO DIFF WBC: CPT | Performed by: EMERGENCY MEDICINE

## 2021-11-29 PROCEDURE — 10061 PR DRAIN SKIN ABSCESS COMPLIC: ICD-10-PCS | Mod: 54,,, | Performed by: PHYSICIAN ASSISTANT

## 2021-11-29 PROCEDURE — 93976 US LIVER TRANSPLANT POST: ICD-10-PCS | Mod: 26,,, | Performed by: INTERNAL MEDICINE

## 2021-11-29 PROCEDURE — 10061 I&D ABSCESS COMP/MULTIPLE: CPT

## 2021-11-29 PROCEDURE — 87389 HIV-1 AG W/HIV-1&-2 AB AG IA: CPT | Performed by: EMERGENCY MEDICINE

## 2021-11-29 RX ORDER — LIDOCAINE HYDROCHLORIDE 10 MG/ML
10 INJECTION INFILTRATION; PERINEURAL
Status: COMPLETED | OUTPATIENT
Start: 2021-11-29 | End: 2021-11-29

## 2021-11-29 RX ORDER — DOXYCYCLINE 100 MG/1
100 CAPSULE ORAL EVERY 12 HOURS
Qty: 19 CAPSULE | Refills: 0 | Status: SHIPPED | OUTPATIENT
Start: 2021-11-29 | End: 2021-12-09

## 2021-11-29 RX ORDER — DOXYCYCLINE HYCLATE 100 MG
100 TABLET ORAL
Status: COMPLETED | OUTPATIENT
Start: 2021-11-29 | End: 2021-11-29

## 2021-11-29 RX ORDER — OXYCODONE HYDROCHLORIDE 5 MG/1
5 TABLET ORAL
Status: COMPLETED | OUTPATIENT
Start: 2021-11-29 | End: 2021-11-29

## 2021-11-29 RX ADMIN — LIDOCAINE HYDROCHLORIDE 10 ML: 10 INJECTION, SOLUTION INFILTRATION; PERINEURAL at 03:11

## 2021-11-29 RX ADMIN — DOXYCYCLINE HYCLATE 100 MG: 100 TABLET, COATED ORAL at 04:11

## 2021-11-29 RX ADMIN — OXYCODONE 5 MG: 5 TABLET ORAL at 03:11

## 2021-11-29 RX ADMIN — SODIUM CHLORIDE 500 ML: 0.9 INJECTION, SOLUTION INTRAVENOUS at 04:11

## 2021-11-30 LAB
HIV 1+2 AB+HIV1 P24 AG SERPL QL IA: NEGATIVE
TACROLIMUS BLD-MCNC: 5.3 NG/ML (ref 5–15)

## 2021-12-01 ENCOUNTER — PATIENT MESSAGE (OUTPATIENT)
Dept: TRANSPLANT | Facility: CLINIC | Age: 41
End: 2021-12-01
Payer: MEDICAID

## 2021-12-01 ENCOUNTER — TELEPHONE (OUTPATIENT)
Dept: TRANSPLANT | Facility: CLINIC | Age: 41
End: 2021-12-01
Payer: MEDICAID

## 2021-12-01 DIAGNOSIS — Z94.4 LIVER REPLACED BY TRANSPLANT: Primary | ICD-10-CM

## 2021-12-01 LAB — BACTERIA SPEC AEROBE CULT: NORMAL

## 2021-12-02 ENCOUNTER — TELEPHONE (OUTPATIENT)
Dept: TRANSPLANT | Facility: CLINIC | Age: 41
End: 2021-12-02
Payer: MEDICAID

## 2021-12-02 DIAGNOSIS — Z94.4 LIVER REPLACED BY TRANSPLANT: Primary | ICD-10-CM

## 2021-12-17 LAB
EXT ALBUMIN: 3.9
EXT ALKALINE PHOSPHATASE: 145
EXT ALT: 8
EXT AST: 20
EXT BASOPHIL%: 0.6
EXT BILIRUBIN TOTAL: 0.7
EXT BUN: 10
EXT CALCIUM: 9.4
EXT CHLORIDE: 104
EXT CO2: 25
EXT CREATININE: 0.75 MG/DL
EXT EOSINOPHIL%: 2.2
EXT GLUCOSE: 106
EXT HEMATOCRIT: 35.7
EXT HEMOGLOBIN: 12.2
EXT LYMPH%: 39.9
EXT MONOCYTES%: 10.3
EXT PLATELETS: 154
EXT POTASSIUM: 4.7
EXT PROTEIN TOTAL: 6.5
EXT SEGS%: 46.8
EXT SODIUM: 134 MMOL/L
EXT TACROLIMUS LVL: 6.4
EXT WBC: 5.3

## 2021-12-21 ENCOUNTER — PATIENT MESSAGE (OUTPATIENT)
Dept: TRANSPLANT | Facility: CLINIC | Age: 41
End: 2021-12-21
Payer: MEDICAID

## 2021-12-21 DIAGNOSIS — Z94.4 LIVER TRANSPLANTED: ICD-10-CM

## 2021-12-21 DIAGNOSIS — L02.412 ABSCESS OF LEFT AXILLA: Primary | ICD-10-CM

## 2021-12-21 RX ORDER — TACROLIMUS 1 MG/1
CAPSULE ORAL
Qty: 180 CAPSULE | Refills: 11 | Status: SHIPPED | OUTPATIENT
Start: 2021-12-21 | End: 2022-03-16 | Stop reason: DRUGHIGH

## 2021-12-21 RX ORDER — DOXYCYCLINE 100 MG/1
100 CAPSULE ORAL EVERY 12 HOURS
Qty: 20 CAPSULE | Refills: 0 | Status: SHIPPED | OUTPATIENT
Start: 2021-12-21 | End: 2021-12-31

## 2022-01-07 LAB
EXT ALBUMIN: 4.2
EXT ALKALINE PHOSPHATASE: 151
EXT ALT: 12
EXT AST: 29
EXT BASOPHIL%: 0.8
EXT BILIRUBIN TOTAL: 0.6
EXT BUN: 9
EXT CALCIUM: 9.7
EXT CHLORIDE: 104
EXT CO2: 28
EXT CREATININE: 0.69 MG/DL
EXT EOSINOPHIL%: 2.1
EXT GLUCOSE: 93
EXT HCV QUANT: NOT DETECTED
EXT HEMATOCRIT: 36.5
EXT HEMOGLOBIN: 12.8
EXT LYMPH%: 40.4
EXT MONOCYTES%: 10.2
EXT PLATELETS: 147
EXT POTASSIUM: 4.8
EXT PROTEIN TOTAL: 7.2
EXT SEGS%: 46.3
EXT SODIUM: 135 MMOL/L
EXT TACROLIMUS LVL: 7.1
EXT WBC: 5.2

## 2022-01-11 ENCOUNTER — HOSPITAL ENCOUNTER (OUTPATIENT)
Dept: RADIOLOGY | Facility: HOSPITAL | Age: 42
Discharge: HOME | End: 2022-01-11
Attending: STUDENT IN AN ORGANIZED HEALTH CARE EDUCATION/TRAINING PROGRAM
Payer: MEDICAID

## 2022-01-11 DIAGNOSIS — Z94.4 LIVER REPLACED BY TRANSPLANT: ICD-10-CM

## 2022-01-11 PROCEDURE — 93976 VASCULAR STUDY: CPT | Mod: 26,,, | Performed by: RADIOLOGY

## 2022-01-11 PROCEDURE — 93976 US DOPPLER LIVER TRANSPLANT POST (XPD): ICD-10-PCS | Mod: 26,,, | Performed by: RADIOLOGY

## 2022-01-11 PROCEDURE — 93976 VASCULAR STUDY: CPT | Mod: TC

## 2022-01-11 PROCEDURE — 76705 ECHO EXAM OF ABDOMEN: CPT | Mod: 26,59,, | Performed by: RADIOLOGY

## 2022-01-11 PROCEDURE — 76705 US DOPPLER LIVER TRANSPLANT POST (XPD): ICD-10-PCS | Mod: 26,59,, | Performed by: RADIOLOGY

## 2022-01-11 PROCEDURE — 76705 ECHO EXAM OF ABDOMEN: CPT | Mod: 59,TC

## 2022-01-13 ENCOUNTER — TELEPHONE (OUTPATIENT)
Dept: TRANSPLANT | Facility: CLINIC | Age: 42
End: 2022-01-13

## 2022-01-13 NOTE — LETTER
January 13, 2022    Kojo Sheikh  [address] [facility] [address]          Dear Kojo Sheikh:  MRN: [id number]    This is a follow up to your recent labs, your lab results were stable.  There are no medicine changes.  Please have your labs drawn again on 1/31/22.      If you cannot have your labs drawn on the scheduled date, it is your responsibility to call the transplant department to reschedule.  Please call [phone] and ask to speak to Theresa HOLLOWAY   for all scheduling requests.     Sincerely,      Juan C SHARIFN, RN  Your Liver Transplant Coordinator    [facility]  [address] [place] [address] [phone]

## 2022-01-13 NOTE — TELEPHONE ENCOUNTER
Stable labs, no medication changes.  Next labs week of 1/31/22, letter sent.  ----- Message from Beny Snow MD sent at 1/9/2022  3:56 PM CST -----  Liver transplant labs reviewed and are stable. No changes in his immunosuppression. Please continue to monitor labs per transplant protocol.

## 2022-01-20 ENCOUNTER — PATIENT MESSAGE (OUTPATIENT)
Dept: TRANSPLANT | Facility: CLINIC | Age: 42
End: 2022-01-20
Payer: MEDICAID

## 2022-01-20 ENCOUNTER — TELEPHONE (OUTPATIENT)
Dept: TRANSPLANT | Facility: CLINIC | Age: 42
End: 2022-01-20
Payer: MEDICAID

## 2022-01-20 DIAGNOSIS — Z94.4 LIVER REPLACED BY TRANSPLANT: Primary | ICD-10-CM

## 2022-01-20 NOTE — TELEPHONE ENCOUNTER
Repeat liver US needed week of 2/7/22.  [employer] message sent to notify pt.  ----- Message from Beny Snow MD sent at 1/18/2022  7:47 PM CST -----  Reviewed. Slightly abnormal flow in hepatic artery. Normal waveforms reassuring. Please repeat Doppler US in 2-4 weeks.

## 2022-01-24 ENCOUNTER — TELEPHONE (OUTPATIENT)
Dept: TRANSPLANT | Facility: CLINIC | Age: 42
End: 2022-01-24

## 2022-01-24 NOTE — TELEPHONE ENCOUNTER
Spoke to pt's dentist.  Left voice message to notify pt.  ----- Message from Jan Aden sent at 1/24/2022 10:14 AM CST -----  Regarding: call back  Pt call to speak with Juan C in regards to him having question  about going to the dentists    Call

## 2022-01-24 NOTE — TELEPHONE ENCOUNTER
----- Message from Jan Aden sent at 1/24/2022 10:14 AM CST -----  Regarding: call back  Pt call to speak with Juan C in regards to him having question  about going to the dentists    Call

## 2022-01-29 ENCOUNTER — NURSE TRIAGE (OUTPATIENT)
Dept: ADMINISTRATIVE | Facility: CLINIC | Age: 42
End: 2022-01-29
Payer: MEDICAID

## 2022-01-29 DIAGNOSIS — L03.115 CELLULITIS OF RIGHT LOWER EXTREMITY: Primary | ICD-10-CM

## 2022-01-29 RX ORDER — CLINDAMYCIN HYDROCHLORIDE 300 MG/1
300 CAPSULE ORAL EVERY 6 HOURS
Qty: 28 CAPSULE | Refills: 0 | Status: ON HOLD | OUTPATIENT
Start: 2022-01-29 | End: 2023-11-04 | Stop reason: HOSPADM

## 2022-01-29 NOTE — TELEPHONE ENCOUNTER
Reason for Disposition   Red streak from area of infection    Additional Information   Negative: [1] Widespread rash AND [2] bright red, sunburn-like AND [3] too weak to stand   Negative: Sounds like a life-threatening emergency to the triager   Negative: Painful lump or swelling at opening to anus (rectum)   Negative: Painful lump or swelling at opening to vagina (on labia)   Negative: Painful lump or swelling on scrotum   Negative: Impetigo suspected or diagnosed   Negative: Doesn't match the SYMPTOMS of a boil   Negative: MRSA, questions about (No boil or other skin lesion)   Negative: Widespread red rash   Negative: Black (necrotic) color or blisters develop in wound   Negative: Patient sounds very sick or weak to the triager   Negative: SEVERE pain (e.g., excruciating)    Protocols used: BOIL (SKIN ABSCESS)-[facility]  Liver Transplant - 4/11/2021  (#1)  BPA 16   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 Pt reports that he went to ED for I& D under arm. Did two round of abx. took doxy twice. Pt states he banged R shin in night 3 weeks ago. pt states never healed. in past 48-72 hours. now developed into cellulitis. warm tender to touch. afeb. pt wants to know if he to go to ED or can go to main campus on Monday?. rates pain 5-7 to touch. pt in [place]. pt concerned about poss beginning of streaking. rec to see dr within 4 hours. Pt requests to speak with MD on call. spoke with dr daniela lovett above. MD warm transferred to speak with pt.   pharm: vahe sanchez

## 2022-02-02 ENCOUNTER — TELEPHONE (OUTPATIENT)
Dept: TRANSPLANT | Facility: CLINIC | Age: 42
End: 2022-02-02
Payer: MEDICAID

## 2022-02-02 NOTE — TELEPHONE ENCOUNTER
----- Message from Tonya Rosas sent at 2/2/2022  4:02 PM CST -----    ----- Message -----  From: Rivas Antunez  Sent: 2/2/2022   3:44 PM CST  To: [facility] Post-Kidney Transplant Clinical    Pt calling to speak w/ Juan C MIJARES. Stating he's currently being admitted into the ER @ [facility] due to cellulitis on his right leg.    [phone]

## 2022-02-02 NOTE — TELEPHONE ENCOUNTER
Pt reports he is being admitted to [facility] with cellulitis.  Will notify when discharged.  ----- Message from Tonya Rosas sent at 2/2/2022  4:02 PM CST -----    ----- Message -----  From: Rivas Antunez  Sent: 2/2/2022   3:44 PM CST  To: [facility] Post-Kidney Transplant Clinical    Pt calling to speak w/ Juan C MIJARES. Stating he's currently being admitted into the ER @ [facility] due to cellulitis on his right leg.    [phone]

## 2022-02-04 LAB
EXT ALBUMIN: 3.8
EXT ALKALINE PHOSPHATASE: 142
EXT ALT: 16
EXT AST: 28
EXT BASOPHIL%: 0.7
EXT BILIRUBIN TOTAL: 0.6
EXT BUN: 6
EXT CALCIUM: 9.3
EXT CHLORIDE: 105
EXT CO2: 30
EXT CREATININE: 0.68 MG/DL
EXT EOSINOPHIL%: 2.1
EXT GLUCOSE: 115
EXT HCV QUANT: NORMAL
EXT HEMATOCRIT: 38.5
EXT HEMOGLOBIN: 12.9
EXT LYMPH%: 42.3
EXT MONOCYTES%: 12.2
EXT PLATELETS: 149
EXT POTASSIUM: 4.7
EXT PROTEIN TOTAL: 6.5
EXT SEGS%: 42.5
EXT SODIUM: 137 MMOL/L
EXT TACROLIMUS LVL: 3.6
EXT WBC: 5.8

## 2022-02-07 ENCOUNTER — HOSPITAL ENCOUNTER (OUTPATIENT)
Dept: RADIOLOGY | Facility: HOSPITAL | Age: 42
Discharge: HOME | End: 2022-02-07
Attending: STUDENT IN AN ORGANIZED HEALTH CARE EDUCATION/TRAINING PROGRAM
Payer: MEDICAID

## 2022-02-07 DIAGNOSIS — Z94.4 LIVER REPLACED BY TRANSPLANT: ICD-10-CM

## 2022-02-07 PROCEDURE — 76705 ECHO EXAM OF ABDOMEN: CPT | Mod: 26,59,, | Performed by: RADIOLOGY

## 2022-02-07 PROCEDURE — 76705 US DOPPLER LIVER TRANSPLANT POST (XPD): ICD-10-PCS | Mod: 26,59,, | Performed by: RADIOLOGY

## 2022-02-07 PROCEDURE — 93976 US DOPPLER LIVER TRANSPLANT POST (XPD): ICD-10-PCS | Mod: 26,,, | Performed by: RADIOLOGY

## 2022-02-07 PROCEDURE — 93976 VASCULAR STUDY: CPT | Mod: TC

## 2022-02-07 PROCEDURE — 76705 ECHO EXAM OF ABDOMEN: CPT | Mod: 59,TC

## 2022-02-07 PROCEDURE — 93976 VASCULAR STUDY: CPT | Mod: 26,,, | Performed by: RADIOLOGY

## 2022-02-09 ENCOUNTER — TELEPHONE (OUTPATIENT)
Dept: TRANSPLANT | Facility: HOSPITAL | Age: 42
End: 2022-02-09
Payer: MEDICAID

## 2022-02-09 ENCOUNTER — TELEPHONE (OUTPATIENT)
Dept: TRANSPLANT | Facility: CLINIC | Age: 42
End: 2022-02-09
Payer: MEDICAID

## 2022-02-09 NOTE — TELEPHONE ENCOUNTER
SW received phone call from pt. Pt requested a letter emailed to him stating he did not have to pay for the [employer] when he stayed after his transplant in 2021. Pt stayed from the end of April 2021 and for a few weeks in May 2021. BRITTANY wrote a letter in Epic and sent pt an email with the attachament to: [email or web address]. Pt stated he needs this letter for social security disability, SW expressed to pt that SW unclear why SSD  would need this information, pt stated he was also confused. BRITTANY instructed pt to contact BRITTANY again should he have any further needs.

## 2022-02-11 ENCOUNTER — TELEPHONE (OUTPATIENT)
Dept: TRANSPLANT | Facility: CLINIC | Age: 42
End: 2022-02-11
Payer: MEDICAID

## 2022-02-11 NOTE — TELEPHONE ENCOUNTER
Stable labs and liver ultrasound.  No changes needed.  Next labs 3/1/22, letter sent.  ----- Message from Beny Snow MD sent at 2/10/2022  4:17 PM CST -----  Reviewed. Liver and blood vessels appear normal.

## 2022-02-11 NOTE — LETTER
February 11, 2022    Kojo Kori  [address] [facility] [address]          Dear Kojo Sheikh:  MRN: [id number]    This is a follow up to your recent labs, your lab results were stable.  There are no medicine changes.  Please have your labs drawn again on 3/1/22.      If you cannot have your labs drawn on the scheduled date, it is your responsibility to call the transplant department to reschedule.  Please call [phone] and ask to speak to Theresa HOLLOWAY   for all scheduling requests.     Sincerely,      Juan C SHARIFN, RN  Your Liver Transplant Coordinator    [facility] [place]  [address] [place] [phone]

## 2022-02-11 NOTE — TELEPHONE ENCOUNTER
----- Message from Beny Snow MD sent at 2/10/2022  4:17 PM CST -----  Liver transplant labs reviewed and are stable. No changes in his immunosuppression. Please continue to monitor labs per transplant protocol.

## 2022-02-15 ENCOUNTER — PATIENT MESSAGE (OUTPATIENT)
Dept: TRANSPLANT | Facility: CLINIC | Age: 42
End: 2022-02-15
Payer: MEDICAID

## 2022-02-15 ENCOUNTER — TELEPHONE (OUTPATIENT)
Dept: TRANSPLANT | Facility: CLINIC | Age: 42
End: 2022-02-15
Payer: MEDICAID

## 2022-02-15 NOTE — TELEPHONE ENCOUNTER
Answered questions, no action required.  ----- Message from Jan Aden sent at 2/15/2022  8:29 AM CST -----  Regarding: call back  Pt call to speak with Juan C in regards to his dental procedure that's being done today at 12 noon    Call

## 2022-03-08 ENCOUNTER — TELEPHONE (OUTPATIENT)
Dept: TRANSPLANT | Facility: CLINIC | Age: 42
End: 2022-03-08
Payer: MEDICAID

## 2022-03-08 NOTE — TELEPHONE ENCOUNTER
Pt reports he will have MD send docs to transplant department for clearance.  Pt will have labs this week.   ----- Message from Kev Brunson sent at 3/8/2022  3:40 PM CST -----  Regarding: Message  Contact: Kojo  Patient spoke with another representative like myself , however, he forgot some additional info in that initial message. Pt would like to give information to his coordinator in regards to his Cardiologist.. Pt states he is having surgery soon.    Dr. Jamil Snow  (P): [phone]  (F): Unavailable    Contact: [phone]

## 2022-03-08 NOTE — TELEPHONE ENCOUNTER
----- Message from Chito Escobar sent at 3/8/2022  3:30 PM CST -----  Regarding: speak to coordinator  Patient calling to speak to coordinator regarding wound care due to getting a full US on leg and doctor wants to do a venous ablation on right lower leg.  Need clearance for surgery tomorrow.     Call: [phone] (Home Phone

## 2022-03-09 ENCOUNTER — HISTORICAL (OUTPATIENT)
Dept: ADMINISTRATIVE | Facility: HOSPITAL | Age: 42
End: 2022-03-09

## 2022-03-10 ENCOUNTER — TELEPHONE (OUTPATIENT)
Dept: TRANSPLANT | Facility: CLINIC | Age: 42
End: 2022-03-10

## 2022-03-10 NOTE — TELEPHONE ENCOUNTER
Called pt to discuss.  Spoke to pt, notified that it is okay to take Xarelto.  Pt to send report to [facility].  Message routed to Dr. Snow.  ----- Message from Theresa Bolaños MA sent at 3/10/2022  2:16 PM CST -----  He had a procedure yesterday (abrasions) and they found 2 DVT's and 6 clots in both legs.  His cardiologist (Jamil Snow MD w/ [phone]) will send a report on everything.  States he also put him on Xarelto 15 mg BID x 3 months then change to 20 mg qd.  And he will re-evaluate to see if he needs to change the dosage again.      Per him - is the Xarelto ok to take?      States they did labs but they were drawn late and he will repeat labs Friday, 3-11-22.    Chart on your keyboard

## 2022-03-15 LAB
EXT ALBUMIN: 4
EXT ALKALINE PHOSPHATASE: 152
EXT ALT: 23
EXT AST: 40
EXT BASOPHIL%: 0.6
EXT BILIRUBIN TOTAL: 0.7
EXT BUN: <5
EXT CALCIUM: 9.3
EXT CHLORIDE: 104
EXT CO2: 28
EXT CREATININE: 0.74 MG/DL
EXT EOSINOPHIL%: 2.1
EXT GLUCOSE: 116
EXT HCV QUANT: NOT DETECTED
EXT HEMATOCRIT: 40
EXT HEMOGLOBIN: 13.7
EXT LYMPH%: 33.3
EXT MONOCYTES%: 10.4
EXT PLATELETS: 164
EXT POTASSIUM: 4.3
EXT PROTEIN TOTAL: 6.8
EXT SEGS%: 53.2
EXT SODIUM: 137 MMOL/L
EXT TACROLIMUS LVL: 1.2
EXT WBC: 6.7

## 2022-03-16 ENCOUNTER — PATIENT MESSAGE (OUTPATIENT)
Dept: TRANSPLANT | Facility: CLINIC | Age: 42
End: 2022-03-16
Payer: MEDICAID

## 2022-03-16 DIAGNOSIS — Z94.4 LIVER TRANSPLANTED: ICD-10-CM

## 2022-03-16 RX ORDER — TACROLIMUS 1 MG/1
CAPSULE ORAL
Qty: 210 CAPSULE | Refills: 11 | Status: SHIPPED | OUTPATIENT
Start: 2022-03-16 | End: 2022-04-05

## 2022-03-16 NOTE — TELEPHONE ENCOUNTER
Received message from pt reporting possible missing one dose of Prograf.  Instructed pt to start taking Prograf 4 mg in AM and 3 mg in PM, repeat labs week of 3/28.  Message sent to notify Dr. Snow.

## 2022-03-16 NOTE — TELEPHONE ENCOUNTER
[employer] message sent to pt re: possibly missing doses.  ----- Message from Beny Snow MD sent at 3/15/2022  9:15 PM CDT -----  Liver tests at upper limit of normal. Tac level low. Has he missed any doses? If not, please increase tac to 4/3 and repeat labs in 1-2 weeks.

## 2022-03-17 ENCOUNTER — TELEPHONE (OUTPATIENT)
Dept: TRANSPLANT | Facility: CLINIC | Age: 42
End: 2022-03-17
Payer: MEDICAID

## 2022-04-01 LAB
EXT ALBUMIN: 3.9
EXT ALKALINE PHOSPHATASE: 156
EXT ALT: 41
EXT AST: 63
EXT BASOPHIL%: 0.5
EXT BILIRUBIN TOTAL: 0.8
EXT BUN: <5
EXT CALCIUM: 9.2
EXT CHLORIDE: 104
EXT CO2: 27
EXT CREATININE: 0.64 MG/DL
EXT EOSINOPHIL%: 2.1
EXT GLUCOSE: 127
EXT HEMATOCRIT: 36.5
EXT HEMOGLOBIN: 12.5
EXT LYMPH%: 32.1
EXT MONOCYTES%: 10.9
EXT NEUT%: 54.2
EXT PLATELETS: 130
EXT POTASSIUM: 4
EXT PROTEIN TOTAL: 6.7
EXT TACROLIMUS LVL: 3
EXT WBC: 6.1

## 2022-04-05 ENCOUNTER — PATIENT MESSAGE (OUTPATIENT)
Dept: TRANSPLANT | Facility: CLINIC | Age: 42
End: 2022-04-05
Payer: MEDICAID

## 2022-04-05 DIAGNOSIS — Z94.4 LIVER TRANSPLANTED: ICD-10-CM

## 2022-04-05 RX ORDER — TACROLIMUS 1 MG/1
CAPSULE ORAL
Qty: 240 CAPSULE | Refills: 11 | Status: SHIPPED | OUTPATIENT
Start: 2022-04-05 | End: 2022-06-17

## 2022-04-05 NOTE — TELEPHONE ENCOUNTER
----- Message from Beny Snow MD sent at 4/4/2022  1:23 PM CDT -----  Liver enzymes elevated. Please increase tac to 4/4 and repeat labs later this week.

## 2022-04-12 LAB
EXT ALBUMIN: 4.1
EXT ALKALINE PHOSPHATASE: 135
EXT ALT: 18
EXT AST: 29
EXT BASOPHIL%: 0.6
EXT BILIRUBIN TOTAL: 0.7
EXT BUN: 6
EXT CALCIUM: 9.5
EXT CHLORIDE: 105
EXT CO2: 26
EXT CREATININE: 0.85 MG/DL
EXT EOSINOPHIL%: 1.7
EXT GLUCOSE: 114
EXT HCV QUANT: NOT DETECTED
EXT HEMATOCRIT: 39.9
EXT HEMOGLOBIN: 13.3
EXT LYMPH%: 34
EXT MONOCYTES%: 10.7
EXT PLATELETS: 176
EXT POTASSIUM: 4.5
EXT PROTEIN TOTAL: 7
EXT SEGS%: 52.3
EXT SODIUM: 135 MMOL/L
EXT TACROLIMUS LVL: 6.9
EXT WBC: 7

## 2022-04-18 ENCOUNTER — PATIENT MESSAGE (OUTPATIENT)
Dept: TRANSPLANT | Facility: CLINIC | Age: 42
End: 2022-04-18
Payer: MEDICAID

## 2022-04-18 ENCOUNTER — TELEPHONE (OUTPATIENT)
Dept: TRANSPLANT | Facility: CLINIC | Age: 42
End: 2022-04-18
Payer: MEDICAID

## 2022-04-18 DIAGNOSIS — Z94.4 LIVER REPLACED BY TRANSPLANT: Primary | ICD-10-CM

## 2022-04-18 NOTE — TELEPHONE ENCOUNTER
[employer] message sent notifying pt of MD review and instructing pt to repeat labs week of 5/2/22.  ----- Message from Beny Snow MD sent at 4/17/2022 12:19 PM CDT -----  Liver enzymes normalized. No changes in his immunosuppression. Please continue to monitor labs per transplant protocol.

## 2022-04-22 ENCOUNTER — HOSPITAL ENCOUNTER (OUTPATIENT)
Dept: RADIOLOGY | Facility: HOSPITAL | Age: 42
Discharge: HOME | End: 2022-04-22
Attending: STUDENT IN AN ORGANIZED HEALTH CARE EDUCATION/TRAINING PROGRAM
Payer: MEDICAID

## 2022-04-22 ENCOUNTER — PATIENT MESSAGE (OUTPATIENT)
Dept: TRANSPLANT | Facility: CLINIC | Age: 42
End: 2022-04-22
Payer: MEDICAID

## 2022-04-22 DIAGNOSIS — Z94.4 LIVER REPLACED BY TRANSPLANT: ICD-10-CM

## 2022-04-22 PROCEDURE — 93976 US DOPPLER LIVER TRANSPLANT POST (XPD): ICD-10-PCS | Mod: 26,,, | Performed by: RADIOLOGY

## 2022-04-22 PROCEDURE — 93976 VASCULAR STUDY: CPT | Mod: 26,,, | Performed by: RADIOLOGY

## 2022-04-22 PROCEDURE — 93976 VASCULAR STUDY: CPT | Mod: TC

## 2022-04-22 PROCEDURE — 76705 ECHO EXAM OF ABDOMEN: CPT | Mod: 26,59,, | Performed by: RADIOLOGY

## 2022-04-22 PROCEDURE — 76705 US DOPPLER LIVER TRANSPLANT POST (XPD): ICD-10-PCS | Mod: 26,59,, | Performed by: RADIOLOGY

## 2022-04-22 PROCEDURE — 76705 ECHO EXAM OF ABDOMEN: CPT | Mod: 59,TC

## 2022-04-26 ENCOUNTER — TELEPHONE (OUTPATIENT)
Dept: TRANSPLANT | Facility: CLINIC | Age: 42
End: 2022-04-26

## 2022-04-26 NOTE — TELEPHONE ENCOUNTER
----- Message from Ani Spann sent at 4/26/2022  1:26 PM CDT -----  Patient calling to speak with Juan C about a letter that he needs for his doctor's appointment tomorrow.    Kojo  @  [phone]

## 2022-04-26 NOTE — LETTER
April 26, 2022    Kojo Sheikh  [address] [facility] [address]          To whom it may concern:    Kojo Sheikh ([birth year]) received a liver transplant over one year ago.  He continues to follow-up appropriately with post transplant care.  There is no contraindication for opiod therapy from a liver transplant perspective.  He cannot take any non-steroidal anti-inflammatory drugs/NSAIDS for pain management.       Sincerely,      Dr. Beny Snow MD    [facility] [place]  [facility] [place] [address] [phone]

## 2022-04-29 ENCOUNTER — PATIENT MESSAGE (OUTPATIENT)
Dept: TRANSPLANT | Facility: CLINIC | Age: 42
End: 2022-04-29
Payer: MEDICAID

## 2022-05-09 LAB
EXT ALBUMIN: 3.9
EXT ALKALINE PHOSPHATASE: 131
EXT ALT: 7
EXT AST: 27
EXT BASOPHIL%: 0.5
EXT BILIRUBIN TOTAL: 1
EXT BUN: 7
EXT CALCIUM: 9.5
EXT CHLORIDE: 98
EXT CO2: 27
EXT CREATININE: 0.74 MG/DL
EXT EOSINOPHIL%: 2.4
EXT GLUCOSE: 108
EXT HEMATOCRIT: 37.5
EXT HEMOGLOBIN: 13
EXT LYMPH%: 28.1
EXT MONOCYTES%: 12.8
EXT PLATELETS: 140
EXT POTASSIUM: 4.4
EXT PROTEIN TOTAL: 6.6
EXT SEGS%: 55.9
EXT SODIUM: 129 MMOL/L
EXT TACROLIMUS LVL: 6.9
EXT WBC: 5.8

## 2022-05-10 ENCOUNTER — PATIENT MESSAGE (OUTPATIENT)
Dept: TRANSPLANT | Facility: CLINIC | Age: 42
End: 2022-05-10
Payer: MEDICAID

## 2022-05-10 ENCOUNTER — TELEPHONE (OUTPATIENT)
Dept: TRANSPLANT | Facility: CLINIC | Age: 42
End: 2022-05-10
Payer: MEDICAID

## 2022-05-10 NOTE — TELEPHONE ENCOUNTER
----- Message from Beny Snow MD sent at 5/10/2022  1:07 PM CDT -----  Slightly abnormal flow in hepatic artery. Normal waveforms reassuring. Please repeat Doppler US in 1-2 months.

## 2022-05-11 ENCOUNTER — TELEPHONE (OUTPATIENT)
Dept: TRANSPLANT | Facility: CLINIC | Age: 42
End: 2022-05-11
Payer: MEDICAID

## 2022-05-11 ENCOUNTER — PATIENT MESSAGE (OUTPATIENT)
Dept: RESEARCH | Facility: CLINIC | Age: 42
End: 2022-05-11
Payer: MEDICAID

## 2022-05-11 NOTE — TELEPHONE ENCOUNTER
----- Message from Beny Snow MD sent at 5/9/2022  8:56 PM CDT -----  Liver transplant labs reviewed and are stable. No changes in his immunosuppression. Please continue to monitor labs per transplant protocol.

## 2022-05-13 ENCOUNTER — OFFICE VISIT (OUTPATIENT)
Dept: TRANSPLANT | Facility: CLINIC | Age: 42
End: 2022-05-13
Payer: MEDICAID

## 2022-05-13 VITALS
TEMPERATURE: 97 F | RESPIRATION RATE: 16 BRPM | SYSTOLIC BLOOD PRESSURE: 149 MMHG | OXYGEN SATURATION: 97 % | WEIGHT: 207.25 LBS | HEIGHT: 74 IN | DIASTOLIC BLOOD PRESSURE: 79 MMHG | HEART RATE: 91 BPM | BODY MASS INDEX: 26.6 KG/M2

## 2022-05-13 DIAGNOSIS — Z94.4 LIVER REPLACED BY TRANSPLANT: Primary | ICD-10-CM

## 2022-05-13 DIAGNOSIS — Z94.4 LIVER TRANSPLANTED: ICD-10-CM

## 2022-05-13 DIAGNOSIS — Z79.60 LONG-TERM USE OF IMMUNOSUPPRESSANT MEDICATION: ICD-10-CM

## 2022-05-13 PROCEDURE — 99214 PR OFFICE/OUTPT VISIT, EST, LEVL IV, 30-39 MIN: ICD-10-PCS | Mod: S$PBB,,, | Performed by: STUDENT IN AN ORGANIZED HEALTH CARE EDUCATION/TRAINING PROGRAM

## 2022-05-13 PROCEDURE — 99214 OFFICE O/P EST MOD 30 MIN: CPT | Mod: PBBFAC | Performed by: STUDENT IN AN ORGANIZED HEALTH CARE EDUCATION/TRAINING PROGRAM

## 2022-05-13 PROCEDURE — 3077F PR MOST RECENT SYSTOLIC BLOOD PRESSURE >= 140 MM HG: ICD-10-PCS | Mod: CPTII,,, | Performed by: STUDENT IN AN ORGANIZED HEALTH CARE EDUCATION/TRAINING PROGRAM

## 2022-05-13 PROCEDURE — 3077F SYST BP >= 140 MM HG: CPT | Mod: CPTII,,, | Performed by: STUDENT IN AN ORGANIZED HEALTH CARE EDUCATION/TRAINING PROGRAM

## 2022-05-13 PROCEDURE — 3078F DIAST BP <80 MM HG: CPT | Mod: CPTII,,, | Performed by: STUDENT IN AN ORGANIZED HEALTH CARE EDUCATION/TRAINING PROGRAM

## 2022-05-13 PROCEDURE — 1159F MED LIST DOCD IN RCRD: CPT | Mod: CPTII,,, | Performed by: STUDENT IN AN ORGANIZED HEALTH CARE EDUCATION/TRAINING PROGRAM

## 2022-05-13 PROCEDURE — 99214 OFFICE O/P EST MOD 30 MIN: CPT | Mod: S$PBB,,, | Performed by: STUDENT IN AN ORGANIZED HEALTH CARE EDUCATION/TRAINING PROGRAM

## 2022-05-13 PROCEDURE — 1159F PR MEDICATION LIST DOCUMENTED IN MEDICAL RECORD: ICD-10-PCS | Mod: CPTII,,, | Performed by: STUDENT IN AN ORGANIZED HEALTH CARE EDUCATION/TRAINING PROGRAM

## 2022-05-13 PROCEDURE — 1160F RVW MEDS BY RX/DR IN RCRD: CPT | Mod: CPTII,,, | Performed by: STUDENT IN AN ORGANIZED HEALTH CARE EDUCATION/TRAINING PROGRAM

## 2022-05-13 PROCEDURE — 99999 PR PBB SHADOW E&M-EST. PATIENT-LVL IV: CPT | Mod: PBBFAC,,, | Performed by: STUDENT IN AN ORGANIZED HEALTH CARE EDUCATION/TRAINING PROGRAM

## 2022-05-13 PROCEDURE — 1160F PR REVIEW ALL MEDS BY PRESCRIBER/CLIN PHARMACIST DOCUMENTED: ICD-10-PCS | Mod: CPTII,,, | Performed by: STUDENT IN AN ORGANIZED HEALTH CARE EDUCATION/TRAINING PROGRAM

## 2022-05-13 PROCEDURE — 99999 PR PBB SHADOW E&M-EST. PATIENT-LVL IV: ICD-10-PCS | Mod: PBBFAC,,, | Performed by: STUDENT IN AN ORGANIZED HEALTH CARE EDUCATION/TRAINING PROGRAM

## 2022-05-13 PROCEDURE — 3008F BODY MASS INDEX DOCD: CPT | Mod: CPTII,,, | Performed by: STUDENT IN AN ORGANIZED HEALTH CARE EDUCATION/TRAINING PROGRAM

## 2022-05-13 PROCEDURE — 3078F PR MOST RECENT DIASTOLIC BLOOD PRESSURE < 80 MM HG: ICD-10-PCS | Mod: CPTII,,, | Performed by: STUDENT IN AN ORGANIZED HEALTH CARE EDUCATION/TRAINING PROGRAM

## 2022-05-13 PROCEDURE — 3008F PR BODY MASS INDEX (BMI) DOCUMENTED: ICD-10-PCS | Mod: CPTII,,, | Performed by: STUDENT IN AN ORGANIZED HEALTH CARE EDUCATION/TRAINING PROGRAM

## 2022-05-13 RX ORDER — RIVAROXABAN 20 MG/1
20 TABLET, FILM COATED ORAL DAILY
COMMUNITY
Start: 2022-04-11

## 2022-05-13 NOTE — LETTER
May 16, 2022        Deysi Pizarro NP  [address] [facility] [address]             [facility]  [address]  [place] [address]  Phone: [phone]   Patient: Kojo Sheikh III   MR Number: [id number]   YOB: 1980   Date of Visit: 5/13/2022       Dear Dr. Pizarro:    I saw your patient, Kojo Sheikh, today for evaluation. Attached you will find relevant portions of my assessment and plan of care.       If you have questions, please do not hesitate to call me. I look forward to following Kojo Sheikh along with you.    Sincerely,      Beny Snow MD            CC  No Recipients    Enclosure

## 2022-05-13 NOTE — PROGRESS NOTES
Transplant Hepatology  Liver Transplant Recipient Follow Up    PCP: Deysi Pizarro NP    Transplant History  Liver transplant performed 4/11/2021 for the primary diagnosis (UNOS) of Alcoholic Cirrhosis     ORGAN: LIVER  Whole or Partial: whole liver  Donor Type: donation after brain death  PHS Increased Risk: no  Donor CMV Status: Positive  Donor HCV Status: Positive  Donor HBcAb: Negative  Donor HBV EDWARD: Negative  Donor HCV EDWARD: Negative     Biliary Anastomosis: end to end  Arterial Anatomy: replaced right hepatic from sma  IVC reconstruction: end to end ivc  Portal vein status: patent    He has had the following complications since transplant: edema and volume overload.     Chief complaint: follow up, history of OLT    HPI:  Kojo Sheikh III is a 42 y.o. male with history of OLT in 04/2021 for alcohol related cirrhosis who presents for scheduled follow up.     He is without complaints today.  He was hospitalized in 03/2022 after being found to have DVT on outpatient ultrasound.  He has since been started on Xarelto.  Otherwise no recent hospitalizations or ED visits. He reports compliance with Prograf. He denies recent fever, chills, nausea, vomiting, diarrhea, headache, tremors.    Past Medical History:   Diagnosis Date    Alcohol abuse     Chronic back pain        Past Surgical History:   Procedure Laterality Date    BACK SURGERY  2011    DIAGNOSTIC ULTRASOUND N/A 4/14/2021    Procedure: ULTRASOUND,INTRAOPERATIVE;  Surgeon: Jose Anderson MD;  Location: [address];  Service: Transplant;  Laterality: N/A;    ESOPHAGOGASTRODUODENOSCOPY N/A 8/5/2021    Procedure: EGD (ESOPHAGOGASTRODUODENOSCOPY);  Surgeon: Judy De La Garza MD;  Location: [address]);  Service: Endoscopy;  Laterality: N/A;    LIVER TRANSPLANT N/A 4/11/2021    Procedure: TRANSPLANT, LIVER;  Surgeon: Joe Baker MD;  Location: [facility] [address] [place] [address];  Service: Transplant;  Laterality: N/A;       No family history on  file.    Social History     Tobacco Use    Smoking status: Never Smoker    Smokeless tobacco: Never Used   Substance Use Topics    Drug use: Never       Current Outpatient Medications   Medication Sig Dispense Refill    albuterol (PROVENTIL/VENTOLIN HFA) 90 mcg/actuation inhaler Inhale 2 puffs into the lungs every 6 (six) hours as needed for Wheezing. Rescue 18 g 0    aspirin (ECOTRIN) 81 MG EC tablet Take 1 tablet (81 mg total) by mouth once daily. (Patient not taking: Reported on 10/11/2021) 30 tablet 11    bisacodyL (DULCOLAX) 5 mg EC tablet Take 2 tablets (10 mg total) by mouth daily as needed for Constipation.  0    calcium carbonate (OS-BRISSA) 500 mg calcium (1,250 mg) tablet Take 2 tablets (1,000 mg total) by mouth once daily. 60 tablet 11    cetirizine (ZYRTEC) 5 MG tablet Take 1 tablet (5 mg total) by mouth daily as needed for Allergies (and before Zarxio doses).  0    clindamycin (CLEOCIN) 300 MG capsule Take 1 capsule (300 mg total) by mouth every 6 (six) hours. 28 capsule 0    diphenhydrAMINE (SOMINEX) 25 mg tablet Take 25 mg by mouth nightly as needed.      ergocalciferol (ERGOCALCIFEROL) 50,000 unit Cap TAKE ONE CAPSULE BY MOUTH EVERY 7 DAYS 4 capsule 6    folic acid (FOLVITE) 1 MG tablet TAKE 1 TABLET(1000 MCG) BY MOUTH EVERY DAY 30 tablet 3    gabapentin (NEURONTIN) 300 MG capsule Take 2 capsules (600 mg total) by mouth 3 (three) times daily. 180 capsule 11    LIDOcaine (LIDODERM) 5 % UNWRAP AND APPLY 1 PATCH TO THE AFFECTED AREA OF SKIN EVERY 24 HOURS AS NEEDED FOR PAIN AS DIRECTED 30 patch 5    loratadine (CLARITIN) 10 mg tablet Take 10 mg by mouth once daily.      mycophenolate (CELLCEPT) 250 mg Cap TAKE 4 CAPSULES(1000 MG) BY MOUTH TWICE DAILY 120 capsule 2    nicotine (NICODERM CQ) 14 mg/24 hr Place 1 patch onto the skin once daily. 28 patch 2    tacrolimus (PROGRAF) 1 MG Cap Take 4 capsules (4 mg total) by mouth every morning AND 4 capsules (4 mg total) every evening. 240  "capsule 11     No current facility-administered medications for this visit.       Review of patient's allergies indicates:  No Known Allergies    Review of Systems   Constitutional: Negative for fever and weight loss.   Gastrointestinal: Negative for abdominal pain, blood in stool, constipation, diarrhea, heartburn, melena, nausea and vomiting.   Neurological: Negative for tremors and headaches.       Vitals:    05/13/22 1452   BP: (!) 149/79   Pulse: 91   Resp: 16   Temp: 97.3 °F (36.3 °C)   TempSrc: Temporal   SpO2: 97%   Weight: 94 kg (207 lb 3.7 oz)   Height: 6' 2" (1.88 m)       Physical Exam  Vitals reviewed.   Constitutional:       General: He is not in acute distress.  Eyes:      General: No scleral icterus.  Cardiovascular:      Rate and Rhythm: Normal rate and regular rhythm.   Pulmonary:      Effort: Pulmonary effort is normal. No respiratory distress.   Abdominal:      General: Bowel sounds are normal. There is no distension.      Palpations: Abdomen is soft.      Tenderness: There is no abdominal tenderness. There is no guarding or rebound.   Musculoskeletal:      Right lower leg: No edema.      Left lower leg: No edema.   Skin:     Coloration: Skin is not jaundiced.         LABS:  Lab Results   Component Value Date    WBC 6.93 11/29/2021    HGB 10.9 (L) 11/29/2021    HCT 32.5 (L) 11/29/2021    MCV 96 11/29/2021     (L) 11/29/2021       Lab Results   Component Value Date     (L) 11/29/2021    K 4.5 11/29/2021    CL 97 11/29/2021    CO2 24 11/29/2021    BUN 8 11/29/2021    CREATININE 0.7 11/29/2021    CALCIUM 9.2 11/29/2021    ANIONGAP 6 (L) 11/29/2021    ESTGFRAFRICA >60.0 11/29/2021    EGFRNONAA >60.0 11/29/2021       Lab Results   Component Value Date    ALT 9 (L) 11/29/2021    AST 17 11/29/2021    ALKPHOS 128 11/29/2021    BILITOT 0.5 11/29/2021       Lab Results   Component Value Date    TACROLIMUS 5.3 11/29/2021       Assessment:  42 y.o. male with history of OLT in 04/2021 for alcohol " related cirrhosis who presents for scheduled follow up.    1. Liver transplanted    2. Long-term use of immunosuppressant medication        Recommendations:  1. Allograft Function  - Excellent graft function with normal LFTs in 05/2022    2. Immunosuppression   - Continue Prograf 4mg twice daily.    3. Kidney function   - Creatinine 0.74 in 10/2021  - Avoid NSAIDs as able and maintain adequate hydration    4. Health Maintenance/Screening:  - Recommend age appropriate cancer screening  - Skin cancer: Recommend use of sunscreen SPF 30 or higher, hat and sunglasses while outside, dermatologist visit annually or sooner if any concerning lesions.  - Osteoporosis: Recommend bone density testing every 2-3 years if previously normal or annually if previously abnormal.    Return to clinic in 6 months.    UNOS Patient Status  Functional Status: 90% - Able to carry on normal activity: minor symptoms of disease  Physical Capacity: No Limitations    I spent a total of 30 minutes on the day of the visit. This includes face to face time and non-face to face time preparing to see the patient (eg, review of tests), obtaining and/or reviewing separately obtained history, documenting clinical information in the electronic or other health record, independently interpreting results, and communicating results to the patient/family/caregiver, or care coordination.    Beny Snow MD  Staff Physician  Hepatology and Liver Transplant  [facility]  [facility] [place]

## 2022-05-17 ENCOUNTER — TELEPHONE (OUTPATIENT)
Dept: TRANSPLANT | Facility: CLINIC | Age: 42
End: 2022-05-17
Payer: MEDICAID

## 2022-06-03 ENCOUNTER — TELEPHONE (OUTPATIENT)
Dept: TRANSPLANT | Facility: CLINIC | Age: 42
End: 2022-06-03
Payer: MEDICAID

## 2022-06-16 LAB
EXT ALBUMIN: 4
EXT ALKALINE PHOSPHATASE: 176
EXT ALT: 104
EXT AST: 196
EXT BASOPHIL%: 0.5
EXT BILIRUBIN TOTAL: 1
EXT BUN: 8
EXT CALCIUM: 9.5
EXT CHLORIDE: 102
EXT CO2: 29
EXT CREATININE: 0.67 MG/DL
EXT EOSINOPHIL%: 1.3
EXT GLUCOSE: 115
EXT HEMATOCRIT: 39.3
EXT HEMOGLOBIN: 13.7
EXT LYMPH%: 38.7
EXT MONOCYTES%: 15.3
EXT PLATELETS: 124
EXT POTASSIUM: 4.5
EXT PROTEIN TOTAL: 6.8
EXT SEGS%: 44
EXT SODIUM: 134 MMOL/L
EXT TACROLIMUS LVL: 5.6
EXT WBC: 5.5

## 2022-06-17 ENCOUNTER — PATIENT MESSAGE (OUTPATIENT)
Dept: TRANSPLANT | Facility: CLINIC | Age: 42
End: 2022-06-17
Payer: MEDICAID

## 2022-06-17 DIAGNOSIS — Z94.4 LIVER TRANSPLANTED: ICD-10-CM

## 2022-06-17 RX ORDER — TACROLIMUS 1 MG/1
CAPSULE ORAL
Qty: 240 CAPSULE | Refills: 11 | Status: SHIPPED | OUTPATIENT
Start: 2022-06-17 | End: 2022-08-16 | Stop reason: DRUGHIGH

## 2022-06-17 NOTE — TELEPHONE ENCOUNTER
Arynga message sent instructing pt to start taking Prograf 5 mg in AM and 4 mg in PM.  Next labs on Monday, liver US and biopsy ordered.  ----- Message from Beny Snow MD sent at 6/17/2022 10:59 AM CDT -----  Liver tests elevated. Please increase tac to 5/4, arrange urgent liver biopsy and US, and repeat labs on Monday.     [facility]    Date of Service: 2017    Chief Complaint  76 y.o. female admitted 2017 with GLF, pyuria and ATN.    Interval Problem Update   stable overnight and no CC this morning. Asleep but easily waked.  Patient otherwise denies fever, chills, nausea, vomiting, adb pain, SOB, CP, headache, diarrhea, cough, or sputum.   patient is pleasant and is stable no significant chief complain the morning overnight. Still very sleepy but easily arousable.   stable and no CC overnight. Patient otherwise denies fever, chills, nausea, vomiting, adb pain, SOB, CP, headache, constipation, diarrhea, cough, or sputum.    Consultants/Specialty  Neurology  Psych      Review of Systems   Constitutional: Negative for chills and weight loss.   HENT: Negative for congestion, ear discharge, ear pain, hearing loss and nosebleeds.    Eyes: Negative for blurred vision and pain.   Respiratory: Positive for sputum production. Negative for cough, shortness of breath and wheezing.    Cardiovascular: Negative for palpitations, leg swelling and PND.   Gastrointestinal: Negative for heartburn and vomiting.   Genitourinary: Negative for dysuria, frequency and hematuria.   Musculoskeletal: Negative for falls, joint pain and neck pain.   Skin: Negative for rash.   Neurological: Negative for dizziness, tremors, speech change, seizures, weakness and headaches.   Psychiatric/Behavioral: Negative for depression, substance abuse and suicidal ideas.   All other systems reviewed and are negative.     Physical Exam  Laboratory/Imaging   Hemodynamics  Temp (24hrs), Av.6 °C (97.8 °F), Min:36.3 °C (97.4 °F), Max:36.9 °C (98.4 °F)   Temperature: 36.6 °C (97.8 °F)  Pulse  Av.1  Min: 50  Max: 106    Blood Pressure : 151/65      Respiratory      Respiration: 16, Pulse Oximetry: 91 %     Work Of Breathing / Effort: Mild  RUL Breath Sounds: Fine Crackles, RML Breath Sounds: Fine Crackles, RLL Breath Sounds:  Diminished, ARGELIA Breath Sounds: Fine Crackles, LLL Breath Sounds: Diminished    Fluids    Intake/Output Summary (Last 24 hours) at 11/30/17 0924  Last data filed at 11/29/17 2145   Gross per 24 hour   Intake              120 ml   Output                0 ml   Net              120 ml       Nutrition  Orders Placed This Encounter   Procedures   • Diet Order     Standing Status:   Standing     Number of Occurrences:   1     Order Specific Question:   Diet:     Answer:   Regular [1]     Physical Exam   Constitutional: She is oriented to person, place, and time. She appears well-developed and well-nourished.   HENT:   Head: Normocephalic.   Nose: Nose normal.   Mouth/Throat: No oropharyngeal exudate.   Eyes: EOM are normal. Pupils are equal, round, and reactive to light.   Neck: Normal range of motion. Neck supple. No JVD present. No thyromegaly present.   Cardiovascular: Normal rate and normal heart sounds.  Exam reveals no gallop and no friction rub.    No murmur heard.  Pulmonary/Chest: Effort normal. No respiratory distress. She has no rales. She exhibits no tenderness.   Abdominal: Soft. Bowel sounds are normal. She exhibits no distension and no mass. There is no tenderness. There is no rebound.   Musculoskeletal: Normal range of motion. She exhibits no edema or tenderness.   Neurological: She is alert and oriented to person, place, and time. No cranial nerve deficit.   Skin: Skin is warm. No rash noted.   Psychiatric: Her behavior is normal.   Nursing note and vitals reviewed.                                   Assessment/Plan     * Dementia- (present on admission)   Assessment & Plan    Min narc/sed when possible.     Stable on floor  Await guardianship.  Continue to monitor        Constipation   Assessment & Plan    No BM since 11/25.  PO mag citrate and LA Dulcolax.  Close monitor        Congestion of upper airway   Assessment & Plan    Saturating 90% on RA.  O2, RT, IS, Vax, CXR and encouraging taking of PO  fluids.          Physical debility   Assessment & Plan    PT/OT and increase activity.        Obesity (BMI 30.0-34.9)   Assessment & Plan    Encourage Kcal restriction        T12 compression fracture (CMS-HCC)- (present on admission)   Assessment & Plan    CT spine, no acute fractures. Cont fosamax and calcium        Chronic back pain- (present on admission)   Assessment & Plan    As above and min narc/sed when possible.        Stable issues - med hx (GIB, CAD/ICM, CVA, DLD), preventives.    Dispo - complex/fair.  Await guardianship.      Reviewed items::  Labs reviewed and Medications reviewed  Pink catheter::  No Pink  DVT prophylaxis pharmacological::  Heparin  Ulcer Prophylaxis::  Yes   For complexity-based billing, please refer to the history, exam, and decison making above. In addition, I spent >35 minutes caring for the patient today. More than 50% of the time was spent counseling and coordinating care.    I have discussed with RN and CM and SW and other consultants about patient's plan.

## 2022-06-21 ENCOUNTER — PATIENT MESSAGE (OUTPATIENT)
Dept: TRANSPLANT | Facility: CLINIC | Age: 42
End: 2022-06-21
Payer: MEDICAID

## 2022-06-21 ENCOUNTER — TELEPHONE (OUTPATIENT)
Dept: TRANSPLANT | Facility: CLINIC | Age: 42
End: 2022-06-21
Payer: MEDICAID

## 2022-06-21 NOTE — TELEPHONE ENCOUNTER
Pt did not reply to MyChart message re: elevated liver enzymes and medication change.  Called pt to discuss.  Voice message left for callback ASAP.

## 2022-06-22 ENCOUNTER — TELEPHONE (OUTPATIENT)
Dept: INTERVENTIONAL RADIOLOGY/VASCULAR | Facility: CLINIC | Age: 42
End: 2022-06-22
Payer: MEDICAID

## 2022-06-22 NOTE — TELEPHONE ENCOUNTER
Left message for pt to return my call. Need to schedule IR procedure. Please forward call to [phone]. Thanks

## 2022-06-22 NOTE — TELEPHONE ENCOUNTER
(2nd attempt) Left message for pt to return my call. Need to schedule IR procedure. Please forward call to [phone]. Thanks

## 2022-06-28 ENCOUNTER — HOSPITAL ENCOUNTER (OUTPATIENT)
Dept: RADIOLOGY | Facility: HOSPITAL | Age: 42
Discharge: HOME | End: 2022-06-28
Attending: STUDENT IN AN ORGANIZED HEALTH CARE EDUCATION/TRAINING PROGRAM
Payer: MEDICAID

## 2022-06-28 DIAGNOSIS — Z94.4 LIVER REPLACED BY TRANSPLANT: ICD-10-CM

## 2022-06-28 PROCEDURE — 93976 US DOPPLER LIVER TRANSPLANT POST (XPD): ICD-10-PCS | Mod: 26,,, | Performed by: RADIOLOGY

## 2022-06-28 PROCEDURE — 76705 US DOPPLER LIVER TRANSPLANT POST (XPD): ICD-10-PCS | Mod: 26,59,, | Performed by: RADIOLOGY

## 2022-06-28 PROCEDURE — 93976 VASCULAR STUDY: CPT | Mod: TC

## 2022-06-28 PROCEDURE — 76705 ECHO EXAM OF ABDOMEN: CPT | Mod: 59,TC

## 2022-06-28 PROCEDURE — 93976 VASCULAR STUDY: CPT | Mod: 26,,, | Performed by: RADIOLOGY

## 2022-06-28 PROCEDURE — 76705 ECHO EXAM OF ABDOMEN: CPT | Mod: 26,59,, | Performed by: RADIOLOGY

## 2022-07-15 ENCOUNTER — TELEPHONE (OUTPATIENT)
Dept: TRANSPLANT | Facility: CLINIC | Age: 42
End: 2022-07-15
Payer: MEDICAID

## 2022-07-15 NOTE — TELEPHONE ENCOUNTER
Pt did not schedule liver biopsy as expected.  Liver ultrasound completed, last labs elevated.  Called pt to assess and schedule labs.  No answer, voice message left for callback.

## 2022-08-08 ENCOUNTER — TELEPHONE (OUTPATIENT)
Dept: TRANSPLANT | Facility: CLINIC | Age: 42
End: 2022-08-08

## 2022-08-08 NOTE — TELEPHONE ENCOUNTER
Called to check pt status.  No callback received.  Called patient contact, Sam, reports he does not have updated phone number.  Called second pt contact Janki.  She reports she is with the pt at this time.  Pt reports that he got a new phone and does not have [employer].  Notified pt he was contacted several times.  Pt reports his phone number did not change.  Notified pt that his liver enzymes were elevated in June.  Pt reports he was taking a supplement that he thinks affected his liver enzymes.  He reports repeating labs a couple of weeks ago and liver enzymes were normal.  Instructed pt to repeat labs.  Pt reports he will have labs sent.

## 2022-08-09 LAB
EXT ALBUMIN: 4
EXT ALKALINE PHOSPHATASE: 105
EXT ALT: 17
EXT AST: 26
EXT BASOPHIL%: 0.6
EXT BILIRUBIN TOTAL: 0.5
EXT BUN: <5
EXT CALCIUM: 9.4
EXT CHLORIDE: 102
EXT CO2: 28
EXT CREATININE: 0.68 MG/DL
EXT EOSINOPHIL%: 4.2
EXT GLUCOSE: 102
EXT HEMATOCRIT: 35.4
EXT HEMOGLOBIN: 12.3
EXT LYMPH%: 36.2
EXT MONOCYTES%: 12.1
EXT PLATELETS: 186
EXT POTASSIUM: 4.3
EXT PROTEIN TOTAL: 6.5
EXT SEGS%: 46.4
EXT SODIUM: 132 MMOL/L
EXT TACROLIMUS LVL: 4.1
EXT WBC: 6.2

## 2022-08-16 DIAGNOSIS — Z94.4 LIVER TRANSPLANTED: ICD-10-CM

## 2022-08-16 RX ORDER — TACROLIMUS 1 MG/1
CAPSULE ORAL
Qty: 300 CAPSULE | Refills: 11 | Status: SHIPPED | OUTPATIENT
Start: 2022-08-16 | End: 2022-10-27 | Stop reason: DRUGHIGH

## 2022-08-16 NOTE — TELEPHONE ENCOUNTER
Pt reports that he has been taking Prograf 5/5 since last med change.  Per notes, pt was instructed to start taking Prograf 5 mg in AM and 4 mg in PM on 6/17/22.  Note and RX sent to MD.

## 2022-08-18 ENCOUNTER — PATIENT MESSAGE (OUTPATIENT)
Dept: TRANSPLANT | Facility: CLINIC | Age: 42
End: 2022-08-18
Payer: MEDICAID

## 2022-08-18 ENCOUNTER — TELEPHONE (OUTPATIENT)
Dept: TRANSPLANT | Facility: CLINIC | Age: 42
End: 2022-08-18
Payer: MEDICAID

## 2022-08-18 NOTE — TELEPHONE ENCOUNTER
Stable labs, no medication changes.  Labs pending from this week.  The [employer] message sent to notify pt.  ----- Message from Beny Snow MD sent at 8/17/2022  8:52 PM CDT -----  Liver transplant labs reviewed and are stable. No changes in his immunosuppression. Please continue to monitor labs per transplant protocol.

## 2022-08-18 NOTE — TELEPHONE ENCOUNTER
----- Message from Beny Snow MD sent at 8/17/2022  8:52 PM CDT -----  Liver transplant labs reviewed and are stable. No changes in his immunosuppression. Please continue to monitor labs per transplant protocol.

## 2022-08-22 ENCOUNTER — TELEPHONE (OUTPATIENT)
Dept: TRANSPLANT | Facility: CLINIC | Age: 42
End: 2022-08-22
Payer: MEDICAID

## 2022-08-25 LAB
EXT ALBUMIN: 4
EXT ALKALINE PHOSPHATASE: 109
EXT ALT: 11
EXT AST: 19
EXT BASOPHIL%: 0.4
EXT BILIRUBIN TOTAL: 0.6
EXT BUN: 7
EXT CALCIUM: 9.2
EXT CHLORIDE: 98
EXT CO2: 27
EXT CREATININE: 0.76 MG/DL
EXT EOSINOPHIL%: 2.7
EXT GLUCOSE: 105
EXT HEMATOCRIT: 30.7
EXT HEMOGLOBIN: 10.5
EXT LYMPH%: 32.6
EXT MONOCYTES%: 13.1
EXT PLATELETS: 181
EXT POTASSIUM: 4.9
EXT PROTEIN TOTAL: 6.5
EXT SEGS%: 50.8
EXT SODIUM: 127 MMOL/L
EXT TACROLIMUS LVL: 5.6
EXT WBC: 6.7

## 2022-09-01 ENCOUNTER — TELEPHONE (OUTPATIENT)
Dept: TRANSPLANT | Facility: CLINIC | Age: 42
End: 2022-09-01
Payer: MEDICAID

## 2022-09-01 NOTE — TELEPHONE ENCOUNTER
Stable labs, no medication changes.  Next labs 10/10/22, letter to be sent.  ----- Message from Beny Snow MD sent at 9/1/2022  8:20 AM CDT -----  Liver transplant labs reviewed and are stable. No changes in his immunosuppression. Please continue to monitor labs per transplant protocol.

## 2022-09-01 NOTE — LETTER
September 1, 2022    Kojo Sheikh  [address] [facility] [address]          Dear Kojo Sheikh:  MRN: [id number]    This is a follow up to your recent labs, your lab results were stable.  There are no medicine changes.  Please have your labs drawn again on 10/10/22.      If you cannot have your labs drawn on the scheduled date, it is your responsibility to call the transplant department to reschedule.  Please call [phone] and ask to speak to Theresa HOLLOWAY   for all scheduling requests.     Sincerely,      Juan C SHARIFN, RN  Your Liver Transplant Coordinator    [facility]  [address] [place] [address] [phone]

## 2022-09-12 NOTE — TELEPHONE ENCOUNTER
----- Message from Kev Brunson sent at 3/10/2022  8:28 AM CST -----  Regarding: speak to nurse  Contact: Kojo  Patient is calling to speak with nurse. Pt states his surgery was canceled b/c of 2 DVTs and 6 clots in the legs. Pt wonders if the reports from Cardiologist needed to be passed on to Dr Snow or if Dr Snow needed to speak with Cardiologist himself.    Contact: [phone]      Detail Level: Zone Initiate Treatment: tretinoin 0.025 % topical cream

## 2022-10-18 LAB
EXT ALBUMIN: 4
EXT ALKALINE PHOSPHATASE: 116
EXT ALT: 8
EXT AST: 17
EXT BASOPHIL%: 0.5
EXT BILIRUBIN TOTAL: 0.6
EXT BUN: 6
EXT CALCIUM: 9
EXT CHLORIDE: 93
EXT CO2: 26
EXT CREATININE: 0.74 MG/DL
EXT EOSINOPHIL%: 3.5
EXT GLUCOSE: 99
EXT HCV QUANT: NOT DETECTED
EXT HEMATOCRIT: 32.5
EXT HEMOGLOBIN: 11.4
EXT LYMPH%: 44.2
EXT MONOCYTES%: 10.9
EXT PLATELETS: 180
EXT POTASSIUM: 4.9
EXT PROTEIN TOTAL: 6.8
EXT SEGS%: 40.7
EXT SODIUM: 124 MMOL/L
EXT TACROLIMUS LVL: 9.7
EXT WBC: 5.8

## 2022-10-25 ENCOUNTER — PATIENT MESSAGE (OUTPATIENT)
Dept: TRANSPLANT | Facility: CLINIC | Age: 42
End: 2022-10-25
Payer: MEDICAID

## 2022-10-25 ENCOUNTER — TELEPHONE (OUTPATIENT)
Dept: TRANSPLANT | Facility: CLINIC | Age: 42
End: 2022-10-25
Payer: MEDICAID

## 2022-10-25 NOTE — TELEPHONE ENCOUNTER
Voice message and MyChart message sent for callback to discuss medication changes.  ----- Message from Beny Snow MD sent at 10/25/2022  8:58 AM CDT -----  Liver transplant labs reviewed and are stable. Please decrease tac to 5/4 and repeat labs in 2 weeks and then monthly x2 to monitor for rejection.

## 2022-10-26 ENCOUNTER — TELEPHONE (OUTPATIENT)
Dept: TRANSPLANT | Facility: CLINIC | Age: 42
End: 2022-10-26
Payer: MEDICAID

## 2022-10-27 DIAGNOSIS — Z94.4 LIVER TRANSPLANTED: ICD-10-CM

## 2022-10-27 NOTE — TELEPHONE ENCOUNTER
Instructed pt to start taking Prograf 5 mg in AM and 4 mg in PM.  Will schedule labs on 11/7/22.  Pt repeated instructions and verbalized understanding.  ----- Message from Chito Escobar sent at 10/27/2022  3:06 PM CDT -----  Regarding: return call  Patient returning call to coordinator. Requesting a call back.      Call: [phone] (Mobile

## 2022-10-28 RX ORDER — TACROLIMUS 1 MG/1
CAPSULE ORAL
Qty: 270 CAPSULE | Refills: 11 | Status: SHIPPED | OUTPATIENT
Start: 2022-10-28 | End: 2023-08-21

## 2022-11-11 ENCOUNTER — TELEPHONE (OUTPATIENT)
Dept: TRANSPLANT | Facility: CLINIC | Age: 42
End: 2022-11-11
Payer: MEDICAID

## 2022-11-22 LAB
EXT ALBUMIN: 4.2
EXT ALKALINE PHOSPHATASE: 113
EXT ALT: 10
EXT AST: 20
EXT BASOPHIL%: 0.6
EXT BILIRUBIN TOTAL: 0.6
EXT BUN: 7
EXT CALCIUM: 9.3
EXT CHLORIDE: 98
EXT CO2: 28
EXT CREATININE: 0.81 MG/DL
EXT EOSINOPHIL%: 1.8
EXT GFR MDRD AF AMER: 133.1
EXT GFR MDRD NON AF AMER: 110
EXT GLUCOSE: 126
EXT HEMATOCRIT: 35.2
EXT HEMOGLOBIN: 12.1
EXT LYMPH%: 40.5
EXT MONOCYTES%: 10
EXT PLATELETS: 162
EXT POTASSIUM: 4.8
EXT PROTEIN TOTAL: 7.2
EXT SEGS%: 46.9
EXT SODIUM: 130 MMOL/L
EXT TACROLIMUS LVL: 6.1
EXT WBC: 5

## 2022-11-23 ENCOUNTER — TELEPHONE (OUTPATIENT)
Dept: TRANSPLANT | Facility: CLINIC | Age: 42
End: 2022-11-23
Payer: MEDICAID

## 2022-11-23 NOTE — LETTER
November 23, 2022    Kojo Sheikh  [address] [facility] [address]          Dear Kojo Sheikh:  MRN: [id number]    This is a follow up to your recent labs, your lab results were stable.  There are no medicine changes.  Please have your labs drawn again on 1/9/23.      If you cannot have your labs drawn on the scheduled date, it is your responsibility to call the transplant department to reschedule.  Please call [phone] and ask to speak to Theresa HOLLOWAY   for all scheduling requests.     Sincerely,      Juan C SHARIFN, RN  Your Liver Transplant Coordinator    [facility]  [address] [place] [address] [phone]

## 2022-11-23 NOTE — TELEPHONE ENCOUNTER
Stable labs, no medication changes.  Next labs on 1/9/23, letter sent.  ----- Message from Beny Snow MD sent at 11/23/2022 12:03 PM CST -----  Liver transplant labs reviewed and are stable. No changes in his immunosuppression. Please continue to monitor labs per transplant protocol.

## 2023-01-12 ENCOUNTER — TELEPHONE (OUTPATIENT)
Dept: TRANSPLANT | Facility: CLINIC | Age: 43
End: 2023-01-12
Payer: MEDICAID

## 2023-01-19 ENCOUNTER — TELEPHONE (OUTPATIENT)
Dept: TRANSPLANT | Facility: CLINIC | Age: 43
End: 2023-01-19
Payer: MEDICAID

## 2023-01-23 ENCOUNTER — TELEPHONE (OUTPATIENT)
Dept: TRANSPLANT | Facility: CLINIC | Age: 43
End: 2023-01-23
Payer: MEDICAID

## 2023-01-30 ENCOUNTER — TELEPHONE (OUTPATIENT)
Dept: TRANSPLANT | Facility: CLINIC | Age: 43
End: 2023-01-30
Payer: MEDICAID

## 2023-02-06 ENCOUNTER — TELEPHONE (OUTPATIENT)
Dept: TRANSPLANT | Facility: CLINIC | Age: 43
End: 2023-02-06
Payer: MEDICAID

## 2023-02-06 NOTE — LETTER
February 6, 2023    Kojo Kori  [address] [facility] [address]          Dear Kojo Sheikh:  MRN: [id number]    Your lab work was due to be drawn on 1/17/23.  We contacted your lab and were unable to get test results.  It is very important to get your labs drawn as scheduled.  We cannot monitor you for rejection, infections, or drug toxicity side effects without lab results.  Please call us at [phone] as soon as possible to let us know when you plan to have labs drawn.    Sincerely,      Juan C SHARIFN, RN  Your Liver Transplant Coordinator    [facility]  [address] [place] [address] [phone]

## 2023-02-06 NOTE — TELEPHONE ENCOUNTER
Patient was due for lab on 1/17/23.  Called patient's lab, they report patient did not have labs drawn.  Letter sent.

## 2023-02-24 ENCOUNTER — TELEPHONE (OUTPATIENT)
Dept: TRANSPLANT | Facility: CLINIC | Age: 43
End: 2023-02-24
Payer: MEDICAID

## 2023-03-01 ENCOUNTER — TELEPHONE (OUTPATIENT)
Dept: TRANSPLANT | Facility: CLINIC | Age: 43
End: 2023-03-01
Payer: MEDICAID

## 2023-03-10 ENCOUNTER — TELEPHONE (OUTPATIENT)
Dept: TRANSPLANT | Facility: CLINIC | Age: 43
End: 2023-03-10
Payer: MEDICAID

## 2023-03-16 ENCOUNTER — TELEPHONE (OUTPATIENT)
Dept: TRANSPLANT | Facility: CLINIC | Age: 43
End: 2023-03-16
Payer: MEDICAID

## 2023-04-03 ENCOUNTER — TELEPHONE (OUTPATIENT)
Dept: TRANSPLANT | Facility: CLINIC | Age: 43
End: 2023-04-03
Payer: MEDICAID

## 2023-04-03 NOTE — LETTER
April 3, 2023    Kojo Sheikh DORCAS  [address] [facility] [address]             Dear Kojo Sheikh III:  MRN: [id number]    This is a letter to confirm that you have not contacted us for two or more months for lab appointments.  Your labs are performed to let us tell how well your liver is working and to rule out rejection.  It is critical that you get your labs drawn when you are instructed.  You may be risking your health and your life by not having your labs drawn.  Compliance is a very important issue in transplant patients.  We expect you to help yourself at least as much as we are willing to help you.  If you do not contact your transplant coordinator within one week of receiving this letter, we will no longer try to contact you.  It is your responsibility to maintain compliance with post-transplant care.      If you have any questions, please contact your coordinator at [phone].    Sincerely,    Peter Dent MD, MSc, FRCSC, FACS  Medical Director, [facility]  Head, Abdominal Transplant Surgery  Surgical Director, Kidney Transplant Program    [facility]  [address] [place] [phone]

## 2023-04-20 ENCOUNTER — TELEPHONE (OUTPATIENT)
Dept: TRANSPLANT | Facility: CLINIC | Age: 43
End: 2023-04-20
Payer: MEDICAID

## 2023-04-20 ENCOUNTER — PATIENT MESSAGE (OUTPATIENT)
Dept: TRANSPLANT | Facility: CLINIC | Age: 43
End: 2023-04-20
Payer: MEDICAID

## 2023-04-20 NOTE — TELEPHONE ENCOUNTER
Spoke to Janki, pt's friend.  Notified that I have not heard from pt after multiple attempts.  She confirms we have the correct number and reports that she will tell him to call transplant department.

## 2023-04-21 LAB
EXT ALBUMIN: 4.6
EXT ALKALINE PHOSPHATASE: 113
EXT ALT: 11
EXT AST: 15
EXT BASOPHIL%: 0.7
EXT BILIRUBIN TOTAL: 0.6
EXT BUN: 10
EXT CALCIUM: 9.7
EXT CHLORIDE: 101
EXT CO2: 28
EXT CREATININE: 0.92 MG/DL
EXT EBV IGG: NOT DETECTED
EXT EOSINOPHIL%: 2.5
EXT GFR MDRD NON AF AMER: 94.6
EXT GLUCOSE: 116
EXT HEMATOCRIT: 39.3
EXT HEMOGLOBIN: 13.2
EXT LYMPH%: 27.9
EXT MONOCYTES%: 10.3
EXT PLATELETS: 210
EXT POTASSIUM: 4.5
EXT PROTEIN TOTAL: 7.4
EXT SEGS%: 58.3
EXT SODIUM: 134 MMOL/L
EXT TACROLIMUS LVL: 2.3
EXT WBC: 7.6

## 2023-04-27 ENCOUNTER — TELEPHONE (OUTPATIENT)
Dept: TRANSPLANT | Facility: CLINIC | Age: 43
End: 2023-04-27
Payer: MEDICAID

## 2023-04-27 ENCOUNTER — PATIENT MESSAGE (OUTPATIENT)
Dept: TRANSPLANT | Facility: CLINIC | Age: 43
End: 2023-04-27
Payer: MEDICAID

## 2023-04-27 NOTE — TELEPHONE ENCOUNTER
Reasult message sent requesting repeat labs on 5/8/23.  ----- Message from Beny Snow MD sent at 4/26/2023 12:34 PM CDT -----  Liver tests are stable. Tac low. Has he missed any doses? If not, please repeat labs in 1-2 weeks.

## 2023-08-10 ENCOUNTER — TELEPHONE (OUTPATIENT)
Dept: TRANSPLANT | Facility: CLINIC | Age: 43
End: 2023-08-10
Payer: MEDICAID

## 2023-08-15 ENCOUNTER — TELEPHONE (OUTPATIENT)
Dept: TRANSPLANT | Facility: CLINIC | Age: 43
End: 2023-08-15
Payer: MEDICAID

## 2023-08-20 DIAGNOSIS — Z94.4 LIVER TRANSPLANTED: ICD-10-CM

## 2023-08-21 RX ORDER — TACROLIMUS 1 MG/1
CAPSULE ORAL
Qty: 300 CAPSULE | Refills: 5 | Status: SHIPPED | OUTPATIENT
Start: 2023-08-21 | End: 2023-11-21 | Stop reason: DRUGHIGH

## 2023-08-23 LAB
EXT ALBUMIN: 4.2
EXT ALKALINE PHOSPHATASE: 103
EXT ALT: 11
EXT AST: 21
EXT BASOPHIL%: 0.6
EXT BILIRUBIN TOTAL: 0.5
EXT BUN: 7
EXT CALCIUM: 9.2
EXT CHLORIDE: 101
EXT CO2: 26
EXT CREATININE: 0.73 MG/DL
EXT EOSINOPHIL%: 2.9
EXT GFR MDRD AF AMER: 149.4
EXT GFR MDRD NON AF AMER: 123.5
EXT GLUCOSE: 121
EXT HCV QUANT: NOT DETECTED
EXT HEMATOCRIT: 37
EXT HEMOGLOBIN: 12.8
EXT LYMPH%: 31.5
EXT MONOCYTES%: 9.8
EXT PLATELETS: 214
EXT POTASSIUM: 4.6
EXT PROTEIN TOTAL: 6.8
EXT SEGS%: 54.9
EXT SODIUM: 136 MMOL/L
EXT TACROLIMUS LVL: 4.3
EXT WBC: 7
PHOSPHATIDYLETHANOL (PETH): NEGATIVE NG/ML

## 2023-08-30 ENCOUNTER — TELEPHONE (OUTPATIENT)
Dept: TRANSPLANT | Facility: CLINIC | Age: 43
End: 2023-08-30
Payer: MEDICAID

## 2023-08-30 ENCOUNTER — PATIENT MESSAGE (OUTPATIENT)
Dept: TRANSPLANT | Facility: CLINIC | Age: 43
End: 2023-08-30
Payer: MEDICAID

## 2023-08-30 NOTE — LETTER
August 30, 2023    Kojo Kori  [address] [facility] [address]          Dear Kojo Sheikh:  MRN: [id number]    This is a follow up to your recent labs, your lab results were stable.  There are no medicine changes.  Please have your labs drawn again on 11/6/23.      If you cannot have your labs drawn on the scheduled date, it is your responsibility to call the transplant department to reschedule.  Please call [phone] and ask to speak to Theresa HOLLOWAY   for all scheduling requests.     Sincerely,      Juan C SHARIFN, RN  Your Liver Transplant Coordinator    [facility] [place]  [address] [place] [address] [phone]

## 2023-08-30 NOTE — TELEPHONE ENCOUNTER
Stable labs, no medication changes.  Next labs 11/6/23.  ----- Message from Beny Snow MD sent at 8/30/2023  9:26 AM CDT -----  Liver tests are stable. No changes in his immunosuppression. Please continue to monitor labs per transplant protocol.

## 2023-09-01 ENCOUNTER — OFFICE VISIT (OUTPATIENT)
Dept: TRANSPLANT | Facility: CLINIC | Age: 43
End: 2023-09-01
Payer: MEDICAID

## 2023-09-01 DIAGNOSIS — Z94.4 LIVER TRANSPLANTED: Primary | ICD-10-CM

## 2023-09-01 DIAGNOSIS — Z79.60 LONG-TERM USE OF IMMUNOSUPPRESSANT MEDICATION: ICD-10-CM

## 2023-09-01 PROCEDURE — 1159F MED LIST DOCD IN RCRD: CPT | Mod: CPTII,95,, | Performed by: STUDENT IN AN ORGANIZED HEALTH CARE EDUCATION/TRAINING PROGRAM

## 2023-09-01 PROCEDURE — 1159F PR MEDICATION LIST DOCUMENTED IN MEDICAL RECORD: ICD-10-PCS | Mod: CPTII,95,, | Performed by: STUDENT IN AN ORGANIZED HEALTH CARE EDUCATION/TRAINING PROGRAM

## 2023-09-01 PROCEDURE — 99213 OFFICE O/P EST LOW 20 MIN: CPT | Mod: 95,,, | Performed by: STUDENT IN AN ORGANIZED HEALTH CARE EDUCATION/TRAINING PROGRAM

## 2023-09-01 PROCEDURE — 1160F RVW MEDS BY RX/DR IN RCRD: CPT | Mod: CPTII,95,, | Performed by: STUDENT IN AN ORGANIZED HEALTH CARE EDUCATION/TRAINING PROGRAM

## 2023-09-01 PROCEDURE — 99213 PR OFFICE/OUTPT VISIT, EST, LEVL III, 20-29 MIN: ICD-10-PCS | Mod: 95,,, | Performed by: STUDENT IN AN ORGANIZED HEALTH CARE EDUCATION/TRAINING PROGRAM

## 2023-09-01 PROCEDURE — 1160F PR REVIEW ALL MEDS BY PRESCRIBER/CLIN PHARMACIST DOCUMENTED: ICD-10-PCS | Mod: CPTII,95,, | Performed by: STUDENT IN AN ORGANIZED HEALTH CARE EDUCATION/TRAINING PROGRAM

## 2023-09-01 NOTE — Clinical Note
September 4, 2023                     [facility]  [address]  [facility] [address]  Phone: [phone]   Patient: Kojo Sheikh III   MR Number: [id number]   YOB: 1980   Date of Visit: 9/1/2023       Dear      Thank you for referring Kojo Sheikh to me for evaluation. Attached you will find relevant portions of my assessment and plan of care.    If you have questions, please do not hesitate to call me. I look forward to following Kojo Sheikh along with you.    Sincerely,    Beny Snow MD    Enclosure    If you would like to receive this communication electronically, please contact [email or web address] or [phone] to request [employer] access.    [employer] is a tool which provides read-only access to select patient information with whom you have a relationship. Its easy to use and provides real time access to review your patients record including encounter summaries, notes, results, and demographic information.    If you feel you have received this communication in error or would no longer like to receive these types of communications, please e-mail [email or web address]

## 2023-10-19 ENCOUNTER — HOSPITAL ENCOUNTER (INPATIENT)
Facility: HOSPITAL | Age: 43
LOS: 16 days | Discharge: HOME | DRG: 571 | End: 2023-11-04
Attending: STUDENT IN AN ORGANIZED HEALTH CARE EDUCATION/TRAINING PROGRAM | Admitting: INTERNAL MEDICINE
Payer: MEDICARE

## 2023-10-19 DIAGNOSIS — R07.9 CHEST PAIN: ICD-10-CM

## 2023-10-19 DIAGNOSIS — R60.9 SWELLING: ICD-10-CM

## 2023-10-19 DIAGNOSIS — L02.415 ABSCESS OF RIGHT LEG: ICD-10-CM

## 2023-10-19 DIAGNOSIS — Z94.4 STATUS POST LIVER TRANSPLANT: ICD-10-CM

## 2023-10-19 DIAGNOSIS — R52 PAIN: ICD-10-CM

## 2023-10-19 DIAGNOSIS — L03.90 CELLULITIS, UNSPECIFIED CELLULITIS SITE: Primary | ICD-10-CM

## 2023-10-19 DIAGNOSIS — M79.661 PAIN AND SWELLING OF RIGHT LOWER LEG: ICD-10-CM

## 2023-10-19 DIAGNOSIS — M79.89 PAIN AND SWELLING OF RIGHT LOWER LEG: ICD-10-CM

## 2023-10-19 DIAGNOSIS — I82.409 DVT (DEEP VENOUS THROMBOSIS): ICD-10-CM

## 2023-10-19 LAB
ALBUMIN SERPL-MCNC: 3.8 G/DL (ref 3.5–5)
ALBUMIN/GLOB SERPL: 1.2 RATIO (ref 1.1–2)
ALP SERPL-CCNC: 137 UNIT/L (ref 40–150)
ALT SERPL-CCNC: 47 UNIT/L (ref 0–55)
AST SERPL-CCNC: 84 UNIT/L (ref 5–34)
BASOPHILS # BLD AUTO: 0.03 X10(3)/MCL
BASOPHILS NFR BLD AUTO: 0.3 %
BILIRUB SERPL-MCNC: 1 MG/DL
BUN SERPL-MCNC: 5.5 MG/DL (ref 8.9–20.6)
CALCIUM SERPL-MCNC: 8.4 MG/DL (ref 8.4–10.2)
CHLORIDE SERPL-SCNC: 94 MMOL/L (ref 98–107)
CO2 SERPL-SCNC: 19 MMOL/L (ref 22–29)
CREAT SERPL-MCNC: 0.67 MG/DL (ref 0.73–1.18)
CRP SERPL-MCNC: 26.4 MG/L
EOSINOPHIL # BLD AUTO: 0.02 X10(3)/MCL (ref 0–0.9)
EOSINOPHIL NFR BLD AUTO: 0.2 %
ERYTHROCYTE [DISTWIDTH] IN BLOOD BY AUTOMATED COUNT: 13.4 % (ref 11.5–17)
ERYTHROCYTE [SEDIMENTATION RATE] IN BLOOD: 21 MM/HR (ref 0–15)
GFR SERPLBLD CREATININE-BSD FMLA CKD-EPI: >60 MLS/MIN/1.73/M2
GLOBULIN SER-MCNC: 3.1 GM/DL (ref 2.4–3.5)
GLUCOSE SERPL-MCNC: 88 MG/DL (ref 74–100)
HCT VFR BLD AUTO: 35.9 % (ref 42–52)
HGB BLD-MCNC: 12.7 G/DL (ref 14–18)
IMM GRANULOCYTES # BLD AUTO: 0.03 X10(3)/MCL (ref 0–0.04)
IMM GRANULOCYTES NFR BLD AUTO: 0.3 %
LACTATE SERPL-SCNC: 1.2 MMOL/L (ref 0.5–2.2)
LYMPHOCYTES # BLD AUTO: 2.62 X10(3)/MCL (ref 0.6–4.6)
LYMPHOCYTES NFR BLD AUTO: 27.4 %
MCH RBC QN AUTO: 35.1 PG (ref 27–31)
MCHC RBC AUTO-ENTMCNC: 35.4 G/DL (ref 33–36)
MCV RBC AUTO: 99.2 FL (ref 80–94)
MONOCYTES # BLD AUTO: 1.43 X10(3)/MCL (ref 0.1–1.3)
MONOCYTES NFR BLD AUTO: 15 %
NEUTROPHILS # BLD AUTO: 5.42 X10(3)/MCL (ref 2.1–9.2)
NEUTROPHILS NFR BLD AUTO: 56.8 %
NRBC BLD AUTO-RTO: 0 %
PLATELET # BLD AUTO: 120 X10(3)/MCL (ref 130–400)
PMV BLD AUTO: 8.7 FL (ref 7.4–10.4)
POTASSIUM SERPL-SCNC: 4.3 MMOL/L (ref 3.5–5.1)
PROT SERPL-MCNC: 6.9 GM/DL (ref 6.4–8.3)
RBC # BLD AUTO: 3.62 X10(6)/MCL (ref 4.7–6.1)
SODIUM SERPL-SCNC: 126 MMOL/L (ref 136–145)
WBC # SPEC AUTO: 9.55 X10(3)/MCL (ref 4.5–11.5)

## 2023-10-19 PROCEDURE — 25000003 PHARM REV CODE 250: Performed by: STUDENT IN AN ORGANIZED HEALTH CARE EDUCATION/TRAINING PROGRAM

## 2023-10-19 PROCEDURE — 11000001 HC ACUTE MED/SURG PRIVATE ROOM

## 2023-10-19 PROCEDURE — 87040 BLOOD CULTURE FOR BACTERIA: CPT | Performed by: PHYSICIAN ASSISTANT

## 2023-10-19 PROCEDURE — 85025 COMPLETE CBC W/AUTO DIFF WBC: CPT | Performed by: PHYSICIAN ASSISTANT

## 2023-10-19 PROCEDURE — 85652 RBC SED RATE AUTOMATED: CPT | Performed by: STUDENT IN AN ORGANIZED HEALTH CARE EDUCATION/TRAINING PROGRAM

## 2023-10-19 PROCEDURE — 96360 HYDRATION IV INFUSION INIT: CPT

## 2023-10-19 PROCEDURE — 80053 COMPREHEN METABOLIC PANEL: CPT | Performed by: PHYSICIAN ASSISTANT

## 2023-10-19 PROCEDURE — 86140 C-REACTIVE PROTEIN: CPT | Performed by: STUDENT IN AN ORGANIZED HEALTH CARE EDUCATION/TRAINING PROGRAM

## 2023-10-19 PROCEDURE — 63600175 PHARM REV CODE 636 W HCPCS: Performed by: PHYSICIAN ASSISTANT

## 2023-10-19 PROCEDURE — 99285 EMERGENCY DEPT VISIT HI MDM: CPT | Mod: 25

## 2023-10-19 PROCEDURE — 25000003 PHARM REV CODE 250: Performed by: INTERNAL MEDICINE

## 2023-10-19 PROCEDURE — 83605 ASSAY OF LACTIC ACID: CPT | Performed by: PHYSICIAN ASSISTANT

## 2023-10-19 PROCEDURE — 63600175 PHARM REV CODE 636 W HCPCS: Performed by: STUDENT IN AN ORGANIZED HEALTH CARE EDUCATION/TRAINING PROGRAM

## 2023-10-19 RX ORDER — OXYCODONE AND ACETAMINOPHEN 10; 325 MG/1; MG/1
1 TABLET ORAL EVERY 4 HOURS PRN
Status: DISCONTINUED | OUTPATIENT
Start: 2023-10-19 | End: 2023-11-04 | Stop reason: HOSPADM

## 2023-10-19 RX ORDER — MORPHINE SULFATE 4 MG/ML
4 INJECTION, SOLUTION INTRAMUSCULAR; INTRAVENOUS
Status: COMPLETED | OUTPATIENT
Start: 2023-10-19 | End: 2023-10-19

## 2023-10-19 RX ORDER — SODIUM CHLORIDE 9 MG/ML
INJECTION, SOLUTION INTRAVENOUS CONTINUOUS
Status: DISCONTINUED | OUTPATIENT
Start: 2023-10-19 | End: 2023-10-21

## 2023-10-19 RX ADMIN — MORPHINE SULFATE 4 MG: 4 INJECTION INTRAVENOUS at 10:10

## 2023-10-19 RX ADMIN — SODIUM CHLORIDE, POTASSIUM CHLORIDE, SODIUM LACTATE AND CALCIUM CHLORIDE 1000 ML: 600; 310; 30; 20 INJECTION, SOLUTION INTRAVENOUS at 06:10

## 2023-10-19 RX ADMIN — PIPERACILLIN AND TAZOBACTAM 4.5 G: 4; .5 INJECTION, POWDER, LYOPHILIZED, FOR SOLUTION INTRAVENOUS; PARENTERAL at 10:10

## 2023-10-19 RX ADMIN — SODIUM CHLORIDE: 9 INJECTION, SOLUTION INTRAVENOUS at 11:10

## 2023-10-19 RX ADMIN — VANCOMYCIN HYDROCHLORIDE 2000 MG: 500 INJECTION, POWDER, LYOPHILIZED, FOR SOLUTION INTRAVENOUS at 11:10

## 2023-10-19 NOTE — FIRST PROVIDER EVALUATION
Medical screening examination initiated.  I have conducted a focused provider triage encounter, findings are as follows:    Brief history of present illness:  43-year-old male with history of liver transplant presents to ED for evaluation of right leg cellulitis.  Patient reports that he started with a phlebitis of his right lower leg.  States has continued increasing redness erythema and heat.  Currently on clindamycin since Tuesday. Reports opening of wound just prior to arrival.  Seen by [facility] and sent to ED for further evaluation with vascular consult.    Vitals:    10/19/23 1819   BP: 133/85   Pulse: 110   Resp: 19   Temp: 98.5 °F (36.9 °C)   TempSrc: Oral   SpO2: 97%   Weight: 100.7 kg (222 lb)       Pertinent physical exam:  Patient is awake and alert and oriented.  Ambulatory to triage.  Erythema noted with warm to right lower extremity.  Wound opening    Brief workup plan:  labs, Lactic, blood cultures, IVF, cv us     Preliminary workup initiated; this workup will be continued and followed by the physician or advanced practice provider that is assigned to the patient when roomed.

## 2023-10-20 LAB
ALBUMIN SERPL-MCNC: 3.3 G/DL (ref 3.5–5)
ALBUMIN/GLOB SERPL: 1.3 RATIO (ref 1.1–2)
ALP SERPL-CCNC: 133 UNIT/L (ref 40–150)
ALT SERPL-CCNC: 47 UNIT/L (ref 0–55)
AMPHET UR QL SCN: NEGATIVE
AST SERPL-CCNC: 86 UNIT/L (ref 5–34)
BARBITURATE SCN PRESENT UR: NEGATIVE
BASOPHILS # BLD AUTO: 0.03 X10(3)/MCL
BASOPHILS NFR BLD AUTO: 0.6 %
BENZODIAZ UR QL SCN: NEGATIVE
BILIRUB SERPL-MCNC: 0.7 MG/DL
BUN SERPL-MCNC: 7.1 MG/DL (ref 8.9–20.6)
CALCIUM SERPL-MCNC: 8.4 MG/DL (ref 8.4–10.2)
CANNABINOIDS UR QL SCN: NEGATIVE
CHLORIDE SERPL-SCNC: 100 MMOL/L (ref 98–107)
CO2 SERPL-SCNC: 26 MMOL/L (ref 22–29)
COCAINE UR QL SCN: NEGATIVE
CREAT SERPL-MCNC: 0.87 MG/DL (ref 0.73–1.18)
EOSINOPHIL # BLD AUTO: 0.06 X10(3)/MCL (ref 0–0.9)
EOSINOPHIL NFR BLD AUTO: 1.1 %
ERYTHROCYTE [DISTWIDTH] IN BLOOD BY AUTOMATED COUNT: 13.7 % (ref 11.5–17)
FENTANYL UR QL SCN: NEGATIVE
GFR SERPLBLD CREATININE-BSD FMLA CKD-EPI: >60 MLS/MIN/1.73/M2
GLOBULIN SER-MCNC: 2.6 GM/DL (ref 2.4–3.5)
GLUCOSE SERPL-MCNC: 95 MG/DL (ref 74–100)
HCT VFR BLD AUTO: 33 % (ref 42–52)
HGB BLD-MCNC: 11.4 G/DL (ref 14–18)
IMM GRANULOCYTES # BLD AUTO: 0.02 X10(3)/MCL (ref 0–0.04)
IMM GRANULOCYTES NFR BLD AUTO: 0.4 %
LYMPHOCYTES # BLD AUTO: 1.43 X10(3)/MCL (ref 0.6–4.6)
LYMPHOCYTES NFR BLD AUTO: 26.5 %
MCH RBC QN AUTO: 34.9 PG (ref 27–31)
MCHC RBC AUTO-ENTMCNC: 34.5 G/DL (ref 33–36)
MCV RBC AUTO: 100.9 FL (ref 80–94)
MDMA UR QL SCN: NEGATIVE
MONOCYTES # BLD AUTO: 0.92 X10(3)/MCL (ref 0.1–1.3)
MONOCYTES NFR BLD AUTO: 17.1 %
NEUTROPHILS # BLD AUTO: 2.93 X10(3)/MCL (ref 2.1–9.2)
NEUTROPHILS NFR BLD AUTO: 54.3 %
NRBC BLD AUTO-RTO: 0 %
OPIATES UR QL SCN: POSITIVE
PCP UR QL: NEGATIVE
PH UR: 7 [PH] (ref 3–11)
PLATELET # BLD AUTO: 112 X10(3)/MCL (ref 130–400)
PMV BLD AUTO: 8.9 FL (ref 7.4–10.4)
POTASSIUM SERPL-SCNC: 4.5 MMOL/L (ref 3.5–5.1)
PROT SERPL-MCNC: 5.9 GM/DL (ref 6.4–8.3)
RBC # BLD AUTO: 3.27 X10(6)/MCL (ref 4.7–6.1)
SODIUM SERPL-SCNC: 134 MMOL/L (ref 136–145)
WBC # SPEC AUTO: 5.39 X10(3)/MCL (ref 4.5–11.5)

## 2023-10-20 PROCEDURE — 80307 DRUG TEST PRSMV CHEM ANLYZR: CPT | Performed by: NURSE PRACTITIONER

## 2023-10-20 PROCEDURE — S4991 NICOTINE PATCH NONLEGEND: HCPCS | Performed by: INTERNAL MEDICINE

## 2023-10-20 PROCEDURE — 80195 ASSAY OF SIROLIMUS: CPT | Performed by: STUDENT IN AN ORGANIZED HEALTH CARE EDUCATION/TRAINING PROGRAM

## 2023-10-20 PROCEDURE — 25000003 PHARM REV CODE 250: Performed by: INTERNAL MEDICINE

## 2023-10-20 PROCEDURE — 63600175 PHARM REV CODE 636 W HCPCS: Performed by: NURSE PRACTITIONER

## 2023-10-20 PROCEDURE — 21400001 HC TELEMETRY ROOM

## 2023-10-20 PROCEDURE — 80053 COMPREHEN METABOLIC PANEL: CPT | Performed by: NURSE PRACTITIONER

## 2023-10-20 PROCEDURE — 85025 COMPLETE CBC W/AUTO DIFF WBC: CPT | Performed by: NURSE PRACTITIONER

## 2023-10-20 PROCEDURE — 63600175 PHARM REV CODE 636 W HCPCS: Performed by: INTERNAL MEDICINE

## 2023-10-20 PROCEDURE — 25000003 PHARM REV CODE 250: Performed by: NURSE PRACTITIONER

## 2023-10-20 RX ORDER — GABAPENTIN 300 MG/1
600 CAPSULE ORAL 3 TIMES DAILY
Status: DISCONTINUED | OUTPATIENT
Start: 2023-10-20 | End: 2023-11-04 | Stop reason: HOSPADM

## 2023-10-20 RX ORDER — IBUPROFEN 200 MG
1 TABLET ORAL DAILY
Status: DISCONTINUED | OUTPATIENT
Start: 2023-10-20 | End: 2023-11-04 | Stop reason: HOSPADM

## 2023-10-20 RX ORDER — FOLIC ACID 1 MG/1
1000 TABLET ORAL DAILY
Status: DISCONTINUED | OUTPATIENT
Start: 2023-10-20 | End: 2023-11-04 | Stop reason: HOSPADM

## 2023-10-20 RX ORDER — IBUPROFEN 200 MG
1 TABLET ORAL DAILY
Status: DISCONTINUED | OUTPATIENT
Start: 2023-10-20 | End: 2023-10-20

## 2023-10-20 RX ORDER — TALC
6 POWDER (GRAM) TOPICAL NIGHTLY PRN
Status: DISCONTINUED | OUTPATIENT
Start: 2023-10-20 | End: 2023-11-04 | Stop reason: HOSPADM

## 2023-10-20 RX ORDER — SODIUM CHLORIDE 0.9 % (FLUSH) 0.9 %
10 SYRINGE (ML) INJECTION
Status: DISCONTINUED | OUTPATIENT
Start: 2023-10-20 | End: 2023-10-26

## 2023-10-20 RX ORDER — PROCHLORPERAZINE EDISYLATE 5 MG/ML
5 INJECTION INTRAMUSCULAR; INTRAVENOUS EVERY 6 HOURS PRN
Status: DISCONTINUED | OUTPATIENT
Start: 2023-10-20 | End: 2023-11-01

## 2023-10-20 RX ORDER — MORPHINE SULFATE 4 MG/ML
2 INJECTION, SOLUTION INTRAMUSCULAR; INTRAVENOUS DAILY PRN
Status: DISCONTINUED | OUTPATIENT
Start: 2023-10-20 | End: 2023-10-23

## 2023-10-20 RX ORDER — ERGOCALCIFEROL 1.25 MG/1
50000 CAPSULE ORAL
Status: DISCONTINUED | OUTPATIENT
Start: 2023-10-20 | End: 2023-11-04 | Stop reason: HOSPADM

## 2023-10-20 RX ORDER — CYCLOBENZAPRINE HCL 10 MG
10 TABLET ORAL 3 TIMES DAILY PRN
COMMUNITY

## 2023-10-20 RX ORDER — DIPHENHYDRAMINE HCL 25 MG
25 CAPSULE ORAL NIGHTLY
Status: DISCONTINUED | OUTPATIENT
Start: 2023-10-20 | End: 2023-11-04 | Stop reason: HOSPADM

## 2023-10-20 RX ORDER — CYCLOBENZAPRINE HCL 10 MG
10 TABLET ORAL 3 TIMES DAILY
Status: DISCONTINUED | OUTPATIENT
Start: 2023-10-20 | End: 2023-11-04 | Stop reason: HOSPADM

## 2023-10-20 RX ORDER — CALCIUM CARBONATE 200(500)MG
1000 TABLET,CHEWABLE ORAL 2 TIMES DAILY
Status: DISCONTINUED | OUTPATIENT
Start: 2023-10-20 | End: 2023-11-04 | Stop reason: HOSPADM

## 2023-10-20 RX ORDER — TACROLIMUS 1 MG/1
5 CAPSULE ORAL 2 TIMES DAILY
Status: DISCONTINUED | OUTPATIENT
Start: 2023-10-20 | End: 2023-11-04 | Stop reason: HOSPADM

## 2023-10-20 RX ORDER — ACETAMINOPHEN 325 MG/1
650 TABLET ORAL EVERY 4 HOURS PRN
Status: DISCONTINUED | OUTPATIENT
Start: 2023-10-20 | End: 2023-11-04 | Stop reason: HOSPADM

## 2023-10-20 RX ORDER — OXYCODONE AND ACETAMINOPHEN 10; 325 MG/1; MG/1
1 TABLET ORAL EVERY 4 HOURS PRN
COMMUNITY

## 2023-10-20 RX ORDER — NALOXONE HCL 0.4 MG/ML
0.02 VIAL (ML) INJECTION
Status: DISCONTINUED | OUTPATIENT
Start: 2023-10-20 | End: 2023-11-04 | Stop reason: HOSPADM

## 2023-10-20 RX ORDER — ACETAMINOPHEN 500 MG
1000 TABLET ORAL EVERY 6 HOURS PRN
Status: DISCONTINUED | OUTPATIENT
Start: 2023-10-20 | End: 2023-11-04 | Stop reason: HOSPADM

## 2023-10-20 RX ORDER — MYCOPHENOLATE MOFETIL 250 MG/1
1000 CAPSULE ORAL 2 TIMES DAILY
Status: DISCONTINUED | OUTPATIENT
Start: 2023-10-20 | End: 2023-10-20

## 2023-10-20 RX ORDER — CYCLOBENZAPRINE HCL 10 MG
10 TABLET ORAL 3 TIMES DAILY PRN
Status: DISCONTINUED | OUTPATIENT
Start: 2023-10-20 | End: 2023-10-20

## 2023-10-20 RX ORDER — CLONIDINE HYDROCHLORIDE 0.1 MG/1
0.1 TABLET ORAL 3 TIMES DAILY PRN
Status: DISCONTINUED | OUTPATIENT
Start: 2023-10-20 | End: 2023-11-04 | Stop reason: HOSPADM

## 2023-10-20 RX ADMIN — OXYCODONE HYDROCHLORIDE AND ACETAMINOPHEN 1 TABLET: 10; 325 TABLET ORAL at 05:10

## 2023-10-20 RX ADMIN — DIPHENHYDRAMINE HYDROCHLORIDE 25 MG: 25 CAPSULE ORAL at 09:10

## 2023-10-20 RX ADMIN — PIPERACILLIN AND TAZOBACTAM 4.5 G: 4; .5 INJECTION, POWDER, FOR SOLUTION INTRAVENOUS at 09:10

## 2023-10-20 RX ADMIN — VANCOMYCIN HYDROCHLORIDE 1750 MG: 500 INJECTION, POWDER, LYOPHILIZED, FOR SOLUTION INTRAVENOUS at 10:10

## 2023-10-20 RX ADMIN — CALCIUM CARBONATE (ANTACID) CHEW TAB 500 MG 1000 MG: 500 CHEW TAB at 10:10

## 2023-10-20 RX ADMIN — CYCLOBENZAPRINE 10 MG: 10 TABLET, FILM COATED ORAL at 09:10

## 2023-10-20 RX ADMIN — CYCLOBENZAPRINE 10 MG: 10 TABLET, FILM COATED ORAL at 02:10

## 2023-10-20 RX ADMIN — GABAPENTIN 600 MG: 300 CAPSULE ORAL at 09:10

## 2023-10-20 RX ADMIN — CYCLOBENZAPRINE 10 MG: 10 TABLET, FILM COATED ORAL at 10:10

## 2023-10-20 RX ADMIN — MORPHINE SULFATE 2 MG: 4 INJECTION, SOLUTION INTRAMUSCULAR; INTRAVENOUS at 10:10

## 2023-10-20 RX ADMIN — CLONIDINE HYDROCHLORIDE 0.1 MG: 0.1 TABLET ORAL at 05:10

## 2023-10-20 RX ADMIN — RIVAROXABAN 20 MG: 10 TABLET, FILM COATED ORAL at 05:10

## 2023-10-20 RX ADMIN — FOLIC ACID 1000 MCG: 1 TABLET ORAL at 10:10

## 2023-10-20 RX ADMIN — PIPERACILLIN AND TAZOBACTAM 4.5 G: 4; .5 INJECTION, POWDER, FOR SOLUTION INTRAVENOUS at 05:10

## 2023-10-20 RX ADMIN — OXYCODONE HYDROCHLORIDE AND ACETAMINOPHEN 1 TABLET: 10; 325 TABLET ORAL at 10:10

## 2023-10-20 RX ADMIN — TACROLIMUS 5 MG: 1 CAPSULE ORAL at 05:10

## 2023-10-20 RX ADMIN — PIPERACILLIN AND TAZOBACTAM 4.5 G: 4; .5 INJECTION, POWDER, FOR SOLUTION INTRAVENOUS at 02:10

## 2023-10-20 RX ADMIN — CALCIUM CARBONATE (ANTACID) CHEW TAB 500 MG 1000 MG: 500 CHEW TAB at 09:10

## 2023-10-20 RX ADMIN — ERGOCALCIFEROL 50000 UNITS: 1.25 CAPSULE ORAL at 10:10

## 2023-10-20 RX ADMIN — NICOTINE 1 PATCH: 21 PATCH, EXTENDED RELEASE TRANSDERMAL at 07:10

## 2023-10-20 RX ADMIN — OXYCODONE HYDROCHLORIDE AND ACETAMINOPHEN 1 TABLET: 10; 325 TABLET ORAL at 04:10

## 2023-10-20 RX ADMIN — OXYCODONE HYDROCHLORIDE AND ACETAMINOPHEN 1 TABLET: 10; 325 TABLET ORAL at 12:10

## 2023-10-20 RX ADMIN — TACROLIMUS 5 MG: 1 CAPSULE ORAL at 10:10

## 2023-10-20 RX ADMIN — SODIUM CHLORIDE: 9 INJECTION, SOLUTION INTRAVENOUS at 12:10

## 2023-10-20 RX ADMIN — GABAPENTIN 600 MG: 300 CAPSULE ORAL at 02:10

## 2023-10-20 NOTE — PLAN OF CARE
Problem: Adult Inpatient Plan of Care  Goal: Plan of Care Review  Outcome: Ongoing, Progressing  Goal: Patient-Specific Goal (Individualized)  Outcome: Ongoing, Progressing  Goal: Absence of Hospital-Acquired Illness or Injury  Outcome: Ongoing, Progressing  Goal: Optimal Comfort and Wellbeing  Outcome: Ongoing, Progressing  Goal: Readiness for Transition of Care  Outcome: Ongoing, Progressing     Problem: Impaired Wound Healing  Goal: Optimal Wound Healing  Outcome: Ongoing, Progressing      Assessment/Plan:         Diagnoses and all orders for this visit:    Essential hypertension  Comments:  Stable BP control    Acquired hypothyroidism  Comments:  Clinically euthyroid     Chronic pulmonary embolism without acute cor pulmonale, unspecified pulmonary embolism type (HCC)  Comments:  Asymptomatic    Insomnia due to anxiety and fear  -     LORazepam (Ativan) 0 5 mg tablet; Take 1 tablet (0 5 mg total) by mouth daily at bedtime    BMI 24 0-24 9, adult    Acute telogen effluvium  Comments:  Likely to regain hair on future growth cycle          Subjective:      Patient ID: Aurea Jordan is a 78 y o  female  3 month follow up  No recent illness or injury    Notes hair loss since COVID    Frustrated with not being able to sleep  Had Lorazepam taken away while in hospital- unable to sleep without  I will restart but at lower dose to decrease risk to her    Is back to walking without walker or wheelchair  Feels she is close to her baseline        The following portions of the patient's history were reviewed and updated as appropriate: allergies, current medications, past family history, past medical history, past social history, past surgical history and problem list     Review of Systems   Constitutional: Positive for unexpected weight change  HENT: Negative  Eyes: Negative for visual disturbance  Respiratory: Negative  Cardiovascular: Negative  Gastrointestinal: Negative  Genitourinary: Negative  Musculoskeletal: Positive for arthralgias and gait problem  Psychiatric/Behavioral: Negative  All other systems reviewed and are negative  Objective:      /78   Pulse 73   Temp 98 8 °F (37 1 °C)   Ht 4' 9 99" (1 473 m)   Wt 52 6 kg (116 lb)   BMI 24 25 kg/m²          Physical Exam  Vitals and nursing note reviewed  Constitutional:       Appearance: Normal appearance  Cardiovascular:      Rate and Rhythm: Normal rate and regular rhythm  Pulses: Normal pulses  Heart sounds: Normal heart sounds  Pulmonary:      Effort: Pulmonary effort is normal       Breath sounds: Normal breath sounds  Musculoskeletal:      Right lower leg: No edema  Left lower leg: No edema  Neurological:      General: No focal deficit present  Mental Status: She is alert and oriented to person, place, and time     Psychiatric:         Mood and Affect: Mood normal

## 2023-10-20 NOTE — NURSING
Nurses Note -- 4 Eyes      10/20/2023   1:31 AM      Skin assessed during: Admit      [] No Altered Skin Integrity Present    []Prevention Measures Documented      [x] Yes- Altered Skin Integrity Present or Discovered   [x] LDA Added if Not in Epic (Describe Wound)   [x] New Altered Skin Integrity was Present on Admit and Documented in LDA   [x] Wound Image Taken    Wound Care Consulted? Yes    Attending Nurse:  Jena Lovett RN/Staff Member:  redness to RLE  Caryl Mireles

## 2023-10-20 NOTE — PLAN OF CARE
10/20/23 1303   Discharge Assessment   Assessment Type Discharge Planning Assessment   Confirmed/corrected address, phone number and insurance Yes   Confirmed Demographics Correct on Facesheet   Source of Information patient   Reason For Admission Cellulitis   People in Home significant other  (Girlfriends mother)   Do you expect to return to your current living situation? Yes   Do you have help at home or someone to help you manage your care at home? Yes   Prior to hospitilization cognitive status: Alert/Oriented   Current cognitive status: Alert/Oriented   Home Accessibility wheelchair accessible   Home Layout Able to live on 1st floor   Equipment Currently Used at Home none   Readmission within 30 days? No   Patient currently being followed by outpatient case management? No   Do you currently have service(s) that help you manage your care at home? No   Do you take prescription medications? Yes   Do you have prescription coverage? Yes   Coverage [facility] Managed Medicare/Medicaid   Do you have any problems affording any of your prescribed medications? No   Is the patient taking medications as prescribed? yes   How do you get to doctors appointments? car, drives self   Are you on dialysis? No   Do you take coumadin? No   DME Needed Upon Discharge  other (see comments)  (TBD)   Discharge Plan discussed with: Patient   Transition of Care Barriers None   Discharge Plan A Home with family   Discharge Plan B Home Health     Patient lives with girlfriend and girlfriend's mother in a single story home in [place]. Patient goes with wound care and outpatient therapy at [facility]. PCP: Dr. Snow, Pharmacy: [facility].

## 2023-10-20 NOTE — PROGRESS NOTES
"Pharmacokinetic Initial Assessment: IV Vancomycin    Assessment/Plan:    Initiate intravenous vancomycin with loading dose of 2000 mg once followed by a maintenance dose of vancomycin 1750 mg IV every 12 hours  Desired empiric serum trough concentration is 15 to 20 mcg/mL  Draw vancomycin trough level 60 min prior to fourth dose on 10/21 at approximately 1000  Pharmacy will continue to follow and monitor vancomycin.      Please contact pharmacy at extension [phone] with any questions regarding this assessment.     Thank you for the consult,   Jeffrey Royallly       Patient brief summary:  Kojo Sheikh III is a 43 y.o. male initiated on antimicrobial therapy with IV Vancomycin for treatment of suspected skin & soft tissue infection    Drug Allergies:   Review of patient's allergies indicates:   Allergen Reactions    Zofran [ondansetron hcl]      Interaction with tacrolimus       Actual Body Weight:   100.7kg    Renal Function:   Estimated Creatinine Clearance: 173.7 mL/min (A) (based on SCr of 0.67 mg/dL (L)).,     Dialysis Method (if applicable):  N/A    CBC (last 72 hours):  Recent Labs   Lab Result Units 10/19/23  1834   WBC x10(3)/mcL 9.55   Hgb g/dL 12.7*   Hct % 35.9*   Platelet x10(3)/mcL 120*   Mono % % 15.0   Eos % % 0.2   Basophil % % 0.3       Metabolic Panel (last 72 hours):  Recent Labs   Lab Result Units 10/19/23  1834   Sodium Level mmol/L 126*   Potassium Level mmol/L 4.3   Chloride mmol/L 94*   Carbon Dioxide mmol/L 19*   Glucose Level mg/dL 88   Blood Urea Nitrogen mg/dL 5.5*   Creatinine mg/dL 0.67*   Albumin Level g/dL 3.8   Bilirubin Total mg/dL 1.0   Alkaline Phosphatase unit/L 137   Aspartate Aminotransferase unit/L 84*   Alanine Aminotransferase unit/L 47       Drug levels (last 3 results):  No results for input(s): "VANCOMYCINRA", "VANCORANDOM", "VANCOMYCINPE", "VANCOPEAK", "VANCOMYCINTR", "VANCOTROUGH" in the last 72 hours.    Microbiologic Results:  Microbiology Results (last 7 days)       " Procedure Component Value Units Date/Time    Blood culture #1 **CANNOT BE ORDERED STAT** [[id number]] Collected: 10/19/23 1834    Order Status: Resulted Specimen: Blood Updated: 10/19/23 1859    Blood culture #2 **CANNOT BE ORDERED STAT** [[id number]] Collected: 10/19/23 1834    Order Status: Resulted Specimen: Blood Updated: 10/19/23 1859

## 2023-10-20 NOTE — H&P
[facility] Hospital Medicine History & Physical Examination       Patient Name: Kojo Sheikh III  MRN: [id number]  Patient Class: IP- Inpatient   Admission Date: 10/19/2023   Admitting Physician: RASHARD Service   Length of Stay: 1  Attending Physician: Dr. Avery Ni  Primary Care Provider:   Face-to-Face encounter date: 10/20/2023  Code Status: Full code  Chief Complaint: Wound Check (Pt seen at [facility] and sent by cardiologist for leg pain/wound to [facility]. Pt has pustule on R calf. Unable to obtain US of leg. )        Patient information was obtained from patient, patient's family, past medical records and ER records.     HISTORY OF PRESENT ILLNESS:   Kojo Sheikh III is a 43 y.o. male who PMH includes alcoholic cirrhosis of the liver status post liver transplant, thrombocytopenia, anemia, chronic back pain, anxiety; presents to the ED sent by his cardiologist for right lower extremity leg wound nonhealing.  Patient was seen and not elicit general for the leg wound however it was reported patient was unable to have ultrasound done.  Patient presented here at [facility] on 10/19/2023 for evaluation of right lower extremity wound.  It was reported patient has had 3 vascular surgeries to the right leg in the past.  Patient was seen in the ED in [place] on Tuesday which he was prescribed oral clindamycin therapy.  Patient reports worsening redness and warmth to the right lower extremity with associated open wound erupted prior to arrival in the ED. patient is on long-term anticoagulation therapy with Xarelto which his last dose was 2 days ago.  Patient does report pain to the right lower extremity he was seen by Cardiology Services and was instructed to come to the ED for further evaluation.  No reports of chest pain, shortness a breath, fever, chills, cough, congestion, or any sick contacts.  Lab work reviewed demonstrated H&H 12.7/35.9, sodium 126, chloride 94, CO2 19, BUN 5.5,  creatinine 0.67; sed rate 21, AST 84, CRP 26.40, lactic acid 1.2; other indices unremarkable.  X-ray of the right lower extremity was done and pending at this time.  Initial vital signs /85 pulse 110 respirations 19 temperature 98.5° F O2 saturation 97% on room air.  Blood cultures were collected x2 sets.  Patient was started on IV vancomycin and Zosyn therapy.  Patient is admitted to hospital medicine services for further management.    PAST MEDICAL HISTORY:   ETOH abuse  Chronic cirrhosis of liver- s/p liver transplant  Thrombocytopenia/pancytopenia  Anemia chronic disease   Chronic back pain  Anxiety  Depression  Neutropenia    PAST SURGICAL HISTORY:     Past Surgical History:   Procedure Laterality Date    BACK SURGERY  2011    DIAGNOSTIC ULTRASOUND N/A 4/14/2021    Procedure: ULTRASOUND,INTRAOPERATIVE;  Surgeon: Jose Anderson MD;  Location: [facility] OR [address] [place] [address];  Service: Transplant;  Laterality: N/A;    ESOPHAGOGASTRODUODENOSCOPY N/A 8/5/2021    Procedure: EGD (ESOPHAGOGASTRODUODENOSCOPY);  Surgeon: Judy De La Garza MD;  Location: [facility] ([address] [place] [address]);  Service: Endoscopy;  Laterality: N/A;    LIVER TRANSPLANT N/A 4/11/2021    Procedure: TRANSPLANT, LIVER;  Surgeon: Joe Baker MD;  Location: [address];  Service: Transplant;  Laterality: N/A;       ALLERGIES:   Zofran [ondansetron hcl]    FAMILY HISTORY:   Reviewed and negative    SOCIAL HISTORY:   Denies any recent tobacco use  Denies any illicit drug use   Denies any recent alcohol use   Lives with family     HOME MEDICATIONS:   As documented  Prior to Admission medications    Medication Sig Start Date End Date Taking? Authorizing Provider   cyclobenzaprine (FLEXERIL) 10 MG tablet Take 10 mg by mouth 3 (three) times daily as needed for Muscle spasms.   Yes Provider, Historical   nicotine (NICODERM CQ) 14 mg/24 hr Place 1 patch onto the skin once daily. 4/26/21  Yes Zach Davila MD   oxyCODONE-acetaminophen (PERCOCET)  mg per  tablet Take 1 tablet by mouth every 4 (four) hours as needed for Pain.   Yes Provider, Historical   tacrolimus (PROGRAF) 1 MG Cap TAKE 5 CAPSULES BY MOUTH EVERY MORNING AND 5 CAPSULES EVERY EVENING 8/21/23  Yes Beny Snow MD   XARELTO 20 mg Tab Take 20 mg by mouth once daily. 4/11/22  Yes Provider, Historical   albuterol (PROVENTIL/VENTOLIN HFA) 90 mcg/actuation inhaler Inhale 2 puffs into the lungs every 6 (six) hours as needed for Wheezing. Rescue 8/6/21 8/6/22  Jose Anderson MD   aspirin (ECOTRIN) 81 MG EC tablet Take 1 tablet (81 mg total) by mouth once daily. 4/19/21 4/19/22  Dorothy Whitley MD   bisacodyL (DULCOLAX) 5 mg EC tablet Take 2 tablets (10 mg total) by mouth daily as needed for Constipation. 4/23/21   Jose Anderson MD   calcium carbonate (OS-BRISSA) 500 mg calcium (1,250 mg) tablet Take 2 tablets (1,000 mg total) by mouth once daily. 5/19/21   Peter Dent MD   cetirizine (ZYRTEC) 5 MG tablet Take 1 tablet (5 mg total) by mouth daily as needed for Allergies (and before Zarxio doses). 8/6/21   Jose Anderson MD   clindamycin (CLEOCIN) 300 MG capsule Take 1 capsule (300 mg total) by mouth every 6 (six) hours. 1/29/22   Landen Pena MD   diphenhydrAMINE (SOMINEX) 25 mg tablet Take 25 mg by mouth nightly as needed.    Provider, Historical   ergocalciferol (ERGOCALCIFEROL) 50,000 unit Cap TAKE ONE CAPSULE BY MOUTH EVERY 7 DAYS 12/8/21   Peter Dent MD   folic acid (FOLVITE) 1 MG tablet TAKE 1 TABLET(1000 MCG) BY MOUTH EVERY DAY 1/18/23   Beny Snow MD   gabapentin (NEURONTIN) 300 MG capsule Take 2 capsules (600 mg total) by mouth 3 (three) times daily. 4/19/21 5/13/22  Dorothy Whitley MD   LIDOcaine (LIDODERM) 5 % UNWRAP AND APPLY 1 PATCH TO SKIN EVERY 24 HOURS AS NEEDED FOR PAIN AS DIRECTED 10/20/22   Beny Snow MD   loratadine (CLARITIN) 10 mg tablet Take 10 mg by mouth once daily.    Provider, Historical   mycophenolate (CELLCEPT) 250 mg Cap  TAKE 4 CAPSULES(1000 MG) BY MOUTH TWICE DAILY 4/6/22   Beny Snow MD       REVIEW OF SYSTEMS:   Except as documented, all other systems reviewed and negative     PHYSICAL EXAM:     VITAL SIGNS: 24 HRS MIN & MAX LAST   Temp  Min: 98.1 °F (36.7 °C)  Max: 98.5 °F (36.9 °C) 98.4 °F (36.9 °C)   BP  Min: 133/85  Max: 165/103 (!) 164/84   Pulse  Min: 99  Max: 110  102   Resp  Min: 16  Max: 19 18   SpO2  Min: 97 %  Max: 100 % 99 %       General appearance:  Chronically ill-appearing male looks older than stated age; nontoxic, fatigued  HENT: Atraumatic head. Moist mucous membranes of oral cavity.  Eyes: PERRL.   Lungs: Clear to auscultation bilaterally. No wheezing present.   Heart: Regular rate and rhythm. S1 and S2 present capillary refill brisk, edema lower extremities   Abdomen: Soft, non-distended, non-tender. No rebound tenderness/guarding. Bowel sounds are normal.   Extremities: No cyanosis, clubbing, COLBERT  Skin: warm and dry right lower extremity cellulitis would pustular area open wound, TTP      Neuro: oriented x 3 no focal deficits  Psych/mental status: Appropriate mood and affect. Responds appropriately to questions.     LABS AND IMAGING:     Recent Labs   Lab 10/19/23  1834 10/20/23  0808   WBC 9.55 5.39   RBC 3.62* 3.27*   HGB 12.7* 11.4*   HCT 35.9* 33.0*   MCV 99.2* 100.9*   MCH 35.1* 34.9*   MCHC 35.4 34.5   RDW 13.4 13.7   * 112*   MPV 8.7 8.9       Recent Labs   Lab 10/19/23  1834 10/20/23  0808   * 134*   K 4.3 4.5   CO2 19* 26   BUN 5.5* 7.1*   CREATININE 0.67* 0.87   CALCIUM 8.4 8.4   ALBUMIN 3.8 3.3*   ALKPHOS 137 133   ALT 47 47   AST 84* 86*   BILITOT 1.0 0.7       Microbiology Results (last 7 days)       Procedure Component Value Units Date/Time    Blood culture #1 **CANNOT BE ORDERED STAT** [[id number]] Collected: 10/19/23 1834    Order Status: Resulted Specimen: Blood Updated: 10/19/23 1859    Blood culture #2 **CANNOT BE ORDERED STAT** [[id number]] Collected: 10/19/23 1834     Order Status: Resulted Specimen: Blood Updated: 10/19/23 2825             CV Ultrasound doppler arterial leg right  Right lower extremity shows no focal stenotic lesions.  CV Ultrasound doppler venous DVT leg right    There is no evidence of a right lower extremity DVT.    The contralateral (left) common femoral vein is patent.    There is a right subcutaneous edema located in the proximal calf and   distal calf veins.    Right lower extremity negative for deep vein thrombus.   Right mid calf non vascular mass noted 1.5 cm x 1.1 cm.         ASSESSMENT & PLAN:   ASSESSMENT:  Acute right lower extremity cellulitis-failed outpatient treatment-POA   Hyponatremia-POA   Anemia chronic disease-POA   Thrombocytopenia-chronic-POA  Immunocompromised state-status post liver transplant-POA   Long-term anticoagulation therapy-on Xarelto-POA  Chronic back pain on chronic opioid therapy-POA   Weakness-POA  Hx of alcoholic cirrhosis of liver- s/p transplant 2021- POA    PLAN:  Consult wound care services appreciate assistance and recommendations   Follow cultures and sensitivities   Continue with IV vancomycin and Zosyn therapy   PRN pain management   Accurate I&O  Daily weights   Monitor blood counts   Resume home medication as deemed necessary   Repeat lab work in a.m.  Neurovascular checks right lower extremity every 4 hours    VTE Prophylaxis: Home Xarelto for DVT prophylaxis and will be advised to be as mobile as possible and sit in a chair as tolerated    Patient condition:  Stable  __________________________________________________________________________  INPATIENT LIST OF MEDICATIONS     Scheduled Meds:   nicotine  1 patch Transdermal Daily    piperacillin-tazobactam (Zosyn) IV (PEDS and ADULTS) (extended infusion is not appropriate)  4.5 g Intravenous Q8H    vancomycin (VANCOCIN) IV (PEDS and ADULTS)  1,750 mg Intravenous Q12H     Continuous Infusions:   sodium chloride 0.9% 100 mL/hr at 10/20/23 0044     PRN  Meds:.acetaminophen, acetaminophen, cloNIDine, melatonin, naloxone, oxyCODONE-acetaminophen, prochlorperazine, sodium chloride 0.9%      I, AGUSTINA Bowles have reviewed and discussed the case with Dr. Avery Ni.  Please see the following addendum for further assessment and plan from there attending MD.  AGUSTINA Bernardo   10/20/2023    ________________________________________________________________________________    MD Addendum:  I, Dr. Last moreno ---assumed care of this patient   For the patient encounter, I performed the substantive portion of the visit, I reviewed the NP/PA documentation, treatment plan, and medical decision making.  I had face to face time with this patient     Chief Complaint: Wound Check ([place] [facility] [place] cardiologist for leg pain/wound to [facility]. Pt has pustule on R calf. Unable to obtain US of leg. )         Patient information was obtained from patient, patient's family, past medical records and ER records.      HISTORY OF PRESENT ILLNESS:   Kojo Sheikh III is a 43 y.o. male who PMH includes alcoholic cirrhosis of the liver status post liver transplant, thrombocytopenia, anemia, chronic back pain, anxiety; presents to the ED sent by his cardiologist for right lower extremity leg wound nonhealing.  Patient was seen and not elicit general for the leg wound however it was reported patient was unable to have ultrasound done.  Patient presented here at [facility] on 10/19/2023 for evaluation of right lower extremity wound.  It was reported patient has had 3 vascular surgeries to the right leg in the past.  Patient was seen in the ED in [place] on Tuesday which he was prescribed oral clindamycin therapy.  Patient reports worsening redness and warmth to the right lower extremity with associated open wound erupted prior to arrival in the ED. patient is on long-term anticoagulation therapy with Xarelto which his last dose was 2 days ago.  Patient does report  pain to the right lower extremity he was seen by Cardiology Services and was instructed to come to the ED for further evaluation.  No reports of chest pain, shortness a breath, fever, chills, cough, congestion, or any sick contacts.  Lab work reviewed demonstrated H&H 12.7/35.9, sodium 126, chloride 94, CO2 19, BUN 5.5, creatinine 0.67; sed rate 21, AST 84, CRP 26.40, lactic acid 1.2; other indices unremarkable.  X-ray of the right lower extremity was done and pending at this time.  Initial vital signs /85 pulse 110 respirations 19 temperature 98.5° F O2 saturation 97% on room air.  Blood cultures were collected x2 sets.  Patient was started on IV vancomycin and Zosyn therapy.  Patient is admitted to hospital medicine services for further management.       Exam  General appearance:  Chronically ill-appearing male looks older than stated age; nontoxic, fatigued  HENT: Atraumatic head. Moist mucous membranes of oral cavity.  Eyes: PERRL.   Lungs: Clear to auscultation bilaterally. No wheezing present.   Heart: Regular rate and rhythm. S1 and S2 present capillary refill brisk, edema lower extremities   Abdomen: Soft, non-distended, non-tender. No rebound tenderness/guarding. Bowel sounds are normal.   Extremities: No cyanosis, clubbing, COLBERT  Skin: warm and dry right lower extremity cellulitis would pustular area open wound, TTP  Neuro: oriented x 3 no focal deficits  Psych/mental status: Appropriate mood and affect. Responds appropriately to questions.         ASSESSMENT:  Acute right lower extremity cellulitis-failed outpatient treatment-POA   Hyponatremia-POA   Anemia chronic disease-POA   Thrombocytopenia-chronic-POA  Immunocompromised state-status post liver transplant-POA   Long-term anticoagulation therapy-on Xarelto-POA  Chronic back pain on chronic opioid therapy-POA   Weakness-POA  Hx of alcoholic cirrhosis of liver- s/p transplant 2021- POA     PLAN:  Vitals reviewed and stable on room air, afebrile    Labs reviewed in stable   Blood cultures negative at 24 hours   Will continue antibiotics   Continue with IV vancomycin and Zosyn therapy   Consult Vascular MD per patient request   Wound care on board   Follow cultures and sensitivities   PRN pain management   Neurovascular checks right lower extremity every 4 hours     VTE Prophylaxis: Home Xarelto for DVT prophylaxis and will be advised to be as mobile as possible and sit in a chair as tolerated     Patient condition:  Stable      Discharge Planning and Disposition: No mobility needs. Ambulating well. Good social support system.   Anticipated discharge    All diagnosis and differential diagnosis have been reviewed; assessment and plan has been documented; I have personally reviewed the labs and test results that are presently available; I have reviewed the patients medication list; I have reviewed the consulting providers response and recommendations. I have reviewed or attempted to review medical records based upon their availability.    All of the patient and family questions have been addressed and answered. Patient's is agreeable to the above stated plan. I will continue to monitor closely and make adjustments to medical management as needed.

## 2023-10-20 NOTE — ED PROVIDER NOTES
Encounter Date: 10/19/2023    SCRIBE #1 NOTE: I, Johana Vi, am scribing for, and in the presence of,  Marvel Cummings MD. I have scribed the following portions of the note - Other sections scribed: HPI, ROS, PE.       History     Chief Complaint   Patient presents with    Wound Check     Pt seen at [facility] and sent by cardiologist for leg pain/wound to RLE. Pt has pustule on R calf. Unable to obtain US of leg.      43 year old male with a history of  orthotopic liver transplantation in 04/2021 for alcohol related cirrhosis presents presents to ED for right leg cellulitis with pain since Sunday. Notes a history of 3 vascular surgeries to the right leg. He presented at [facility] on Tuesday for diffused cellulitis and was prescribed Clindamycin PO. This morning he reports increasing redness and warmth to right leg and his wound opened just prior to arrival. He was seen by [facility] and advised him to come here for further evaluation with vascular consult, he has an appointment with them tomorrow as well. He normally takes 40 mg Xarelto, but did not take any today.       The history is provided by the patient. No  was used.     Review of patient's allergies indicates:   Allergen Reactions    Zofran [ondansetron hcl]      Interaction with tacrolimus     Past Medical History:   Diagnosis Date    Alcohol abuse     Chronic back pain      Past Surgical History:   Procedure Laterality Date    BACK SURGERY  2011    DIAGNOSTIC ULTRASOUND N/A 4/14/2021    Procedure: ULTRASOUND,INTRAOPERATIVE;  Surgeon: Jose Anderson MD;  Location: [facility] OR [address] [place] [address];  Service: Transplant;  Laterality: N/A;    ESOPHAGOGASTRODUODENOSCOPY N/A 8/5/2021    Procedure: EGD (ESOPHAGOGASTRODUODENOSCOPY);  Surgeon: Judy De La Garza MD;  Location: [facility] ENDO ([address] [place] [address]);  Service: Endoscopy;  Laterality: N/A;    LIVER TRANSPLANT N/A 4/11/2021    Procedure: TRANSPLANT, LIVER;  Surgeon: Joe Baker MD;  Location: [facility] OR [address] [place] [address];  Service:  Transplant;  Laterality: N/A;     No family history on file.  Social History     Tobacco Use    Smoking status: Never    Smokeless tobacco: Never   Substance Use Topics    Drug use: Never     Review of Systems   Constitutional:  Negative for chills and fever.   HENT:  Negative for drooling and sore throat.    Eyes:  Negative for pain and redness.   Respiratory:  Negative for shortness of breath, wheezing and stridor.    Cardiovascular:  Positive for leg swelling (RLE). Negative for chest pain and palpitations.   Gastrointestinal:  Negative for abdominal pain, nausea and vomiting.   Genitourinary:  Negative for dysuria and hematuria.   Musculoskeletal:  Negative for neck pain and neck stiffness.   Skin:  Positive for wound.        Right leg cellulitis   Neurological:  Negative for weakness and numbness.   Hematological:  Does not bruise/bleed easily.       Physical Exam     Initial Vitals [10/19/23 1819]   BP Pulse Resp Temp SpO2   133/85 110 19 98.5 °F (36.9 °C) 97 %      MAP       --         Physical Exam    Nursing note and vitals reviewed.  Constitutional: He appears well-developed and well-nourished. He is not diaphoretic. No distress.   HENT:   Head: Normocephalic and atraumatic.   Nose: Nose normal.   Mouth/Throat: Oropharynx is clear and moist.   Eyes: Conjunctivae and EOM are normal. Pupils are equal, round, and reactive to light.   Neck: Neck supple.   Normal range of motion.  Cardiovascular:  Normal rate and regular rhythm.           No murmur heard.  Pulmonary/Chest: Breath sounds normal. No respiratory distress. He has no wheezes. He has no rales.   Abdominal: Abdomen is soft. He exhibits no distension. There is no abdominal tenderness.   Musculoskeletal:         General: Edema present.      Cervical back: Normal range of motion and neck supple.      Comments: Extensive swelling and redness to right lower extremity  2 small areas of induration, proximal area has spontaneous drainage of clear fluid  No  crepitus     Neurological: He is alert and oriented to person, place, and time. He has normal strength. No cranial nerve deficit.   Skin: Skin is warm. Capillary refill takes less than 2 seconds.         ED Course   Critical Care    Date/Time: 10/20/2023 5:30 AM    Performed by: Marvel Cummings MD  Authorized by: Marvel Cummings MD  Direct patient critical care time: 35 minutes  Total critical care time (exclusive of procedural time) : 35 minutes  Critical care time was exclusive of separately billable procedures and treating other patients.  Critical care was necessary to treat or prevent imminent or life-threatening deterioration of the following conditions: sepsis.  Critical care was time spent personally by me on the following activities: development of treatment plan with patient or surrogate, evaluation of patient's response to treatment, examination of patient, obtaining history from patient or surrogate, ordering and performing treatments and interventions, ordering and review of laboratory studies, ordering and review of radiographic studies, pulse oximetry, re-evaluation of patient's condition and review of old charts.        Labs Reviewed   COMPREHENSIVE METABOLIC PANEL - Abnormal; Notable for the following components:       Result Value    Sodium Level 126 (*)     Chloride 94 (*)     Carbon Dioxide 19 (*)     Blood Urea Nitrogen 5.5 (*)     Creatinine 0.67 (*)     Aspartate Aminotransferase 84 (*)     All other components within normal limits   CBC WITH DIFFERENTIAL - Abnormal; Notable for the following components:    RBC 3.62 (*)     Hgb 12.7 (*)     Hct 35.9 (*)     MCV 99.2 (*)     MCH 35.1 (*)     Platelet 120 (*)     Mono # 1.43 (*)     All other components within normal limits   SEDIMENTATION RATE - Abnormal; Notable for the following components:    Sed Rate 21 (*)     All other components within normal limits   C-REACTIVE PROTEIN - Abnormal; Notable for the following components:     C-Reactive Protein 26.40 (*)     All other components within normal limits   LACTIC ACID, PLASMA - Normal   BLOOD CULTURE OLG   BLOOD CULTURE OLG   CBC W/ AUTO DIFFERENTIAL    Narrative:     The following orders were created for panel order CBC auto differential.  Procedure                               Abnormality         Status                     ---------                               -----------         ------                     CBC with Differential[[id number]]        Abnormal            Final result                 Please view results for these tests on the individual orders.   SIROLIMUS          Imaging Results              X-Ray Tibia Fibula 2 View Right (In process)                      Medications   piperacillin-tazobactam (ZOSYN) 4.5 g in dextrose 5 % in water (D5W) 100 mL IVPB (MB+) (4.5 g Intravenous New Bag 10/20/23 0528)   oxyCODONE-acetaminophen  mg per tablet 1 tablet (1 tablet Oral Given 10/20/23 0527)   0.9%  NaCl infusion ( Intravenous New Bag 10/20/23 0044)   vancomycin (VANCOCIN) 1,750 mg in dextrose 5 % (D5W) 500 mL IVPB (has no administration in time range)   cloNIDine tablet 0.1 mg (0.1 mg Oral Given 10/20/23 0528)   lactated ringers bolus 1,000 mL (0 mLs Intravenous Stopped 10/19/23 1938)   vancomycin 2 g in dextrose 5 % 500 mL IVPB (0 mg Intravenous Stopped 10/20/23 0124)   piperacillin-tazobactam (ZOSYN) 4.5 g in dextrose 5 % in water (D5W) 100 mL IVPB (MB+) (0 g Intravenous Stopped 10/19/23 2251)   morphine injection 4 mg (4 mg Intravenous Given 10/19/23 2216)     Medical Decision Making  Amount and/or Complexity of Data Reviewed  Labs: ordered. Decision-making details documented in ED Course.  Radiology: ordered. Decision-making details documented in ED Course.    Risk  Prescription drug management.  Decision regarding hospitalization.            Scribe Attestation:   Scribe #1: I performed the above scribed service and the documentation accurately describes the services I  "performed. I attest to the accuracy of the note.    Attending Attestation:           Physician Attestation for Scribe:  Physician Attestation Statement for Scribe #1: I, Marvel Cummings MD, reviewed documentation, as scribed by Johana Billy in my presence, and it is both accurate and complete.           Differential diagnosis (includes but is not limited to):   Cellulitis, abscess, infection, vascular mass    MDM Narrative  43-year-old male presents for worsening redness pain and swelling to his right lower extremity.  He states he took some oral clindamycin without significant improvement.  Labs are pending.  Infectious workup including blood cultures and lactic acid pending.  Will hold off on 30 cc/kilos IV fluid bolus at this time as the patient is a transplant patient and I am concerned about overloading him and will instead proceed with a limited IV fluid bolus.  X-ray pending.  Pain and nausea control as needed.  Ultrasounds pending of the right lower extremity.    Update:  Ultrasounds reviewed, no DVT noted.  X-ray without any subcu emphysema.  Inflammatory markers are elevated.  Continue antibiotic coverage.  Given the failure of outpatient oral antibiotics, will proceed with hospital admission for close monitoring and pain and nausea control as well as continued IV antibiotic therapy.  Patient agrees with plan of care.  Hospitalist consulted and has accepted the patient.    Dispo: Admit    My independent radiology interpretation: as above  Point of care US (independently performed and interpreted):   Decision rules/clinical scoring:     Sepsis Perfusion Assessment: "I attest a sepsis perfusion exam was performed within 6 hours of sepsis, severe sepsis, or septic shock presentation, following fluid resuscitation."     Amount and/or Complexity of Data Reviewed  Independent historian: none   Summary of history:   External data reviewed: notes from previous admissions, notes from previous ED visits, and " notes from clinic visits  Summary of data reviewed: Prior records reviewed  Risk and benefits of testing: discussed   Labs: ordered and reviewed  Radiology: ordered and independent interpretation performed (see above or ED course)  ECG/medicine tests: ordered and independent interpretation performed (see above or ED course)  Discussion of management or test interpretation with external provider(s): discussed with hospitalist physician   Summary of discussion: as above    Risk  Parenteral controlled substances   Drug therapy requiring intense monitoring for toxicity   Decision regarding hospitalization  Shared decision making     Critical Care  30-74 minutes     Data Reviewed/Counseling: I have personally reviewed the patient's vital signs, nursing notes, and other relevant tests, information, and imaging. I had a detailed discussion regarding the historical points, exam findings, and any diagnostic results supporting the discharge diagnosis. I personally performed the history, PE, MDM and procedures as documented above and agree with the scribe's documentation.    Portions of this note were dictated using voice recognition software. Although it was reviewed for accuracy, some inherent voice recognition errors may have occurred and may be present in this document.        ED Course as of 10/20/23 2430   Thu Oct 19, 2023   2145 X-Ray Tibia Fibula 2 View Right  Independently visualized/reviewed by me during the ED visit.  - No acute fracture or dislocation, no subcutaneous gas  [MC]      ED Course User Index  [MC] Marvel Cummings MD                    Clinical Impression:   Final diagnoses:  [M79.661, M79.89] Pain and swelling of right lower leg  [R52] Pain  [L03.90] Cellulitis, unspecified cellulitis site (Primary)  [R60.9] Swelling        ED Disposition Condition    Admit Stable                Marvel Cummings MD  10/20/23 7824

## 2023-10-20 NOTE — NURSING
"[facility] - 9th Floor Med Surg  Wound Care    Patient Name:  Kojo Sheikh III   MRN:  [id number]  Date: 10/20/2023  Diagnosis: <principal problem not specified>    History:     Past Medical History:   Diagnosis Date    Alcohol abuse     Chronic back pain        Social History     Socioeconomic History    Marital status: Single   Tobacco Use    Smoking status: Never    Smokeless tobacco: Never   Substance and Sexual Activity    Drug use: Never       Precautions:     Allergies as of 10/19/2023 - Reviewed 10/19/2023   Allergen Reaction Noted    Zofran [ondansetron hcl]  10/19/2023       WOC Assessment Details/Treatment   Consult received for Right lower leg wound. Patient states history of venous vein disease to the right lower leg, states has had vein ablation procedure done with "Sponge". Denies history of arterial problems with lower legs. Wound is painful. Noted right lower leg red, edematous, wound draining moderate amounts of purulent red fluid. Wound bed cleansed with Vashe, silver alginate applied to wound bed, covered with gauze, wrapped with kerlix. Meredith patient nurse states vascular is being consulted       10/20/23 1130   WOCN Assessment   WOCN Total Time (mins) 60   Visit Date 10/20/23   Visit Time 1130   Consult Type New   WOCN Speciality Wound   Wound venous   Number of Wounds 1   Intervention assessed;changed;applied;chart review;orders   Positioning   Body Position position changed independently   Head of Bed (HOB) Positioning HOB at 30 degrees        Altered Skin Integrity 10/20/23 0015 Right lower;medial Leg #1 Other (comment) Partial thickness tissue loss. Shallow open ulcer with a red or pink wound bed, without slough. Intact or Open/Ruptured Serum-filled blister.   Date First Assessed/Time First Assessed: 10/20/23 0015   Altered Skin Integrity Present on Admission - Did Patient arrive to the hospital with altered skin?: yes  Side: Right  Orientation: lower;medial  Location: " Leg  Wound Number: #1  Is this injury ...   Wound Image    Dressing Appearance Intact   Drainage Amount Moderate   Drainage Characteristics/Odor Sanguineous;Purulent   Appearance Black   Tissue loss description Full thickness   Periwound Area Hemosiderin Staining;Edematous   Wound Edges Defined   Wound Length (cm) 1.5 cm   Wound Width (cm) 1.4 cm   Wound Depth (cm) 0.2 cm   Wound Volume (cm^3) 0.42 cm^3   Wound Surface Area (cm^2) 2.1 cm^2   Care Wound cleanser   Dressing Applied  (silver alginate, gauze, kerlix, cloth tape)         Recommendations made to primary team for wound care to cleanse with Vashe, apply silver alginate to wound bed, wrap with kerlix, secure with cloth tape  . Orders placed.     10/20/2023     calm

## 2023-10-21 ENCOUNTER — ANESTHESIA (OUTPATIENT)
Dept: SURGERY | Facility: HOSPITAL | Age: 43
DRG: 571 | End: 2023-10-21
Payer: MEDICARE

## 2023-10-21 ENCOUNTER — ANESTHESIA EVENT (OUTPATIENT)
Dept: SURGERY | Facility: HOSPITAL | Age: 43
DRG: 571 | End: 2023-10-21
Payer: MEDICARE

## 2023-10-21 PROBLEM — L02.415 ABSCESS OF RIGHT LEG: Status: ACTIVE | Noted: 2023-10-21

## 2023-10-21 LAB
ALBUMIN SERPL-MCNC: 3.2 G/DL (ref 3.5–5)
ALBUMIN/GLOB SERPL: 1.1 RATIO (ref 1.1–2)
ALP SERPL-CCNC: 130 UNIT/L (ref 40–150)
ALT SERPL-CCNC: 40 UNIT/L (ref 0–55)
AST SERPL-CCNC: 57 UNIT/L (ref 5–34)
BASOPHILS # BLD AUTO: 0.02 X10(3)/MCL
BASOPHILS NFR BLD AUTO: 0.3 %
BILIRUB SERPL-MCNC: 0.8 MG/DL
BUN SERPL-MCNC: 13 MG/DL (ref 8.9–20.6)
CALCIUM SERPL-MCNC: 8.7 MG/DL (ref 8.4–10.2)
CHLORIDE SERPL-SCNC: 99 MMOL/L (ref 98–107)
CO2 SERPL-SCNC: 29 MMOL/L (ref 22–29)
CREAT SERPL-MCNC: 1.15 MG/DL (ref 0.73–1.18)
EOSINOPHIL # BLD AUTO: 0.07 X10(3)/MCL (ref 0–0.9)
EOSINOPHIL NFR BLD AUTO: 1.1 %
ERYTHROCYTE [DISTWIDTH] IN BLOOD BY AUTOMATED COUNT: 13.8 % (ref 11.5–17)
GFR SERPLBLD CREATININE-BSD FMLA CKD-EPI: >60 MLS/MIN/1.73/M2
GLOBULIN SER-MCNC: 2.8 GM/DL (ref 2.4–3.5)
GLUCOSE SERPL-MCNC: 161 MG/DL (ref 74–100)
HCT VFR BLD AUTO: 33.1 % (ref 42–52)
HGB BLD-MCNC: 11.3 G/DL (ref 14–18)
IMM GRANULOCYTES # BLD AUTO: 0.02 X10(3)/MCL (ref 0–0.04)
IMM GRANULOCYTES NFR BLD AUTO: 0.3 %
LYMPHOCYTES # BLD AUTO: 1.85 X10(3)/MCL (ref 0.6–4.6)
LYMPHOCYTES NFR BLD AUTO: 29.4 %
MCH RBC QN AUTO: 35.3 PG (ref 27–31)
MCHC RBC AUTO-ENTMCNC: 34.1 G/DL (ref 33–36)
MCV RBC AUTO: 103.4 FL (ref 80–94)
MONOCYTES # BLD AUTO: 1.19 X10(3)/MCL (ref 0.1–1.3)
MONOCYTES NFR BLD AUTO: 18.9 %
NEUTROPHILS # BLD AUTO: 3.14 X10(3)/MCL (ref 2.1–9.2)
NEUTROPHILS NFR BLD AUTO: 50 %
NRBC BLD AUTO-RTO: 0 %
PLATELET # BLD AUTO: 118 X10(3)/MCL (ref 130–400)
PMV BLD AUTO: 8.9 FL (ref 7.4–10.4)
POTASSIUM SERPL-SCNC: 4.4 MMOL/L (ref 3.5–5.1)
PROT SERPL-MCNC: 6 GM/DL (ref 6.4–8.3)
RBC # BLD AUTO: 3.2 X10(6)/MCL (ref 4.7–6.1)
SIROLIMUS BLD-MCNC: <1 NG/ML
SODIUM SERPL-SCNC: 134 MMOL/L (ref 136–145)
VANCOMYCIN TROUGH SERPL-MCNC: 17.9 UG/ML (ref 15–20)
WBC # SPEC AUTO: 6.29 X10(3)/MCL (ref 4.5–11.5)

## 2023-10-21 PROCEDURE — 36000706: Performed by: SPECIALIST

## 2023-10-21 PROCEDURE — 63600175 PHARM REV CODE 636 W HCPCS: Performed by: SPECIALIST

## 2023-10-21 PROCEDURE — 87070 CULTURE OTHR SPECIMN AEROBIC: CPT | Performed by: SPECIALIST

## 2023-10-21 PROCEDURE — 87075 CULTR BACTERIA EXCEPT BLOOD: CPT | Performed by: SPECIALIST

## 2023-10-21 PROCEDURE — D9220A PRA ANESTHESIA: ICD-10-PCS | Mod: CRNA,,, | Performed by: NURSE ANESTHETIST, CERTIFIED REGISTERED

## 2023-10-21 PROCEDURE — 21400001 HC TELEMETRY ROOM

## 2023-10-21 PROCEDURE — 80053 COMPREHEN METABOLIC PANEL: CPT | Performed by: NURSE PRACTITIONER

## 2023-10-21 PROCEDURE — 25000003 PHARM REV CODE 250: Performed by: INTERNAL MEDICINE

## 2023-10-21 PROCEDURE — 37000008 HC ANESTHESIA 1ST 15 MINUTES: Performed by: SPECIALIST

## 2023-10-21 PROCEDURE — 37000009 HC ANESTHESIA EA ADD 15 MINS: Performed by: SPECIALIST

## 2023-10-21 PROCEDURE — 87077 CULTURE AEROBIC IDENTIFY: CPT

## 2023-10-21 PROCEDURE — 85025 COMPLETE CBC W/AUTO DIFF WBC: CPT | Performed by: NURSE PRACTITIONER

## 2023-10-21 PROCEDURE — 63600175 PHARM REV CODE 636 W HCPCS: Performed by: INTERNAL MEDICINE

## 2023-10-21 PROCEDURE — 63600175 PHARM REV CODE 636 W HCPCS: Performed by: NURSE ANESTHETIST, CERTIFIED REGISTERED

## 2023-10-21 PROCEDURE — 87186 SC STD MICRODIL/AGAR DIL: CPT | Performed by: INTERNAL MEDICINE

## 2023-10-21 PROCEDURE — 25000003 PHARM REV CODE 250: Performed by: NURSE ANESTHETIST, CERTIFIED REGISTERED

## 2023-10-21 PROCEDURE — D9220A PRA ANESTHESIA: Mod: ANES,,, | Performed by: ANESTHESIOLOGY

## 2023-10-21 PROCEDURE — 63600175 PHARM REV CODE 636 W HCPCS: Performed by: ANESTHESIOLOGY

## 2023-10-21 PROCEDURE — 88305 TISSUE EXAM BY PATHOLOGIST: CPT | Performed by: SPECIALIST

## 2023-10-21 PROCEDURE — 99222 1ST HOSP IP/OBS MODERATE 55: CPT | Mod: 25,,, | Performed by: SPECIALIST

## 2023-10-21 PROCEDURE — 36000707: Performed by: SPECIALIST

## 2023-10-21 PROCEDURE — 10140 I&D HMTMA SEROMA/FLUID COLLJ: CPT | Mod: 51,,, | Performed by: SPECIALIST

## 2023-10-21 PROCEDURE — 71000033 HC RECOVERY, INTIAL HOUR: Performed by: SPECIALIST

## 2023-10-21 PROCEDURE — 25000003 PHARM REV CODE 250: Performed by: NURSE PRACTITIONER

## 2023-10-21 PROCEDURE — 11043 PR DEBRIDEMENT, SKIN, SUB-Q TISSUE,MUSCLE,=<20 SQ CM: ICD-10-PCS | Mod: ,,, | Performed by: SPECIALIST

## 2023-10-21 PROCEDURE — D9220A PRA ANESTHESIA: ICD-10-PCS | Mod: ANES,,, | Performed by: ANESTHESIOLOGY

## 2023-10-21 PROCEDURE — D9220A PRA ANESTHESIA: Mod: CRNA,,, | Performed by: NURSE ANESTHETIST, CERTIFIED REGISTERED

## 2023-10-21 PROCEDURE — 10140 PR DRAINAGE OF HEMATOMA/FLUID: ICD-10-PCS | Mod: 51,,, | Performed by: SPECIALIST

## 2023-10-21 PROCEDURE — 87205 SMEAR GRAM STAIN: CPT | Performed by: SPECIALIST

## 2023-10-21 PROCEDURE — 87102 FUNGUS ISOLATION CULTURE: CPT | Performed by: SPECIALIST

## 2023-10-21 PROCEDURE — 80202 ASSAY OF VANCOMYCIN: CPT | Performed by: NURSE PRACTITIONER

## 2023-10-21 PROCEDURE — S4991 NICOTINE PATCH NONLEGEND: HCPCS | Performed by: INTERNAL MEDICINE

## 2023-10-21 PROCEDURE — 11043 DBRDMT MUSC&/FSCA 1ST 20/<: CPT | Mod: ,,, | Performed by: SPECIALIST

## 2023-10-21 PROCEDURE — 99222 PR INITIAL HOSPITAL CARE,LEVL II: ICD-10-PCS | Mod: 25,,, | Performed by: SPECIALIST

## 2023-10-21 PROCEDURE — 63600175 PHARM REV CODE 636 W HCPCS: Performed by: NURSE PRACTITIONER

## 2023-10-21 RX ORDER — PROPOFOL 10 MG/ML
VIAL (ML) INTRAVENOUS
Status: DISCONTINUED | OUTPATIENT
Start: 2023-10-21 | End: 2023-10-21

## 2023-10-21 RX ORDER — ROCURONIUM BROMIDE 10 MG/ML
INJECTION, SOLUTION INTRAVENOUS
Status: DISCONTINUED | OUTPATIENT
Start: 2023-10-21 | End: 2023-10-21

## 2023-10-21 RX ORDER — DEXAMETHASONE SODIUM PHOSPHATE 4 MG/ML
INJECTION, SOLUTION INTRA-ARTICULAR; INTRALESIONAL; INTRAMUSCULAR; INTRAVENOUS; SOFT TISSUE
Status: DISCONTINUED | OUTPATIENT
Start: 2023-10-21 | End: 2023-10-21

## 2023-10-21 RX ORDER — LIDOCAINE HYDROCHLORIDE 20 MG/ML
INJECTION, SOLUTION EPIDURAL; INFILTRATION; INTRACAUDAL; PERINEURAL
Status: DISCONTINUED | OUTPATIENT
Start: 2023-10-21 | End: 2023-10-21

## 2023-10-21 RX ORDER — DIPHENHYDRAMINE HYDROCHLORIDE 50 MG/ML
25 INJECTION INTRAMUSCULAR; INTRAVENOUS EVERY 6 HOURS PRN
Status: DISCONTINUED | OUTPATIENT
Start: 2023-10-21 | End: 2023-10-21 | Stop reason: HOSPADM

## 2023-10-21 RX ORDER — HYDROMORPHONE HYDROCHLORIDE 2 MG/ML
0.5 INJECTION, SOLUTION INTRAMUSCULAR; INTRAVENOUS; SUBCUTANEOUS EVERY 5 MIN PRN
Status: DISCONTINUED | OUTPATIENT
Start: 2023-10-21 | End: 2023-10-21 | Stop reason: HOSPADM

## 2023-10-21 RX ORDER — FENTANYL CITRATE 50 UG/ML
INJECTION, SOLUTION INTRAMUSCULAR; INTRAVENOUS
Status: DISCONTINUED | OUTPATIENT
Start: 2023-10-21 | End: 2023-10-21

## 2023-10-21 RX ORDER — MIDAZOLAM HYDROCHLORIDE 1 MG/ML
INJECTION INTRAMUSCULAR; INTRAVENOUS
Status: DISCONTINUED | OUTPATIENT
Start: 2023-10-21 | End: 2023-10-21

## 2023-10-21 RX ORDER — METOCLOPRAMIDE HYDROCHLORIDE 5 MG/ML
10 INJECTION INTRAMUSCULAR; INTRAVENOUS EVERY 10 MIN PRN
Status: DISCONTINUED | OUTPATIENT
Start: 2023-10-21 | End: 2023-10-21 | Stop reason: HOSPADM

## 2023-10-21 RX ADMIN — PIPERACILLIN AND TAZOBACTAM 4.5 G: 4; .5 INJECTION, POWDER, FOR SOLUTION INTRAVENOUS at 03:10

## 2023-10-21 RX ADMIN — PROPOFOL 140 MG: 10 INJECTION, EMULSION INTRAVENOUS at 01:10

## 2023-10-21 RX ADMIN — CYCLOBENZAPRINE 10 MG: 10 TABLET, FILM COATED ORAL at 03:10

## 2023-10-21 RX ADMIN — ROCURONIUM BROMIDE 50 MG: 10 SOLUTION INTRAVENOUS at 01:10

## 2023-10-21 RX ADMIN — RIVAROXABAN 20 MG: 10 TABLET, FILM COATED ORAL at 05:10

## 2023-10-21 RX ADMIN — TACROLIMUS 5 MG: 1 CAPSULE ORAL at 09:10

## 2023-10-21 RX ADMIN — PIPERACILLIN AND TAZOBACTAM 4.5 G: 4; .5 INJECTION, POWDER, FOR SOLUTION INTRAVENOUS at 06:10

## 2023-10-21 RX ADMIN — CYCLOBENZAPRINE 10 MG: 10 TABLET, FILM COATED ORAL at 08:10

## 2023-10-21 RX ADMIN — GABAPENTIN 600 MG: 300 CAPSULE ORAL at 08:10

## 2023-10-21 RX ADMIN — DIPHENHYDRAMINE HYDROCHLORIDE 25 MG: 50 INJECTION INTRAMUSCULAR; INTRAVENOUS at 02:10

## 2023-10-21 RX ADMIN — SODIUM CHLORIDE, SODIUM GLUCONATE, SODIUM ACETATE, POTASSIUM CHLORIDE AND MAGNESIUM CHLORIDE: 526; 502; 368; 37; 30 INJECTION, SOLUTION INTRAVENOUS at 01:10

## 2023-10-21 RX ADMIN — OXYCODONE HYDROCHLORIDE AND ACETAMINOPHEN 1 TABLET: 10; 325 TABLET ORAL at 08:10

## 2023-10-21 RX ADMIN — LIDOCAINE HYDROCHLORIDE 80 MG: 20 INJECTION, SOLUTION EPIDURAL; INFILTRATION; INTRACAUDAL; PERINEURAL at 01:10

## 2023-10-21 RX ADMIN — PIPERACILLIN AND TAZOBACTAM 4.5 G: 4; .5 INJECTION, POWDER, FOR SOLUTION INTRAVENOUS at 11:10

## 2023-10-21 RX ADMIN — OXYCODONE HYDROCHLORIDE AND ACETAMINOPHEN 1 TABLET: 10; 325 TABLET ORAL at 06:10

## 2023-10-21 RX ADMIN — TACROLIMUS 5 MG: 1 CAPSULE ORAL at 05:10

## 2023-10-21 RX ADMIN — DIPHENHYDRAMINE HYDROCHLORIDE 25 MG: 25 CAPSULE ORAL at 08:10

## 2023-10-21 RX ADMIN — MIDAZOLAM HYDROCHLORIDE 2 MG: 1 INJECTION, SOLUTION INTRAMUSCULAR; INTRAVENOUS at 01:10

## 2023-10-21 RX ADMIN — FOLIC ACID 1000 MCG: 1 TABLET ORAL at 09:10

## 2023-10-21 RX ADMIN — NICOTINE 1 PATCH: 21 PATCH, EXTENDED RELEASE TRANSDERMAL at 09:10

## 2023-10-21 RX ADMIN — VANCOMYCIN HYDROCHLORIDE 1250 MG: 1.25 INJECTION, POWDER, LYOPHILIZED, FOR SOLUTION INTRAVENOUS at 11:10

## 2023-10-21 RX ADMIN — DEXAMETHASONE SODIUM PHOSPHATE 4 MG: 4 INJECTION, SOLUTION INTRA-ARTICULAR; INTRALESIONAL; INTRAMUSCULAR; INTRAVENOUS; SOFT TISSUE at 01:10

## 2023-10-21 RX ADMIN — GABAPENTIN 600 MG: 300 CAPSULE ORAL at 03:10

## 2023-10-21 RX ADMIN — OXYCODONE HYDROCHLORIDE AND ACETAMINOPHEN 1 TABLET: 10; 325 TABLET ORAL at 03:10

## 2023-10-21 RX ADMIN — HYDROMORPHONE HYDROCHLORIDE 0.5 MG: 2 INJECTION INTRAMUSCULAR; INTRAVENOUS; SUBCUTANEOUS at 02:10

## 2023-10-21 RX ADMIN — MORPHINE SULFATE 2 MG: 4 INJECTION, SOLUTION INTRAMUSCULAR; INTRAVENOUS at 11:10

## 2023-10-21 RX ADMIN — SUGAMMADEX 200 MG: 100 INJECTION, SOLUTION INTRAVENOUS at 01:10

## 2023-10-21 RX ADMIN — OXYCODONE HYDROCHLORIDE AND ACETAMINOPHEN 1 TABLET: 10; 325 TABLET ORAL at 01:10

## 2023-10-21 RX ADMIN — FENTANYL CITRATE 100 MCG: 50 INJECTION, SOLUTION INTRAMUSCULAR; INTRAVENOUS at 01:10

## 2023-10-21 RX ADMIN — CALCIUM CARBONATE (ANTACID) CHEW TAB 500 MG 1000 MG: 500 CHEW TAB at 08:10

## 2023-10-21 RX ADMIN — CYCLOBENZAPRINE 10 MG: 10 TABLET, FILM COATED ORAL at 09:10

## 2023-10-21 NOTE — OP NOTE
[facility] - Periop Services  General Surgery  Operative Note    SUMMARY     Date of Procedure: 10/21/2023     Procedure: Procedure(s) (LRB):  IRRIGATION AND DEBRIDEMENT- i&d abscess right lower extremity (Right)       Surgeon(s) and Role:     * Shirlene Durán MD - Primary    Assisting Surgeon: None    Pre-Operative Diagnosis: * No pre-op diagnosis entered *    Post-Operative Diagnosis: Post-Op Diagnosis Codes:     * Abscess of right leg [L02.415]    Anesthesia: General    Operative Findings (including complications, if any):  Necrotic fat and debris within wound.  No significant simba purulence.  Wound debrided bluntly and with 15 blade and Metzenbaum scissors.  Cultures and pathology specimen sent.    Description of Technical Procedures:  Mr. Sheikh was taken to the operating room placed in a supine position where general anesthesia was initiated.  His right leg was prepped and draped in the usual sterile fashion.  An incision was made into the fluctuant protuberant area in the right medial lower leg.  There was no immediate drainage of purulence but there was necrotic debris and some fluid.  Necrotic fat was noted.  This was removed cultures were taken.  Some tissue sampling was performed and sent for pathology.  We debrided back the remaining unhealthy appearing tissue with 15 blade knife and Metzenbaum scissors.  The total wound size was approximately 3 cm x 3 cm.  There was extension through the fascia to the muscular level.  One varicosity was encountered and this did result in some limited hemorrhage.  This was addressed with placement surgical clips.  Some thrombin was applied to the wound bed.  Hemostasis was assured and then the wound was packed with Vashe soaked gauze.  4x4s ABD Kerlix wrap and Ace wrap were then utilized.      Significant Surgical Tasks Conducted by the Assistant(s), if Applicable: minmal    Estimated Blood Loss (EBL): 80ml           Implants:     Specimens:    Specimen (24h ago, onward)       Start     Ordered    10/21/23 1336  Specimen to Pathology  RELEASE UPON ORDERING        References:    Click here for ordering Quick Tip   Question:  Release to patient  Answer:  Immediate    10/21/23 1336                            Condition: Good    Disposition: PACU - hemodynamically stable.    Attestation: I was present and scrubbed for the entire procedure.

## 2023-10-21 NOTE — ANESTHESIA PREPROCEDURE EVALUATION
"                                                                                                             10/21/2023  Kojo Sheikh III is a 43 y.o., male with h/o Alcoholism and Cirrhosis. He has h/o Lower extremity Venous ds.w/ multiple previous lower extremity DVT's/Thrombophlebitis. He presents with RLE leg pain.  He is referred to [facility] ER by [facility] in [place], [place] for management.     focal fluctuant area consistent with an abscess.  Recommend incision and drainage. Utilizing Ultrasound, Adductor canal and associated anatomy identified. Visualizing block needle as advanced to target area(Femoral and Saphenous nerve adjacent to Femoral artery) local anesthetic observed surrounding Femoral artery   and nerves. Image saved and stored.    The pt. comes to [facility] for the noted procedure under GA/LMA vs GETA.        PMHx:  Chronic back pain    Other Medical History   Alcohol abuse      Surgical History:  BACK SURGERY LIVER TRANSPLANT   DIAGNOSTIC ULTRASOUND            Vital signs:  Pre Vitals     Current as of 10/21/23 1202  BP: 147/96 Pulse: 92   Resp:  SpO2: 98   Temp: 37.3 °C (99.2 °F)   Height: 6' 1" (1.854 m) (10/20/23) Weight: 100.8 kg (222 lb 3.6 oz) (10/20/23)   BMI: 29.3 IBW: 79.9 kg (176 lb 1.7 oz)   Last edited 10/21/23 1155 by FRANCO          Lab Data:      EKG:    Nuclear Stress:        ECHO:          Pre-op Assessment    I have reviewed the Patient Summary Reports.     I have reviewed the Nursing Notes. I have reviewed the NPO Status.   I have reviewed the Medications.     Review of Systems  Anesthesia Hx:  No problems with previous Anesthesia    Social:  Non-Smoker    Hematology/Oncology:  Hematology Normal   Oncology Normal     EENT/Dental:EENT/Dental Normal   Cardiovascular:   Exercise tolerance: good PVD  Functional Capacity good / => 4 METS    Pulmonary:  Pulmonary Normal    Renal/:  Renal/ Normal     Hepatic/GI:  Hepatic/GI Normal    Musculoskeletal:  Musculoskeletal Normal  "   Neurological:  Neurology Normal    Endocrine:  Endocrine Normal    Dermatological:  Skin Normal    Psych:  Psychiatric Normal           Physical Exam  General: Alert, Oriented, Well nourished and Cooperative    Airway:  Mallampati: II   Mouth Opening: Normal  TM Distance: Normal  Tongue: Normal  Neck ROM: Normal ROM    Dental:  Intact    Chest/Lungs:  Clear to auscultation, Normal Respiratory Rate    Heart:  Rate: Normal  Rhythm: Regular Rhythm        Anesthesia Plan  Type of Anesthesia, risks & benefits discussed:    Anesthesia Type: Gen ETT  Intra-op Monitoring Plan: Standard ASA Monitors  Post Op Pain Control Plan: IV/PO Opioids PRN  Induction:  IV and Inhalation  Airway Plan: Direct  Informed Consent: Informed consent signed with the Patient and all parties understand the risks and agree with anesthesia plan.  All questions answered. Patient consented to blood products? Yes  ASA Score: 2  Day of Surgery Review of History & Physical: H&P Update referred to the surgeon/provider.    Ready For Surgery From Anesthesia Perspective.     .

## 2023-10-21 NOTE — ANESTHESIA PROCEDURE NOTES
Intubation    Date/Time: 10/21/2023 1:10 PM    Performed by: Isa Broussard CRNA  Authorized by: Edmund De La Rosa MD    Intubation:     Induction:  Intravenous    Intubated:  Postinduction    Mask Ventilation:  Easy mask    Attempts:  1    Attempted By:  CRNA    Method of Intubation:  Direct    Blade:  Saxena 2    Laryngeal View Grade: Grade I - full view of cords      Difficult Airway Encountered?: No      Complications:  None    Airway Device:  Oral endotracheal tube    Airway Device Size:  7.5    Style/Cuff Inflation:  Cuffed (inflated to minimal occlusive pressure)    Tube secured:  22    Secured at:  The lips    Placement Verified By:  Capnometry    Complicating Factors:  None    Findings Post-Intubation:  BS equal bilateral and atraumatic/condition of teeth unchanged

## 2023-10-21 NOTE — CONSULTS
[facility] Surg  Vascular Surgery  Consult Note    Inpatient consult to Vascular Surgery  Consult performed by: Shirlene Durán MD  Consult ordered by: Avery Ni MD        Subjective:     Chief Complaint/Reason for Admission:  Right leg discomfort    History of Present Illness: Mr. Sheikh is a 43-year-old gentleman who presented to the ER with right lower leg pain.  He does report a history of venous disease.  Has reportedly had multiple previous lower extremity deep vein thrombosiss.  Also reports episodes of thrombophlebitis.  His venous disease has been cared for by [facility].  Specifically Dr. Snow in [place].  Reportedly [facility] referred him to the ER here.      Medications Prior to Admission   Medication Sig Dispense Refill Last Dose    cyclobenzaprine (FLEXERIL) 10 MG tablet Take 10 mg by mouth 3 (three) times daily as needed for Muscle spasms.   10/19/2023    nicotine (NICODERM CQ) 14 mg/24 hr Place 1 patch onto the skin once daily. 28 patch 2 10/19/2023    oxyCODONE-acetaminophen (PERCOCET)  mg per tablet Take 1 tablet by mouth every 4 (four) hours as needed for Pain.   10/19/2023    tacrolimus (PROGRAF) 1 MG Cap TAKE 5 CAPSULES BY MOUTH EVERY MORNING AND 5 CAPSULES EVERY EVENING 300 capsule 5 10/19/2023    XARELTO 20 mg Tab Take 20 mg by mouth once daily.   10/19/2023    albuterol (PROVENTIL/VENTOLIN HFA) 90 mcg/actuation inhaler Inhale 2 puffs into the lungs every 6 (six) hours as needed for Wheezing. Rescue 18 g 0     aspirin (ECOTRIN) 81 MG EC tablet Take 1 tablet (81 mg total) by mouth once daily. 30 tablet 11     bisacodyL (DULCOLAX) 5 mg EC tablet Take 2 tablets (10 mg total) by mouth daily as needed for Constipation.  0     calcium carbonate (OS-BRISSA) 500 mg calcium (1,250 mg) tablet Take 2 tablets (1,000 mg total) by mouth once daily. 60 tablet 11     cetirizine (ZYRTEC) 5 MG tablet Take 1 tablet (5 mg total) by mouth daily as needed for Allergies (and before  Zarxio doses).  0     clindamycin (CLEOCIN) 300 MG capsule Take 1 capsule (300 mg total) by mouth every 6 (six) hours. 28 capsule 0     diphenhydrAMINE (SOMINEX) 25 mg tablet Take 25 mg by mouth nightly as needed.       ergocalciferol (ERGOCALCIFEROL) 50,000 unit Cap TAKE ONE CAPSULE BY MOUTH EVERY 7 DAYS 4 capsule 6     folic acid (FOLVITE) 1 MG tablet TAKE 1 TABLET(1000 MCG) BY MOUTH EVERY DAY 30 tablet 3     gabapentin (NEURONTIN) 300 MG capsule Take 2 capsules (600 mg total) by mouth 3 (three) times daily. 180 capsule 11     LIDOcaine (LIDODERM) 5 % UNWRAP AND APPLY 1 PATCH TO SKIN EVERY 24 HOURS AS NEEDED FOR PAIN AS DIRECTED 30 patch 5     loratadine (CLARITIN) 10 mg tablet Take 10 mg by mouth once daily.       mycophenolate (CELLCEPT) 250 mg Cap TAKE 4 CAPSULES(1000 MG) BY MOUTH TWICE DAILY 120 capsule 2        Review of patient's allergies indicates:   Allergen Reactions    Zofran [ondansetron hcl]      Interaction with tacrolimus       Past Medical History:   Diagnosis Date    Alcohol abuse     Chronic back pain      Past Surgical History:   Procedure Laterality Date    BACK SURGERY  2011    DIAGNOSTIC ULTRASOUND N/A 4/14/2021    Procedure: ULTRASOUND,INTRAOPERATIVE;  Surgeon: Jose Anderson MD;  Location: [address];  Service: Transplant;  Laterality: N/A;    ESOPHAGOGASTRODUODENOSCOPY N/A 8/5/2021    Procedure: EGD (ESOPHAGOGASTRODUODENOSCOPY);  Surgeon: Judy De La Garza MD;  Location: [address]);  Service: Endoscopy;  Laterality: N/A;    LIVER TRANSPLANT N/A 4/11/2021    Procedure: TRANSPLANT, LIVER;  Surgeon: Joe Baker MD;  Location: [address];  Service: Transplant;  Laterality: N/A;     Family History    None       Tobacco Use    Smoking status: Never    Smokeless tobacco: Never   Substance and Sexual Activity    Alcohol use: Not on file    Drug use: Never    Sexual activity: Not on file     Review of Systems    No recent fevers   Ten system review is negative as per  admission H and P    Objective:     Vital Signs (Most Recent):  Temp: 98.3 °F (36.8 °C) (10/21/23 0810)  Pulse: 88 (10/21/23 0810)  Resp: 18 (10/21/23 0630)  BP: 128/82 (10/21/23 0810)  SpO2: 95 % (10/21/23 0810) Vital Signs (24h Range):  Temp:  [98.2 °F (36.8 °C)-98.8 °F (37.1 °C)] 98.3 °F (36.8 °C)  Pulse:  [] 88  Resp:  [18-20] 18  SpO2:  [95 %-100 %] 95 %  BP: (107-152)/() 128/82     Weight: 100.8 kg (222 lb 3.6 oz)  Body mass index is 29.32 kg/m².        Physical Exam  Nervous  man   Talkative   Difficult to ask him questions  No scleral icterus  Ears nose and mouth grossly normal   Easily palpable radial pulses   Heart rhythm regular   Breathing easily   Palpable dorsalis pedis pulses  Right lower leg with some mild edema  The right lower leg also has significant hemosiderin deposition throughout the infrapopliteal region down to the ankle.  There is a site in the proximal medial right lower leg that is fluctuant and protuberant with thinning overlying skin.  It is weeping somewhat.  There is no purulent drainage.  The site is approximately 3-4 cm in size.  There is surrounding induration.  The left lower leg has some small varicosities but otherwise is normal.      Significant Labs:  BMP:   Recent Labs   Lab 10/21/23  0607   *   K 4.4   CO2 29   BUN 13.0   CREATININE 1.15   CALCIUM 8.7     CBC:   Recent Labs   Lab 10/21/23  0607   WBC 6.29   RBC 3.20*   HGB 11.3*   HCT 33.1*   *   .4*   MCH 35.3*   MCHC 34.1       Significant Diagnostics:      Assessment/Plan:     There are no hospital problems to display for this patient.    Mr. Sheikh is a 42 y/o man with history of cirrhosis and liver transplant who has an abscess to the right lower leg.  Does have a history of chronic venous insufficiency in the right leg with previous varicosities and significant skin changes to the lower leg consistent with chronic venous insufficiency.  He does report undergoing intermittent  procedures over the last year for this leg.  He does have a focal fluctuant area consistent with an abscess.  Recommend incision and drainage.  We discussed the risks and benefits.  Wound healing could be more complicated given his immunocompromised state and the chronic changes to the right lower leg tissues.    Thank you for your consult.     Shirlene Durán MD  Vascular Surgery  [facility] - 9th Floor Med Surg

## 2023-10-21 NOTE — TRANSFER OF CARE
"Anesthesia Transfer of Care Note    Patient: Kojo Sheikh III    Procedure(s) Performed: Procedure(s) (LRB):  IRRIGATION AND DEBRIDEMENT- i&d abscess right lower extremity (Right)    Patient location: PACU    Anesthesia Type: general    Transport from OR: Transported from OR on room air with adequate spontaneous ventilation    Post pain: adequate analgesia    Post assessment: no apparent anesthetic complications and tolerated procedure well    Post vital signs: stable    Level of consciousness: responds to stimulation    Nausea/Vomiting: no nausea/vomiting    Complications: none    Transfer of care protocol was followed      Last vitals:   Visit Vitals  BP (!) 162/91 (BP Location: Right arm, Patient Position: Sitting)   Pulse 90   Temp 37.3 °C (99.2 °F) (Oral)   Resp 16   Ht 6' 1" (1.854 m)   Wt 100.8 kg (222 lb 3.6 oz)   SpO2 100%   BMI 29.32 kg/m²     "

## 2023-10-21 NOTE — PROGRESS NOTES
[facility]  Hospital Medicine Progress Note      Chief Complaint: Wound Check (Pt seen at [facility] and sent by cardiologist for leg pain/wound to E. Pt has pustule on R calf. Unable to obtain US of leg. )         Patient information was obtained from patient, patient's family, past medical records and ER records.      HISTORY OF PRESENT ILLNESS:   Kojo Sheikh III is a 43 y.o. male who PMH includes alcoholic cirrhosis of the liver status post liver transplant, thrombocytopenia, anemia, chronic back pain, anxiety; presents to the ED sent by his cardiologist for right lower extremity leg wound nonhealing.  Patient was seen and not elicit general for the leg wound however it was reported patient was unable to have ultrasound done.  Patient presented [facility] at [facility] on 10/19/2023 for evaluation of right lower extremity wound.  It was reported patient has had 3 vascular surgeries to the right leg in the past.  Patient was seen in the ED in [place] on Tuesday which he was prescribed oral clindamycin therapy.  Patient reports worsening redness and warmth to the right lower extremity with associated open wound erupted prior to arrival in the ED. patient is on long-term anticoagulation therapy with Xarelto which his last dose was 2 days ago.  Patient does report pain to the right lower extremity he was seen by Cardiology Services and was instructed to come to the ED for further evaluation.  No reports of chest pain, shortness a breath, fever, chills, cough, congestion, or any sick contacts.  Lab work reviewed demonstrated H&H 12.7/35.9, sodium 126, chloride 94, CO2 19, BUN 5.5, creatinine 0.67; sed rate 21, AST 84, CRP 26.40, lactic acid 1.2; other indices unremarkable.  X-ray of the right lower extremity was done and pending at this time.  Initial vital signs /85 pulse 110 respirations 19 temperature 98.5° F O2 saturation 97% on room air.  Blood cultures were collected x2 sets.   Patient was started on IV vancomycin and Zosyn therapy.  Patient is admitted to hospital medicine services for further management.      Patient currently admitted for acute right lower extremity cellulitis with concern for possible abscess.  Vascular MD consulted per patient request    Today's information  Patient seen and examined at bedside   Vitals reviewed and stable on room air, afebrile   Labs reviewed in stable   Blood cultures negative at 24 hours   Patient tells me vascular MD has evaluated him in conjunction with general surgery and he is planned for I&D for the right lower extremity abscess today   Will continue antibiotics and follow-up with postop recs     Exam  General appearance:  Chronically ill-appearing male looks older than stated age; nontoxic, fatigued  HENT: Atraumatic head. Moist mucous membranes of oral cavity.  Eyes: PERRL.   Lungs: Clear to auscultation bilaterally. No wheezing present.   Heart: Regular rate and rhythm. S1 and S2 present capillary refill brisk, edema lower extremities   Abdomen: Soft, non-distended, non-tender. No rebound tenderness/guarding. Bowel sounds are normal.   Extremities: No cyanosis, clubbing, COLBERT  Skin: warm and dry right lower extremity cellulitis would pustular area open wound, TTP  Neuro: oriented x 3 no focal deficits  Psych/mental status: Appropriate mood and affect. Responds appropriately to questions.        ASSESSMENT:  Acute right lower extremity cellulitis-failed outpatient treatment-POA   Hyponatremia-POA   Anemia chronic disease-POA   Thrombocytopenia-chronic-POA  Immunocompromised state-status post liver transplant-POA   Long-term anticoagulation therapy-on Xarelto-POA  Chronic back pain on chronic opioid therapy-POA   Weakness-POA  Hx of alcoholic cirrhosis of liver- s/p transplant 2021- POA     PLAN:  Vitals reviewed and stable on room air, afebrile   Labs reviewed in stable   Blood cultures negative at 24 hours   Patient tells me vascular MD has  evaluated him in conjunction with general surgery and he is planned for I&D for the right lower extremity abscess today   Will continue antibiotics and follow-up with postop recs   Continue with IV vancomycin and Zosyn therapy   Wound care on board   Follow cultures and sensitivities   PRN pain management   Neurovascular checks right lower extremity every 4 hours     VTE Prophylaxis: Home Xarelto for DVT prophylaxis and will be advised to be as mobile as possible and sit in a chair as tolerated     Patient condition:  Stable  __________________________________________________________________________  VITAL SIGNS: 24 HRS MIN & MAX LAST   Temp  Min: 98.3 °F (36.8 °C)  Max: 99.2 °F (37.3 °C) 99.2 °F (37.3 °C)   BP  Min: 107/89  Max: 162/91 (!) 162/91   Pulse  Min: 88  Max: 114  90   Resp  Min: 16  Max: 20 16   SpO2  Min: 95 %  Max: 100 % 100 %     I have reviewed the following labs:  Recent Labs   Lab 10/19/23  1834 10/20/23  0808 10/21/23  0607   WBC 9.55 5.39 6.29   RBC 3.62* 3.27* 3.20*   HGB 12.7* 11.4* 11.3*   HCT 35.9* 33.0* 33.1*   MCV 99.2* 100.9* 103.4*   MCH 35.1* 34.9* 35.3*   MCHC 35.4 34.5 34.1   RDW 13.4 13.7 13.8   * 112* 118*   MPV 8.7 8.9 8.9     Recent Labs   Lab 10/19/23  1834 10/20/23  0808 10/21/23  0607   * 134* 134*   K 4.3 4.5 4.4   CO2 19* 26 29   BUN 5.5* 7.1* 13.0   CREATININE 0.67* 0.87 1.15   CALCIUM 8.4 8.4 8.7   ALBUMIN 3.8 3.3* 3.2*   ALKPHOS 137 133 130   ALT 47 47 40   AST 84* 86* 57*   BILITOT 1.0 0.7 0.8     Microbiology Results (last 7 days)       Procedure Component Value Units Date/Time    Blood culture #1 **CANNOT BE ORDERED STAT** [[id number]]  (Normal) Collected: 10/19/23 1834    Order Status: Completed Specimen: Blood Updated: 10/20/23 2000     CULTURE, BLOOD ([facility]) No Growth At 24 Hours    Blood culture #2 **CANNOT BE ORDERED STAT** [[id number]]  (Normal) Collected: 10/19/23 1834    Order Status: Completed Specimen: Blood Updated: 10/20/23 2000     CULTURE, BLOOD  ([facility]) No Growth At 24 Hours             See below for Radiology    Scheduled Med:   calcium carbonate  1,000 mg Oral BID    cyclobenzaprine  10 mg Oral TID    diphenhydrAMINE  25 mg Oral QHS    ergocalciferol  50,000 Units Oral Q7 Days    folic acid  1,000 mcg Oral Daily    gabapentin  600 mg Oral TID    nicotine  1 patch Transdermal Daily    piperacillin-tazobactam (Zosyn) IV (PEDS and ADULTS) (extended infusion is not appropriate)  4.5 g Intravenous Q8H    rivaroxaban  20 mg Oral with dinner    tacrolimus  5 mg Oral BID    vancomycin (VANCOCIN) IV (PEDS and ADULTS)  1,250 mg Intravenous Q12H      Continuous Infusions:     PRN Meds:  acetaminophen, acetaminophen, cloNIDine, melatonin, morphine, naloxone, oxyCODONE-acetaminophen, prochlorperazine, sodium chloride 0.9%     Assessment/Plan:      VTE prophylaxis:     Patient condition:  Stable/Fair/Guarded/ Serious/ Critical    Anticipated discharge and Disposition:         All diagnosis and differential diagnosis have been reviewed; assessment and plan has been documented; I have personally reviewed the labs and test results that are presently available; I have reviewed the patients medication list; I have reviewed the consulting providers response and recommendations. I have reviewed or attempted to review medical records based upon their availability    All of the patient's questions have been  addressed and answered. Patient's is agreeable to the above stated plan. I will continue to monitor closely and make adjustments to medical management as needed.  _____________________________________________________________________    Nutrition Status:    Radiology:  I have personally reviewed the following imaging and agree with the radiologist.     X-Ray Tibia Fibula 2 View Right  Narrative: EXAMINATION:  XR TIBIA FIBULA 2 VIEW RIGHT    CLINICAL HISTORY:  Pain, unspecified    TECHNIQUE:  AP and lateral views of the right tibia and fibula were  performed.    COMPARISON:  None.    FINDINGS:  No fracture. No dislocation.    Nonfocal soft tissue swelling.  Impression: No acute osseous abnormality.    Electronically signed by: Manasa Murphy  Date:    10/20/2023  Time:    12:40  CV Ultrasound doppler arterial leg right  Right lower extremity shows no focal stenotic lesions.  CV Ultrasound doppler venous DVT leg right    There is no evidence of a right lower extremity DVT.    The contralateral (left) common femoral vein is patent.    There is a right subcutaneous edema located in the proximal calf and   distal calf veins.    Right lower extremity negative for deep vein thrombus.   Right mid calf non vascular mass noted 1.5 cm x 1.1 cm.       Avery Ni MD   10/21/2023

## 2023-10-21 NOTE — PROGRESS NOTES
Pharmacokinetic Assessment Follow Up: IV Vancomycin    Vancomycin serum concentration assessment(s):    The trough level was drawn correctly and can be used to guide therapy at this time. The measurement is above the desired definitive target range of 10 to 15 mcg/mL.    Vancomycin Regimen Plan:    Change regimen to Vancomycin 1250 mg IV every 12 hours with next serum trough concentration measured at 1000 prior to 5th dose on 10/23    Drug levels (last 3 results):  Recent Labs   Lab Result Units 10/21/23  1023   Vancomycin Trough ug/ml 17.9       Pharmacy will continue to follow and monitor vancomycin.    Please contact pharmacy at extension [phone] for questions regarding this assessment.    Thank you for the consult,   Leighann Espinoza       Patient brief summary:  Kojo Sheikh III is a 43 y.o. male initiated on antimicrobial therapy with IV Vancomycin for treatment of skin & soft tissue infection    The patient's current regimen is 1250mg 112    Drug Allergies:   Review of patient's allergies indicates:   Allergen Reactions    Zofran [ondansetron hcl]      Interaction with tacrolimus       Actual Body Weight:   100.8kg    Renal Function:   Estimated Creatinine Clearance: 101.3 mL/min (based on SCr of 1.15 mg/dL).,     Dialysis Method (if applicable):  N/A    CBC (last 72 hours):  Recent Labs   Lab Result Units 10/19/23  1834 10/20/23  0808 10/21/23  0607   WBC x10(3)/mcL 9.55 5.39 6.29   Hgb g/dL 12.7* 11.4* 11.3*   Hct % 35.9* 33.0* 33.1*   Platelet x10(3)/mcL 120* 112* 118*   Mono % % 15.0 17.1 18.9   Eos % % 0.2 1.1 1.1   Basophil % % 0.3 0.6 0.3       Metabolic Panel (last 72 hours):  Recent Labs   Lab Result Units 10/19/23  1834 10/20/23  0808 10/21/23  0607   Sodium Level mmol/L 126* 134* 134*   Potassium Level mmol/L 4.3 4.5 4.4   Chloride mmol/L 94* 100 99   Carbon Dioxide mmol/L 19* 26 29   Glucose Level mg/dL 88 95 161*   Blood Urea Nitrogen mg/dL 5.5* 7.1* 13.0   Creatinine mg/dL 0.67* 0.87 1.15    Albumin Level g/dL 3.8 3.3* 3.2*   Bilirubin Total mg/dL 1.0 0.7 0.8   Alkaline Phosphatase unit/L 137 133 130   Aspartate Aminotransferase unit/L 84* 86* 57*   Alanine Aminotransferase unit/L 47 47 40       Vancomycin Administrations:  vancomycin given in the last 96 hours                     vancomycin (VANCOCIN) 1,750 mg in dextrose 5 % (D5W) 500 mL IVPB (mg) 1,750 mg New Bag 10/20/23 2213     1,750 mg New Bag  1047    vancomycin 2 g in dextrose 5 % 500 mL IVPB (mg) 2,000 mg New Bag 10/19/23 2324                    Microbiologic Results:  Microbiology Results (last 7 days)       Procedure Component Value Units Date/Time    Blood culture #1 **CANNOT BE ORDERED STAT** [[id number]]  (Normal) Collected: 10/19/23 1834    Order Status: Completed Specimen: Blood Updated: 10/20/23 2000     CULTURE, BLOOD ([facility]) No Growth At 24 Hours    Blood culture #2 **CANNOT BE ORDERED STAT** [[id number]]  (Normal) Collected: 10/19/23 1834    Order Status: Completed Specimen: Blood Updated: 10/20/23 2000     CULTURE, BLOOD ([facility]) No Growth At 24 Hours

## 2023-10-21 NOTE — NURSING
Pt reported nuchal rigidity; stated that he started feeling it this morning, but just reported it.

## 2023-10-22 LAB
ANION GAP SERPL CALC-SCNC: 7 MEQ/L
BASOPHILS # BLD AUTO: 0.03 X10(3)/MCL
BASOPHILS NFR BLD AUTO: 0.4 %
BUN SERPL-MCNC: 9.7 MG/DL (ref 8.9–20.6)
CALCIUM SERPL-MCNC: 8.8 MG/DL (ref 8.4–10.2)
CHLORIDE SERPL-SCNC: 99 MMOL/L (ref 98–107)
CO2 SERPL-SCNC: 28 MMOL/L (ref 22–29)
CREAT SERPL-MCNC: 1.04 MG/DL (ref 0.73–1.18)
CREAT/UREA NIT SERPL: 9
EOSINOPHIL # BLD AUTO: 0.07 X10(3)/MCL (ref 0–0.9)
EOSINOPHIL NFR BLD AUTO: 0.9 %
ERYTHROCYTE [DISTWIDTH] IN BLOOD BY AUTOMATED COUNT: 13.7 % (ref 11.5–17)
GFR SERPLBLD CREATININE-BSD FMLA CKD-EPI: >60 MLS/MIN/1.73/M2
GLUCOSE SERPL-MCNC: 97 MG/DL (ref 74–100)
GRAM STN SPEC: NORMAL
HCT VFR BLD AUTO: 33.3 % (ref 42–52)
HGB BLD-MCNC: 11.3 G/DL (ref 14–18)
IMM GRANULOCYTES # BLD AUTO: 0.02 X10(3)/MCL (ref 0–0.04)
IMM GRANULOCYTES NFR BLD AUTO: 0.3 %
LYMPHOCYTES # BLD AUTO: 1.94 X10(3)/MCL (ref 0.6–4.6)
LYMPHOCYTES NFR BLD AUTO: 25.8 %
MCH RBC QN AUTO: 35.3 PG (ref 27–31)
MCHC RBC AUTO-ENTMCNC: 33.9 G/DL (ref 33–36)
MCV RBC AUTO: 104.1 FL (ref 80–94)
MONOCYTES # BLD AUTO: 0.96 X10(3)/MCL (ref 0.1–1.3)
MONOCYTES NFR BLD AUTO: 12.7 %
NEUTROPHILS # BLD AUTO: 4.51 X10(3)/MCL (ref 2.1–9.2)
NEUTROPHILS NFR BLD AUTO: 59.9 %
NRBC BLD AUTO-RTO: 0 %
PLATELET # BLD AUTO: 128 X10(3)/MCL (ref 130–400)
PMV BLD AUTO: 8.7 FL (ref 7.4–10.4)
POTASSIUM SERPL-SCNC: 4.2 MMOL/L (ref 3.5–5.1)
RBC # BLD AUTO: 3.2 X10(6)/MCL (ref 4.7–6.1)
SODIUM SERPL-SCNC: 134 MMOL/L (ref 136–145)
WBC # SPEC AUTO: 7.53 X10(3)/MCL (ref 4.5–11.5)

## 2023-10-22 PROCEDURE — 63600175 PHARM REV CODE 636 W HCPCS: Performed by: NURSE PRACTITIONER

## 2023-10-22 PROCEDURE — 85025 COMPLETE CBC W/AUTO DIFF WBC: CPT | Performed by: INTERNAL MEDICINE

## 2023-10-22 PROCEDURE — 63600175 PHARM REV CODE 636 W HCPCS: Performed by: INTERNAL MEDICINE

## 2023-10-22 PROCEDURE — 21400001 HC TELEMETRY ROOM

## 2023-10-22 PROCEDURE — 80048 BASIC METABOLIC PNL TOTAL CA: CPT | Performed by: INTERNAL MEDICINE

## 2023-10-22 PROCEDURE — 25000003 PHARM REV CODE 250: Performed by: NURSE PRACTITIONER

## 2023-10-22 PROCEDURE — S4991 NICOTINE PATCH NONLEGEND: HCPCS | Performed by: INTERNAL MEDICINE

## 2023-10-22 PROCEDURE — 25000003 PHARM REV CODE 250: Performed by: INTERNAL MEDICINE

## 2023-10-22 RX ORDER — SILVER NITRATE 38.21; 12.74 MG/1; MG/1
3 STICK TOPICAL ONCE
Status: DISCONTINUED | OUTPATIENT
Start: 2023-10-22 | End: 2023-10-24

## 2023-10-22 RX ADMIN — OXYCODONE HYDROCHLORIDE AND ACETAMINOPHEN 1 TABLET: 10; 325 TABLET ORAL at 07:10

## 2023-10-22 RX ADMIN — PIPERACILLIN AND TAZOBACTAM 4.5 G: 4; .5 INJECTION, POWDER, FOR SOLUTION INTRAVENOUS at 10:10

## 2023-10-22 RX ADMIN — VANCOMYCIN HYDROCHLORIDE 1250 MG: 1.25 INJECTION, POWDER, LYOPHILIZED, FOR SOLUTION INTRAVENOUS at 12:10

## 2023-10-22 RX ADMIN — CYCLOBENZAPRINE 10 MG: 10 TABLET, FILM COATED ORAL at 07:10

## 2023-10-22 RX ADMIN — GABAPENTIN 600 MG: 300 CAPSULE ORAL at 09:10

## 2023-10-22 RX ADMIN — CYCLOBENZAPRINE 10 MG: 10 TABLET, FILM COATED ORAL at 02:10

## 2023-10-22 RX ADMIN — VANCOMYCIN HYDROCHLORIDE 1250 MG: 1.25 INJECTION, POWDER, LYOPHILIZED, FOR SOLUTION INTRAVENOUS at 10:10

## 2023-10-22 RX ADMIN — TACROLIMUS 5 MG: 1 CAPSULE ORAL at 07:10

## 2023-10-22 RX ADMIN — TACROLIMUS 5 MG: 1 CAPSULE ORAL at 09:10

## 2023-10-22 RX ADMIN — GABAPENTIN 600 MG: 300 CAPSULE ORAL at 02:10

## 2023-10-22 RX ADMIN — MORPHINE SULFATE 2 MG: 4 INJECTION, SOLUTION INTRAMUSCULAR; INTRAVENOUS at 10:10

## 2023-10-22 RX ADMIN — PIPERACILLIN AND TAZOBACTAM 4.5 G: 4; .5 INJECTION, POWDER, FOR SOLUTION INTRAVENOUS at 05:10

## 2023-10-22 RX ADMIN — OXYCODONE HYDROCHLORIDE AND ACETAMINOPHEN 1 TABLET: 10; 325 TABLET ORAL at 01:10

## 2023-10-22 RX ADMIN — DIPHENHYDRAMINE HYDROCHLORIDE 25 MG: 25 CAPSULE ORAL at 07:10

## 2023-10-22 RX ADMIN — GABAPENTIN 600 MG: 300 CAPSULE ORAL at 07:10

## 2023-10-22 RX ADMIN — Medication 6 MG: at 07:10

## 2023-10-22 RX ADMIN — FOLIC ACID 1000 MCG: 1 TABLET ORAL at 09:10

## 2023-10-22 RX ADMIN — OXYCODONE HYDROCHLORIDE AND ACETAMINOPHEN 1 TABLET: 10; 325 TABLET ORAL at 05:10

## 2023-10-22 RX ADMIN — OXYCODONE HYDROCHLORIDE AND ACETAMINOPHEN 1 TABLET: 10; 325 TABLET ORAL at 09:10

## 2023-10-22 RX ADMIN — PIPERACILLIN AND TAZOBACTAM 4.5 G: 4; .5 INJECTION, POWDER, FOR SOLUTION INTRAVENOUS at 02:10

## 2023-10-22 RX ADMIN — NICOTINE 1 PATCH: 21 PATCH, EXTENDED RELEASE TRANSDERMAL at 09:10

## 2023-10-22 RX ADMIN — CYCLOBENZAPRINE 10 MG: 10 TABLET, FILM COATED ORAL at 09:10

## 2023-10-22 RX ADMIN — OXYCODONE HYDROCHLORIDE AND ACETAMINOPHEN 1 TABLET: 10; 325 TABLET ORAL at 02:10

## 2023-10-22 NOTE — PLAN OF CARE
Problem: Adult Inpatient Plan of Care  Goal: Plan of Care Review  Outcome: Ongoing, Not Progressing  Goal: Patient-Specific Goal (Individualized)  Outcome: Ongoing, Not Progressing  Goal: Absence of Hospital-Acquired Illness or Injury  Outcome: Ongoing, Not Progressing  Goal: Optimal Comfort and Wellbeing  Outcome: Ongoing, Not Progressing  Goal: Readiness for Transition of Care  Outcome: Ongoing, Not Progressing     Problem: Impaired Wound Healing  Goal: Optimal Wound Healing  Outcome: Ongoing, Not Progressing

## 2023-10-22 NOTE — PROGRESS NOTES
[facility]  Hospital Medicine Progress Note      Chief Complaint: Wound Check (Pt seen at [facility] and sent by cardiologist for leg pain/wound to E. Pt has pustule on R calf. Unable to obtain US of leg. )         Patient information was obtained from patient, patient's family, past medical records and ER records.      HISTORY OF PRESENT ILLNESS:   Kojo Sheikh III is a 43 y.o. male who PMH includes alcoholic cirrhosis of the liver status post liver transplant, thrombocytopenia, anemia, chronic back pain, anxiety; presents to the ED sent by his cardiologist for right lower extremity leg wound nonhealing.  Patient was seen and not elicit general for the leg wound however it was reported patient was unable to have ultrasound done.  Patient presented here at [facility] on 10/19/2023 for evaluation of right lower extremity wound.  It was reported patient has had 3 vascular surgeries to the right leg in the past.  Patient was seen in the ED in [place] on Tuesday which he was prescribed oral clindamycin therapy.  Patient reports worsening redness and warmth to the right lower extremity with associated open wound erupted prior to arrival in the ED. patient is on long-term anticoagulation therapy with Xarelto which his last dose was 2 days ago.  Patient does report pain to the right lower extremity he was seen by Cardiology Services and was instructed to come to the ED for further evaluation.  No reports of chest pain, shortness a breath, fever, chills, cough, congestion, or any sick contacts.  Lab work reviewed demonstrated H&H 12.7/35.9, sodium 126, chloride 94, CO2 19, BUN 5.5, creatinine 0.67; sed rate 21, AST 84, CRP 26.40, lactic acid 1.2; other indices unremarkable.  X-ray of the right lower extremity was done and pending at this time.  Initial vital signs /85 pulse 110 respirations 19 temperature 98.5° F O2 saturation 97% on room air.  Blood cultures were collected x2 sets.   Patient was started on IV vancomycin and Zosyn therapy.  Patient is admitted to hospital medicine services for further management.        Patient currently admitted for acute right lower extremity cellulitis with concern for possible abscess.  Vascular MD consulted per patient request     Today's information  Patient seen and examined at bedside   Patient is status post I&D by vascular MD on 10/21 with findings of necrotic substance sent for pathology   Vitals reviewed-  low grade fever noted   Wound cultures currently pending   Blood cultures negative at 48 hours   Follow-up with pathology on culture reports   Continue current antibiotics     Exam  General appearance:  Chronically ill-appearing male looks older than stated age; nontoxic, fatigued  HENT: Atraumatic head. Moist mucous membranes of oral cavity.  Eyes: PERRL.   Lungs: Clear to auscultation bilaterally. No wheezing present.   Heart: Regular rate and rhythm. S1 and S2 present capillary refill brisk, edema lower extremities   Abdomen: Soft, non-distended, non-tender. No rebound tenderness/guarding. Bowel sounds are normal.   Extremities: No cyanosis, clubbing, COLBERT  Skin: warm and dry right lower extremity cellulitis would pustular area open wound, TTP  Neuro: oriented x 3 no focal deficits  Psych/mental status: Appropriate mood and affect. Responds appropriately to questions.         ASSESSMENT:  Acute right lower extremity cellulitis-failed outpatient treatment-POA   Hyponatremia-POA   Anemia chronic disease-POA   Thrombocytopenia-chronic-POA  Immunocompromised state-status post liver transplant-POA   Long-term anticoagulation therapy-on Xarelto-POA  Chronic back pain on chronic opioid therapy-POA   Weakness-POA  Hx of alcoholic cirrhosis of liver- s/p transplant 2021- POA     PLAN:  Patient is status post I&D by vascular MD on 10/21 with findings of necrotic substance sent for pathology   Vitals reviewed-  low grade fever noted   Wound cultures  currently pending   Blood cultures negative at 48 hours   Follow-up with pathology on culture reports   Continue current antibiotics   Continue with IV vancomycin and Zosyn therapy   Wound care on board PRN pain management   Neurovascular checks right lower extremity every 4 hours     VTE Prophylaxis: Home Xarelto for DVT prophylaxis and will be advised to be as mobile as possible and sit in a chair as tolerated      VITAL SIGNS: 24 HRS MIN & MAX LAST   Temp  Min: 98.4 °F (36.9 °C)  Max: 99.6 °F (37.6 °C) 98.4 °F (36.9 °C)   BP  Min: 119/89  Max: 160/95 131/81   Pulse  Min: 78  Max: 113  97   Resp  Min: 14  Max: 33 18   SpO2  Min: 96 %  Max: 100 % 99 %     I have reviewed the following labs:  Recent Labs   Lab 10/20/23  0808 10/21/23  0607 10/22/23  0909   WBC 5.39 6.29 7.53   RBC 3.27* 3.20* 3.20*   HGB 11.4* 11.3* 11.3*   HCT 33.0* 33.1* 33.3*   .9* 103.4* 104.1*   MCH 34.9* 35.3* 35.3*   MCHC 34.5 34.1 33.9   RDW 13.7 13.8 13.7   * 118* 128*   MPV 8.9 8.9 8.7     Recent Labs   Lab 10/19/23  1834 10/20/23  0808 10/21/23  0607 10/22/23  0909   * 134* 134* 134*   K 4.3 4.5 4.4 4.2   CO2 19* 26 29 28   BUN 5.5* 7.1* 13.0 9.7   CREATININE 0.67* 0.87 1.15 1.04   CALCIUM 8.4 8.4 8.7 8.8   ALBUMIN 3.8 3.3* 3.2*  --    ALKPHOS 137 133 130  --    ALT 47 47 40  --    AST 84* 86* 57*  --    BILITOT 1.0 0.7 0.8  --      Microbiology Results (last 7 days)       Procedure Component Value Units Date/Time    Gram Stain [[id number]] Collected: 10/21/23 8038    Order Status: Completed Specimen: Abscess from Leg, Right Updated: 10/22/23 1145     GRAM STAIN Rare WBC observed      Rare Gram positive cocci    Gram Stain [[id number]] Collected: 10/21/23 1334    Order Status: Completed Specimen: Abscess from Leg, Right Updated: 10/22/23 1145     GRAM STAIN Rare WBC observed      Few Gram positive cocci    Wound Culture [[id number]] Collected: 10/21/23 1334    Order Status: Completed Specimen: Abscess from Leg, Right  Updated: 10/22/23 0637     Wound Culture No Growth At 24 Hours    Wound Culture [[id number]] Collected: 10/21/23 1334    Order Status: Completed Specimen: Abscess from Leg, Right Updated: 10/22/23 0636     Wound Culture No Growth At 24 Hours    Blood culture #1 **CANNOT BE ORDERED STAT** [[id number]]  (Normal) Collected: 10/19/23 1834    Order Status: Completed Specimen: Blood Updated: 10/21/23 2000     CULTURE, BLOOD ([facility]) No Growth At 48 Hours    Blood culture #2 **CANNOT BE ORDERED STAT** [[id number]]  (Normal) Collected: 10/19/23 1834    Order Status: Completed Specimen: Blood Updated: 10/21/23 2000     CULTURE, BLOOD ([facility]) No Growth At 48 Hours    Anaerobic Culture [[id number]] Collected: 10/21/23 1334    Order Status: Sent Specimen: Abscess from Leg, Right Updated: 10/21/23 1417    Fungal Culture [[id number]] Collected: 10/21/23 1334    Order Status: Sent Specimen: Abscess from Leg, Right Updated: 10/21/23 1417    Anaerobic Culture [[id number]] Collected: 10/21/23 1334    Order Status: Sent Specimen: Abscess from Leg, Right Updated: 10/21/23 1417    Fungal Culture [[id number]] Collected: 10/21/23 1334    Order Status: Sent Specimen: Abscess from Leg, Right Updated: 10/21/23 1417             See below for Radiology    Scheduled Med:   calcium carbonate  1,000 mg Oral BID    cyclobenzaprine  10 mg Oral TID    diphenhydrAMINE  25 mg Oral QHS    ergocalciferol  50,000 Units Oral Q7 Days    folic acid  1,000 mcg Oral Daily    gabapentin  600 mg Oral TID    nicotine  1 patch Transdermal Daily    piperacillin-tazobactam (Zosyn) IV (PEDS and ADULTS) (extended infusion is not appropriate)  4.5 g Intravenous Q8H    rivaroxaban  20 mg Oral with dinner    silver nitrate applicators  3 applicator Topical (Top) Once    tacrolimus  5 mg Oral BID    vancomycin (VANCOCIN) IV (PEDS and ADULTS)  1,250 mg Intravenous Q12H      Continuous Infusions:     PRN Meds:  acetaminophen, acetaminophen, cloNIDine, melatonin, morphine,  naloxone, oxyCODONE-acetaminophen, prochlorperazine, sodium chloride 0.9%     Assessment/Plan:      VTE prophylaxis:     Patient condition:  Stable/Fair/Guarded/ Serious/ Critical    Anticipated discharge and Disposition:         All diagnosis and differential diagnosis have been reviewed; assessment and plan has been documented; I have personally reviewed the labs and test results that are presently available; I have reviewed the patients medication list; I have reviewed the consulting providers response and recommendations. I have reviewed or attempted to review medical records based upon their availability    All of the patient's questions have been  addressed and answered. Patient's is agreeable to the above stated plan. I will continue to monitor closely and make adjustments to medical management as needed.  _____________________________________________________________________    Nutrition Status:    Radiology:  I have personally reviewed the following imaging and agree with the radiologist.     X-Ray Tibia Fibula 2 View Right  Narrative: EXAMINATION:  XR TIBIA FIBULA 2 VIEW RIGHT    CLINICAL HISTORY:  Pain, unspecified    TECHNIQUE:  AP and lateral views of the right tibia and fibula were performed.    COMPARISON:  None.    FINDINGS:  No fracture. No dislocation.    Nonfocal soft tissue swelling.  Impression: No acute osseous abnormality.    Electronically signed by: Manasa Murphy  Date:    10/20/2023  Time:    12:40  CV Ultrasound doppler arterial leg right  Right lower extremity shows no focal stenotic lesions.  CV Ultrasound doppler venous DVT leg right    There is no evidence of a right lower extremity DVT.    The contralateral (left) common femoral vein is patent.    There is a right subcutaneous edema located in the proximal calf and   distal calf veins.    Right lower extremity negative for deep vein thrombus.   Right mid calf non vascular mass noted 1.5 cm x 1.1 cm.       Avery Ni MD    10/22/2023

## 2023-10-22 NOTE — PROGRESS NOTES
[facility] - 9th Floor Med Surg  Vascular Surgery  Progress Note    Patient Name: Kojo Sheikh III  MRN: [id number]  Admission Date: 10/19/2023  Primary Care Provider: No primary care provider on file.    Subjective:     Interval History: No acute issues; No significant pain issues;     Post-Op Info:  Procedure(s) (LRB):  IRRIGATION AND DEBRIDEMENT- i&d abscess right lower extremity (Right)   1 Day Post-Op      Medications:  Continuous Infusions:  Scheduled Meds:   calcium carbonate  1,000 mg Oral BID    cyclobenzaprine  10 mg Oral TID    diphenhydrAMINE  25 mg Oral QHS    ergocalciferol  50,000 Units Oral Q7 Days    folic acid  1,000 mcg Oral Daily    gabapentin  600 mg Oral TID    nicotine  1 patch Transdermal Daily    piperacillin-tazobactam (Zosyn) IV (PEDS and ADULTS) (extended infusion is not appropriate)  4.5 g Intravenous Q8H    rivaroxaban  20 mg Oral with dinner    silver nitrate applicators  3 applicator Topical (Top) Once    tacrolimus  5 mg Oral BID    vancomycin (VANCOCIN) IV (PEDS and ADULTS)  1,250 mg Intravenous Q12H     PRN Meds:acetaminophen, acetaminophen, cloNIDine, melatonin, morphine, naloxone, oxyCODONE-acetaminophen, prochlorperazine, sodium chloride 0.9%     Objective:     Vital Signs (Most Recent):  Temp: 98.4 °F (36.9 °C) (10/22/23 0839)  Pulse: 97 (10/22/23 0839)  Resp: 18 (10/22/23 1027)  BP: 131/81 (10/22/23 0839)  SpO2: 99 % (10/22/23 0839) Vital Signs (24h Range):  Temp:  [98.4 °F (36.9 °C)-99.6 °F (37.6 °C)] 98.4 °F (36.9 °C)  Pulse:  [] 97  Resp:  [14-33] 18  SpO2:  [96 %-100 %] 99 %  BP: (119-162)/() 131/81         Physical Exam    Alert, walking in room on my entry  Breathing easily  Right lower leg dressing changed  Wound remains clean - packing changed  Area of surrounding violaceous discoloration about 8cm in discrete [place] - no significant change from yesterday    Significant Labs:  BMP:   Recent Labs   Lab 10/22/23  0909   *   K 4.2    CO2 28   BUN 9.7   CREATININE 1.04   CALCIUM 8.8     CBC:   Recent Labs   Lab 10/22/23  0909   WBC 7.53   RBC 3.20*   HGB 11.3*   HCT 33.3*   *   .1*   MCH 35.3*   MCHC 33.9       Significant Diagnostics:      Assessment/Plan:     Active Diagnoses:    Diagnosis Date Noted POA    PRINCIPAL PROBLEM:  Abscess of right leg [L02.415] 10/21/2023 Yes      Problems Resolved During this Admission:     POD#1 s/p incision and drainage of right lower leg necrotic wound.   Cause of the necrotic tissue remains unknown.  He is immunosuppressed.  Await cultures.  Additionally await pathology.  We will continue to watch the area violaceous skin color change closely over the next few days.  May need evaluation by General surgery if changes continue.  He denies any trauma to this area and denies that this was a site of recent foam sclerotherapy injection.  His most recent intervention was a proximally 3 months ago and foam sclerotherapy at that location occurred at his ankle to the best of his knowledge.    Shirlene Durán MD  Vascular Surgery  [facility] - 9th Floor Med Surg

## 2023-10-23 LAB — VANCOMYCIN TROUGH SERPL-MCNC: 20.6 UG/ML (ref 15–20)

## 2023-10-23 PROCEDURE — 25000003 PHARM REV CODE 250: Performed by: INTERNAL MEDICINE

## 2023-10-23 PROCEDURE — S4991 NICOTINE PATCH NONLEGEND: HCPCS | Performed by: INTERNAL MEDICINE

## 2023-10-23 PROCEDURE — 25000003 PHARM REV CODE 250: Performed by: NURSE PRACTITIONER

## 2023-10-23 PROCEDURE — 63600175 PHARM REV CODE 636 W HCPCS: Performed by: INTERNAL MEDICINE

## 2023-10-23 PROCEDURE — 80202 ASSAY OF VANCOMYCIN: CPT | Performed by: INTERNAL MEDICINE

## 2023-10-23 PROCEDURE — 63600175 PHARM REV CODE 636 W HCPCS: Performed by: NURSE PRACTITIONER

## 2023-10-23 PROCEDURE — 21400001 HC TELEMETRY ROOM

## 2023-10-23 RX ORDER — POLYETHYLENE GLYCOL 3350 17 G/17G
17 POWDER, FOR SOLUTION ORAL DAILY
Status: DISCONTINUED | OUTPATIENT
Start: 2023-10-23 | End: 2023-11-04 | Stop reason: HOSPADM

## 2023-10-23 RX ORDER — MORPHINE SULFATE 4 MG/ML
2 INJECTION, SOLUTION INTRAMUSCULAR; INTRAVENOUS 2 TIMES DAILY PRN
Status: DISCONTINUED | OUTPATIENT
Start: 2023-10-23 | End: 2023-10-23

## 2023-10-23 RX ORDER — VANCOMYCIN HCL IN 5 % DEXTROSE 1G/250ML
1000 PLASTIC BAG, INJECTION (ML) INTRAVENOUS
Status: DISCONTINUED | OUTPATIENT
Start: 2023-10-23 | End: 2023-11-04 | Stop reason: HOSPADM

## 2023-10-23 RX ORDER — MORPHINE SULFATE 4 MG/ML
4 INJECTION, SOLUTION INTRAMUSCULAR; INTRAVENOUS 2 TIMES DAILY PRN
Status: DISCONTINUED | OUTPATIENT
Start: 2023-10-23 | End: 2023-11-04 | Stop reason: HOSPADM

## 2023-10-23 RX ADMIN — GABAPENTIN 600 MG: 300 CAPSULE ORAL at 11:10

## 2023-10-23 RX ADMIN — CYCLOBENZAPRINE 10 MG: 10 TABLET, FILM COATED ORAL at 09:10

## 2023-10-23 RX ADMIN — OXYCODONE HYDROCHLORIDE AND ACETAMINOPHEN 1 TABLET: 10; 325 TABLET ORAL at 09:10

## 2023-10-23 RX ADMIN — VANCOMYCIN HYDROCHLORIDE 1000 MG: 1 INJECTION, POWDER, LYOPHILIZED, FOR SOLUTION INTRAVENOUS at 01:10

## 2023-10-23 RX ADMIN — OXYCODONE HYDROCHLORIDE AND ACETAMINOPHEN 1 TABLET: 10; 325 TABLET ORAL at 02:10

## 2023-10-23 RX ADMIN — GABAPENTIN 600 MG: 300 CAPSULE ORAL at 09:10

## 2023-10-23 RX ADMIN — RIVAROXABAN 20 MG: 10 TABLET, FILM COATED ORAL at 05:10

## 2023-10-23 RX ADMIN — GABAPENTIN 600 MG: 300 CAPSULE ORAL at 03:10

## 2023-10-23 RX ADMIN — OXYCODONE HYDROCHLORIDE AND ACETAMINOPHEN 1 TABLET: 10; 325 TABLET ORAL at 11:10

## 2023-10-23 RX ADMIN — TACROLIMUS 5 MG: 1 CAPSULE ORAL at 08:10

## 2023-10-23 RX ADMIN — PIPERACILLIN AND TAZOBACTAM 4.5 G: 4; .5 INJECTION, POWDER, FOR SOLUTION INTRAVENOUS at 05:10

## 2023-10-23 RX ADMIN — PIPERACILLIN AND TAZOBACTAM 4.5 G: 4; .5 INJECTION, POWDER, FOR SOLUTION INTRAVENOUS at 03:10

## 2023-10-23 RX ADMIN — NICOTINE 1 PATCH: 21 PATCH, EXTENDED RELEASE TRANSDERMAL at 09:10

## 2023-10-23 RX ADMIN — FOLIC ACID 1000 MCG: 1 TABLET ORAL at 09:10

## 2023-10-23 RX ADMIN — CYCLOBENZAPRINE 10 MG: 10 TABLET, FILM COATED ORAL at 11:10

## 2023-10-23 RX ADMIN — MORPHINE SULFATE 2 MG: 4 INJECTION, SOLUTION INTRAMUSCULAR; INTRAVENOUS at 10:10

## 2023-10-23 RX ADMIN — PIPERACILLIN AND TAZOBACTAM 4.5 G: 4; .5 INJECTION, POWDER, FOR SOLUTION INTRAVENOUS at 11:10

## 2023-10-23 RX ADMIN — CYCLOBENZAPRINE 10 MG: 10 TABLET, FILM COATED ORAL at 03:10

## 2023-10-23 RX ADMIN — TACROLIMUS 5 MG: 1 CAPSULE ORAL at 05:10

## 2023-10-23 RX ADMIN — OXYCODONE HYDROCHLORIDE AND ACETAMINOPHEN 1 TABLET: 10; 325 TABLET ORAL at 05:10

## 2023-10-23 RX ADMIN — DIPHENHYDRAMINE HYDROCHLORIDE 25 MG: 25 CAPSULE ORAL at 11:10

## 2023-10-23 RX ADMIN — CALCIUM CARBONATE (ANTACID) CHEW TAB 500 MG 1000 MG: 500 CHEW TAB at 11:10

## 2023-10-23 RX ADMIN — OXYCODONE HYDROCHLORIDE AND ACETAMINOPHEN 1 TABLET: 10; 325 TABLET ORAL at 12:10

## 2023-10-23 NOTE — NURSING
[facility] - 9th Floor Med Surg  Wound Care    Patient Name:  Kojo Sheikh III   MRN:  [id number]  Date: 10/23/2023  Diagnosis: Abscess of right leg    History:     Past Medical History:   Diagnosis Date    Alcohol abuse     Chronic back pain        Social History     Socioeconomic History    Marital status: Single   Tobacco Use    Smoking status: Never    Smokeless tobacco: Never   Substance and Sexual Activity    Drug use: Never       Precautions:     Allergies as of 10/19/2023 - Reviewed 10/19/2023   Allergen Reaction Noted    Zofran [ondansetron hcl]  10/19/2023       WOC Assessment Details/Treatment        10/23/23 1000        Altered Skin Integrity 10/20/23 0015 Right lower;medial Leg #1 Other (comment) Partial thickness tissue loss. Shallow open ulcer with a red or pink wound bed, without slough. Intact or Open/Ruptured Serum-filled blister.   Date First Assessed/Time First Assessed: 10/20/23 0015   Altered Skin Integrity Present on Admission - Did Patient arrive to the hospital with altered skin?: yes  Side: Right  Orientation: lower;medial  Location: Leg  Wound Number: #1  Is this injury ...   Wound Image    Dressing Appearance Intact;Moist drainage   Drainage Amount Small   Drainage Characteristics/Odor Serosanguineous;No odor   Appearance Red;Moist;Bleeding   Tissue loss description Full thickness   Red (%), Wound Tissue Color 100 %   Periwound Area Dry;Redness   Wound Edges Approximated   Wound Length (cm) 3 cm   Wound Width (cm) 2 cm   Wound Depth (cm) 1.4 cm   Wound Volume (cm^3) 8.4 cm^3   Wound Surface Area (cm^2) 6 cm^2   Care Cleansed with:;Antimicrobial agent   Dressing Gauze, wet to dry   Packing gauze, iodoform     44 y/o male in with right lower leg cellulitis.  Awake alert oriented and very mobil.    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    Spoke  with Dr. Durán and care put in  place.      Recommendations made to primary team for  . Orders placed.    Will follow up weekly while pt is in hospital.     10/23/2023

## 2023-10-23 NOTE — PROGRESS NOTES
[facility]  Hospital Medicine Progress Note        Chief Complaint: Wound Check (Pt seen at [facility] [place] and sent by cardiologist for leg pain/wound to E. Pt has pustule on R calf. Unable to obtain US of leg. )         Patient information was obtained from patient, patient's family, past medical records and ER records.      HISTORY OF PRESENT ILLNESS:   Kojo Shekih III is a 43 y.o. male who PMH includes alcoholic cirrhosis of the liver status post liver transplant, thrombocytopenia, anemia, chronic back pain, anxiety; presents to the ED sent by his cardiologist for right lower extremity leg wound nonhealing.  Patient was seen and not elicit general for the leg wound however it was reported patient was unable to have ultrasound done.  Patient presented here at [facility] on 10/19/2023 for evaluation of right lower extremity wound.  It was reported patient has had 3 vascular surgeries to the right leg in the past.  Patient was seen in the ED in [place] on Tuesday which he was prescribed oral clindamycin therapy.  Patient reports worsening redness and warmth to the right lower extremity with associated open wound erupted prior to arrival in the ED. patient is on long-term anticoagulation therapy with Xarelto which his last dose was 2 days ago.  Patient does report pain to the right lower extremity he was seen by Cardiology Services and was instructed to come to the ED for further evaluation.  No reports of chest pain, shortness a breath, fever, chills, cough, congestion, or any sick contacts.  Lab work reviewed demonstrated H&H 12.7/35.9, sodium 126, chloride 94, CO2 19, BUN 5.5, creatinine 0.67; sed rate 21, AST 84, CRP 26.40, lactic acid 1.2; other indices unremarkable.  X-ray of the right lower extremity was done and pending at this time.  Initial vital signs /85 pulse 110 respirations 19 temperature 98.5° F O2 saturation 97% on room air.  Blood cultures were collected x2 sets.   Patient was started on IV vancomycin and Zosyn therapy.  Patient is admitted to hospital medicine services for further management.        Patient currently admitted for acute right lower extremity cellulitis with concern for possible abscess.  Vascular MD consulted per patient request. Patient is status post I&D by vascular MD on 10/21 with findings of necrotic substance sent for pathology        Today's information  Patient seen and examined at bedside   Patient reports his IV morphine doses not controlling his pain adequately during dressing changes   Vitals reviewed and stable on room air   Wound cultures growing Gram-positive cocci   Blood culture chills negative at 72 hours   Will consult ID to help with antibiotic management   Concern for Pseudomonas infection given necrotic appearance of wound ecthyma gangrenosum   However cultures not growing Gram-negative rods   Follow up pathology reports     Exam  General appearance:  Chronically ill-appearing male looks older than stated age; nontoxic, fatigued  HENT: Atraumatic head. Moist mucous membranes of oral cavity.  Eyes: PERRL.   Lungs: Clear to auscultation bilaterally. No wheezing present.   Heart: Regular rate and rhythm. S1 and S2 present capillary refill brisk, edema lower extremities   Abdomen: Soft, non-distended, non-tender. No rebound tenderness/guarding. Bowel sounds are normal.   Extremities: No cyanosis, clubbing, COLBERT  Skin: warm and dry right lower extremity cellulitis would pustular area open wound, TTP  Neuro: oriented x 3 no focal deficits  Psych/mental status: Appropriate mood and affect. Responds appropriately to questions.         ASSESSMENT:  Acute right lower extremity cellulitis-failed outpatient treatment-POA   Hyponatremia-POA   Anemia chronic disease-POA   Thrombocytopenia-chronic-POA  Immunocompromised state-status post liver transplant-POA   Long-term anticoagulation therapy-on Xarelto-POA  Chronic back pain on chronic opioid  therapy-POA   Weakness-POA  Hx of alcoholic cirrhosis of liver- s/p transplant 2021- POA     PLAN:  Vitals reviewed and stable on room air   Wound cultures growing Gram-positive cocci   Blood culture chills negative at 72 hours   Will consult ID to help with antibiotic management   Concern for Pseudomonas infection given necrotic appearance of wound ecthyma gangrenosum   However cultures not growing Gram-negative rods   Follow up pathology reports   Patient is status post I&D by vascular MD on 10/21 with findings of necrotic substance sent for pathology   Continue current antibiotics   Continue with IV vancomycin and Zosyn therapy   Wound care on board PRN pain management   Neurovascular checks right lower extremity every 4 hours     VTE Prophylaxis: Home Xarelto for DVT prophylaxis and will be advised to be as mobile as possible and sit in a chair as tolerated           VITAL SIGNS: 24 HRS MIN & MAX LAST   Temp  Min: 98.2 °F (36.8 °C)  Max: 98.6 °F (37 °C) 98.6 °F (37 °C)   BP  Min: 124/80  Max: 133/88 133/88   Pulse  Min: 95  Max: 108  103   Resp  Min: 18  Max: 20 18   SpO2  Min: 98 %  Max: 99 % 99 %     I have reviewed the following labs:  Recent Labs   Lab 10/20/23  0808 10/21/23  0607 10/22/23  0909   WBC 5.39 6.29 7.53   RBC 3.27* 3.20* 3.20*   HGB 11.4* 11.3* 11.3*   HCT 33.0* 33.1* 33.3*   .9* 103.4* 104.1*   MCH 34.9* 35.3* 35.3*   MCHC 34.5 34.1 33.9   RDW 13.7 13.8 13.7   * 118* 128*   MPV 8.9 8.9 8.7     Recent Labs   Lab 10/19/23  1834 10/20/23  0808 10/21/23  0607 10/22/23  0909   * 134* 134* 134*   K 4.3 4.5 4.4 4.2   CO2 19* 26 29 28   BUN 5.5* 7.1* 13.0 9.7   CREATININE 0.67* 0.87 1.15 1.04   CALCIUM 8.4 8.4 8.7 8.8   ALBUMIN 3.8 3.3* 3.2*  --    ALKPHOS 137 133 130  --    ALT 47 47 40  --    AST 84* 86* 57*  --    BILITOT 1.0 0.7 0.8  --      Microbiology Results (last 7 days)       Procedure Component Value Units Date/Time    Anaerobic Culture [[id number]] Collected: 10/21/23  1334    Order Status: Completed Specimen: Abscess from Leg, Right Updated: 10/23/23 0911     Anaerobe Culture No Anaerobes Isolated    Anaerobic Culture [[id number]] Collected: 10/21/23 1334    Order Status: Completed Specimen: Abscess from Leg, Right Updated: 10/23/23 0909     Anaerobe Culture No Anaerobes Isolated    Wound Culture [[id number]] Collected: 10/21/23 1334    Order Status: Completed Specimen: Abscess from Leg, Right Updated: 10/23/23 0726     Wound Culture No Growth At 48 Hours    Wound Culture [[id number]] Collected: 10/21/23 1334    Order Status: Completed Specimen: Abscess from Leg, Right Updated: 10/23/23 0726     Wound Culture No Growth At 48 Hours    Blood culture #1 **CANNOT BE ORDERED STAT** [[id number]]  (Normal) Collected: 10/19/23 1834    Order Status: Completed Specimen: Blood Updated: 10/22/23 2000     CULTURE, BLOOD ([facility]) No Growth At 72 Hours    Blood culture #2 **CANNOT BE ORDERED STAT** [[id number]]  (Normal) Collected: 10/19/23 1834    Order Status: Completed Specimen: Blood Updated: 10/22/23 2000     CULTURE, BLOOD ([facility]) No Growth At 72 Hours    Gram Stain [[id number]] Collected: 10/21/23 1334    Order Status: Completed Specimen: Abscess from Leg, Right Updated: 10/22/23 1145     GRAM STAIN Rare WBC observed      Rare Gram positive cocci    Gram Stain [[id number]] Collected: 10/21/23 1334    Order Status: Completed Specimen: Abscess from Leg, Right Updated: 10/22/23 1145     GRAM STAIN Rare WBC observed      Few Gram positive cocci    Fungal Culture [[id number]] Collected: 10/21/23 1334    Order Status: Sent Specimen: Abscess from Leg, Right Updated: 10/21/23 1417    Fungal Culture [[id number]] Collected: 10/21/23 1334    Order Status: Sent Specimen: Abscess from Leg, Right Updated: 10/21/23 1417             See below for Radiology    Scheduled Med:   calcium carbonate  1,000 mg Oral BID    cyclobenzaprine  10 mg Oral TID    diphenhydrAMINE  25 mg Oral QHS    ergocalciferol  50,000  Units Oral Q7 Days    folic acid  1,000 mcg Oral Daily    gabapentin  600 mg Oral TID    [START ON 10/24/2023] naloxegoL  25 mg Oral Daily    nicotine  1 patch Transdermal Daily    piperacillin-tazobactam (Zosyn) IV (PEDS and ADULTS) (extended infusion is not appropriate)  4.5 g Intravenous Q8H    polyethylene glycol  17 g Oral Daily    rivaroxaban  20 mg Oral with dinner    silver nitrate applicators  3 applicator Topical (Top) Once    tacrolimus  5 mg Oral BID    vancomycin (VANCOCIN) IV (PEDS and ADULTS)  1,000 mg Intravenous Q12H      Continuous Infusions:     PRN Meds:  acetaminophen, acetaminophen, cloNIDine, melatonin, morphine, naloxone, oxyCODONE-acetaminophen, prochlorperazine, sodium chloride 0.9%     Assessment/Plan:      VTE prophylaxis:     Patient condition:  Stable/Fair/Guarded/ Serious/ Critical    Anticipated discharge and Disposition:         All diagnosis and differential diagnosis have been reviewed; assessment and plan has been documented; I have personally reviewed the labs and test results that are presently available; I have reviewed the patients medication list; I have reviewed the consulting providers response and recommendations. I have reviewed or attempted to review medical records based upon their availability    All of the patient's questions have been  addressed and answered. Patient's is agreeable to the above stated plan. I will continue to monitor closely and make adjustments to medical management as needed.  _____________________________________________________________________    Nutrition Status:    Radiology:  I have personally reviewed the following imaging and agree with the radiologist.     X-Ray Tibia Fibula 2 View Right  Narrative: EXAMINATION:  XR TIBIA FIBULA 2 VIEW RIGHT    CLINICAL HISTORY:  Pain, unspecified    TECHNIQUE:  AP and lateral views of the right tibia and fibula were performed.    COMPARISON:  None.    FINDINGS:  No fracture. No dislocation.    Nonfocal  soft tissue swelling.  Impression: No acute osseous abnormality.    Electronically signed by: Manasa Murphy  Date:    10/20/2023  Time:    12:40  CV Ultrasound doppler arterial leg right  Right lower extremity shows no focal stenotic lesions.  CV Ultrasound doppler venous DVT leg right    There is no evidence of a right lower extremity DVT.    The contralateral (left) common femoral vein is patent.    There is a right subcutaneous edema located in the proximal calf and   distal calf veins.    Right lower extremity negative for deep vein thrombus.   Right mid calf non vascular mass noted 1.5 cm x 1.1 cm.       Avery Ni MD   10/23/2023

## 2023-10-23 NOTE — PROGRESS NOTES
Pharmacokinetic Assessment Follow Up: IV Vancomycin    Vancomycin serum concentration assessment(s):    The trough level was drawn correctly and can be used to guide therapy at this time. The measurement is above the desired definitive target range of 15 to 20 mcg/mL.    Vancomycin Regimen Plan:    Change regimen to Vancomycin 1000 mg IV every 12 hours with next serum trough concentration measured at 1130 prior to 5th dose on 10/25    Drug levels (last 3 results):  Recent Labs   Lab Result Units 10/21/23  1023 10/23/23  1021   Vancomycin Trough ug/ml 17.9 20.6*       Pharmacy will continue to follow and monitor vancomycin.    Please contact pharmacy at extension [phone] for questions regarding this assessment.    Thank you for the consult,   Peace Blackwood       Patient brief summary:  Kojo Sheikh III is a 43 y.o. male initiated on antimicrobial therapy with IV Vancomycin for treatment of skin & soft tissue infection    Drug Allergies:   Review of patient's allergies indicates:   Allergen Reactions    Zofran [ondansetron hcl]      Interaction with tacrolimus       Actual Body Weight:   100.8 kg    Renal Function:   Estimated Creatinine Clearance: 112 mL/min (based on SCr of 1.04 mg/dL).,     Dialysis Method (if applicable):  N/A    CBC (last 72 hours):  Recent Labs   Lab Result Units 10/21/23  0607 10/22/23  0909   WBC x10(3)/mcL 6.29 7.53   Hgb g/dL 11.3* 11.3*   Hct % 33.1* 33.3*   Platelet x10(3)/mcL 118* 128*   Mono % % 18.9 12.7   Eos % % 1.1 0.9   Basophil % % 0.3 0.4       Metabolic Panel (last 72 hours):  Recent Labs   Lab Result Units 10/21/23  0607 10/22/23  0909   Sodium Level mmol/L 134* 134*   Potassium Level mmol/L 4.4 4.2   Chloride mmol/L 99 99   Carbon Dioxide mmol/L 29 28   Glucose Level mg/dL 161* 97   Blood Urea Nitrogen mg/dL 13.0 9.7   Creatinine mg/dL 1.15 1.04   Albumin Level g/dL 3.2*  --    Bilirubin Total mg/dL 0.8  --    Alkaline Phosphatase unit/L 130  --    Aspartate  Aminotransferase unit/L 57*  --    Alanine Aminotransferase unit/L 40  --        Vancomycin Administrations:  vancomycin given in the last 96 hours                     vancomycin 1.25 g in dextrose 5% 250 mL IVPB (ready to mix) (mg) 1,250 mg New Bag 10/22/23 2259     1,250 mg New Bag  1240     1,250 mg New Bag 10/21/23 2329     1,250 mg New Bag  1143    vancomycin (VANCOCIN) 1,750 mg in dextrose 5 % (D5W) 500 mL IVPB (mg) 1,750 mg New Bag 10/20/23 2213     1,750 mg New Bag  1047    vancomycin 2 g in dextrose 5 % 500 mL IVPB (mg) 2,000 mg New Bag 10/19/23 2324                    Microbiologic Results:  Microbiology Results (last 7 days)       Procedure Component Value Units Date/Time    Anaerobic Culture [[id number]] Collected: 10/21/23 1334    Order Status: Completed Specimen: Abscess from Leg, Right Updated: 10/23/23 0911     Anaerobe Culture No Anaerobes Isolated    Anaerobic Culture [[id number]] Collected: 10/21/23 1334    Order Status: Completed Specimen: Abscess from Leg, Right Updated: 10/23/23 0909     Anaerobe Culture No Anaerobes Isolated    Wound Culture [[id number]] Collected: 10/21/23 1334    Order Status: Completed Specimen: Abscess from Leg, Right Updated: 10/23/23 0726     Wound Culture No Growth At 48 Hours    Wound Culture [[id number]] Collected: 10/21/23 1334    Order Status: Completed Specimen: Abscess from Leg, Right Updated: 10/23/23 0726     Wound Culture No Growth At 48 Hours    Blood culture #1 **CANNOT BE ORDERED STAT** [[id number]]  (Normal) Collected: 10/19/23 1834    Order Status: Completed Specimen: Blood Updated: 10/22/23 2000     CULTURE, BLOOD ([facility]) No Growth At 72 Hours    Blood culture #2 **CANNOT BE ORDERED STAT** [[id number]]  (Normal) Collected: 10/19/23 1834    Order Status: Completed Specimen: Blood Updated: 10/22/23 2000     CULTURE, BLOOD ([facility]) No Growth At 72 Hours    Gram Stain [[id number]] Collected: 10/21/23 1334    Order Status: Completed Specimen: Abscess from Leg, Right  Updated: 10/22/23 1145     GRAM STAIN Rare WBC observed      Rare Gram positive cocci    Gram Stain [[id number]] Collected: 10/21/23 1334    Order Status: Completed Specimen: Abscess from Leg, Right Updated: 10/22/23 1145     GRAM STAIN Rare WBC observed      Few Gram positive cocci    Fungal Culture [[id number]] Collected: 10/21/23 1334    Order Status: Sent Specimen: Abscess from Leg, Right Updated: 10/21/23 1417    Fungal Culture [[id number]] Collected: 10/21/23 1334    Order Status: Sent Specimen: Abscess from Leg, Right Updated: 10/21/23 1411

## 2023-10-24 LAB
BACTERIA BLD CULT: NORMAL
BACTERIA BLD CULT: NORMAL
BACTERIA WND CULT: ABNORMAL
PSYCHE PATHOLOGY RESULT: NORMAL

## 2023-10-24 PROCEDURE — 63600175 PHARM REV CODE 636 W HCPCS: Performed by: INTERNAL MEDICINE

## 2023-10-24 PROCEDURE — 11000001 HC ACUTE MED/SURG PRIVATE ROOM

## 2023-10-24 PROCEDURE — C1751 CATH, INF, PER/CENT/MIDLINE: HCPCS

## 2023-10-24 PROCEDURE — 63600175 PHARM REV CODE 636 W HCPCS: Performed by: NURSE PRACTITIONER

## 2023-10-24 PROCEDURE — S4991 NICOTINE PATCH NONLEGEND: HCPCS | Performed by: INTERNAL MEDICINE

## 2023-10-24 PROCEDURE — A4216 STERILE WATER/SALINE, 10 ML: HCPCS | Performed by: INTERNAL MEDICINE

## 2023-10-24 PROCEDURE — 36569 INSJ PICC 5 YR+ W/O IMAGING: CPT

## 2023-10-24 PROCEDURE — 25000003 PHARM REV CODE 250: Performed by: NURSE PRACTITIONER

## 2023-10-24 PROCEDURE — 25000003 PHARM REV CODE 250: Performed by: INTERNAL MEDICINE

## 2023-10-24 RX ORDER — SODIUM CHLORIDE 9 MG/ML
INJECTION, SOLUTION INTRAVENOUS
Status: DISCONTINUED | OUTPATIENT
Start: 2023-10-24 | End: 2023-11-04 | Stop reason: HOSPADM

## 2023-10-24 RX ORDER — SODIUM CHLORIDE 0.9 % (FLUSH) 0.9 %
10 SYRINGE (ML) INJECTION
Status: DISCONTINUED | OUTPATIENT
Start: 2023-10-24 | End: 2023-10-29

## 2023-10-24 RX ORDER — SODIUM CHLORIDE 0.9 % (FLUSH) 0.9 %
10 SYRINGE (ML) INJECTION EVERY 6 HOURS
Status: DISCONTINUED | OUTPATIENT
Start: 2023-10-24 | End: 2023-10-29

## 2023-10-24 RX ADMIN — CYCLOBENZAPRINE 10 MG: 10 TABLET, FILM COATED ORAL at 10:10

## 2023-10-24 RX ADMIN — OXYCODONE HYDROCHLORIDE AND ACETAMINOPHEN 1 TABLET: 10; 325 TABLET ORAL at 06:10

## 2023-10-24 RX ADMIN — NICOTINE 1 PATCH: 21 PATCH, EXTENDED RELEASE TRANSDERMAL at 09:10

## 2023-10-24 RX ADMIN — RIVAROXABAN 20 MG: 10 TABLET, FILM COATED ORAL at 05:10

## 2023-10-24 RX ADMIN — VANCOMYCIN HYDROCHLORIDE 1000 MG: 1 INJECTION, POWDER, LYOPHILIZED, FOR SOLUTION INTRAVENOUS at 02:10

## 2023-10-24 RX ADMIN — NALOXEGOL OXALATE 25 MG: 25 TABLET, FILM COATED ORAL at 09:10

## 2023-10-24 RX ADMIN — GABAPENTIN 600 MG: 300 CAPSULE ORAL at 02:10

## 2023-10-24 RX ADMIN — FOLIC ACID 1000 MCG: 1 TABLET ORAL at 09:10

## 2023-10-24 RX ADMIN — MORPHINE SULFATE 4 MG: 4 INJECTION, SOLUTION INTRAMUSCULAR; INTRAVENOUS at 04:10

## 2023-10-24 RX ADMIN — OXYCODONE HYDROCHLORIDE AND ACETAMINOPHEN 1 TABLET: 10; 325 TABLET ORAL at 04:10

## 2023-10-24 RX ADMIN — SODIUM CHLORIDE: 9 INJECTION, SOLUTION INTRAVENOUS at 02:10

## 2023-10-24 RX ADMIN — SODIUM CHLORIDE, PRESERVATIVE FREE 10 ML: 5 INJECTION INTRAVENOUS at 06:10

## 2023-10-24 RX ADMIN — SODIUM CHLORIDE, PRESERVATIVE FREE 10 ML: 5 INJECTION INTRAVENOUS at 11:10

## 2023-10-24 RX ADMIN — PIPERACILLIN AND TAZOBACTAM 4.5 G: 4; .5 INJECTION, POWDER, FOR SOLUTION INTRAVENOUS at 02:10

## 2023-10-24 RX ADMIN — VANCOMYCIN HYDROCHLORIDE 1000 MG: 1 INJECTION, POWDER, LYOPHILIZED, FOR SOLUTION INTRAVENOUS at 12:10

## 2023-10-24 RX ADMIN — CYCLOBENZAPRINE 10 MG: 10 TABLET, FILM COATED ORAL at 02:10

## 2023-10-24 RX ADMIN — PIPERACILLIN AND TAZOBACTAM 4.5 G: 4; .5 INJECTION, POWDER, FOR SOLUTION INTRAVENOUS at 10:10

## 2023-10-24 RX ADMIN — TACROLIMUS 5 MG: 1 CAPSULE ORAL at 09:10

## 2023-10-24 RX ADMIN — OXYCODONE HYDROCHLORIDE AND ACETAMINOPHEN 1 TABLET: 10; 325 TABLET ORAL at 02:10

## 2023-10-24 RX ADMIN — POLYETHYLENE GLYCOL 3350 17 G: 17 POWDER, FOR SOLUTION ORAL at 09:10

## 2023-10-24 RX ADMIN — MORPHINE SULFATE 4 MG: 4 INJECTION, SOLUTION INTRAMUSCULAR; INTRAVENOUS at 12:10

## 2023-10-24 RX ADMIN — OXYCODONE HYDROCHLORIDE AND ACETAMINOPHEN 1 TABLET: 10; 325 TABLET ORAL at 10:10

## 2023-10-24 RX ADMIN — GABAPENTIN 600 MG: 300 CAPSULE ORAL at 09:10

## 2023-10-24 RX ADMIN — PIPERACILLIN AND TAZOBACTAM 4.5 G: 4; .5 INJECTION, POWDER, FOR SOLUTION INTRAVENOUS at 06:10

## 2023-10-24 RX ADMIN — OXYCODONE HYDROCHLORIDE AND ACETAMINOPHEN 1 TABLET: 10; 325 TABLET ORAL at 11:10

## 2023-10-24 RX ADMIN — GABAPENTIN 600 MG: 300 CAPSULE ORAL at 10:10

## 2023-10-24 RX ADMIN — TACROLIMUS 5 MG: 1 CAPSULE ORAL at 05:10

## 2023-10-24 RX ADMIN — CYCLOBENZAPRINE 10 MG: 10 TABLET, FILM COATED ORAL at 09:10

## 2023-10-24 RX ADMIN — CALCIUM CARBONATE (ANTACID) CHEW TAB 500 MG 1000 MG: 500 CHEW TAB at 10:10

## 2023-10-24 NOTE — PROGRESS NOTES
[facility]  Hospital Medicine Progress Note        Chief Complaint: Wound Check (Pt seen at [facility] and sent by cardiologist for leg pain/wound to E. Pt has pustule on R calf. Unable to obtain US of leg. )         Patient information was obtained from patient, patient's family, past medical records and ER records.      HISTORY OF PRESENT ILLNESS:   Kojo Sheikh III is a 43 y.o. male who PMH includes alcoholic cirrhosis of the liver status post liver transplant, thrombocytopenia, anemia, chronic back pain, anxiety; presents to the ED sent by his cardiologist for right lower extremity leg wound nonhealing.  Patient was seen and not elicit general for the leg wound however it was reported patient was unable to have ultrasound done.  Patient presented here at [facility] on 10/19/2023 for evaluation of right lower extremity wound.  It was reported patient has had 3 vascular surgeries to the right leg in the past.  Patient was seen in the ED in [place] on Tuesday which he was prescribed oral clindamycin therapy.  Patient reports worsening redness and warmth to the right lower extremity with associated open wound erupted prior to arrival in the ED. patient is on long-term anticoagulation therapy with Xarelto which his last dose was 2 days ago.  Patient does report pain to the right lower extremity he was seen by Cardiology Services and was instructed to come to the ED for further evaluation.  No reports of chest pain, shortness a breath, fever, chills, cough, congestion, or any sick contacts.  Lab work reviewed demonstrated H&H 12.7/35.9, sodium 126, chloride 94, CO2 19, BUN 5.5, creatinine 0.67; sed rate 21, AST 84, CRP 26.40, lactic acid 1.2; other indices unremarkable.  X-ray of the right lower extremity was done and pending at this time.  Initial vital signs /85 pulse 110 respirations 19 temperature 98.5° F O2 saturation 97% on room air.  Blood cultures were collected x2 sets.   Patient was started on IV vancomycin and Zosyn therapy.  Patient is admitted to hospital medicine services for further management.        Patient currently admitted for acute right lower extremity cellulitis with concern for possible abscess.  Vascular MD consulted per patient request. Patient is status post I&D by vascular MD on 10/21 with findings of necrotic substance sent for pathology . Will consult ID to help with antibiotic management . Concern for Pseudomonas infection given necrotic appearance of wound ecthyma gangrenosum   However cultures not growing Gram-negative rods         Today's information  Overnight events reported with patient requesting a repeat lower extremity ultrasound to rule out DVT  Patient not at bedside this morning  Vitals reviewed and stable on room air   Ultrasound of the right lower extremity reports is pending   Follow up pathology reports      ASSESSMENT:  Acute right lower extremity cellulitis-failed outpatient treatment-POA   Hyponatremia-POA   Anemia chronic disease-POA   Thrombocytopenia-chronic-POA  Immunocompromised state-status post liver transplant-POA   Long-term anticoagulation therapy-on Xarelto-POA  Chronic back pain on chronic opioid therapy-POA   Weakness-POA  Hx of alcoholic cirrhosis of liver- s/p transplant 2021- POA     PLAN:  Vitals reviewed and stable on room air   Ultrasound of the right lower extremity reports is pending   Follow up pathology reports   Wound cultures growing Gram-positive cocci   Blood culture chills negative at 96 hours   Continue with IV vancomycin and Zosyn therapy   Will consult ID to help with antibiotic management   Concern for Pseudomonas infection given necrotic appearance of wound ecthyma gangrenosum   However cultures not growing Gram-negative rods   Patient is status post I&D by vascular MD on 10/21 with findings of necrotic substance sent for pathology   Wound care on board PRN pain management   Neurovascular checks right lower  extremity every 4 hours     VTE Prophylaxis: Home Xarelto for DVT prophylaxis and will be advised to be as mobile as possible and sit in a chair as tolerated      dispo- pending Path report, and ID recs     VITAL SIGNS: 24 HRS MIN & MAX LAST   Temp  Min: 97.6 °F (36.4 °C)  Max: 98.3 °F (36.8 °C) 97.6 °F (36.4 °C)   BP  Min: 117/76  Max: 160/80 136/84   Pulse  Min: 98  Max: 109  100   Resp  Min: 16  Max: 18 18   SpO2  Min: 96 %  Max: 100 % 99 %     I have reviewed the following labs:  Recent Labs   Lab 10/20/23  0808 10/21/23  0607 10/22/23  0909   WBC 5.39 6.29 7.53   RBC 3.27* 3.20* 3.20*   HGB 11.4* 11.3* 11.3*   HCT 33.0* 33.1* 33.3*   .9* 103.4* 104.1*   MCH 34.9* 35.3* 35.3*   MCHC 34.5 34.1 33.9   RDW 13.7 13.8 13.7   * 118* 128*   MPV 8.9 8.9 8.7     Recent Labs   Lab 10/19/23  1834 10/20/23  0808 10/21/23  0607 10/22/23  0909   * 134* 134* 134*   K 4.3 4.5 4.4 4.2   CO2 19* 26 29 28   BUN 5.5* 7.1* 13.0 9.7   CREATININE 0.67* 0.87 1.15 1.04   CALCIUM 8.4 8.4 8.7 8.8   ALBUMIN 3.8 3.3* 3.2*  --    ALKPHOS 137 133 130  --    ALT 47 47 40  --    AST 84* 86* 57*  --    BILITOT 1.0 0.7 0.8  --      Microbiology Results (last 7 days)       Procedure Component Value Units Date/Time    Anaerobic Culture [[id number]] Collected: 10/21/23 1334    Order Status: Completed Specimen: Abscess from Leg, Right Updated: 10/24/23 0744     Anaerobe Culture No Anaerobes Isolated    Anaerobic Culture [[id number]] Collected: 10/21/23 1334    Order Status: Completed Specimen: Abscess from Leg, Right Updated: 10/24/23 0740     Anaerobe Culture No Anaerobes Isolated    Blood culture #1 **CANNOT BE ORDERED STAT** [[id number]]  (Normal) Collected: 10/19/23 1834    Order Status: Completed Specimen: Blood Updated: 10/23/23 2000     CULTURE, BLOOD ([facility]) No Growth At 96 Hours    Blood culture #2 **CANNOT BE ORDERED STAT** [[id number]]  (Normal) Collected: 10/19/23 1834    Order Status: Completed Specimen: Blood Updated:  10/23/23 2000     CULTURE, BLOOD ([facility]) No Growth At 96 Hours    Wound Culture [[id number]] Collected: 10/21/23 1334    Order Status: Completed Specimen: Abscess from Leg, Right Updated: 10/23/23 0726     Wound Culture No Growth At 48 Hours    Wound Culture [[id number]] Collected: 10/21/23 1334    Order Status: Completed Specimen: Abscess from Leg, Right Updated: 10/23/23 0726     Wound Culture No Growth At 48 Hours    Gram Stain [[id number]] Collected: 10/21/23 1334    Order Status: Completed Specimen: Abscess from Leg, Right Updated: 10/22/23 1145     GRAM STAIN Rare WBC observed      Rare Gram positive cocci    Gram Stain [[id number]] Collected: 10/21/23 1334    Order Status: Completed Specimen: Abscess from Leg, Right Updated: 10/22/23 1145     GRAM STAIN Rare WBC observed      Few Gram positive cocci    Fungal Culture [[id number]] Collected: 10/21/23 1334    Order Status: Sent Specimen: Abscess from Leg, Right Updated: 10/21/23 1417    Fungal Culture [[id number]] Collected: 10/21/23 1334    Order Status: Sent Specimen: Abscess from Leg, Right Updated: 10/21/23 1417             See below for Radiology    Scheduled Med:   calcium carbonate  1,000 mg Oral BID    cyclobenzaprine  10 mg Oral TID    diphenhydrAMINE  25 mg Oral QHS    ergocalciferol  50,000 Units Oral Q7 Days    folic acid  1,000 mcg Oral Daily    gabapentin  600 mg Oral TID    naloxegoL  25 mg Oral Daily    nicotine  1 patch Transdermal Daily    piperacillin-tazobactam (Zosyn) IV (PEDS and ADULTS) (extended infusion is not appropriate)  4.5 g Intravenous Q8H    polyethylene glycol  17 g Oral Daily    rivaroxaban  20 mg Oral with dinner    tacrolimus  5 mg Oral BID    vancomycin (VANCOCIN) IV (PEDS and ADULTS)  1,000 mg Intravenous Q12H      Continuous Infusions:     PRN Meds:  sodium chloride 0.9%, acetaminophen, acetaminophen, cloNIDine, melatonin, morphine, naloxone, oxyCODONE-acetaminophen, prochlorperazine, sodium chloride 0.9%      Assessment/Plan:      VTE prophylaxis:     Patient condition:  Stable/Fair/Guarded/ Serious/ Critical    Anticipated discharge and Disposition:         All diagnosis and differential diagnosis have been reviewed; assessment and plan has been documented; I have personally reviewed the labs and test results that are presently available; I have reviewed the patients medication list; I have reviewed the consulting providers response and recommendations. I have reviewed or attempted to review medical records based upon their availability    All of the patient's questions have been  addressed and answered. Patient's is agreeable to the above stated plan. I will continue to monitor closely and make adjustments to medical management as needed.  _____________________________________________________________________    Nutrition Status:    Radiology:  I have personally reviewed the following imaging and agree with the radiologist.     X-Ray Tibia Fibula 2 View Right  Narrative: EXAMINATION:  XR TIBIA FIBULA 2 VIEW RIGHT    CLINICAL HISTORY:  Pain, unspecified    TECHNIQUE:  AP and lateral views of the right tibia and fibula were performed.    COMPARISON:  None.    FINDINGS:  No fracture. No dislocation.    Nonfocal soft tissue swelling.  Impression: No acute osseous abnormality.    Electronically signed by: Manasa Murphy  Date:    10/20/2023  Time:    12:40  CV Ultrasound doppler arterial leg right  Right lower extremity shows no focal stenotic lesions.  CV Ultrasound doppler venous DVT leg right    There is no evidence of a right lower extremity DVT.    The contralateral (left) common femoral vein is patent.    There is a right subcutaneous edema located in the proximal calf and   distal calf veins.    Right lower extremity negative for deep vein thrombus.   Right mid calf non vascular mass noted 1.5 cm x 1.1 cm.       Avery Ni MD   10/24/2023

## 2023-10-24 NOTE — PROCEDURES
"Kojo Sheikh III is a 43 y.o. male patient.    Temp: 97.6 °F (36.4 °C) (10/24/23 1109)  Pulse: 108 (10/24/23 1109)  Resp: 18 (10/24/23 1215)  BP: 136/84 (10/24/23 1109)  SpO2: 99 % (10/24/23 1109)  Weight: 100.8 kg (222 lb 3.6 oz) (10/20/23 0015)  Height: 6' 1" (185.4 cm) (10/20/23 0015)    PICC  Date/Time: 10/24/2023 12:44 PM  Performed by: Javi Robb RN  Consent Done: Yes  Time out: Immediately prior to procedure a time out was called to verify the correct patient, procedure, equipment, support staff and site/side marked as required  Indications: med administration  Anesthesia: local infiltration  Local anesthetic: lidocaine 1% without epinephrine  Anesthetic Total (mL): 5  Preparation: skin prepped with ChloraPrep  Skin prep agent dried: skin prep agent completely dried prior to procedure  Sterile barriers: all five maximum sterile barriers used - cap, mask, sterile gown, sterile gloves, and large sterile sheet  Hand hygiene: hand hygiene performed prior to central venous catheter insertion  Location details: right basilic  Catheter type: double lumen  Catheter size: 5 Fr  Catheter Length: 40cm    Number of attempts: 1  Post-procedure: sterile dressing applied    Assessment: placement verified by x-ray        Javi Robb RN  10/24/2023    "

## 2023-10-24 NOTE — PLAN OF CARE
10/24/23 1411   Discharge Reassessment   Assessment Type Discharge Planning Reassessment   Discharge Plan A Long-term acute care facility (LTAC)   Discharge Plan B Home Health

## 2023-10-24 NOTE — CARE UPDATE
Patient concerned about RLE swelling. Has hx DVT. He had a RLE venous US on 10/19 that showed no evidence of DVT but he is requesting that it be repeated.

## 2023-10-24 NOTE — CONSULTS
Infectious Diseases Consultation       Inpatient consult to Infectious Diseases  Consult performed by: Bernard Hewitt MD  Consult ordered by: Avery Ni MD      HPI:   43-year-old male with past medical history of anxiety, chronic back pain, alcoholic cirrhosis of the liver s/p liver transplant on tacrolimus, thrombocytopenia, history of prior vascular surgeries in the right lower extremity, on long-term anticoagulation therapy with Xarelto, is admitted to [facility] on 10/19/2023, presenting through the ED, sent by his cardiologist for right lower extremity nonhealing leg wound with cellulitis.  He apparently was initially seen [place] [facility] [place], prescribed oral clindamycin without improvement, reporting worsening redness and warmth to the right lower extremity.  He was seen by the Cardiology Service and was instructed to present to the ED for further evaluation.  On presentation he was noted to have no fevers and no leukocytosis, noted with thrombocytopenia, anemic with low albumin.  Urine toxicology test positive for opiates.  Blood cultures have remained negative.  CRP 26.40.  X-ray of the tibia fibula with no acute osseous abnormality.  He was seen by vascular surgery, taken to OR on 10/21 for irrigation and debridement of abscess of right lower extremity with findings of purulence blood cultures have remained negative and Gram-positive cocci noted on Gram stain.  He is on antibiotic coverage with vancomycin and Zosyn.    Past Medical and Surgical History  Allergies :   Zofran [ondansetron hcl]      Medical :   He has a past medical history of Alcohol abuse and Chronic back pain.    Surgical :   He has a past surgical history that includes Back surgery (2011); Liver transplant (N/A, 4/11/2021); Diagnostic ultrasound (N/A, 4/14/2021); Esophagogastroduodenoscopy (N/A, 8/5/2021); and irrigation and debridement (Right, 10/21/2023).     Family  "History  Reviewed and noncontributory    Social History  He reports that he has never smoked. He has never used smokeless tobacco. He reports that he does not use drugs.     Review of Systems   HENT: Negative.     Respiratory: Negative.     Gastrointestinal: Negative.    Genitourinary: Negative.    Musculoskeletal: Negative.    Skin:         Right leg wound   Neurological:  Negative for weakness.   Endo/Heme/Allergies: Negative.    Psychiatric/Behavioral: Negative.     All other Systems review done and negative.    Objective   Physical Exam  Vitals reviewed.   Constitutional:       General: He is not in acute distress.     Appearance: He is obese. He is not toxic-appearing.   HENT:      Head: Normocephalic and atraumatic.   Eyes:      Pupils: Pupils are equal, round, and reactive to light.   Cardiovascular:      Rate and Rhythm: Normal rate and regular rhythm.      Heart sounds: Normal heart sounds.   Pulmonary:      Effort: Pulmonary effort is normal. No respiratory distress.      Breath sounds: Normal breath sounds.   Abdominal:      General: Bowel sounds are normal. There is no distension.      Palpations: Abdomen is soft.      Tenderness: There is no abdominal tenderness.   Genitourinary:     Comments: No lesions reported  Musculoskeletal:         General: No deformity. Normal range of motion.      Cervical back: Neck supple.   Skin:     Findings: No rash.      Comments: RLE swelling noted with a clean wound and surrounding erythema   Neurological:      Mental Status: He is alert and oriented to person, place, and time.   Psychiatric:      Comments: Calm and cooperative       VITAL SIGNS: 24 HR MIN & MAX LAST    Temp  Min: 98.2 °F (36.8 °C)  Max: 98.6 °F (37 °C)  98.3 °F (36.8 °C)        BP  Min: 124/80  Max: 142/89  125/79     Pulse  Min: 95  Max: 109  109     Resp  Min: 18  Max: 20  18    SpO2  Min: 98 %  Max: 100 %  100 %      HT: 6' 1" (185.4 cm)  WT: 100.8 kg (222 lb 3.6 oz)  BMI: 29.3     Recent Results " (from the past 24 hour(s))   VANCOMYCIN, TROUGH    Collection Time: 10/23/23 10:21 AM   Result Value Ref Range    Vancomycin Trough 20.6 (H) 15.0 - 20.0 ug/ml       Imaging  Imaging Results              X-Ray Tibia Fibula 2 View Right (Final result)  Result time 10/20/23 12:40:35      Final result by Manasa Murphy MD (10/20/23 12:40:35)                   Impression:      No acute osseous abnormality.      Electronically signed by: Manasa Murphy  Date:    10/20/2023  Time:    12:40               Narrative:    EXAMINATION:  XR TIBIA FIBULA 2 VIEW RIGHT    CLINICAL HISTORY:  Pain, unspecified    TECHNIQUE:  AP and lateral views of the right tibia and fibula were performed.    COMPARISON:  None.    FINDINGS:  No fracture. No dislocation.    Nonfocal soft tissue swelling.                                       Impression  1. Right lower extremity abscess/cellulitis/wound infection-Gram-positive  2. Immunocompromised s/p liver transplant on immunosuppressive drugs  3. History of alcoholic cirrhosis  4.  Anemia and thrombocytopenia  5.  Protein calorie malnutrition  6. Chronic back pain, opioid dependent  6.  Morbid obesity     Recommendations  I agree with the management of this patient.  Immunocompromised patient with history of alcoholic cirrhosis status post liver transplant on immunosuppressive drugs presenting with right lower extremity infection with development of abscess and associated cellulitis, status post drainage of the abscess with cultures remain negative so far, likely due to antibiotic effect but Gram-positive cocci noted on Gram stain, likely representing the pathogen.  We will continue current antibiotic coverage with vancomycin and Zosyn with plan to complete about a 14 day course with option of transitioning to doxycycline and Augmentin on discharge.  We will continue wound care per surgery and Wound Care team.  He is encouraged to keep the extremity elevated at all times while sitting or  supine.  Weight loss is encouraged.  Case is discussed at length with patient including review of his multiple pictures of the extremity, questions solicited and answered.  I have also discussed the case with nursing staff.  I would like to thank the team very much for the opportunity to assist in the care of this patient

## 2023-10-24 NOTE — ANESTHESIA POSTPROCEDURE EVALUATION
Anesthesia Post Evaluation    Patient: Kojo Sheikh III    Procedure(s) Performed: Procedure(s) (LRB):  IRRIGATION AND DEBRIDEMENT- i&d abscess right lower extremity (Right)    Final Anesthesia Type: general      Patient location during evaluation: PACU  Patient participation: Yes- Able to Participate  Level of consciousness: awake and alert and oriented  Post-procedure vital signs: reviewed and stable  Pain management: adequate  Airway patency: patent  AUGUSTUS mitigation strategies: Verification of full reversal of neuromuscular block  PONV status at discharge: No PONV  Anesthetic complications: no      Cardiovascular status: blood pressure returned to baseline and stable  Respiratory status: spontaneous ventilation and unassisted  Hydration status: euvolemic  Follow-up not needed.  Comments: [facility]          Vitals Value Taken Time   /84 10/24/23 1109   Temp 36.4 °C (97.6 °F) 10/24/23 1109   Pulse 108 10/24/23 1109   Resp 18 10/24/23 1215   SpO2 99 % 10/24/23 1109         Event Time   Out of Recovery 10/21/2023 14:49:03         Pain/Anel Score: Pain Rating Prior to Med Admin: 7 (10/24/2023 12:15 PM)  Pain Rating Post Med Admin: 7 (10/24/2023 11:50 AM)

## 2023-10-25 LAB
ALBUMIN SERPL-MCNC: 3.4 G/DL (ref 3.5–5)
ALP SERPL-CCNC: 108 UNIT/L (ref 40–150)
ALT SERPL-CCNC: 34 UNIT/L (ref 0–55)
ANION GAP SERPL CALC-SCNC: 7 MEQ/L
AST SERPL-CCNC: 37 UNIT/L (ref 5–34)
BASOPHILS # BLD AUTO: 0.04 X10(3)/MCL
BASOPHILS NFR BLD AUTO: 0.8 %
BILIRUB SERPL-MCNC: 0.5 MG/DL
BILIRUBIN DIRECT+TOT PNL SERPL-MCNC: 0.2 MG/DL (ref 0–0.8)
BILIRUBIN DIRECT+TOT PNL SERPL-MCNC: 0.3 MG/DL (ref 0–?)
BUN SERPL-MCNC: 7.5 MG/DL (ref 8.9–20.6)
CALCIUM SERPL-MCNC: 9 MG/DL (ref 8.4–10.2)
CHLORIDE SERPL-SCNC: 101 MMOL/L (ref 98–107)
CO2 SERPL-SCNC: 29 MMOL/L (ref 22–29)
CREAT SERPL-MCNC: 0.94 MG/DL (ref 0.73–1.18)
CREAT/UREA NIT SERPL: 8
EOSINOPHIL # BLD AUTO: 0.16 X10(3)/MCL (ref 0–0.9)
EOSINOPHIL NFR BLD AUTO: 3.1 %
ERYTHROCYTE [DISTWIDTH] IN BLOOD BY AUTOMATED COUNT: 13.6 % (ref 11.5–17)
ERYTHROCYTE [SEDIMENTATION RATE] IN BLOOD: 36 MM/HR (ref 0–15)
GFR SERPLBLD CREATININE-BSD FMLA CKD-EPI: >60 MLS/MIN/1.73/M2
GLUCOSE SERPL-MCNC: 113 MG/DL (ref 74–100)
HCT VFR BLD AUTO: 35.6 % (ref 42–52)
HGB BLD-MCNC: 11.8 G/DL (ref 14–18)
IMM GRANULOCYTES # BLD AUTO: 0.03 X10(3)/MCL (ref 0–0.04)
IMM GRANULOCYTES NFR BLD AUTO: 0.6 %
LYMPHOCYTES # BLD AUTO: 1.6 X10(3)/MCL (ref 0.6–4.6)
LYMPHOCYTES NFR BLD AUTO: 31.2 %
MCH RBC QN AUTO: 35.2 PG (ref 27–31)
MCHC RBC AUTO-ENTMCNC: 33.1 G/DL (ref 33–36)
MCV RBC AUTO: 106.3 FL (ref 80–94)
MONOCYTES # BLD AUTO: 0.76 X10(3)/MCL (ref 0.1–1.3)
MONOCYTES NFR BLD AUTO: 14.8 %
NEUTROPHILS # BLD AUTO: 2.54 X10(3)/MCL (ref 2.1–9.2)
NEUTROPHILS NFR BLD AUTO: 49.5 %
NRBC BLD AUTO-RTO: 0 %
PLATELET # BLD AUTO: 143 X10(3)/MCL (ref 130–400)
PMV BLD AUTO: 8.4 FL (ref 7.4–10.4)
POTASSIUM SERPL-SCNC: 3.9 MMOL/L (ref 3.5–5.1)
PROT SERPL-MCNC: 6.5 GM/DL (ref 6.4–8.3)
RBC # BLD AUTO: 3.35 X10(6)/MCL (ref 4.7–6.1)
SODIUM SERPL-SCNC: 137 MMOL/L (ref 136–145)
VANCOMYCIN TROUGH SERPL-MCNC: 34.8 UG/ML (ref 15–20)
WBC # SPEC AUTO: 5.13 X10(3)/MCL (ref 4.5–11.5)

## 2023-10-25 PROCEDURE — 80048 BASIC METABOLIC PNL TOTAL CA: CPT | Performed by: INTERNAL MEDICINE

## 2023-10-25 PROCEDURE — 80202 ASSAY OF VANCOMYCIN: CPT | Performed by: INTERNAL MEDICINE

## 2023-10-25 PROCEDURE — 63600175 PHARM REV CODE 636 W HCPCS: Performed by: NURSE PRACTITIONER

## 2023-10-25 PROCEDURE — 63600175 PHARM REV CODE 636 W HCPCS: Performed by: INTERNAL MEDICINE

## 2023-10-25 PROCEDURE — 25000003 PHARM REV CODE 250: Performed by: NURSE PRACTITIONER

## 2023-10-25 PROCEDURE — 25000003 PHARM REV CODE 250: Performed by: INTERNAL MEDICINE

## 2023-10-25 PROCEDURE — 80076 HEPATIC FUNCTION PANEL: CPT | Performed by: INTERNAL MEDICINE

## 2023-10-25 PROCEDURE — 85652 RBC SED RATE AUTOMATED: CPT | Performed by: INTERNAL MEDICINE

## 2023-10-25 PROCEDURE — A4216 STERILE WATER/SALINE, 10 ML: HCPCS | Performed by: INTERNAL MEDICINE

## 2023-10-25 PROCEDURE — S4991 NICOTINE PATCH NONLEGEND: HCPCS | Performed by: INTERNAL MEDICINE

## 2023-10-25 PROCEDURE — C1751 CATH, INF, PER/CENT/MIDLINE: HCPCS

## 2023-10-25 PROCEDURE — 85025 COMPLETE CBC W/AUTO DIFF WBC: CPT | Performed by: INTERNAL MEDICINE

## 2023-10-25 PROCEDURE — 11000001 HC ACUTE MED/SURG PRIVATE ROOM

## 2023-10-25 RX ORDER — MUPIROCIN 20 MG/G
OINTMENT TOPICAL 2 TIMES DAILY
Status: DISPENSED | OUTPATIENT
Start: 2023-10-25 | End: 2023-10-30

## 2023-10-25 RX ADMIN — POLYETHYLENE GLYCOL 3350 17 G: 17 POWDER, FOR SOLUTION ORAL at 09:10

## 2023-10-25 RX ADMIN — MORPHINE SULFATE 4 MG: 4 INJECTION, SOLUTION INTRAMUSCULAR; INTRAVENOUS at 01:10

## 2023-10-25 RX ADMIN — GABAPENTIN 600 MG: 300 CAPSULE ORAL at 08:10

## 2023-10-25 RX ADMIN — RIVAROXABAN 20 MG: 10 TABLET, FILM COATED ORAL at 03:10

## 2023-10-25 RX ADMIN — CALCIUM CARBONATE (ANTACID) CHEW TAB 500 MG 1000 MG: 500 CHEW TAB at 09:10

## 2023-10-25 RX ADMIN — OXYCODONE HYDROCHLORIDE AND ACETAMINOPHEN 1 TABLET: 10; 325 TABLET ORAL at 09:10

## 2023-10-25 RX ADMIN — SODIUM CHLORIDE, PRESERVATIVE FREE 10 ML: 5 INJECTION INTRAVENOUS at 12:10

## 2023-10-25 RX ADMIN — TACROLIMUS 5 MG: 1 CAPSULE ORAL at 09:10

## 2023-10-25 RX ADMIN — GABAPENTIN 600 MG: 300 CAPSULE ORAL at 09:10

## 2023-10-25 RX ADMIN — SODIUM CHLORIDE, PRESERVATIVE FREE 10 ML: 5 INJECTION INTRAVENOUS at 06:10

## 2023-10-25 RX ADMIN — CYCLOBENZAPRINE 10 MG: 10 TABLET, FILM COATED ORAL at 02:10

## 2023-10-25 RX ADMIN — VANCOMYCIN HYDROCHLORIDE 1000 MG: 1 INJECTION, POWDER, LYOPHILIZED, FOR SOLUTION INTRAVENOUS at 03:10

## 2023-10-25 RX ADMIN — PIPERACILLIN AND TAZOBACTAM 4.5 G: 4; .5 INJECTION, POWDER, FOR SOLUTION INTRAVENOUS at 06:10

## 2023-10-25 RX ADMIN — PIPERACILLIN AND TAZOBACTAM 4.5 G: 4; .5 INJECTION, POWDER, FOR SOLUTION INTRAVENOUS at 04:10

## 2023-10-25 RX ADMIN — TACROLIMUS 5 MG: 1 CAPSULE ORAL at 05:10

## 2023-10-25 RX ADMIN — CALCIUM CARBONATE (ANTACID) CHEW TAB 500 MG 1000 MG: 500 CHEW TAB at 08:10

## 2023-10-25 RX ADMIN — NICOTINE 1 PATCH: 21 PATCH, EXTENDED RELEASE TRANSDERMAL at 09:10

## 2023-10-25 RX ADMIN — PIPERACILLIN AND TAZOBACTAM 4.5 G: 4; .5 INJECTION, POWDER, FOR SOLUTION INTRAVENOUS at 10:10

## 2023-10-25 RX ADMIN — CYCLOBENZAPRINE 10 MG: 10 TABLET, FILM COATED ORAL at 08:10

## 2023-10-25 RX ADMIN — MORPHINE SULFATE 4 MG: 4 INJECTION, SOLUTION INTRAMUSCULAR; INTRAVENOUS at 03:10

## 2023-10-25 RX ADMIN — OXYCODONE HYDROCHLORIDE AND ACETAMINOPHEN 1 TABLET: 10; 325 TABLET ORAL at 08:10

## 2023-10-25 RX ADMIN — GABAPENTIN 600 MG: 300 CAPSULE ORAL at 02:10

## 2023-10-25 RX ADMIN — MUPIROCIN: 20 OINTMENT TOPICAL at 08:10

## 2023-10-25 RX ADMIN — CYCLOBENZAPRINE 10 MG: 10 TABLET, FILM COATED ORAL at 09:10

## 2023-10-25 RX ADMIN — FOLIC ACID 1000 MCG: 1 TABLET ORAL at 09:10

## 2023-10-25 RX ADMIN — VANCOMYCIN HYDROCHLORIDE 1000 MG: 1 INJECTION, POWDER, LYOPHILIZED, FOR SOLUTION INTRAVENOUS at 02:10

## 2023-10-25 NOTE — PROGRESS NOTES
Infectious Diseases Progress Note  43-year-old male with past medical history of anxiety, chronic back pain, alcoholic cirrhosis of the liver s/p liver transplant on tacrolimus, thrombocytopenia, history of prior vascular surgeries in the right lower extremity, on long-term anticoagulation therapy with Xarelto, is admitted to [facility] on 10/19/2023, presenting through the ED, sent by his cardiologist for right lower extremity nonhealing leg wound with cellulitis.  He apparently was initially seen at outside facility, [facility] on 10/17, prescribed oral clindamycin without improvement, reporting worsening redness and warmth to the right lower extremity.  He was seen by the Cardiology Service and was instructed to present to the ED for further evaluation.  On presentation he was noted to have no fevers and no leukocytosis, noted with thrombocytopenia, anemic with low albumin.  Urine toxicology test positive for opiates.  Blood cultures have remained negative.  CRP 26.40.  X-ray of the tibia fibula with no acute osseous abnormality.  He was seen by vascular surgery, taken to OR on 10/21 for irrigation and debridement of abscess of right lower extremity with findings of purulence blood cultures have remained negative and Gram-positive cocci noted on Gram stain.    He is on antibiotic coverage with vancomycin and Zosyn.    Subjective:  No new complaints, no fevers, doing about the same.  In no acute distress      Past Medical History:   Diagnosis Date    Alcohol abuse     Chronic back pain      Past Surgical History:   Procedure Laterality Date    BACK SURGERY  2011    DIAGNOSTIC ULTRASOUND N/A 4/14/2021    Procedure: ULTRASOUND,INTRAOPERATIVE;  Surgeon: Jose Anderson MD;  Location: [facility] OR [address] [place] [address];  Service: Transplant;  Laterality: N/A;    ESOPHAGOGASTRODUODENOSCOPY N/A 8/5/2021    Procedure: EGD (ESOPHAGOGASTRODUODENOSCOPY);  Surgeon: Judy De La Garza MD;  Location: [facility] ([facility]  FLR);  Service: Endoscopy;  Laterality: N/A;    IRRIGATION AND DEBRIDEMENT Right 10/21/2023    Procedure: IRRIGATION AND DEBRIDEMENT- i&d abscess right lower extremity;  Surgeon: Shirlene Durán MD;  Location: [facility] OR;  Service: Peripheral Vascular;  Laterality: Right;    LIVER TRANSPLANT N/A 4/11/2021    Procedure: TRANSPLANT, LIVER;  Surgeon: Joe Baker MD;  Location: [facility] OR 2ND FLR;  Service: Transplant;  Laterality: N/A;     Social History     Socioeconomic History    Marital status: Single   Tobacco Use    Smoking status: Never    Smokeless tobacco: Never   Substance and Sexual Activity    Drug use: Never       ROS  HENT: Negative.     Respiratory: Negative.     Gastrointestinal: Negative.    Genitourinary: Negative.    Musculoskeletal: Negative.    Skin:         Right leg wound   Neurological:  Negative for weakness.   Endo/Heme/Allergies: Negative.    Psychiatric/Behavioral: Negative.     All other Systems review done and negative.    Review of patient's allergies indicates:   Allergen Reactions    Zofran [ondansetron hcl]      Interaction with tacrolimus         Scheduled Meds:   calcium carbonate  1,000 mg Oral BID    cyclobenzaprine  10 mg Oral TID    diphenhydrAMINE  25 mg Oral QHS    ergocalciferol  50,000 Units Oral Q7 Days    folic acid  1,000 mcg Oral Daily    gabapentin  600 mg Oral TID    naloxegoL  25 mg Oral Daily    nicotine  1 patch Transdermal Daily    piperacillin-tazobactam (Zosyn) IV (PEDS and ADULTS) (extended infusion is not appropriate)  4.5 g Intravenous Q8H    polyethylene glycol  17 g Oral Daily    rivaroxaban  20 mg Oral with dinner    sodium chloride 0.9%  10 mL Intravenous Q6H    tacrolimus  5 mg Oral BID    vancomycin (VANCOCIN) IV (PEDS and ADULTS)  1,000 mg Intravenous Q12H     Continuous Infusions:  PRN Meds:sodium chloride 0.9%, acetaminophen, acetaminophen, cloNIDine, melatonin, morphine, naloxone, oxyCODONE-acetaminophen, prochlorperazine, sodium chloride 0.9%,  "Flushing PICC/Midline Protocol **AND** sodium chloride 0.9% **AND** sodium chloride 0.9%    Objective:  BP (!) 155/93   Pulse (!) 111   Temp 98.2 °F (36.8 °C) (Oral)   Resp 20   Ht 6' 1" (1.854 m)   Wt 100.8 kg (222 lb 3.6 oz)   SpO2 100%   BMI 29.32 kg/m²     Physical Exam:   Physical Exam  Vitals reviewed.   Constitutional:       General: He is not in acute distress.     Appearance: He is obese. He is not toxic-appearing.   HENT:      Head: Normocephalic and atraumatic.   Eyes:      Pupils: Pupils are equal, round, and reactive to light.   Cardiovascular:      Rate and Rhythm: Normal rate and regular rhythm.      Heart sounds: Normal heart sounds.   Pulmonary:      Effort: Pulmonary effort is normal. No respiratory distress.      Breath sounds: Normal breath sounds.   Abdominal:      General: Bowel sounds are normal. There is no distension.      Palpations: Abdomen is soft.      Tenderness: There is no abdominal tenderness.   Genitourinary:     Comments: No lesions reported  Musculoskeletal:         General: No deformity. Normal range of motion.      Cervical back: Neck supple.   Skin:     Findings: No rash.      Comments: RLE wound dressed  Neurological:      Mental Status: He is alert and oriented to person, place, and time.   Psychiatric:      Comments: Calm and cooperative      Imaging  Imaging Results              X-Ray Tibia Fibula 2 View Right (Final result)  Result time 10/20/23 12:40:35      Final result by Manasa Murphy MD (10/20/23 12:40:35)                   Impression:      No acute osseous abnormality.      Electronically signed by: Manasa Murphy  Date:    10/20/2023  Time:    12:40               Narrative:    EXAMINATION:  XR TIBIA FIBULA 2 VIEW RIGHT    CLINICAL HISTORY:  Pain, unspecified    TECHNIQUE:  AP and lateral views of the right tibia and fibula were performed.    COMPARISON:  None.    FINDINGS:  No fracture. No dislocation.    Nonfocal soft tissue swelling.            "                            Lab Review   No results found for this or any previous visit (from the past 24 hour(s)).          Assessment/Plan:  1. Right lower extremity abscess/cellulitis/wound infection-Gram-positive  2. Immunocompromised s/p liver transplant on immunosuppressive drugs  3. History of alcoholic cirrhosis  4.  Anemia and thrombocytopenia  5.  Protein calorie malnutrition  6. Chronic back pain, opioid dependent  6.  Morbid obesity      Continue vancomycin and Zosyn with plan to transition to doxycycline and Augmentin on discharge to complete a 14 day course of antibiotics for soft tissue infection  No fevers and no leukocytosis   S/p abscess drainage on 10/21 with cultures negative so far, GS with GPC and GPR, follow   10/19 blood cultures negative  -10/24 chest x-ray results noted with PICC line placement  -We will continue wound care per surgery and Wound Care team  -Keep the extremity elevated at all times while sitting or supine  -Weight loss is encouraged  -Discussed with patient and nursing staff

## 2023-10-25 NOTE — PLAN OF CARE
10/25/23 0853   Discharge Reassessment   Assessment Type Discharge Planning Reassessment   Discharge Plan discussed with: Patient   Discharge Plan A Long-term acute care facility (LTAC)   Discharge Plan B Home Health   Post-Acute Status   Post-Acute Authorization Placement   Post-Acute Placement Status Patient List Provided  (List of LTACs provided)

## 2023-10-25 NOTE — PROGRESS NOTES
Inpatient Nutrition Assessment    Admit Date: 10/19/2023   Total duration of encounter: 6 days     Nutrition Recommendation/Prescription     Continue current diet as tolerated  Encouraged adequate PO intake  Monitor appetite/PO intake, weight, and labs    Communication of Recommendations: reviewed with patient    Nutrition Assessment     Malnutrition Assessment/Nutrition-Focused Physical Exam    Malnutrition Context: other (see comments) (Unable to determine at this time) (10/25/23 1425)  Malnutrition Level: other (see comments) (Unable to determine at this time) (10/25/23 1425)  Energy Intake (Malnutrition): less than 75% for greater than or equal to 1 month (10/25/23 1425)  Weight Loss (Malnutrition): other (see comments) (Does not meet criteria) (10/25/23 1425)  Subcutaneous Fat (Malnutrition): other (see comments) (Unable to assess) (10/25/23 1425)           Muscle Mass (Malnutrition): other (see comments) (Unable to assess) (10/25/23 1425)                          Fluid Accumulation (Malnutrition): other (see comments) (Unable to assess) (10/25/23 1425)        A minimum of two characteristics is recommended for diagnosis of either severe or non-severe malnutrition.    Chart Review    Reason Seen: length of stay    Malnutrition Screening Tool Results   Have you recently lost weight without trying?: No  Have you been eating poorly because of a decreased appetite?: No   MST Score: 0   Diagnosis:  Acute right lower extremity cellulitis-failed outpatient treatment-POA   Hyponatremia-POA   Anemia chronic disease-POA   Thrombocytopenia-chronic-POA  Immunocompromised state-status post liver transplant-POA   Long-term anticoagulation therapy-on Xarelto-POA  Chronic back pain on chronic opioid therapy-POA   Weakness-POA  Hx of alcoholic cirrhosis of liver- s/p transplant 2021- POA    Relevant Medical History:   ETOH abuse  Chronic cirrhosis of liver- s/p liver transplant  Thrombocytopenia/pancytopenia  Anemia chronic  disease   Chronic back pain  Anxiety  Depression  Neutropenia    Nutrition-Related Medications:   Scheduled Meds:   calcium carbonate  1,000 mg Oral BID    cyclobenzaprine  10 mg Oral TID    diphenhydrAMINE  25 mg Oral QHS    ergocalciferol  50,000 Units Oral Q7 Days    folic acid  1,000 mcg Oral Daily    gabapentin  600 mg Oral TID    mupirocin   Nasal BID    nicotine  1 patch Transdermal Daily    piperacillin-tazobactam (Zosyn) IV (PEDS and ADULTS) (extended infusion is not appropriate)  4.5 g Intravenous Q8H    polyethylene glycol  17 g Oral Daily    rivaroxaban  20 mg Oral with dinner    sodium chloride 0.9%  10 mL Intravenous Q6H    tacrolimus  5 mg Oral BID    vancomycin (VANCOCIN) IV (PEDS and ADULTS)  1,000 mg Intravenous Q12H     Continuous Infusions:  PRN Meds:.sodium chloride 0.9%, acetaminophen, acetaminophen, cloNIDine, melatonin, morphine, naloxegoL, naloxone, oxyCODONE-acetaminophen, prochlorperazine, sodium chloride 0.9%, Flushing PICC/Midline Protocol **AND** sodium chloride 0.9% **AND** sodium chloride 0.9%, vancomycin - pharmacy to dose    Calorie Containing IV Medications: no significant kcals from medications at this time    Nutrition-Related Labs:  10/25/2023: BUN 7.5, Alb 3.4, AST 37, Gluc 113    Diet/PN Order: Diet Adult Regular  Oral Supplement Order: none  Tube Feeding Order: none  Appetite/Oral Intake: good/% of meals  Factors Affecting Nutritional Intake: none identified  Food/Latter day/Cultural Preferences: none reported  Food Allergies: none reported    Wound(s):      Altered Skin Integrity 10/20/23 0015 Right lower;medial Leg #1 Other (comment) Partial thickness tissue loss. Shallow open ulcer with a red or pink wound bed, without slough. Intact or Open/Ruptured Serum-filled blister.-Tissue loss description: Full thickness noted    Last Bowel Movement: 10/24/23    Comments    10/25/2023: Pt reports a poor appetite/PO intake for ~1 month. Pt reports a good appetite/PO intake  "currently. Pt denies N/V/D/C and chewing/swallowing difficulties. Pt reports UBW as 97.7 kg. Pt declined ONS and double protein diet. Encouraged adequate PO intake. Last BM documented as 10/24/2023. Will monitor.    Anthropometrics    Height: 6' 1" (185.4 cm)    Last Weight: 105 kg (231 lb 7.7 oz) (10/25/23 0617) Weight Method: Standard Scale  BMI (Calculated): 30.5  BMI Classification: obese grade I (BMI 30-34.9)        Ideal Body Weight (IBW), Male: 184 lb     % Ideal Body Weight, Male (lb): 120.78 %                 Usual Body Weight (UBW), k.7 kg  % Usual Body Weight: 107.7     Usual Weight Provided By: patient    Wt Readings from Last 5 Encounters:   10/25/23 105 kg (231 lb 7.7 oz)   22 94 kg (207 lb 3.7 oz)   21 81.6 kg (180 lb)   10/11/21 79 kg (174 lb 2.6 oz)   21 78 kg (171 lb 15.3 oz)     Weight Change(s) Since Admission:  Admit Weight: 100.7 kg (222 lb) (10/19/23 1819)  10/25/2023: 105 kg    Estimated Needs    Weight Used For Calorie Calculations: 105 kg (231 lb 7.7 oz)  Energy Calorie Requirements (kcal): 3431-1057 (MSJ x 1.2-1.3)  Energy Need Method: [place]-St Jeor  Weight Used For Protein Calculations: 105 kg (231 lb 7.7 oz)  Protein Requirements: 105-126 (1.0-1.2 g/kg)  Fluid Requirements (mL): 2336 (1 mL/kcal) or per MD orders  Temp (24hrs), Av.3 °F (36.8 °C), Min:98 °F (36.7 °C), Max:99 °F (37.2 °C)       Enteral Nutrition    Patient not receiving enteral nutrition at this time.    Parenteral Nutrition    Patient not receiving parenteral nutrition support at this time.    Evaluation of Received Nutrient Intake    Calories: meeting estimated needs  Protein: meeting estimated needs    Patient Education    Not applicable.    Nutrition Diagnosis     PES: Increased nutrient needs related to chronic illness as evidenced by increased nutrient demand. (new)    Interventions/Goals     Intervention(s): general/healthful diet  Goal: Meet greater than 75% of nutritional needs by " follow-up. (new)    Monitoring & Evaluation     Dietitian will monitor food and beverage intake, weight, food/nutrition knowledge skill, glucose/endocrine profile, and gastrointestinal profile.  Nutrition Risk/Follow-Up: moderate (follow-up in 3-5 days)   Please consult if re-assessment needed sooner.

## 2023-10-25 NOTE — PROGRESS NOTES
[facility] - 9th Floor Med Surg  Vascular Surgery  Progress Note    Patient Name: Kojo Sheikh III  MRN: [id number]  Admission Date: 10/19/2023  Primary Care Provider: No primary care provider on file.    Subjective:     Interval History: No major issues; Seen by infectious disease recently;   Dressing being changed by wound care team and nursing;     Post-Op Info:  Procedure(s) (LRB):  IRRIGATION AND DEBRIDEMENT- i&d abscess right lower extremity (Right)   4 Days Post-Op      Medications:  Continuous Infusions:  Scheduled Meds:   calcium carbonate  1,000 mg Oral BID    cyclobenzaprine  10 mg Oral TID    diphenhydrAMINE  25 mg Oral QHS    ergocalciferol  50,000 Units Oral Q7 Days    folic acid  1,000 mcg Oral Daily    gabapentin  600 mg Oral TID    mupirocin   Nasal BID    naloxegoL  25 mg Oral Daily    nicotine  1 patch Transdermal Daily    piperacillin-tazobactam (Zosyn) IV (PEDS and ADULTS) (extended infusion is not appropriate)  4.5 g Intravenous Q8H    polyethylene glycol  17 g Oral Daily    rivaroxaban  20 mg Oral with dinner    sodium chloride 0.9%  10 mL Intravenous Q6H    tacrolimus  5 mg Oral BID    vancomycin (VANCOCIN) IV (PEDS and ADULTS)  1,000 mg Intravenous Q12H     PRN Meds:sodium chloride 0.9%, acetaminophen, acetaminophen, cloNIDine, melatonin, morphine, naloxone, oxyCODONE-acetaminophen, prochlorperazine, sodium chloride 0.9%, Flushing PICC/Midline Protocol **AND** sodium chloride 0.9% **AND** sodium chloride 0.9%, vancomycin - pharmacy to dose     Objective:     Vital Signs (Most Recent):  Temp: 98.1 °F (36.7 °C) (10/25/23 0910)  Pulse: 100 (10/25/23 0910)  Resp: 18 (10/25/23 0910)  BP: (!) 153/93 (10/25/23 0910)  SpO2: 100 % (10/25/23 0910) Vital Signs (24h Range):  Temp:  [97.6 °F (36.4 °C)-99 °F (37.2 °C)] 98.1 °F (36.7 °C)  Pulse:  [] 100  Resp:  [18-20] 18  SpO2:  [97 %-100 %] 100 %  BP: (108-157)/(76-96) 153/93         Physical Exam    Alert, ambulating in room  with out difficulty  Breathing easily  Right leg without edema and without spreading cellulitis  Recent wound care photos reviewed.      Significant Labs:  BMP:   Recent Labs   Lab 10/25/23  0936      K 3.9   CO2 29   BUN 7.5*   CREATININE 0.94   CALCIUM 9.0     CBC:   Recent Labs   Lab 10/25/23  0936   WBC 5.13   RBC 3.35*   HGB 11.8*   HCT 35.6*      .3*   MCH 35.2*   MCHC 33.1       Significant Diagnostics:      Assessment/Plan:     Active Diagnoses:    Diagnosis Date Noted POA    PRINCIPAL PROBLEM:  Abscess of right leg [L02.415] 10/21/2023 Yes      Problems Resolved During this Admission:     Continue wound care with wound packing per wound care  Antibiotics per primary team and infectious disease  Can follow up with me in 3 weeks - please contact me for any worsening to his leg.      Shirlene Durán MD  Vascular Surgery  [facility] - 9th Floor Med Surg

## 2023-10-25 NOTE — PLAN OF CARE
IQ Criteria IQ Criteria Review Completed By: Mikaela Parra Level Of Care Reviewed For: LTAC Preadmission Review Status: InterQual Criteria Met Presently, this patient meets InterQual® criteria for LTAC admission. Please note, if there is a change in the patient's clinical status or treatments needed, a new review will be necessary.

## 2023-10-25 NOTE — PROGRESS NOTES
[facility]  Hospital Medicine Progress Note        Chief Complaint: Wound Check ([place] [facility] [place] cardiologist for leg pain/wound to E. Pt has pustule on R calf. Unable to obtain US of leg. )         Patient information was obtained from patient, patient's family, past medical records and ER records.      HISTORY OF PRESENT ILLNESS:   Kojo Sheikh III is a 43 y.o. male who PMH includes alcoholic cirrhosis of the liver status post liver transplant, thrombocytopenia, anemia, chronic back pain, anxiety; presents to the ED sent by his cardiologist for right lower extremity leg wound nonhealing.  Patient was seen and not elicit general for the leg wound however it was reported patient was unable to have ultrasound done.  Patient presented here at [facility] on 10/19/2023 for evaluation of right lower extremity wound.  It was reported patient has had 3 vascular surgeries to the right leg in the past.  Patient was seen in the ED in [place] on Tuesday which he was prescribed oral clindamycin therapy.  Patient reports worsening redness and warmth to the right lower extremity with associated open wound erupted prior to arrival in the ED. patient is on long-term anticoagulation therapy with Xarelto which his last dose was 2 days ago.  Patient does report pain to the right lower extremity he was seen by Cardiology Services and was instructed to come to the ED for further evaluation.  No reports of chest pain, shortness a breath, fever, chills, cough, congestion, or any sick contacts.  Lab work reviewed demonstrated H&H 12.7/35.9, sodium 126, chloride 94, CO2 19, BUN 5.5, creatinine 0.67; sed rate 21, AST 84, CRP 26.40, lactic acid 1.2; other indices unremarkable.  X-ray of the right lower extremity was done and pending at this time.  Initial vital signs /85 pulse 110 respirations 19 temperature 98.5° F O2 saturation 97% on room air.  Blood cultures were collected x2 sets.   Patient was started on IV vancomycin and Zosyn therapy.  Patient is admitted to hospital medicine services for further management.        Patient currently admitted for acute right lower extremity cellulitis .  status post I&D by vascular MD on 10/21 with findings of necrotic substance sent for pathology and negative for malignancy. ID on board      Today's information  Patient seen and examined at bedside   Patient has no new complaints would like his hepatic function test checked since it has been a few days   Vitals reviewed with blood pressure elevated likely due to ongoing pain  Ultrasound right lower extremity shows superficial greater saphenous vein thrombosis but no DVT   Pathology reports is back shows no malignancy, hemorrhagic organizing abscess, reported to patient   Appreciate ID input to continue IV vancomycin and Zosyn with options of transitioning to p.o. doxycycline and Augmentin   Patient voices concern and would like to remain on IV therapy and also remain in the hospital given immunocompromised state   Cultures currently growing Gram-positive cocci and Gram-positive rods will follow-up with results        ASSESSMENT:  Acute right lower extremity cellulitis-failed outpatient treatment-POA , GPC, GPR   Immunocompromised state-status post liver transplant  Anemia chronic disease  Thrombocytopenia-chronic  Long-term anticoagulation therapy-on Xarelto  Chronic back pain on chronic opioid therapy  Weakness  Hx of alcoholic cirrhosis of liver- s/p transplant 2021- POA     PLAN:    Ultrasound right lower extremity shows superficial greater saphenous vein thrombosis but no DVT   Pathology reports is back shows no malignancy, hemorrhagic organizing abscess, reported to patient   Appreciate ID input to continue IV vancomycin and Zosyn with options of transitioning to p.o. doxycycline and Augmentin   Cultures currently growing Gram-positive cocci and Gram-positive rods will follow-up with results   Patient  is status post I&D by vascular MD on 10/21, to follow-up outpatient in 3 weeks   Wound care on board PRN pain management   Neurovascular checks right lower extremity every 4 hours     VTE Prophylaxis: Home Xarelto for DVT prophylaxis and will be advised to be as mobile as possible and sit in a chair as tolerated      dispo- Patient voices concern of immunosuppression and would like to remain on IV therapy and also remain in the hospital       VITAL SIGNS: 24 HRS MIN & MAX LAST   Temp  Min: 98 °F (36.7 °C)  Max: 99 °F (37.2 °C) 98.1 °F (36.7 °C)   BP  Min: 108/76  Max: 157/96 (!) 153/93   Pulse  Min: 99  Max: 111  100   Resp  Min: 18  Max: 20 18   SpO2  Min: 97 %  Max: 100 % 100 %     I have reviewed the following labs:  Recent Labs   Lab 10/21/23  0607 10/22/23  0909 10/25/23  0936   WBC 6.29 7.53 5.13   RBC 3.20* 3.20* 3.35*   HGB 11.3* 11.3* 11.8*   HCT 33.1* 33.3* 35.6*   .4* 104.1* 106.3*   MCH 35.3* 35.3* 35.2*   MCHC 34.1 33.9 33.1   RDW 13.8 13.7 13.6   * 128* 143   MPV 8.9 8.7 8.4     Recent Labs   Lab 10/19/23  1834 10/20/23  0808 10/21/23  0607 10/22/23  0909 10/25/23  0936   * 134* 134* 134* 137   K 4.3 4.5 4.4 4.2 3.9   CO2 19* 26 29 28 29   BUN 5.5* 7.1* 13.0 9.7 7.5*   CREATININE 0.67* 0.87 1.15 1.04 0.94   CALCIUM 8.4 8.4 8.7 8.8 9.0   ALBUMIN 3.8 3.3* 3.2*  --   --    ALKPHOS 137 133 130  --   --    ALT 47 47 40  --   --    AST 84* 86* 57*  --   --    BILITOT 1.0 0.7 0.8  --   --      Microbiology Results (last 7 days)       Procedure Component Value Units Date/Time    Anaerobic Culture [[id number]]  (Abnormal) Collected: 10/21/23 1334    Order Status: Completed Specimen: Abscess from Leg, Right Updated: 10/25/23 1242     Anaerobe Culture No Anaerobes Isolated      Finegoldia magna     Comment: Anaerobic sensitivity is not usually indicated except on single isolates from sterile body sites.       Anaerobic Culture [[id number]]  (Abnormal) Collected: 10/21/23 1334    Order Status:  Completed Specimen: Abscess from Leg, Right Updated: 10/25/23 1237     Anaerobe Culture Noahldia magna     Comment: Anaerobic sensitivity is not usually indicated except on single isolates from sterile body sites.       Wound Culture [[id number]]  (Abnormal) Collected: 10/21/23 1334    Order Status: Completed Specimen: Abscess from Leg, Right Updated: 10/25/23 0955     Wound Culture Rare Gram-positive Rods     Comment: Sent to Reference Lab for Identification  Susceptibility sent to reference lab. Results to follow on separate report.       Narrative:      For sensitivity results refer to [id number].    Blood culture #1 **CANNOT BE ORDERED STAT** [[id number]]  (Normal) Collected: 10/19/23 1834    Order Status: Completed Specimen: Blood Updated: 10/24/23 2001     CULTURE, BLOOD ([facility]) No Growth at 5 days    Blood culture #2 **CANNOT BE ORDERED STAT** [[id number]]  (Normal) Collected: 10/19/23 1834    Order Status: Completed Specimen: Blood Updated: 10/24/23 2001     CULTURE, BLOOD ([facility]) No Growth at 5 days    Wound Culture [[id number]]  (Abnormal) Collected: 10/21/23 1334    Order Status: Completed Specimen: Abscess from Leg, Right Updated: 10/24/23 1433     Wound Culture Few Gram-positive Rods    Narrative:      Refer to [id number] for identification and sensitivity.    Gram Stain [[id number]] Collected: 10/21/23 1334    Order Status: Completed Specimen: Abscess from Leg, Right Updated: 10/22/23 1145     GRAM STAIN Rare WBC observed      Rare Gram positive cocci    Gram Stain [[id number]] Collected: 10/21/23 1334    Order Status: Completed Specimen: Abscess from Leg, Right Updated: 10/22/23 1145     GRAM STAIN Rare WBC observed      Few Gram positive cocci    Fungal Culture [[id number]] Collected: 10/21/23 1334    Order Status: Sent Specimen: Abscess from Leg, Right Updated: 10/21/23 1417    Fungal Culture [[id number]] Collected: 10/21/23 1334    Order Status: Sent Specimen: Abscess from Leg, Right Updated:  10/21/23 1417             See below for Radiology    Scheduled Med:   calcium carbonate  1,000 mg Oral BID    cyclobenzaprine  10 mg Oral TID    diphenhydrAMINE  25 mg Oral QHS    ergocalciferol  50,000 Units Oral Q7 Days    folic acid  1,000 mcg Oral Daily    gabapentin  600 mg Oral TID    mupirocin   Nasal BID    nicotine  1 patch Transdermal Daily    piperacillin-tazobactam (Zosyn) IV (PEDS and ADULTS) (extended infusion is not appropriate)  4.5 g Intravenous Q8H    polyethylene glycol  17 g Oral Daily    rivaroxaban  20 mg Oral with dinner    sodium chloride 0.9%  10 mL Intravenous Q6H    tacrolimus  5 mg Oral BID    vancomycin (VANCOCIN) IV (PEDS and ADULTS)  1,000 mg Intravenous Q12H      Continuous Infusions:     PRN Meds:  sodium chloride 0.9%, acetaminophen, acetaminophen, cloNIDine, melatonin, morphine, naloxegoL, naloxone, oxyCODONE-acetaminophen, prochlorperazine, sodium chloride 0.9%, Flushing PICC/Midline Protocol **AND** sodium chloride 0.9% **AND** sodium chloride 0.9%, vancomycin - pharmacy to dose     Assessment/Plan:      VTE prophylaxis:     Patient condition:  Stable/Fair/Guarded/ Serious/ Critical    Anticipated discharge and Disposition:         All diagnosis and differential diagnosis have been reviewed; assessment and plan has been documented; I have personally reviewed the labs and test results that are presently available; I have reviewed the patients medication list; I have reviewed the consulting providers response and recommendations. I have reviewed or attempted to review medical records based upon their availability    All of the patient's questions have been  addressed and answered. Patient's is agreeable to the above stated plan. I will continue to monitor closely and make adjustments to medical management as needed.  _____________________________________________________________________    Nutrition Status:    Radiology:  I have personally reviewed the following imaging and agree  with the radiologist.     CV Ultrasound doppler venous DVT leg right  Superficial vein thrombosis identified in the above and below knee greater   saphenous vein of the right lower extremity.  There is no evidence of deep vein thrombosis identified.     Notification:  Critical results given to SUE Arrington on 10/24/23  X-Ray Chest 1 View for Line/Tube Placement  Narrative: EXAMINATION:  XR CHEST 1 VIEW FOR LINE/TUBE PLACEMENT    CLINICAL HISTORY:  picc;    COMPARISON:  12 April 2021    FINDINGS:  Portable frontal view of the chest was obtained. Right PICC tip overlies the mid SVC.  The heart is not enlarged.  Lungs are clear.  There is no pneumothorax or significant effusion.  Impression: Right PICC tip overlies the mid SVC.    Electronically signed by: Kojo Travis  Date:    10/24/2023  Time:    14:09      Avery Ni MD   10/25/2023

## 2023-10-25 NOTE — PROGRESS NOTES
Pharmacokinetic Assessment Follow Up: IV Vancomycin    Vancomycin serum concentration assessment(s):    Cannot use level of 34.8 mcg/ml for dosing since it was drawn while bag infusing    Vancomycin Regimen Plan:    Check trough at 0200 on 10/26/23 before next dose.      Drug levels (last 3 results):  Recent Labs   Lab Result Units 10/23/23  1021 10/25/23  1508   Vancomycin Trough ug/ml 20.6* 34.8*          Patient brief summary:  Kojo Sheikh III is a 43 y.o. male initiated on antimicrobial therapy with IV Vancomycin for treatment of skin & soft tissue infection      Drug Allergies:   Review of patient's allergies indicates:   Allergen Reactions    Zofran [ondansetron hcl]      Interaction with tacrolimus       Actual Body Weight:   105 kg    Renal Function:   Estimated Creatinine Clearance: 126.2 mL/min (based on SCr of 0.94 mg/dL).,     Dialysis Method (if applicable):  N/A    CBC (last 72 hours):  Recent Labs   Lab Result Units 10/25/23  0936   WBC x10(3)/mcL 5.13   Hgb g/dL 11.8*   Hct % 35.6*   Platelet x10(3)/mcL 143   Mono % % 14.8   Eos % % 3.1   Basophil % % 0.8       Metabolic Panel (last 72 hours):  Recent Labs   Lab Result Units 10/25/23  0936   Sodium Level mmol/L 137   Potassium Level mmol/L 3.9   Chloride mmol/L 101   Carbon Dioxide mmol/L 29   Glucose Level mg/dL 113*   Blood Urea Nitrogen mg/dL 7.5*   Creatinine mg/dL 0.94   Albumin Level g/dL 3.4*   Bilirubin Total mg/dL 0.5   Alkaline Phosphatase unit/L 108   Aspartate Aminotransferase unit/L 37*   Alanine Aminotransferase unit/L 34       Vancomycin Administrations:  vancomycin given in the last 96 hours                     vancomycin in dextrose 5 % 1 gram/250 mL IVPB 1,000 mg (mg) 1,000 mg New Bag 10/25/23 1413     1,000 mg New Bag  0305      Restarted 10/24/23 1459     1,000 mg New Bag  1458     1,000 mg New Bag  0004     1,000 mg New Bag 10/23/23 1303    vancomycin 1.25 g in dextrose 5% 250 mL IVPB (ready to mix) (mg) 1,250 mg New Bag  10/22/23 2259     1,250 mg New Bag  1240     1,250 mg New Bag 10/21/23 2329                    Microbiologic Results:  Microbiology Results (last 7 days)       Procedure Component Value Units Date/Time    Anaerobic Culture [[id number]]  (Abnormal) Collected: 10/21/23 1334    Order Status: Completed Specimen: Abscess from Leg, Right Updated: 10/25/23 1242     Anaerobe Culture No Anaerobes Isolated      Finegoldia magna     Comment: Anaerobic sensitivity is not usually indicated except on single isolates from sterile body sites.       Anaerobic Culture [[id number]]  (Abnormal) Collected: 10/21/23 1334    Order Status: Completed Specimen: Abscess from Leg, Right Updated: 10/25/23 1237     Anaerobe Culture Finegoldia magna     Comment: Anaerobic sensitivity is not usually indicated except on single isolates from sterile body sites.       Wound Culture [[id number]]  (Abnormal) Collected: 10/21/23 1334    Order Status: Completed Specimen: Abscess from Leg, Right Updated: 10/25/23 0955     Wound Culture Rare Gram-positive Rods     Comment: Sent to Reference Lab for Identification  Susceptibility sent to reference lab. Results to follow on separate report.       Narrative:      For sensitivity results refer to [id number].    Blood culture #1 **CANNOT BE ORDERED STAT** [[id number]]  (Normal) Collected: 10/19/23 1834    Order Status: Completed Specimen: Blood Updated: 10/24/23 2001     CULTURE, BLOOD ([facility]) No Growth at 5 days    Blood culture #2 **CANNOT BE ORDERED STAT** [[id number]]  (Normal) Collected: 10/19/23 1834    Order Status: Completed Specimen: Blood Updated: 10/24/23 2001     CULTURE, BLOOD ([facility]) No Growth at 5 days    Wound Culture [[id number]]  (Abnormal) Collected: 10/21/23 1334    Order Status: Completed Specimen: Abscess from Leg, Right Updated: 10/24/23 1433     Wound Culture Few Gram-positive Rods    Narrative:      Refer to [id number] for identification and sensitivity.    Gram Stain [[id number]]  Collected: 10/21/23 1334    Order Status: Completed Specimen: Abscess from Leg, Right Updated: 10/22/23 1145     GRAM STAIN Rare WBC observed      Rare Gram positive cocci    Gram Stain [[id number]] Collected: 10/21/23 1334    Order Status: Completed Specimen: Abscess from Leg, Right Updated: 10/22/23 1145     GRAM STAIN Rare WBC observed      Few Gram positive cocci    Fungal Culture [[id number]] Collected: 10/21/23 1334    Order Status: Sent Specimen: Abscess from Leg, Right Updated: 10/21/23 1417    Fungal Culture [[id number]] Collected: 10/21/23 1334    Order Status: Sent Specimen: Abscess from Leg, Right Updated: 10/21/23 1417

## 2023-10-26 LAB
ALBUMIN SERPL-MCNC: 3.2 G/DL (ref 3.5–5)
ALBUMIN/GLOB SERPL: 1.1 RATIO (ref 1.1–2)
ALP SERPL-CCNC: 95 UNIT/L (ref 40–150)
ALT SERPL-CCNC: 29 UNIT/L (ref 0–55)
AST SERPL-CCNC: 30 UNIT/L (ref 5–34)
BACTERIA SPEC ANAEROBE CULT: ABNORMAL
BILIRUB SERPL-MCNC: 0.4 MG/DL
BUN SERPL-MCNC: 8.3 MG/DL (ref 8.9–20.6)
CALCIUM SERPL-MCNC: 8.7 MG/DL (ref 8.4–10.2)
CHLORIDE SERPL-SCNC: 102 MMOL/L (ref 98–107)
CO2 SERPL-SCNC: 26 MMOL/L (ref 22–29)
CREAT SERPL-MCNC: 0.92 MG/DL (ref 0.73–1.18)
GFR SERPLBLD CREATININE-BSD FMLA CKD-EPI: >60 MLS/MIN/1.73/M2
GLOBULIN SER-MCNC: 2.9 GM/DL (ref 2.4–3.5)
GLUCOSE SERPL-MCNC: 115 MG/DL (ref 74–100)
PATH REV: NORMAL
POTASSIUM SERPL-SCNC: 4.5 MMOL/L (ref 3.5–5.1)
PROT SERPL-MCNC: 6.1 GM/DL (ref 6.4–8.3)
SODIUM SERPL-SCNC: 136 MMOL/L (ref 136–145)
VANCOMYCIN TROUGH SERPL-MCNC: 14.4 UG/ML (ref 15–20)

## 2023-10-26 PROCEDURE — S4991 NICOTINE PATCH NONLEGEND: HCPCS | Performed by: INTERNAL MEDICINE

## 2023-10-26 PROCEDURE — 80053 COMPREHEN METABOLIC PANEL: CPT | Performed by: SPECIALIST

## 2023-10-26 PROCEDURE — 25000003 PHARM REV CODE 250: Performed by: INTERNAL MEDICINE

## 2023-10-26 PROCEDURE — 11000001 HC ACUTE MED/SURG PRIVATE ROOM

## 2023-10-26 PROCEDURE — A4216 STERILE WATER/SALINE, 10 ML: HCPCS | Performed by: INTERNAL MEDICINE

## 2023-10-26 PROCEDURE — 63600175 PHARM REV CODE 636 W HCPCS: Performed by: INTERNAL MEDICINE

## 2023-10-26 PROCEDURE — 25000003 PHARM REV CODE 250: Performed by: NURSE PRACTITIONER

## 2023-10-26 PROCEDURE — 63600175 PHARM REV CODE 636 W HCPCS: Performed by: NURSE PRACTITIONER

## 2023-10-26 PROCEDURE — 80202 ASSAY OF VANCOMYCIN: CPT | Performed by: INTERNAL MEDICINE

## 2023-10-26 RX ADMIN — CYCLOBENZAPRINE 10 MG: 10 TABLET, FILM COATED ORAL at 08:10

## 2023-10-26 RX ADMIN — OXYCODONE HYDROCHLORIDE AND ACETAMINOPHEN 1 TABLET: 10; 325 TABLET ORAL at 05:10

## 2023-10-26 RX ADMIN — OXYCODONE HYDROCHLORIDE AND ACETAMINOPHEN 1 TABLET: 10; 325 TABLET ORAL at 09:10

## 2023-10-26 RX ADMIN — GABAPENTIN 600 MG: 300 CAPSULE ORAL at 08:10

## 2023-10-26 RX ADMIN — CYCLOBENZAPRINE 10 MG: 10 TABLET, FILM COATED ORAL at 09:10

## 2023-10-26 RX ADMIN — SODIUM CHLORIDE, PRESERVATIVE FREE 10 ML: 5 INJECTION INTRAVENOUS at 05:10

## 2023-10-26 RX ADMIN — NICOTINE 1 PATCH: 21 PATCH, EXTENDED RELEASE TRANSDERMAL at 08:10

## 2023-10-26 RX ADMIN — VANCOMYCIN HYDROCHLORIDE 1000 MG: 1 INJECTION, POWDER, LYOPHILIZED, FOR SOLUTION INTRAVENOUS at 02:10

## 2023-10-26 RX ADMIN — RIVAROXABAN 20 MG: 10 TABLET, FILM COATED ORAL at 05:10

## 2023-10-26 RX ADMIN — MUPIROCIN: 20 OINTMENT TOPICAL at 08:10

## 2023-10-26 RX ADMIN — OXYCODONE HYDROCHLORIDE AND ACETAMINOPHEN 1 TABLET: 10; 325 TABLET ORAL at 07:10

## 2023-10-26 RX ADMIN — SODIUM CHLORIDE, PRESERVATIVE FREE 10 ML: 5 INJECTION INTRAVENOUS at 12:10

## 2023-10-26 RX ADMIN — TACROLIMUS 5 MG: 1 CAPSULE ORAL at 08:10

## 2023-10-26 RX ADMIN — GABAPENTIN 600 MG: 300 CAPSULE ORAL at 02:10

## 2023-10-26 RX ADMIN — PROCHLORPERAZINE EDISYLATE 5 MG: 5 INJECTION INTRAMUSCULAR; INTRAVENOUS at 01:10

## 2023-10-26 RX ADMIN — CALCIUM CARBONATE (ANTACID) CHEW TAB 500 MG 1000 MG: 500 CHEW TAB at 08:10

## 2023-10-26 RX ADMIN — CYCLOBENZAPRINE 10 MG: 10 TABLET, FILM COATED ORAL at 02:10

## 2023-10-26 RX ADMIN — OXYCODONE HYDROCHLORIDE AND ACETAMINOPHEN 1 TABLET: 10; 325 TABLET ORAL at 03:10

## 2023-10-26 RX ADMIN — MUPIROCIN: 20 OINTMENT TOPICAL at 09:10

## 2023-10-26 RX ADMIN — MORPHINE SULFATE 4 MG: 4 INJECTION, SOLUTION INTRAMUSCULAR; INTRAVENOUS at 01:10

## 2023-10-26 RX ADMIN — VANCOMYCIN HYDROCHLORIDE 1000 MG: 1 INJECTION, POWDER, LYOPHILIZED, FOR SOLUTION INTRAVENOUS at 03:10

## 2023-10-26 RX ADMIN — POLYETHYLENE GLYCOL 3350 17 G: 17 POWDER, FOR SOLUTION ORAL at 08:10

## 2023-10-26 RX ADMIN — FOLIC ACID 1000 MCG: 1 TABLET ORAL at 08:10

## 2023-10-26 RX ADMIN — TACROLIMUS 5 MG: 1 CAPSULE ORAL at 05:10

## 2023-10-26 RX ADMIN — SODIUM CHLORIDE, PRESERVATIVE FREE 10 ML: 5 INJECTION INTRAVENOUS at 06:10

## 2023-10-26 RX ADMIN — GABAPENTIN 600 MG: 300 CAPSULE ORAL at 09:10

## 2023-10-26 RX ADMIN — CALCIUM CARBONATE (ANTACID) CHEW TAB 500 MG 1000 MG: 500 CHEW TAB at 09:10

## 2023-10-26 RX ADMIN — PIPERACILLIN AND TAZOBACTAM 4.5 G: 4; .5 INJECTION, POWDER, FOR SOLUTION INTRAVENOUS at 06:10

## 2023-10-26 RX ADMIN — PIPERACILLIN AND TAZOBACTAM 4.5 G: 4; .5 INJECTION, POWDER, FOR SOLUTION INTRAVENOUS at 10:10

## 2023-10-26 RX ADMIN — PIPERACILLIN AND TAZOBACTAM 4.5 G: 4; .5 INJECTION, POWDER, FOR SOLUTION INTRAVENOUS at 02:10

## 2023-10-26 RX ADMIN — OXYCODONE HYDROCHLORIDE AND ACETAMINOPHEN 1 TABLET: 10; 325 TABLET ORAL at 12:10

## 2023-10-26 NOTE — PROGRESS NOTES
Infectious Diseases Progress Note  43-year-old male with past medical history of anxiety, chronic back pain, alcoholic cirrhosis of the liver s/p liver transplant on tacrolimus, thrombocytopenia, history of prior vascular surgeries in the right lower extremity, on long-term anticoagulation therapy with Xarelto, is admitted to [facility] on 10/19/2023, presenting through the ED, sent by his cardiologist for right lower extremity nonhealing leg wound with cellulitis.  He apparently was initially seen at outside facility, [place] on 10/17, prescribed oral clindamycin without improvement, reporting worsening redness and warmth to the right lower extremity.  He was seen by the Cardiology Service and was instructed to present to the ED for further evaluation.  On presentation he was noted to have no fevers and no leukocytosis, noted with thrombocytopenia, anemic with low albumin.  Urine toxicology test positive for opiates.  Blood cultures have remained negative.  CRP 26.40.  X-ray of the tibia fibula with no acute osseous abnormality.  He was seen by vascular surgery, taken to OR on 10/21 for irrigation and debridement of abscess of right lower extremity with findings of purulence blood cultures have remained negative and Gram-positive cocci noted on Gram stain.    He is on antibiotic coverage with vancomycin and Zosyn.    Subjective:  No new complaints, no fevers, doing about the same.  In no acute distress      Past Medical History:   Diagnosis Date    Alcohol abuse     Chronic back pain      Past Surgical History:   Procedure Laterality Date    BACK SURGERY  2011    DIAGNOSTIC ULTRASOUND N/A 4/14/2021    Procedure: ULTRASOUND,INTRAOPERATIVE;  Surgeon: Jose Anderson MD;  Location: [facility] OR [address] [place] [address];  Service: Transplant;  Laterality: N/A;    ESOPHAGOGASTRODUODENOSCOPY N/A 8/5/2021    Procedure: EGD (ESOPHAGOGASTRODUODENOSCOPY);  Surgeon: Judy De La Garza MD;  Location: [facility] ([facility]  FL[facility]);  Service: Endoscopy;  Laterality: N/A;    IRRIGATION AND DEBRIDEMENT Right 10/21/2023    Procedure: IRRIGATION AND DEBRIDEMENT- i&d abscess right lower extremity;  Surgeon: Shirlene Durán MD;  Location: [facility] OR;  Service: Peripheral Vascular;  Laterality: Right;    LIVER TRANSPLANT N/A 4/11/2021    Procedure: TRANSPLANT, LIVER;  Surgeon: Joe Baker MD;  Location: [facility] OR 2ND FLR;  Service: Transplant;  Laterality: N/A;     Social History     Socioeconomic History    Marital status: Single   Tobacco Use    Smoking status: Never    Smokeless tobacco: Never   Substance and Sexual Activity    Drug use: Never       ROS  HENT: Negative.     Respiratory: Negative.     Gastrointestinal: Negative.    Genitourinary: Negative.    Musculoskeletal: Negative.    Skin:         Right leg wound   Neurological:  Negative for weakness.   Endo/Heme/Allergies: Negative.    Psychiatric/Behavioral: Negative.     All other Systems review done and negative.    Review of patient's allergies indicates:   Allergen Reactions    Zofran [ondansetron hcl]      Interaction with tacrolimus         Scheduled Meds:   calcium carbonate  1,000 mg Oral BID    cyclobenzaprine  10 mg Oral TID    diphenhydrAMINE  25 mg Oral QHS    ergocalciferol  50,000 Units Oral Q7 Days    folic acid  1,000 mcg Oral Daily    gabapentin  600 mg Oral TID    mupirocin   Nasal BID    nicotine  1 patch Transdermal Daily    piperacillin-tazobactam (Zosyn) IV (PEDS and ADULTS) (extended infusion is not appropriate)  4.5 g Intravenous Q8H    polyethylene glycol  17 g Oral Daily    rivaroxaban  20 mg Oral with dinner    sodium chloride 0.9%  10 mL Intravenous Q6H    tacrolimus  5 mg Oral BID    vancomycin (VANCOCIN) IV (PEDS and ADULTS)  1,000 mg Intravenous Q12H     Continuous Infusions:  PRN Meds:sodium chloride 0.9%, acetaminophen, acetaminophen, cloNIDine, melatonin, morphine, naloxegoL, naloxone, oxyCODONE-acetaminophen, prochlorperazine, sodium chloride 0.9%,  "Flushing PICC/Midline Protocol **AND** sodium chloride 0.9% **AND** sodium chloride 0.9%, vancomycin - pharmacy to dose    Objective:  BP (!) 148/99   Pulse 110   Temp 98.2 °F (36.8 °C) (Oral)   Resp 18   Ht 6' 1" (1.854 m)   Wt 105 kg (231 lb 7.7 oz)   SpO2 97%   BMI 30.54 kg/m²     Physical Exam:   Physical Exam  Vitals reviewed.   Constitutional:       General: He is not in acute distress.     Appearance: He is obese. He is not toxic-appearing.   HENT:      Head: Normocephalic and atraumatic.   Eyes:      Pupils: Pupils are equal, round, and reactive to light.   Cardiovascular:      Rate and Rhythm: Normal rate and regular rhythm.      Heart sounds: Normal heart sounds.   Pulmonary:      Effort: Pulmonary effort is normal. No respiratory distress.      Breath sounds: Normal breath sounds.   Abdominal:      General: Bowel sounds are normal. There is no distension.      Palpations: Abdomen is soft.      Tenderness: There is no abdominal tenderness.   Genitourinary:     Comments: No lesions reported  Musculoskeletal:         General: No deformity. Normal range of motion.      Cervical back: Neck supple.   Skin:     Findings: No rash.      Comments: RLE wound dressed  Neurological:      Mental Status: He is alert and oriented to person, place, and time.   Psychiatric:      Comments: Calm and cooperative      Imaging  Imaging Results              X-Ray Tibia Fibula 2 View Right (Final result)  Result time 10/20/23 12:40:35      Final result by Manasa Murphy MD (10/20/23 12:40:35)                   Impression:      No acute osseous abnormality.      Electronically signed by: Manasa Murphy  Date:    10/20/2023  Time:    12:40               Narrative:    EXAMINATION:  XR TIBIA FIBULA 2 VIEW RIGHT    CLINICAL HISTORY:  Pain, unspecified    TECHNIQUE:  AP and lateral views of the right tibia and fibula were performed.    COMPARISON:  None.    FINDINGS:  No fracture. No dislocation.    Nonfocal soft tissue " swelling.                                       Lab Review   Recent Results (from the past 24 hour(s))   Basic Metabolic Panel    Collection Time: 10/25/23  9:36 AM   Result Value Ref Range    Sodium Level 137 136 - 145 mmol/L    Potassium Level 3.9 3.5 - 5.1 mmol/L    Chloride 101 98 - 107 mmol/L    Carbon Dioxide 29 22 - 29 mmol/L    Glucose Level 113 (H) 74 - 100 mg/dL    Blood Urea Nitrogen 7.5 (L) 8.9 - 20.6 mg/dL    Creatinine 0.94 0.73 - 1.18 mg/dL    BUN/Creatinine Ratio 8     Calcium Level Total 9.0 8.4 - 10.2 mg/dL    Anion Gap 7.0 mEq/L    eGFR >60 mls/min/1.73/m2   CBC with Differential    Collection Time: 10/25/23  9:36 AM   Result Value Ref Range    WBC 5.13 4.50 - 11.50 x10(3)/mcL    RBC 3.35 (L) 4.70 - 6.10 x10(6)/mcL    Hgb 11.8 (L) 14.0 - 18.0 g/dL    Hct 35.6 (L) 42.0 - 52.0 %    .3 (H) 80.0 - 94.0 fL    MCH 35.2 (H) 27.0 - 31.0 pg    MCHC 33.1 33.0 - 36.0 g/dL    RDW 13.6 11.5 - 17.0 %    Platelet 143 130 - 400 x10(3)/mcL    MPV 8.4 7.4 - 10.4 fL    Neut % 49.5 %    Lymph % 31.2 %    Mono % 14.8 %    Eos % 3.1 %    Basophil % 0.8 %    Lymph # 1.60 0.6 - 4.6 x10(3)/mcL    Neut # 2.54 2.1 - 9.2 x10(3)/mcL    Mono # 0.76 0.1 - 1.3 x10(3)/mcL    Eos # 0.16 0 - 0.9 x10(3)/mcL    Baso # 0.04 <=0.2 x10(3)/mcL    IG# 0.03 0 - 0.04 x10(3)/mcL    IG% 0.6 %    NRBC% 0.0 %   Sedimentation rate    Collection Time: 10/25/23  9:36 AM   Result Value Ref Range    Sed Rate 36 (H) 0 - 15 mm/hr   Hepatic Function Panel    Collection Time: 10/25/23  9:36 AM   Result Value Ref Range    Albumin Level 3.4 (L) 3.5 - 5.0 g/dL    Alkaline Phosphatase 108 40 - 150 unit/L    Alanine Aminotransferase 34 0 - 55 unit/L    Aspartate Aminotransferase 37 (H) 5 - 34 unit/L    Bilirubin Direct 0.3 0.0 - <0.5 mg/dL    Bilirubin Indirect 0.20 0.00 - 0.80 mg/dL    Bilirubin Total 0.5 <=1.5 mg/dL    Protein Total 6.5 6.4 - 8.3 gm/dL   VANCOMYCIN, TROUGH    Collection Time: 10/25/23  3:08 PM   Result Value Ref Range    Vancomycin  Trough 34.8 (HH) 15.0 - 20.0 ug/ml             Assessment/Plan:  1. Right lower extremity abscess/cellulitis/wound infection-Finegoldia  2. Immunocompromised s/p liver transplant on immunosuppressive drugs  3. History of alcoholic cirrhosis  4.  Anemia and thrombocytopenia  5.  Protein calorie malnutrition  6. Chronic back pain, opioid dependent  6.  Morbid obesity      Continue vancomycin and Zosyn, plan end date of 11/4 with option of transitioning to oral antibiotics on discharge with doxycycline and Augmentin  No fevers and no leukocytosis   S/p abscess drainage on 10/21 with cultures yielding Finegoldia magna  -10/19 blood cultures negative  -10/24 chest x-ray results noted with PICC line placement  -We will continue wound care per surgery and Wound Care team  -Keep the extremity elevated at all times while sitting or supine  -Weight loss is encouraged  -Discussed with patient and nursing staff.  Disposition per primary team

## 2023-10-26 NOTE — PROGRESS NOTES
Pharmacokinetic Assessment Follow Up: IV Vancomycin    Vancomycin serum concentration assessment(s):    The trough level was drawn correctly and can be used to guide therapy at this time. The measurement is within the desired definitive target range of 10 to 20 mcg/mL.    Vancomycin Regimen Plan:    Continue regimen to Vancomycin 1000 mg IV every 12 hours with next serum trough concentration measured at 1400 prior to fourth dose on 10/27    Scheduled Administration Times    0300  1500    Drug levels (last 3 results):  Recent Labs   Lab Result Units 10/23/23  1021 10/25/23  1508 10/26/23  0213   Vancomycin Trough ug/ml 20.6* 34.8* 14.4*       Vancomycin Administrations:  vancomycin given in the last 96 hours                     vancomycin in dextrose 5 % 1 gram/250 mL IVPB 1,000 mg (mg) 1,000 mg New Bag 10/26/23 0330     1,000 mg New Bag 10/25/23 1413     1,000 mg New Bag  0305      Restarted 10/24/23 1459     1,000 mg New Bag  1458     1,000 mg New Bag  0004     1,000 mg New Bag 10/23/23 1303    vancomycin 1.25 g in dextrose 5% 250 mL IVPB (ready to mix) (mg) 1,250 mg New Bag 10/22/23 2259     1,250 mg New Bag  1240                    Pharmacy will continue to follow and monitor vancomycin.    Please contact pharmacy at extension [phone] for questions regarding this assessment.    Thank you for the consult,   Rachel Cruz       Patient brief summary:  Kojo Sheikh III is a 43 y.o. male initiated on antimicrobial therapy with IV Vancomycin for treatment of skin & soft tissue infection    The patient's current regimen is Vancomycin 1000mg IV q12h     Drug Allergies:   Review of patient's allergies indicates:   Allergen Reactions    Zofran [ondansetron hcl]      Interaction with tacrolimus       Actual Body Weight:   105 kg    Renal Function:   Estimated Creatinine Clearance: 128.9 mL/min (based on SCr of 0.92 mg/dL).,     Dialysis Method (if applicable):  N/A    CBC (last 72 hours):  Recent Labs   Lab Result  Units 10/25/23  0936   WBC x10(3)/mcL 5.13   Hgb g/dL 11.8*   Hct % 35.6*   Platelet x10(3)/mcL 143   Mono % % 14.8   Eos % % 3.1   Basophil % % 0.8       Metabolic Panel (last 72 hours):  Recent Labs   Lab Result Units 10/25/23  0936 10/26/23  0213   Sodium Level mmol/L 137 136   Potassium Level mmol/L 3.9 4.5   Chloride mmol/L 101 102   Carbon Dioxide mmol/L 29 26   Glucose Level mg/dL 113* 115*   Blood Urea Nitrogen mg/dL 7.5* 8.3*   Creatinine mg/dL 0.94 0.92   Albumin Level g/dL 3.4* 3.2*   Bilirubin Total mg/dL 0.5 0.4   Alkaline Phosphatase unit/L 108 95   Aspartate Aminotransferase unit/L 37* 30   Alanine Aminotransferase unit/L 34 29       Microbiologic Results:  Microbiology Results (last 7 days)       Procedure Component Value Units Date/Time    Anaerobic Culture [[id number]]  (Abnormal) Collected: 10/21/23 1334    Order Status: Completed Specimen: Abscess from Leg, Right Updated: 10/25/23 1242     Anaerobe Culture No Anaerobes Isolated      Finegoldia magna     Comment: Anaerobic sensitivity is not usually indicated except on single isolates from sterile body sites.       Anaerobic Culture [[id number]]  (Abnormal) Collected: 10/21/23 1334    Order Status: Completed Specimen: Abscess from Leg, Right Updated: 10/25/23 1237     Anaerobe Culture Finegoldia magna     Comment: Anaerobic sensitivity is not usually indicated except on single isolates from sterile body sites.       Wound Culture [[id number]]  (Abnormal) Collected: 10/21/23 1334    Order Status: Completed Specimen: Abscess from Leg, Right Updated: 10/25/23 0955     Wound Culture Rare Gram-positive Rods     Comment: Sent to Reference Lab for Identification  Susceptibility sent to reference lab. Results to follow on separate report.       Narrative:      For sensitivity results refer to [id number].    Blood culture #1 **CANNOT BE ORDERED STAT** [[id number]]  (Normal) Collected: 10/19/23 1834    Order Status: Completed Specimen: Blood Updated:  10/24/23 2001     CULTURE, BLOOD ([facility]) No Growth at 5 days    Blood culture #2 **CANNOT BE ORDERED STAT** [[id number]]  (Normal) Collected: 10/19/23 1834    Order Status: Completed Specimen: Blood Updated: 10/24/23 2001     CULTURE, BLOOD ([facility]) No Growth at 5 days    Wound Culture [[id number]]  (Abnormal) Collected: 10/21/23 1334    Order Status: Completed Specimen: Abscess from Leg, Right Updated: 10/24/23 1433     Wound Culture Few Gram-positive Rods    Narrative:      Refer to [address] [place], [place] [id number] for identification and sensitivity.    Gram Stain [[id number]] Collected: 10/21/23 1334    Order Status: Completed Specimen: Abscess from Leg, Right Updated: 10/22/23 1145     GRAM STAIN Rare WBC observed      Rare Gram positive cocci    Gram Stain [[id number]] Collected: 10/21/23 1334    Order Status: Completed Specimen: Abscess from Leg, Right Updated: 10/22/23 1145     GRAM STAIN Rare WBC observed      Few Gram positive cocci    Fungal Culture [[id number]] Collected: 10/21/23 1334    Order Status: Sent Specimen: Abscess from Leg, Right Updated: 10/21/23 1417    Fungal Culture [[id number]] Collected: 10/21/23 1334    Order Status: Sent Specimen: Abscess from Leg, Right Updated: 10/21/23 1417

## 2023-10-27 LAB
BACTERIA SPEC ANAEROBE CULT: ABNORMAL
BACTERIA SPEC ANAEROBE CULT: ABNORMAL
VANCOMYCIN TROUGH SERPL-MCNC: 15.5 UG/ML (ref 15–20)

## 2023-10-27 PROCEDURE — 25000003 PHARM REV CODE 250: Performed by: NURSE PRACTITIONER

## 2023-10-27 PROCEDURE — S4991 NICOTINE PATCH NONLEGEND: HCPCS | Performed by: INTERNAL MEDICINE

## 2023-10-27 PROCEDURE — 25000003 PHARM REV CODE 250: Performed by: INTERNAL MEDICINE

## 2023-10-27 PROCEDURE — 63600175 PHARM REV CODE 636 W HCPCS: Performed by: INTERNAL MEDICINE

## 2023-10-27 PROCEDURE — A4216 STERILE WATER/SALINE, 10 ML: HCPCS | Performed by: INTERNAL MEDICINE

## 2023-10-27 PROCEDURE — 11000001 HC ACUTE MED/SURG PRIVATE ROOM

## 2023-10-27 PROCEDURE — 80202 ASSAY OF VANCOMYCIN: CPT | Performed by: NURSE PRACTITIONER

## 2023-10-27 RX ADMIN — ERGOCALCIFEROL 50000 UNITS: 1.25 CAPSULE ORAL at 09:10

## 2023-10-27 RX ADMIN — MORPHINE SULFATE 4 MG: 4 INJECTION, SOLUTION INTRAMUSCULAR; INTRAVENOUS at 01:10

## 2023-10-27 RX ADMIN — CYCLOBENZAPRINE 10 MG: 10 TABLET, FILM COATED ORAL at 09:10

## 2023-10-27 RX ADMIN — MORPHINE SULFATE 4 MG: 4 INJECTION, SOLUTION INTRAMUSCULAR; INTRAVENOUS at 02:10

## 2023-10-27 RX ADMIN — OXYCODONE HYDROCHLORIDE AND ACETAMINOPHEN 1 TABLET: 10; 325 TABLET ORAL at 04:10

## 2023-10-27 RX ADMIN — PIPERACILLIN AND TAZOBACTAM 4.5 G: 4; .5 INJECTION, POWDER, FOR SOLUTION INTRAVENOUS at 10:10

## 2023-10-27 RX ADMIN — GABAPENTIN 600 MG: 300 CAPSULE ORAL at 08:10

## 2023-10-27 RX ADMIN — VANCOMYCIN HYDROCHLORIDE 1000 MG: 1 INJECTION, POWDER, LYOPHILIZED, FOR SOLUTION INTRAVENOUS at 03:10

## 2023-10-27 RX ADMIN — PIPERACILLIN AND TAZOBACTAM 4.5 G: 4; .5 INJECTION, POWDER, FOR SOLUTION INTRAVENOUS at 02:10

## 2023-10-27 RX ADMIN — CYCLOBENZAPRINE 10 MG: 10 TABLET, FILM COATED ORAL at 08:10

## 2023-10-27 RX ADMIN — SODIUM CHLORIDE, PRESERVATIVE FREE 10 ML: 5 INJECTION INTRAVENOUS at 06:10

## 2023-10-27 RX ADMIN — OXYCODONE HYDROCHLORIDE AND ACETAMINOPHEN 1 TABLET: 10; 325 TABLET ORAL at 09:10

## 2023-10-27 RX ADMIN — CALCIUM CARBONATE (ANTACID) CHEW TAB 500 MG 1000 MG: 500 CHEW TAB at 08:10

## 2023-10-27 RX ADMIN — MUPIROCIN: 20 OINTMENT TOPICAL at 08:10

## 2023-10-27 RX ADMIN — MUPIROCIN: 20 OINTMENT TOPICAL at 09:10

## 2023-10-27 RX ADMIN — Medication 6 MG: at 08:10

## 2023-10-27 RX ADMIN — TACROLIMUS 5 MG: 1 CAPSULE ORAL at 09:10

## 2023-10-27 RX ADMIN — FOLIC ACID 1000 MCG: 1 TABLET ORAL at 09:10

## 2023-10-27 RX ADMIN — VANCOMYCIN HYDROCHLORIDE 1000 MG: 1 INJECTION, POWDER, LYOPHILIZED, FOR SOLUTION INTRAVENOUS at 02:10

## 2023-10-27 RX ADMIN — POLYETHYLENE GLYCOL 3350 17 G: 17 POWDER, FOR SOLUTION ORAL at 09:10

## 2023-10-27 RX ADMIN — NICOTINE 1 PATCH: 21 PATCH, EXTENDED RELEASE TRANSDERMAL at 09:10

## 2023-10-27 RX ADMIN — RIVAROXABAN 20 MG: 10 TABLET, FILM COATED ORAL at 04:10

## 2023-10-27 RX ADMIN — CALCIUM CARBONATE (ANTACID) CHEW TAB 500 MG 1000 MG: 500 CHEW TAB at 09:10

## 2023-10-27 RX ADMIN — SODIUM CHLORIDE, PRESERVATIVE FREE 10 ML: 5 INJECTION INTRAVENOUS at 12:10

## 2023-10-27 RX ADMIN — GABAPENTIN 600 MG: 300 CAPSULE ORAL at 09:10

## 2023-10-27 RX ADMIN — GABAPENTIN 600 MG: 300 CAPSULE ORAL at 04:10

## 2023-10-27 RX ADMIN — CYCLOBENZAPRINE 10 MG: 10 TABLET, FILM COATED ORAL at 04:10

## 2023-10-27 RX ADMIN — TACROLIMUS 5 MG: 1 CAPSULE ORAL at 06:10

## 2023-10-27 RX ADMIN — SODIUM CHLORIDE, PRESERVATIVE FREE 10 ML: 5 INJECTION INTRAVENOUS at 02:10

## 2023-10-27 RX ADMIN — DIPHENHYDRAMINE HYDROCHLORIDE 25 MG: 25 CAPSULE ORAL at 08:10

## 2023-10-27 RX ADMIN — OXYCODONE HYDROCHLORIDE AND ACETAMINOPHEN 1 TABLET: 10; 325 TABLET ORAL at 05:10

## 2023-10-27 RX ADMIN — OXYCODONE HYDROCHLORIDE AND ACETAMINOPHEN 1 TABLET: 10; 325 TABLET ORAL at 08:10

## 2023-10-27 NOTE — PROGRESS NOTES
Infectious Diseases Progress Note  43-year-old male with past medical history of anxiety, chronic back pain, alcoholic cirrhosis of the liver s/p liver transplant on tacrolimus, thrombocytopenia, history of prior vascular surgeries in the right lower extremity, on long-term anticoagulation therapy with Xarelto, is admitted to [facility] on 10/19/2023, presenting through the ED, sent by his cardiologist for right lower extremity nonhealing leg wound with cellulitis.  [place] apparently was [place] [facility] prescribed oral clindamycin without improvement, reporting worsening redness and warmth to the right lower extremity.  He was seen by the Cardiology Service and was instructed to present to the ED for further evaluation.  On presentation he was noted to have no fevers and no leukocytosis, noted with thrombocytopenia, anemic with low albumin.  Urine toxicology test positive for opiates.  Blood cultures have remained negative.  CRP 26.40.  X-ray of the tibia fibula with no acute osseous abnormality.  He was seen by vascular surgery, taken to OR on 10/21 for irrigation and debridement of abscess of right lower extremity with findings of purulence blood cultures have remained negative and Gram-positive cocci noted on Gram stain.    He is on antibiotic coverage with vancomycin and Zosyn.    Subjective:  No new complaints, no fevers, doing about the same.  In no acute distress      Past Medical History:   Diagnosis Date    Alcohol abuse     Chronic back pain      Past Surgical History:   Procedure Laterality Date    BACK SURGERY  2011    DIAGNOSTIC ULTRASOUND N/A 4/14/2021    Procedure: ULTRASOUND,INTRAOPERATIVE;  Surgeon: Jose Anderson MD;  Location: [facility] OR [address] [place] [address];  Service: Transplant;  Laterality: N/A;    ESOPHAGOGASTRODUODENOSCOPY N/A 8/5/2021    Procedure: EGD (ESOPHAGOGASTRODUODENOSCOPY);  Surgeon: Judy De La Garza MD;  [facility] ([facility]  [facility]);  Service: Endoscopy;  Laterality: N/A;    IRRIGATION AND DEBRIDEMENT Right 10/21/2023    Procedure: IRRIGATION AND DEBRIDEMENT- i&d abscess right lower extremity;  Surgeon: Shirlene Durán MD;  Location: [facility] OR;  Service: Peripheral Vascular;  Laterality: Right;    LIVER TRANSPLANT N/A 4/11/2021    Procedure: TRANSPLANT, LIVER;  Surgeon: Joe Baker MD;  Location: [facility] OR 2ND FLR;  Service: Transplant;  Laterality: N/A;     Social History     Socioeconomic History    Marital status: Single   Tobacco Use    Smoking status: Never    Smokeless tobacco: Never   Substance and Sexual Activity    Drug use: Never       ROS  HENT: Negative.     Respiratory: Negative.     Gastrointestinal: Negative.    Genitourinary: Negative.    Musculoskeletal: Negative.    Skin:         Right leg wound   Neurological:  Negative for weakness.   Endo/Heme/Allergies: Negative.    Psychiatric/Behavioral: Negative.     All other Systems review done and negative.    Review of patient's allergies indicates:   Allergen Reactions    Zofran [ondansetron hcl]      Interaction with tacrolimus         Scheduled Meds:   calcium carbonate  1,000 mg Oral BID    cyclobenzaprine  10 mg Oral TID    diphenhydrAMINE  25 mg Oral QHS    ergocalciferol  50,000 Units Oral Q7 Days    folic acid  1,000 mcg Oral Daily    gabapentin  600 mg Oral TID    mupirocin   Nasal BID    nicotine  1 patch Transdermal Daily    piperacillin-tazobactam (Zosyn) IV (PEDS and ADULTS) (extended infusion is not appropriate)  4.5 g Intravenous Q8H    polyethylene glycol  17 g Oral Daily    rivaroxaban  20 mg Oral with dinner    sodium chloride 0.9%  10 mL Intravenous Q6H    tacrolimus  5 mg Oral BID    vancomycin (VANCOCIN) IV (PEDS and ADULTS)  1,000 mg Intravenous Q12H     Continuous Infusions:  PRN Meds:sodium chloride 0.9%, acetaminophen, acetaminophen, cloNIDine, melatonin, morphine, naloxegoL, naloxone, oxyCODONE-acetaminophen, prochlorperazine, Flushing PICC/Midline  "Protocol **AND** sodium chloride 0.9% **AND** sodium chloride 0.9%, vancomycin - pharmacy to dose    Objective:  BP (!) 152/95   Pulse 95   Temp 99 °F (37.2 °C) (Oral)   Resp 18   Ht 6' 1" (1.854 m)   Wt 105 kg (231 lb 7.7 oz)   SpO2 100%   BMI 30.54 kg/m²     Physical Exam:   Physical Exam  Vitals reviewed.   Constitutional:       General: He is not in acute distress.     Appearance: He is obese. He is not toxic-appearing.   HENT:      Head: Normocephalic and atraumatic.   Eyes:      Pupils: Pupils are equal, round, and reactive to light.   Cardiovascular:      Rate and Rhythm: Normal rate and regular rhythm.      Heart sounds: Normal heart sounds.   Pulmonary:      Effort: Pulmonary effort is normal. No respiratory distress.      Breath sounds: Normal breath sounds.   Abdominal:      General: Bowel sounds are normal. There is no distension.      Palpations: Abdomen is soft.      Tenderness: There is no abdominal tenderness.   Genitourinary:     Comments: No lesions reported  Musculoskeletal:         General: No deformity. Normal range of motion.      Cervical back: Neck supple.   Skin:     Findings: No rash.      Comments: RLE wound dressed  Neurological:      Mental Status: He is alert and oriented to person, place, and time.   Psychiatric:      Comments: Calm and cooperative      Imaging  Imaging Results              X-Ray Tibia Fibula 2 View Right (Final result)  Result time 10/20/23 12:40:35      Final result by Manasa Murphy MD (10/20/23 12:40:35)                   Impression:      No acute osseous abnormality.      Electronically signed by: Manasa Murphy  Date:    10/20/2023  Time:    12:40               Narrative:    EXAMINATION:  XR TIBIA FIBULA 2 VIEW RIGHT    CLINICAL HISTORY:  Pain, unspecified    TECHNIQUE:  AP and lateral views of the right tibia and fibula were performed.    COMPARISON:  None.    FINDINGS:  No fracture. No dislocation.    Nonfocal soft tissue swelling.            "                            Lab Review   Recent Results (from the past 24 hour(s))   VANCOMYCIN, TROUGH    Collection Time: 10/26/23  2:13 AM   Result Value Ref Range    Vancomycin Trough 14.4 (L) 15.0 - 20.0 ug/ml   Comprehensive Metabolic Panel    Collection Time: 10/26/23  2:13 AM   Result Value Ref Range    Sodium Level 136 136 - 145 mmol/L    Potassium Level 4.5 3.5 - 5.1 mmol/L    Chloride 102 98 - 107 mmol/L    Carbon Dioxide 26 22 - 29 mmol/L    Glucose Level 115 (H) 74 - 100 mg/dL    Blood Urea Nitrogen 8.3 (L) 8.9 - 20.6 mg/dL    Creatinine 0.92 0.73 - 1.18 mg/dL    Calcium Level Total 8.7 8.4 - 10.2 mg/dL    Protein Total 6.1 (L) 6.4 - 8.3 gm/dL    Albumin Level 3.2 (L) 3.5 - 5.0 g/dL    Globulin 2.9 2.4 - 3.5 gm/dL    Albumin/Globulin Ratio 1.1 1.1 - 2.0 ratio    Bilirubin Total 0.4 <=1.5 mg/dL    Alkaline Phosphatase 95 40 - 150 unit/L    Alanine Aminotransferase 29 0 - 55 unit/L    Aspartate Aminotransferase 30 5 - 34 unit/L    eGFR >60 mls/min/1.73/m2             Assessment/Plan:  1. Right lower extremity abscess/cellulitis/wound infection-Finegoldia  2. Immunocompromised s/p liver transplant on immunosuppressive drugs  3. History of alcoholic cirrhosis  4.  Anemia and thrombocytopenia  5.  Protein calorie malnutrition  6. Chronic back pain, opioid dependent  6.  Morbid obesity      Continue vancomycin and Zosyn, plan end date of 11/4 with option of transitioning to oral antibiotics on discharge with doxycycline and Augmentin  No fevers and no leukocytosis   S/p abscess drainage on 10/21 with cultures yielding Finegoldia magna  -10/19 blood cultures negative  -10/24 chest x-ray results noted with PICC line placement  -We will continue wound care per surgery and Wound Care team  -Keep the extremity elevated at all times while sitting or supine  -Weight loss is encouraged  -Discussed with patient and nursing staff.  Disposition per primary team

## 2023-10-27 NOTE — PROGRESS NOTES
[facility]  Hospital Medicine Progress Note        Chief Complaint: Wound Check (Pt seen at [facility] and sent by cardiologist for leg pain/wound to E. Pt has pustule on R calf. Unable to obtain US of leg. )         Patient information was obtained from patient, patient's family, past medical records and ER records.      HISTORY OF PRESENT ILLNESS:   Kojo Sheikh III is a 43 y.o. male who PMH includes alcoholic cirrhosis of the liver status post liver transplant, thrombocytopenia, anemia, chronic back pain, anxiety; presents to the ED sent by his cardiologist for right lower extremity leg wound nonhealing.  Patient was seen and not elicit general for the leg wound however it was reported patient was unable to have ultrasound done.  Patient presented here at [facility] on 10/19/2023 for evaluation of right lower extremity wound.  It was reported patient has had 3 vascular surgeries to the right leg in the past.  Patient was seen in the ED in [place] on Tuesday which he was prescribed oral clindamycin therapy.  Patient reports worsening redness and warmth to the right lower extremity with associated open wound erupted prior to arrival in the ED. patient is on long-term anticoagulation therapy with Xarelto which his last dose was 2 days ago.  Patient does report pain to the right lower extremity he was seen by Cardiology Services and was instructed to come to the ED for further evaluation.  No reports of chest pain, shortness a breath, fever, chills, cough, congestion, or any sick contacts.  Lab work reviewed demonstrated H&H 12.7/35.9, sodium 126, chloride 94, CO2 19, BUN 5.5, creatinine 0.67; sed rate 21, AST 84, CRP 26.40, lactic acid 1.2; other indices unremarkable.  X-ray of the right lower extremity was done and pending at this time.  Initial vital signs /85 pulse 110 respirations 19 temperature 98.5° F O2 saturation 97% on room air.  Blood cultures were collected x2 sets.   Patient was started on IV vancomycin and Zosyn therapy.  Patient is admitted to hospital medicine services for further management.        Patient currently admitted for acute right lower extremity cellulitis .  status post I&D by vascular MD on 10/21 with findings of necrotic substance sent for pathology and negative for malignancy. ID on board      Today's information  10/25/23-Patient seen and examined at bedside   Patient has no new complaints would like his hepatic function test checked since it has been a few days   Vitals reviewed with blood pressure elevated likely due to ongoing pain  Ultrasound right lower extremity shows superficial greater saphenous vein thrombosis but no DVT   Pathology reports is back shows no malignancy, hemorrhagic organizing abscess, reported to patient   Appreciate ID input to continue IV vancomycin and Zosyn with options of transitioning to p.o. doxycycline and Augmentin   Patient voices concern and would like to remain on IV therapy and also remain in the hospital given immunocompromised state   Cultures currently growing Gram-positive cocci and Gram-positive rods will follow-up with results       10/26/23 dr kramer - Mr ring shows me pic from hospital course. Much improved since debridement. Final recs given for po abx. Growing finegoldia magna and other gram negs. Continue vanco and zosyn w plans for discharge Saturday.     10/27/2023 Dr. Kramer-chart reviewed patient examined.  Patient very compliant and motivated to get better.  Patient is a nurse.  Will make arrangements for discharge Monday.  He is requesting to continue IV antibiotics at least until Monday since he is immune compromise.  Id gave option to discharge patient on doxy and Augmentin to complete a 14 day course.        PHYSICAL EXAM:  HENT:  Normocephalic and atraumatic.   Neck supple  Pupils are equal, round, and reactive to light.   Heart sounds: Normal heart sounds. s1s2  Pulmonary:  Normal breath sounds.    Abdominal: Bowel sounds are normal.  no distension.  soft.  no abdominal tenderness  Musculoskeletal:  No deformity. Normal range of motion.   Skin:RLE wound dressed  Neurological: alert and oriented to person, place, and time.   Psychiatric: Calm and cooperative         ASSESSMENT:  Acute right lower extremity cellulitis-failed outpatient treatment-POA , GPC, GPR   -finegoldia magna     Immunocompromised state-status post liver transplant    Anemia chronic disease    Thrombocytopenia-chronic    Long-term anticoagulation therapy-on Xarelto    Chronic back pain on chronic opioid therapy    Weakness    Hx of alcoholic cirrhosis of liver- s/p transplant 2021- POA     PLAN:    Ultrasound right lower extremity shows superficial greater saphenous vein thrombosis but no DVT   Pathology reports is back shows no malignancy, hemorrhagic organizing abscess, reported to patient   Appreciate ID input to continue IV vancomycin and Zosyn with options of transitioning to p.o. doxycycline and Augmentin  Monday  Patient is status post I&D by vascular MD on 10/21, to follow-up outpatient in 3 weeks   Wound care on board PRN pain management   Neurovascular checks right lower extremity every 4 hours     VTE Prophylaxis: Home Xarelto for DVT prophylaxis and will be advised to be as mobile as possible and sit in a chair as tolerated      dispo- will discharge on augmentin and doxy Saturday ubtil 11/4      VITAL SIGNS: 24 HRS MIN & MAX LAST   Temp  Min: 97.7 °F (36.5 °C)  Max: 99 °F (37.2 °C) 98 °F (36.7 °C)   BP  Min: 118/70  Max: 156/92 118/70   Pulse  Min: 91  Max: 114  101   Resp  Min: 18  Max: 20 20   SpO2  Min: 94 %  Max: 100 % (!) 94 %      General: not in acute distress.obese. not toxic-appearing.            I have reviewed the following labs:  Recent Labs   Lab 10/21/23  0607 10/22/23  0909 10/25/23  0936   WBC 6.29 7.53 5.13   RBC 3.20* 3.20* 3.35*   HGB 11.3* 11.3* 11.8*   HCT 33.1* 33.3* 35.6*   .4* 104.1* 106.3*   MCH  35.3* 35.3* 35.2*   MCHC 34.1 33.9 33.1   RDW 13.8 13.7 13.6   * 128* 143   MPV 8.9 8.7 8.4       Recent Labs   Lab 10/21/23  0607 10/22/23  0909 10/25/23  0936 10/26/23  0213   * 134* 137 136   K 4.4 4.2 3.9 4.5   CO2 29 28 29 26   BUN 13.0 9.7 7.5* 8.3*   CREATININE 1.15 1.04 0.94 0.92   CALCIUM 8.7 8.8 9.0 8.7   ALBUMIN 3.2*  --  3.4* 3.2*   ALKPHOS 130  --  108 95   ALT 40  --  34 29   AST 57*  --  37* 30   BILITOT 0.8  --  0.5 0.4       Microbiology Results (last 7 days)       Procedure Component Value Units Date/Time    Anaerobic Culture [[id number]]  (Abnormal) Collected: 10/21/23 1334    Order Status: Completed Specimen: Abscess from Leg, Right Updated: 10/27/23 1240     Anaerobe Culture Finegoldia magna     Comment: Anaerobic sensitivity is not usually indicated except on single isolates from sterile body sites.         PREVOTELLA SPECIES    Anaerobic Culture [[id number]]  (Abnormal) Collected: 10/21/23 1334    Order Status: Completed Specimen: Abscess from Leg, Right Updated: 10/26/23 1102     Anaerobe Culture Finegoldia magna     Comment: Anaerobic sensitivity is not usually indicated except on single isolates from sterile body sites.       Wound Culture [[id number]]  (Abnormal) Collected: 10/21/23 1334    Order Status: Completed Specimen: Abscess from Leg, Right Updated: 10/25/23 0955     Wound Culture Rare Gram-positive Rods     Comment: Sent to Reference Lab for Identification  Susceptibility sent to reference lab. Results to follow on separate report.       Narrative:      For sensitivity results refer to [address] [place], [place] [id number].    Blood culture #1 **CANNOT BE ORDERED STAT** [[id number]]  (Normal) Collected: 10/19/23 1214    Order Status: Completed Specimen: Blood Updated: 10/24/23 2001     CULTURE, BLOOD ([facility]) No Growth at 5 days    Blood culture #2 **CANNOT BE ORDERED STAT** [[id number]]  (Normal) Collected: 10/19/23 1834    Order Status: Completed Specimen: Blood Updated: 10/24/23 2001      CULTURE, BLOOD ([facility]) No Growth at 5 days    Wound Culture [[id number]]  (Abnormal) Collected: 10/21/23 1334    Order Status: Completed Specimen: Abscess from Leg, Right Updated: 10/24/23 1433     Wound Culture Few Gram-positive Rods    Narrative:      Refer to [id number] for identification and sensitivity.    Gram Stain [[id number]] Collected: 10/21/23 1334    Order Status: Completed Specimen: Abscess from Leg, Right Updated: 10/22/23 1145     GRAM STAIN Rare WBC observed      Rare Gram positive cocci    Gram Stain [[id number]] Collected: 10/21/23 1334    Order Status: Completed Specimen: Abscess from Leg, Right Updated: 10/22/23 1145     GRAM STAIN Rare WBC observed      Few Gram positive cocci    Fungal Culture [[id number]] Collected: 10/21/23 1334    Order Status: Sent Specimen: Abscess from Leg, Right Updated: 10/21/23 1417    Fungal Culture [[id number]] Collected: 10/21/23 1334    Order Status: Sent Specimen: Abscess from Leg, Right Updated: 10/21/23 1417             See below for Radiology    Scheduled Med:   calcium carbonate  1,000 mg Oral BID    cyclobenzaprine  10 mg Oral TID    diphenhydrAMINE  25 mg Oral QHS    ergocalciferol  50,000 Units Oral Q7 Days    folic acid  1,000 mcg Oral Daily    gabapentin  600 mg Oral TID    mupirocin   Nasal BID    nicotine  1 patch Transdermal Daily    piperacillin-tazobactam (Zosyn) IV (PEDS and ADULTS) (extended infusion is not appropriate)  4.5 g Intravenous Q8H    polyethylene glycol  17 g Oral Daily    rivaroxaban  20 mg Oral with dinner    sodium chloride 0.9%  10 mL Intravenous Q6H    tacrolimus  5 mg Oral BID    vancomycin (VANCOCIN) IV (PEDS and ADULTS)  1,000 mg Intravenous Q12H       Patient condition:  Stable    Anticipated discharge and Disposition:         All diagnosis and differential diagnosis have been reviewed; assessment and plan has been documented; I have personally reviewed the labs and test results that are presently available; I have reviewed  the patients medication list; I have reviewed the consulting providers response and recommendations. I have reviewed or attempted to review medical records based upon their availability    All of the patient's questions have been  addressed and answered. Patient's is agreeable to the above stated plan. I will continue to monitor closely and make adjustments to medical management as needed.  _____________________________________________________________________    Nutrition Status:    Radiology:  I have personally reviewed the following imaging and agree with the radiologist.     CV Ultrasound doppler venous DVT leg right  Superficial vein thrombosis identified in the above and below knee greater   saphenous vein of the right lower extremity.  There is no evidence of deep vein thrombosis identified.     Notification:  Critical results given to SUE Arrington on 10/24/23  X-Ray Chest 1 View for Line/Tube Placement  Narrative: EXAMINATION:  XR CHEST 1 VIEW FOR LINE/TUBE PLACEMENT    CLINICAL HISTORY:  picc;    COMPARISON:  12 April 2021    FINDINGS:  Portable frontal view of the chest was obtained. Right PICC tip overlies the mid SVC.  The heart is not enlarged.  Lungs are clear.  There is no pneumothorax or significant effusion.  Impression: Right PICC tip overlies the mid SVC.    Electronically signed by: oKjo Travis  Date:    10/24/2023  Time:    14:09      Jim Kramer MD   10/27/2023

## 2023-10-27 NOTE — NURSING
[facility] - 9th Floor Med Surg  Wound Care    Patient Name:  Kojo Sheikh III   MRN:  [id number]  Date: 10/27/2023  Diagnosis: Abscess of right leg    History:     Past Medical History:   Diagnosis Date    Alcohol abuse     Chronic back pain        Social History     Socioeconomic History    Marital status: Single   Tobacco Use    Smoking status: Never    Smokeless tobacco: Never   Substance and Sexual Activity    Drug use: Never       Precautions:     Allergies as of 10/19/2023 - Reviewed 10/19/2023   Allergen Reaction Noted    Zofran [ondansetron hcl]  10/19/2023       WOC Assessment Details/Treatment   Patient seen for follow up wound assessment. Patient medicated for pain per nurse Sherwood. Patient states pain is improved, patient assisting with dressing removal. Wound bed with good granulation present, noted 2+edema to right ankle and hemosiderin staining. Patient tolerated dressing change without complications.       10/27/23 1400   WOCN Assessment   WOCN Total Time (mins) 45   Visit Date 10/27/23   Visit Time 1400   Consult Type Follow Up   WOCN Speciality Wound   Wound surgical   Number of Wounds 1   Intervention assessed;changed   Skin Interventions   Pressure Reduction Techniques frequent weight shift encouraged   Positioning   Body Position 30 degrees;position changed independently   Head of Bed (HOB) Positioning HOB at 30 degrees        Altered Skin Integrity 10/20/23 0015 Right lower;medial Leg #1 Other (comment) Partial thickness tissue loss. Shallow open ulcer with a red or pink wound bed, without slough. Intact or Open/Ruptured Serum-filled blister.   Date First Assessed/Time First Assessed: 10/20/23 0015   Altered Skin Integrity Present on Admission - Did Patient arrive to the hospital with altered skin?: yes  Side: Right  Orientation: lower;medial  Location: Leg  Wound Number: #1  Is this injury ...   Wound Image    Dressing Appearance Intact   Drainage Amount Moderate   Drainage  Characteristics/Odor Serosanguineous   Appearance Slough;Granulating   Tissue loss description Full thickness   Periwound Area Intact;Hemosiderin Staining;Edematous   Wound Edges Defined   Wound Length (cm) 2.5 cm   Wound Width (cm) 2.4 cm   Wound Depth (cm) 0.8 cm   Wound Volume (cm^3) 4.8 cm^3   Wound Surface Area (cm^2) 6 cm^2   Care Wound cleanser   Dressing Applied  (Vashe moistened 1/4 inch plain gauze, 4x4 gauze, kerlix x2, tape.)         Recommendations made to primary team to continue present treatment coarse.    10/27/2023

## 2023-10-27 NOTE — PROGRESS NOTES
[facility]  Hospital Medicine Progress Note        Chief Complaint: Wound Check (Pt seen at [facility] and sent by cardiologist for leg pain/wound to E. Pt has pustule on R calf. Unable to obtain US of leg. )         Patient information was obtained from patient, patient's family, past medical records and ER records.      HISTORY OF PRESENT ILLNESS:   Kojo Sheikh III is a 43 y.o. male who PMH includes alcoholic cirrhosis of the liver status post liver transplant, thrombocytopenia, anemia, chronic back pain, anxiety; presents to the ED sent by his cardiologist for right lower extremity leg wound nonhealing.  Patient was seen and not elicit general for the leg wound however it was reported patient was unable to have ultrasound done.  Patient presented here at [facility] on 10/19/2023 for evaluation of right lower extremity wound.  It was reported patient has had 3 vascular surgeries to the right leg in the past.  Patient was seen in the ED in [place] on Tuesday which he was prescribed oral clindamycin therapy.  Patient reports worsening redness and warmth to the right lower extremity with associated open wound erupted prior to arrival in the ED. patient is on long-term anticoagulation therapy with Xarelto which his last dose was 2 days ago.  Patient does report pain to the right lower extremity he was seen by Cardiology Services and was instructed to come to the ED for further evaluation.  No reports of chest pain, shortness a breath, fever, chills, cough, congestion, or any sick contacts.  Lab work reviewed demonstrated H&H 12.7/35.9, sodium 126, chloride 94, CO2 19, BUN 5.5, creatinine 0.67; sed rate 21, AST 84, CRP 26.40, lactic acid 1.2; other indices unremarkable.  X-ray of the right lower extremity was done and pending at this time.  Initial vital signs /85 pulse 110 respirations 19 temperature 98.5° F O2 saturation 97% on room air.  Blood cultures were collected x2 sets.   Patient was started on IV vancomycin and Zosyn therapy.  Patient is admitted to hospital medicine services for further management.        Patient currently admitted for acute right lower extremity cellulitis .  status post I&D by vascular MD on 10/21 with findings of necrotic substance sent for pathology and negative for malignancy. ID on board      Today's information  10/25/23-Patient seen and examined at bedside   Patient has no new complaints would like his hepatic function test checked since it has been a few days   Vitals reviewed with blood pressure elevated likely due to ongoing pain  Ultrasound right lower extremity shows superficial greater saphenous vein thrombosis but no DVT   Pathology reports is back shows no malignancy, hemorrhagic organizing abscess, reported to patient   Appreciate ID input to continue IV vancomycin and Zosyn with options of transitioning to p.o. doxycycline and Augmentin   Patient voices concern and would like to remain on IV therapy and also remain in the hospital given immunocompromised state   Cultures currently growing Gram-positive cocci and Gram-positive rods will follow-up with results       10/26/23 dr melendez - Mr ring shows me pic from hospital course. Much improved since debridement. Final recs given for po abx. Growing finegoldia magna and other gram negs. Continue vanco and zosyn w plans for discharge Saturday.         PHYSICAL EXAM:  HENT:  Normocephalic and atraumatic.   Neck supple  Pupils are equal, round, and reactive to light.   Heart sounds: Normal heart sounds. s1s2  Pulmonary:  Normal breath sounds.   Abdominal: Bowel sounds are normal.  no distension.  soft.  no abdominal tenderness  Musculoskeletal:  No deformity. Normal range of motion.   Skin:RLE wound dressed  Neurological: alert and oriented to person, place, and time.   Psychiatric: Calm and cooperative         ASSESSMENT:  Acute right lower extremity cellulitis-failed outpatient treatment-POA , GPC,  GPR   -finegoldia magna     Immunocompromised state-status post liver transplant    Anemia chronic disease    Thrombocytopenia-chronic    Long-term anticoagulation therapy-on Xarelto    Chronic back pain on chronic opioid therapy    Weakness    Hx of alcoholic cirrhosis of liver- s/p transplant 2021- POA     PLAN:    Ultrasound right lower extremity shows superficial greater saphenous vein thrombosis but no DVT   Pathology reports is back shows no malignancy, hemorrhagic organizing abscess, reported to patient   Appreciate ID input to continue IV vancomycin and Zosyn with options of transitioning to p.o. doxycycline and Augmentin    Patient is status post I&D by vascular MD on 10/21, to follow-up outpatient in 3 weeks   Wound care on board PRN pain management   Neurovascular checks right lower extremity every 4 hours     VTE Prophylaxis: Home Xarelto for DVT prophylaxis and will be advised to be as mobile as possible and sit in a chair as tolerated      dispo- will discharge on augmentin and doxy Saturday ubtil 11/4      VITAL SIGNS: 24 HRS MIN & MAX LAST   Temp  Min: 97.4 °F (36.3 °C)  Max: 98.3 °F (36.8 °C) 98 °F (36.7 °C)   BP  Min: 125/80  Max: 157/92 125/80   Pulse  Min: 81  Max: 110  92   Resp  Min: 18  Max: 20 18   SpO2  Min: 96 %  Max: 100 % 96 %      General: not in acute distress.obese. not toxic-appearing.            I have reviewed the following labs:  Recent Labs   Lab 10/21/23  0607 10/22/23  0909 10/25/23  0936   WBC 6.29 7.53 5.13   RBC 3.20* 3.20* 3.35*   HGB 11.3* 11.3* 11.8*   HCT 33.1* 33.3* 35.6*   .4* 104.1* 106.3*   MCH 35.3* 35.3* 35.2*   MCHC 34.1 33.9 33.1   RDW 13.8 13.7 13.6   * 128* 143   MPV 8.9 8.7 8.4       Recent Labs   Lab 10/21/23  0607 10/22/23  0909 10/25/23  0936 10/26/23  0213   * 134* 137 136   K 4.4 4.2 3.9 4.5   CO2 29 28 29 26   BUN 13.0 9.7 7.5* 8.3*   CREATININE 1.15 1.04 0.94 0.92   CALCIUM 8.7 8.8 9.0 8.7   ALBUMIN 3.2*  --  3.4* 3.2*   ALKPHOS 130   --  108 95   ALT 40  --  34 29   AST 57*  --  37* 30   BILITOT 0.8  --  0.5 0.4       Microbiology Results (last 7 days)       Procedure Component Value Units Date/Time    Anaerobic Culture [[id number]]  (Abnormal) Collected: 10/21/23 1334    Order Status: Completed Specimen: Abscess from Leg, Right Updated: 10/26/23 1102     Anaerobe Culture Finegoldia magna     Comment: Anaerobic sensitivity is not usually indicated except on single isolates from sterile body sites.       Anaerobic Culture [[id number]]  (Abnormal) Collected: 10/21/23 1334    Order Status: Completed Specimen: Abscess from Leg, Right Updated: 10/26/23 1058     Anaerobe Culture Finegoldia magna     Comment: Anaerobic sensitivity is not usually indicated except on single isolates from sterile body sites.         ANAEROBIC GRAM NEGATIVE ELIER    Wound Culture [[id number]]  (Abnormal) Collected: 10/21/23 1334    Order Status: Completed Specimen: Abscess from Leg, Right Updated: 10/25/23 0955     Wound Culture Rare Gram-positive Rods     Comment: Sent to Reference Lab for Identification  Susceptibility sent to reference lab. Results to follow on separate report.       Narrative:      For sensitivity results refer to [id number].    Blood culture #1 **CANNOT BE ORDERED STAT** [[id number]]  (Normal) Collected: 10/19/23 1834    Order Status: Completed Specimen: Blood Updated: 10/24/23 2001     CULTURE, BLOOD ([facility]) No Growth at 5 days    Blood culture #2 **CANNOT BE ORDERED STAT** [[id number]]  (Normal) Collected: 10/19/23 1834    Order Status: Completed Specimen: Blood Updated: 10/24/23 2001     CULTURE, BLOOD ([facility]) No Growth at 5 days    Wound Culture [[id number]]  (Abnormal) Collected: 10/21/23 1334    Order Status: Completed Specimen: Abscess from Leg, Right Updated: 10/24/23 1433     Wound Culture Few Gram-positive Rods    Narrative:      Refer to [id number] for identification and sensitivity.    Gram Stain [[id number]] Collected: 10/21/23 1334     Order Status: Completed Specimen: Abscess from Leg, Right Updated: 10/22/23 1145     GRAM STAIN Rare WBC observed      Rare Gram positive cocci    Gram Stain [[id number]] Collected: 10/21/23 1334    Order Status: Completed Specimen: Abscess from Leg, Right Updated: 10/22/23 1145     GRAM STAIN Rare WBC observed      Few Gram positive cocci    Fungal Culture [[id number]] Collected: 10/21/23 1334    Order Status: Sent Specimen: Abscess from Leg, Right Updated: 10/21/23 1417    Fungal Culture [[id number]] Collected: 10/21/23 1334    Order Status: Sent Specimen: Abscess from Leg, Right Updated: 10/21/23 1417             See below for Radiology    Scheduled Med:   calcium carbonate  1,000 mg Oral BID    cyclobenzaprine  10 mg Oral TID    diphenhydrAMINE  25 mg Oral QHS    ergocalciferol  50,000 Units Oral Q7 Days    folic acid  1,000 mcg Oral Daily    gabapentin  600 mg Oral TID    mupirocin   Nasal BID    nicotine  1 patch Transdermal Daily    piperacillin-tazobactam (Zosyn) IV (PEDS and ADULTS) (extended infusion is not appropriate)  4.5 g Intravenous Q8H    polyethylene glycol  17 g Oral Daily    rivaroxaban  20 mg Oral with dinner    sodium chloride 0.9%  10 mL Intravenous Q6H    tacrolimus  5 mg Oral BID    vancomycin (VANCOCIN) IV (PEDS and ADULTS)  1,000 mg Intravenous Q12H       Patient condition:  Stable    Anticipated discharge and Disposition:         All diagnosis and differential diagnosis have been reviewed; assessment and plan has been documented; I have personally reviewed the labs and test results that are presently available; I have reviewed the patients medication list; I have reviewed the consulting providers response and recommendations. I have reviewed or attempted to review medical records based upon their availability    All of the patient's questions have been  addressed and answered. Patient's is agreeable to the above stated plan. I will continue to monitor closely and make adjustments to  medical management as needed.  _____________________________________________________________________    Nutrition Status:    Radiology:  I have personally reviewed the following imaging and agree with the radiologist.     CV Ultrasound doppler venous DVT leg right  Superficial vein thrombosis identified in the above and below knee greater   saphenous vein of the right lower extremity.  There is no evidence of deep vein thrombosis identified.     Notification:  Critical results given to SUE Arrington on 10/24/23  X-Ray Chest 1 View for Line/Tube Placement  Narrative: EXAMINATION:  XR CHEST 1 VIEW FOR LINE/TUBE PLACEMENT    CLINICAL HISTORY:  picc;    COMPARISON:  12 April 2021    FINDINGS:  Portable frontal view of the chest was obtained. Right PICC tip overlies the mid SVC.  The heart is not enlarged.  Lungs are clear.  There is no pneumothorax or significant effusion.  Impression: Right PICC tip overlies the mid SVC.    Electronically signed by: Kojo Travis  Date:    10/24/2023  Time:    14:09      Jim Kramer MD   10/26/2023

## 2023-10-27 NOTE — PLAN OF CARE
Problem: Adult Inpatient Plan of Care  Goal: Plan of Care Review  Outcome: Ongoing, Progressing  Goal: Optimal Comfort and Wellbeing  Outcome: Ongoing, Progressing  Goal: Readiness for Transition of Care  Outcome: Ongoing, Progressing     Problem: Impaired Wound Healing  Goal: Optimal Wound Healing  Outcome: Ongoing, Progressing     Problem: Fall Injury Risk  Goal: Absence of Fall and Fall-Related Injury  Outcome: Ongoing, Progressing     Problem: Infection  Goal: Absence of Infection Signs and Symptoms  Outcome: Ongoing, Progressing

## 2023-10-27 NOTE — PROGRESS NOTES
Pharmacokinetic Assessment Follow Up: IV Vancomycin    Vancomycin serum concentration assessment(s):    The trough level was drawn correctly and can be used to guide therapy at this time. The measurement is within the desired definitive target range of 15 to 20 mcg/mL.    Vancomycin Regimen Plan:    Continue regimen to Vancomycin 1000 mg IV every 12 hours with next serum trough concentration measured at 1400 prior to fourth dose on 10/29    Scheduled Administration Times    0300  1500      Drug levels (last 3 results):  Recent Labs   Lab Result Units 10/25/23  1508 10/26/23  0213 10/27/23  1409   Vancomycin Trough ug/ml 34.8* 14.4* 15.5       Vancomycin Administrations:  vancomycin given in the last 96 hours                     vancomycin in dextrose 5 % 1 gram/250 mL IVPB 1,000 mg (mg) 1,000 mg New Bag 10/27/23 1407     1,000 mg New Bag  0347     1,000 mg New Bag 10/26/23 1438     1,000 mg New Bag  0330     1,000 mg New Bag 10/25/23 1413     1,000 mg New Bag  0305      Restarted 10/24/23 1459     1,000 mg New Bag  1458     1,000 mg New Bag  0004                    Pharmacy will continue to follow and monitor vancomycin.    Please contact pharmacy at extension [phone] for questions regarding this assessment.    Thank you for the consult,   Rachel Cruz       Patient brief summary:  Kojo Sheikh III is a 43 y.o. male initiated on antimicrobial therapy with IV Vancomycin for treatment of skin & soft tissue infection    The patient's current regimen is Vancomycin IV 1000mg Q12H    Drug Allergies:   Review of patient's allergies indicates:   Allergen Reactions    Zofran [ondansetron hcl]      Interaction with tacrolimus       Actual Body Weight:   105kg    Renal Function:   Estimated Creatinine Clearance: 128.9 mL/min (based on SCr of 0.92 mg/dL).,     Dialysis Method (if applicable):  N/A    CBC (last 72 hours):  Recent Labs   Lab Result Units 10/25/23  0936   WBC x10(3)/mcL 5.13   Hgb g/dL 11.8*   Hct % 35.6*    Platelet x10(3)/mcL 143   Mono % % 14.8   Eos % % 3.1   Basophil % % 0.8       Metabolic Panel (last 72 hours):  Recent Labs   Lab Result Units 10/25/23  0936 10/26/23  0213   Sodium Level mmol/L 137 136   Potassium Level mmol/L 3.9 4.5   Chloride mmol/L 101 102   Carbon Dioxide mmol/L 29 26   Glucose Level mg/dL 113* 115*   Blood Urea Nitrogen mg/dL 7.5* 8.3*   Creatinine mg/dL 0.94 0.92   Albumin Level g/dL 3.4* 3.2*   Bilirubin Total mg/dL 0.5 0.4   Alkaline Phosphatase unit/L 108 95   Aspartate Aminotransferase unit/L 37* 30   Alanine Aminotransferase unit/L 34 29       Microbiologic Results:  Microbiology Results (last 7 days)       Procedure Component Value Units Date/Time    Anaerobic Culture [[id number]]  (Abnormal) Collected: 10/21/23 1334    Order Status: Completed Specimen: Abscess from Leg, Right Updated: 10/27/23 1240     Anaerobe Culture Finegoldia magna     Comment: Anaerobic sensitivity is not usually indicated except on single isolates from sterile body sites.         PREVOTELLA SPECIES    Anaerobic Culture [[id number]]  (Abnormal) Collected: 10/21/23 1334    Order Status: Completed Specimen: Abscess from Leg, Right Updated: 10/26/23 1102     Anaerobe Culture Finegoldia magna     Comment: Anaerobic sensitivity is not usually indicated except on single isolates from sterile body sites.       Wound Culture [[id number]]  (Abnormal) Collected: 10/21/23 1334    Order Status: Completed Specimen: Abscess from Leg, Right Updated: 10/25/23 0955     Wound Culture Rare Gram-positive Rods     Comment: Sent to Reference Lab for Identification  Susceptibility sent to reference lab. Results to follow on separate report.       Narrative:      For sensitivity results refer to [id number].    Blood culture #1 **CANNOT BE ORDERED STAT** [[id number]]  (Normal) Collected: 10/19/23 1834    Order Status: Completed Specimen: Blood Updated: 10/24/23 2001     CULTURE, BLOOD ([facility]) No Growth at 5 days    Blood culture #2  **CANNOT BE ORDERED STAT** [[id number]]  (Normal) Collected: 10/19/23 1834    Order Status: Completed Specimen: Blood Updated: 10/24/23 2001     CULTURE, BLOOD ([facility]) No Growth at 5 days    Wound Culture [[id number]]  (Abnormal) Collected: 10/21/23 1334    Order Status: Completed Specimen: Abscess from Leg, Right Updated: 10/24/23 1433     Wound Culture Few Gram-positive Rods    Narrative:      Refer to [address] [place], [place] [id number] for identification and sensitivity.    Gram Stain [[id number]] Collected: 10/21/23 1334    Order Status: Completed Specimen: Abscess from Leg, Right Updated: 10/22/23 1145     GRAM STAIN Rare WBC observed      Rare Gram positive cocci    Gram Stain [[id number]] Collected: 10/21/23 1334    Order Status: Completed Specimen: Abscess from Leg, Right Updated: 10/22/23 1145     GRAM STAIN Rare WBC observed      Few Gram positive cocci    Fungal Culture [[id number]] Collected: 10/21/23 1334    Order Status: Sent Specimen: Abscess from Leg, Right Updated: 10/21/23 1417    Fungal Culture [[id number]] Collected: 10/21/23 1334    Order Status: Sent Specimen: Abscess from Leg, Right Updated: 10/21/23 1417

## 2023-10-28 PROCEDURE — 63600175 PHARM REV CODE 636 W HCPCS: Performed by: INTERNAL MEDICINE

## 2023-10-28 PROCEDURE — 25000003 PHARM REV CODE 250: Performed by: INTERNAL MEDICINE

## 2023-10-28 PROCEDURE — 25000003 PHARM REV CODE 250: Performed by: NURSE PRACTITIONER

## 2023-10-28 PROCEDURE — 11000001 HC ACUTE MED/SURG PRIVATE ROOM

## 2023-10-28 PROCEDURE — A4216 STERILE WATER/SALINE, 10 ML: HCPCS | Performed by: INTERNAL MEDICINE

## 2023-10-28 PROCEDURE — S4991 NICOTINE PATCH NONLEGEND: HCPCS | Performed by: INTERNAL MEDICINE

## 2023-10-28 RX ADMIN — OXYCODONE HYDROCHLORIDE AND ACETAMINOPHEN 1 TABLET: 10; 325 TABLET ORAL at 02:10

## 2023-10-28 RX ADMIN — SODIUM CHLORIDE, PRESERVATIVE FREE 10 ML: 5 INJECTION INTRAVENOUS at 12:10

## 2023-10-28 RX ADMIN — MUPIROCIN: 20 OINTMENT TOPICAL at 08:10

## 2023-10-28 RX ADMIN — CYCLOBENZAPRINE 10 MG: 10 TABLET, FILM COATED ORAL at 08:10

## 2023-10-28 RX ADMIN — CALCIUM CARBONATE (ANTACID) CHEW TAB 500 MG 1000 MG: 500 CHEW TAB at 08:10

## 2023-10-28 RX ADMIN — PIPERACILLIN AND TAZOBACTAM 4.5 G: 4; .5 INJECTION, POWDER, FOR SOLUTION INTRAVENOUS at 02:10

## 2023-10-28 RX ADMIN — FOLIC ACID 1000 MCG: 1 TABLET ORAL at 08:10

## 2023-10-28 RX ADMIN — GABAPENTIN 600 MG: 300 CAPSULE ORAL at 08:10

## 2023-10-28 RX ADMIN — OXYCODONE HYDROCHLORIDE AND ACETAMINOPHEN 1 TABLET: 10; 325 TABLET ORAL at 08:10

## 2023-10-28 RX ADMIN — PIPERACILLIN AND TAZOBACTAM 4.5 G: 4; .5 INJECTION, POWDER, FOR SOLUTION INTRAVENOUS at 05:10

## 2023-10-28 RX ADMIN — DIPHENHYDRAMINE HYDROCHLORIDE 25 MG: 25 CAPSULE ORAL at 08:10

## 2023-10-28 RX ADMIN — VANCOMYCIN HYDROCHLORIDE 1000 MG: 1 INJECTION, POWDER, LYOPHILIZED, FOR SOLUTION INTRAVENOUS at 02:10

## 2023-10-28 RX ADMIN — SODIUM CHLORIDE, PRESERVATIVE FREE 10 ML: 5 INJECTION INTRAVENOUS at 06:10

## 2023-10-28 RX ADMIN — GABAPENTIN 600 MG: 300 CAPSULE ORAL at 02:10

## 2023-10-28 RX ADMIN — VANCOMYCIN HYDROCHLORIDE 1000 MG: 1 INJECTION, POWDER, LYOPHILIZED, FOR SOLUTION INTRAVENOUS at 03:10

## 2023-10-28 RX ADMIN — RIVAROXABAN 20 MG: 10 TABLET, FILM COATED ORAL at 04:10

## 2023-10-28 RX ADMIN — NICOTINE 1 PATCH: 21 PATCH, EXTENDED RELEASE TRANSDERMAL at 08:10

## 2023-10-28 RX ADMIN — SODIUM CHLORIDE: 9 INJECTION, SOLUTION INTRAVENOUS at 02:10

## 2023-10-28 RX ADMIN — TACROLIMUS 5 MG: 1 CAPSULE ORAL at 08:10

## 2023-10-28 RX ADMIN — CYCLOBENZAPRINE 10 MG: 10 TABLET, FILM COATED ORAL at 02:10

## 2023-10-28 RX ADMIN — TACROLIMUS 5 MG: 1 CAPSULE ORAL at 05:10

## 2023-10-28 RX ADMIN — Medication 6 MG: at 08:10

## 2023-10-28 RX ADMIN — MORPHINE SULFATE 4 MG: 4 INJECTION, SOLUTION INTRAMUSCULAR; INTRAVENOUS at 01:10

## 2023-10-28 RX ADMIN — SODIUM CHLORIDE, PRESERVATIVE FREE 10 ML: 5 INJECTION INTRAVENOUS at 05:10

## 2023-10-28 RX ADMIN — Medication 10 ML: at 05:10

## 2023-10-28 RX ADMIN — POLYETHYLENE GLYCOL 3350 17 G: 17 POWDER, FOR SOLUTION ORAL at 08:10

## 2023-10-28 RX ADMIN — MORPHINE SULFATE 4 MG: 4 INJECTION, SOLUTION INTRAMUSCULAR; INTRAVENOUS at 12:10

## 2023-10-28 RX ADMIN — OXYCODONE HYDROCHLORIDE AND ACETAMINOPHEN 1 TABLET: 10; 325 TABLET ORAL at 12:10

## 2023-10-28 RX ADMIN — OXYCODONE HYDROCHLORIDE AND ACETAMINOPHEN 1 TABLET: 10; 325 TABLET ORAL at 04:10

## 2023-10-28 NOTE — PROGRESS NOTES
[facility]  Hospital Medicine Progress Note        Chief Complaint: Wound Check (Pt seen at [facility] and sent by cardiologist for leg pain/wound to E. Pt has pustule on R calf. Unable to obtain US of leg. )         Patient information was obtained from patient, patient's family, past medical records and ER records.      HISTORY OF PRESENT ILLNESS:   Kojo Sheikh III is a 43 y.o. male who PMH includes alcoholic cirrhosis of the liver status post liver transplant, thrombocytopenia, anemia, chronic back pain, anxiety; presents to the ED sent by his cardiologist for right lower extremity leg wound nonhealing.  Patient was seen and not elicit general for the leg wound however it was reported patient was unable to have ultrasound done.  Patient presented here at [facility] on 10/19/2023 for evaluation of right lower extremity wound.  It was reported patient has had 3 vascular surgeries to the right leg in the past.  Patient was seen in the ED in [place] on Tuesday which he was prescribed oral clindamycin therapy.  Patient reports worsening redness and warmth to the right lower extremity with associated open wound erupted prior to arrival in the ED. patient is on long-term anticoagulation therapy with Xarelto which his last dose was 2 days ago.  Patient does report pain to the right lower extremity he was seen by Cardiology Services and was instructed to come to the ED for further evaluation.  No reports of chest pain, shortness a breath, fever, chills, cough, congestion, or any sick contacts.  Lab work reviewed demonstrated H&H 12.7/35.9, sodium 126, chloride 94, CO2 19, BUN 5.5, creatinine 0.67; sed rate 21, AST 84, CRP 26.40, lactic acid 1.2; other indices unremarkable.  X-ray of the right lower extremity was done and pending at this time.  Initial vital signs /85 pulse 110 respirations 19 temperature 98.5° F O2 saturation 97% on room air.  Blood cultures were collected x2 sets.   Patient was started on IV vancomycin and Zosyn therapy.  Patient is admitted to hospital medicine services for further management.        Patient currently admitted for acute right lower extremity cellulitis .  status post I&D by vascular MD on 10/21 with findings of necrotic substance sent for pathology and negative for malignancy. ID on board      Today's information  10/25/23-Patient seen and examined at bedside   Patient has no new complaints would like his hepatic function test checked since it has been a few days   Vitals reviewed with blood pressure elevated likely due to ongoing pain  Ultrasound right lower extremity shows superficial greater saphenous vein thrombosis but no DVT   Pathology reports is back shows no malignancy, hemorrhagic organizing abscess, reported to patient   Appreciate ID input to continue IV vancomycin and Zosyn with options of transitioning to p.o. doxycycline and Augmentin   Patient voices concern and would like to remain on IV therapy and also remain in the hospital given immunocompromised state   Cultures currently growing Gram-positive cocci and Gram-positive rods will follow-up with results       10/26/23 dr kramer - Mr ring shows me pic from hospital course. Much improved since debridement. Final recs given for po abx. Growing finegoldia magna and other gram negs. Continue vanco and zosyn w plans for discharge Saturday.     10/27/2023 Dr. Kramer-chart reviewed patient examined.  Patient very compliant and motivated to get better.  Patient is a nurse.  Will make arrangements for discharge Monday.  He is requesting to continue IV antibiotics at least until Monday since he is immune compromise.  Id gave option to discharge patient on doxy and Augmentin to complete a 14 day course.    10/28/23 amanda kramer - asking to stay until completion of abx's         PHYSICAL EXAM:  HENT:  Normocephalic and atraumatic.   Neck supple  Pupils are equal, round, and reactive to light.   Heart  sounds: Normal heart sounds. s1s2  Pulmonary:  Normal breath sounds.   Abdominal: Bowel sounds are normal.  no distension.  soft.  no abdominal tenderness  Musculoskeletal:  No deformity. Normal range of motion.   Skin:RLE wound dressed  Neurological: alert and oriented to person, place, and time.   Psychiatric: Calm and cooperative         ASSESSMENT:  Acute right lower extremity cellulitis/abscess-failed outpatient treatment-POA   -Patient is status post I&D by vascular MD on 10/21,  -finegoldia magna   -prevotella species  - IV vancomycin and Zosyn with options of transitioning to p.o. doxycycline and Augmentin  until 11-4    Immunocompromised state-status post liver transplant    Anemia chronic disease    Thrombocytopenia-chronic    Long-term anticoagulation therapy-on Xarelto    Chronic back pain on chronic opioid therapy    Weakness    Hx of alcoholic cirrhosis of liver- s/p transplant 2021- POA     PLAN:    Ultrasound right lower extremity shows superficial greater saphenous vein thrombosis but no DVT   Pathology reports is back shows no malignancy, hemorrhagic organizing abscess, reported to patient   Appreciate ID input to continue IV vancomycin and Zosyn with options of transitioning to p.o. doxycycline and Augmentin    Patient is status post I&D by vascular MD on 10/21, to follow-up outpatient in 3 weeks   Wound care on board PRN pain management   Neurovascular checks right lower extremity every 4 hours     VTE Prophylaxis: Home Xarelto for DVT prophylaxis and will be advised to be as mobile as possible and sit in a chair as tolerated      dispo- plans for discharge on augmentin and doxy but pt requesting to stay secondary to failed outpt po abx's      VITAL SIGNS: 24 HRS MIN & MAX LAST   Temp  Min: 97.7 °F (36.5 °C)  Max: 98.5 °F (36.9 °C) 98.2 °F (36.8 °C)   BP  Min: 118/70  Max: 149/90 127/84   Pulse  Min: 91  Max: 106  91   Resp  Min: 16  Max: 20 18   SpO2  Min: 94 %  Max: 99 % 96 %      General: not  in acute distress.obese. not toxic-appearing.            I have reviewed the following labs:  Recent Labs   Lab 10/22/23  0909 10/25/23  0936   WBC 7.53 5.13   RBC 3.20* 3.35*   HGB 11.3* 11.8*   HCT 33.3* 35.6*   .1* 106.3*   MCH 35.3* 35.2*   MCHC 33.9 33.1   RDW 13.7 13.6   * 143   MPV 8.7 8.4       Recent Labs   Lab 10/22/23  0909 10/25/23  0936 10/26/23  0213   * 137 136   K 4.2 3.9 4.5   CO2 28 29 26   BUN 9.7 7.5* 8.3*   CREATININE 1.04 0.94 0.92   CALCIUM 8.8 9.0 8.7   ALBUMIN  --  3.4* 3.2*   ALKPHOS  --  108 95   ALT  --  34 29   AST  --  37* 30   BILITOT  --  0.5 0.4       Microbiology Results (last 7 days)       Procedure Component Value Units Date/Time    Anaerobic Culture [[id number]]  (Abnormal) Collected: 10/21/23 1334    Order Status: Completed Specimen: Abscess from Leg, Right Updated: 10/27/23 1240     Anaerobe Culture Finegoldia magna     Comment: Anaerobic sensitivity is not usually indicated except on single isolates from sterile body sites.         PREVOTELLA SPECIES    Anaerobic Culture [[id number]]  (Abnormal) Collected: 10/21/23 1334    Order Status: Completed Specimen: Abscess from Leg, Right Updated: 10/26/23 1102     Anaerobe Culture Finegoldia magna     Comment: Anaerobic sensitivity is not usually indicated except on single isolates from sterile body sites.       Wound Culture [[id number]]  (Abnormal) Collected: 10/21/23 1334    Order Status: Completed Specimen: Abscess from Leg, Right Updated: 10/25/23 0955     Wound Culture Rare Gram-positive Rods     Comment: Sent to Reference Lab for Identification  Susceptibility sent to reference lab. Results to follow on separate report.       Narrative:      For sensitivity results refer to [id number].    Blood culture #1 **CANNOT BE ORDERED STAT** [[id number]]  (Normal) Collected: 10/19/23 1834    Order Status: Completed Specimen: Blood Updated: 10/24/23 2001     CULTURE, BLOOD ([facility]) No Growth at 5 days    Blood culture  #2 **CANNOT BE ORDERED STAT** [[id number]]  (Normal) Collected: 10/19/23 1834    Order Status: Completed Specimen: Blood Updated: 10/24/23 2001     CULTURE, BLOOD ([facility]) No Growth at 5 days    Wound Culture [[id number]]  (Abnormal) Collected: 10/21/23 1334    Order Status: Completed Specimen: Abscess from Leg, Right Updated: 10/24/23 1433     Wound Culture Few Gram-positive Rods    Narrative:      Refer to [address] [place], [place] [id number] for identification and sensitivity.    Gram Stain [[id number]] Collected: 10/21/23 1334    Order Status: Completed Specimen: Abscess from Leg, Right Updated: 10/22/23 1145     GRAM STAIN Rare WBC observed      Rare Gram positive cocci    Gram Stain [[id number]] Collected: 10/21/23 1334    Order Status: Completed Specimen: Abscess from Leg, Right Updated: 10/22/23 1145     GRAM STAIN Rare WBC observed      Few Gram positive cocci    Fungal Culture [[id number]] Collected: 10/21/23 1334    Order Status: Sent Specimen: Abscess from Leg, Right Updated: 10/21/23 1417    Fungal Culture [[id number]] Collected: 10/21/23 1334    Order Status: Sent Specimen: Abscess from Leg, Right Updated: 10/21/23 1417             See below for Radiology    Scheduled Med:   alteplase  2 mg Intra-Catheter Once    calcium carbonate  1,000 mg Oral BID    cyclobenzaprine  10 mg Oral TID    diphenhydrAMINE  25 mg Oral QHS    ergocalciferol  50,000 Units Oral Q7 Days    folic acid  1,000 mcg Oral Daily    gabapentin  600 mg Oral TID    mupirocin   Nasal BID    nicotine  1 patch Transdermal Daily    piperacillin-tazobactam (Zosyn) IV (PEDS and ADULTS) (extended infusion is not appropriate)  4.5 g Intravenous Q8H    polyethylene glycol  17 g Oral Daily    rivaroxaban  20 mg Oral with dinner    sodium chloride 0.9%  10 mL Intravenous Q6H    tacrolimus  5 mg Oral BID    vancomycin (VANCOCIN) IV (PEDS and ADULTS)  1,000 mg Intravenous Q12H       Patient condition:  Stable    Anticipated discharge and Disposition:         All diagnosis  and differential diagnosis have been reviewed; assessment and plan has been documented; I have personally reviewed the labs and test results that are presently available; I have reviewed the patients medication list; I have reviewed the consulting providers response and recommendations. I have reviewed or attempted to review medical records based upon their availability    All of the patient's questions have been  addressed and answered. Patient's is agreeable to the above stated plan. I will continue to monitor closely and make adjustments to medical management as needed.  _____________________________________________________________________    Nutrition Status:    Radiology:  I have personally reviewed the following imaging and agree with the radiologist.     CV Ultrasound doppler venous DVT leg right  Superficial vein thrombosis identified in the above and below knee greater   saphenous vein of the right lower extremity.  There is no evidence of deep vein thrombosis identified.     Notification:  Critical results given to SUE Arrington on 10/24/23  X-Ray Chest 1 View for Line/Tube Placement  Narrative: EXAMINATION:  XR CHEST 1 VIEW FOR LINE/TUBE PLACEMENT    CLINICAL HISTORY:  picc;    COMPARISON:  12 April 2021    FINDINGS:  Portable frontal view of the chest was obtained. Right PICC tip overlies the mid SVC.  The heart is not enlarged.  Lungs are clear.  There is no pneumothorax or significant effusion.  Impression: Right PICC tip overlies the mid SVC.    Electronically signed by: Kojo Travis  Date:    10/24/2023  Time:    14:09      Jim Kramer MD   10/28/2023

## 2023-10-28 NOTE — PROGRESS NOTES
Infectious Diseases Progress Note  43-year-old male with past medical history of anxiety, chronic back pain, alcoholic cirrhosis of the liver s/p liver transplant on tacrolimus, thrombocytopenia, history of prior vascular surgeries in the right lower extremity, on long-term anticoagulation therapy with Xarelto, is admitted to [facility] on 10/19/2023, presenting through the ED, sent by his cardiologist for right lower extremity nonhealing leg wound with cellulitis.  He apparently was initially seen at outside facility, [facility] on 10/17, prescribed oral clindamycin without improvement, reporting worsening redness and warmth to the right lower extremity.  He was seen by the Cardiology Service and was instructed to present to the ED for further evaluation.  On presentation he was noted to have no fevers and no leukocytosis, noted with thrombocytopenia, anemic with low albumin.  Urine toxicology test positive for opiates.  Blood cultures have remained negative.  CRP 26.40.  X-ray of the tibia fibula with no acute osseous abnormality.  He was seen by vascular surgery, taken to OR on 10/21 for irrigation and debridement of abscess of right lower extremity with findings of purulence blood cultures have remained negative and Gram-positive cocci noted on Gram stain.    He is on antibiotic coverage with vancomycin and Zosyn.    Subjective:  No new complaints, no fevers, doing about the same. In no acute distress      Past Medical History:   Diagnosis Date    Alcohol abuse     Chronic back pain      Past Surgical History:   Procedure Laterality Date    BACK SURGERY  2011    DIAGNOSTIC ULTRASOUND N/A 4/14/2021    Procedure: ULTRASOUND,INTRAOPERATIVE;  Surgeon: Jose Anderson MD;  Location: [facility] OR [address] [place] [address];  Service: Transplant;  Laterality: N/A;    ESOPHAGOGASTRODUODENOSCOPY N/A 8/5/2021    Procedure: EGD (ESOPHAGOGASTRODUODENOSCOPY);  Surgeon: Judy De La Garza MD;  Location: [facility] ([facility]  FL[facility]);  Service: Endoscopy;  Laterality: N/A;    IRRIGATION AND DEBRIDEMENT Right 10/21/2023    Procedure: IRRIGATION AND DEBRIDEMENT- i&d abscess right lower extremity;  Surgeon: Shirlene Durán MD;  Location: [facility] OR;  Service: Peripheral Vascular;  Laterality: Right;    LIVER TRANSPLANT N/A 4/11/2021    Procedure: TRANSPLANT, LIVER;  Surgeon: Joe Baker MD;  Location: [facility] OR 2ND FLR;  Service: Transplant;  Laterality: N/A;     Social History     Socioeconomic History    Marital status: Single   Tobacco Use    Smoking status: Never    Smokeless tobacco: Never   Substance and Sexual Activity    Drug use: Never       ROS  HENT: Negative.     Respiratory: Negative.     Gastrointestinal: Negative.    Genitourinary: Negative.    Musculoskeletal: Negative.    Skin:         Right leg wound   Neurological:  Negative for weakness.   Endo/Heme/Allergies: Negative.    Psychiatric/Behavioral: Negative.     All other Systems review done and negative.    Review of patient's allergies indicates:   Allergen Reactions    Zofran [ondansetron hcl]      Interaction with tacrolimus         Scheduled Meds:   calcium carbonate  1,000 mg Oral BID    cyclobenzaprine  10 mg Oral TID    diphenhydrAMINE  25 mg Oral QHS    ergocalciferol  50,000 Units Oral Q7 Days    folic acid  1,000 mcg Oral Daily    gabapentin  600 mg Oral TID    mupirocin   Nasal BID    nicotine  1 patch Transdermal Daily    piperacillin-tazobactam (Zosyn) IV (PEDS and ADULTS) (extended infusion is not appropriate)  4.5 g Intravenous Q8H    polyethylene glycol  17 g Oral Daily    rivaroxaban  20 mg Oral with dinner    sodium chloride 0.9%  10 mL Intravenous Q6H    tacrolimus  5 mg Oral BID    vancomycin (VANCOCIN) IV (PEDS and ADULTS)  1,000 mg Intravenous Q12H     Continuous Infusions:  PRN Meds:sodium chloride 0.9%, acetaminophen, acetaminophen, cloNIDine, melatonin, morphine, naloxegoL, naloxone, oxyCODONE-acetaminophen, prochlorperazine, Flushing PICC/Midline  "Protocol **AND** sodium chloride 0.9% **AND** sodium chloride 0.9%, vancomycin - pharmacy to dose    Objective:  BP (!) 137/93   Pulse 101   Temp 97.7 °F (36.5 °C) (Oral)   Resp 20   Ht 6' 1" (1.854 m)   Wt 105 kg (231 lb 7.7 oz)   SpO2 96%   BMI 30.54 kg/m²     Physical Exam:   Physical Exam  Vitals reviewed.   Constitutional:       General: He is not in acute distress.     Appearance: He is obese. He is not toxic-appearing.   HENT:      Head: Normocephalic and atraumatic.   Eyes:      Pupils: Pupils are equal, round, and reactive to light.   Cardiovascular:      Rate and Rhythm: Normal rate and regular rhythm.      Heart sounds: Normal heart sounds.   Pulmonary:      Effort: Pulmonary effort is normal. No respiratory distress.      Breath sounds: Normal breath sounds.   Abdominal:      General: Bowel sounds are normal. There is no distension.      Palpations: Abdomen is soft.      Tenderness: There is no abdominal tenderness.   Genitourinary:     Comments: No lesions reported  Musculoskeletal:         General: No deformity. Normal range of motion.      Cervical back: Neck supple.   Skin:     Findings: No rash.      Comments: RLE wound dressed  Neurological:      Mental Status: He is alert and oriented to person, place, and time.   Psychiatric:      Comments: Calm and cooperative      Imaging  Imaging Results              X-Ray Tibia Fibula 2 View Right (Final result)  Result time 10/20/23 12:40:35      Final result by Manasa Murphy MD (10/20/23 12:40:35)                   Impression:      No acute osseous abnormality.      Electronically signed by: Manasa Murphy  Date:    10/20/2023  Time:    12:40               Narrative:    EXAMINATION:  XR TIBIA FIBULA 2 VIEW RIGHT    CLINICAL HISTORY:  Pain, unspecified    TECHNIQUE:  AP and lateral views of the right tibia and fibula were performed.    COMPARISON:  None.    FINDINGS:  No fracture. No dislocation.    Nonfocal soft tissue swelling.            "                            Lab Review   Recent Results (from the past 24 hour(s))   VANCOMYCIN, TROUGH    Collection Time: 10/27/23  2:09 PM   Result Value Ref Range    Vancomycin Trough 15.5 15.0 - 20.0 ug/ml             Assessment/Plan:  1. Right lower extremity abscess/cellulitis/wound infection-Finegoldia  2. Immunocompromised s/p liver transplant on immunosuppressive drugs  3. History of alcoholic cirrhosis  4.  Anemia and thrombocytopenia  5.  Protein calorie malnutrition  6. Chronic back pain, opioid dependent  6.  Morbid obesity      -Continue vancomycin and Zosyn, plan end date of 11/4 with option of transitioning to oral antibiotics on discharge with doxycycline and Augmentin  -No fevers and no leukocytosis   -S/p abscess drainage on 10/21 with cultures yielding Finegoldia magna  -10/19 blood cultures negative  -10/24 chest x-ray results noted with PICC line placement  -We will continue wound care per surgery and Wound Care team  -Keep the extremity elevated at all times while sitting or supine  -Weight loss is encouraged  -Discussed with patient and nursing staff.  Disposition per primary team

## 2023-10-29 LAB — VANCOMYCIN TROUGH SERPL-MCNC: 14.4 UG/ML (ref 15–20)

## 2023-10-29 PROCEDURE — 25000003 PHARM REV CODE 250: Performed by: INTERNAL MEDICINE

## 2023-10-29 PROCEDURE — 80202 ASSAY OF VANCOMYCIN: CPT | Performed by: INTERNAL MEDICINE

## 2023-10-29 PROCEDURE — 63600175 PHARM REV CODE 636 W HCPCS: Performed by: NURSE PRACTITIONER

## 2023-10-29 PROCEDURE — 63600175 PHARM REV CODE 636 W HCPCS: Performed by: INTERNAL MEDICINE

## 2023-10-29 PROCEDURE — A4216 STERILE WATER/SALINE, 10 ML: HCPCS | Performed by: INTERNAL MEDICINE

## 2023-10-29 PROCEDURE — 25000003 PHARM REV CODE 250: Performed by: NURSE PRACTITIONER

## 2023-10-29 PROCEDURE — S4991 NICOTINE PATCH NONLEGEND: HCPCS | Performed by: INTERNAL MEDICINE

## 2023-10-29 PROCEDURE — 11000001 HC ACUTE MED/SURG PRIVATE ROOM

## 2023-10-29 RX ORDER — LABETALOL HYDROCHLORIDE 5 MG/ML
10 INJECTION, SOLUTION INTRAVENOUS
Status: DISCONTINUED | OUTPATIENT
Start: 2023-10-29 | End: 2023-11-04 | Stop reason: HOSPADM

## 2023-10-29 RX ORDER — HYDRALAZINE HYDROCHLORIDE 20 MG/ML
10 INJECTION INTRAMUSCULAR; INTRAVENOUS EVERY 4 HOURS PRN
Status: DISCONTINUED | OUTPATIENT
Start: 2023-10-29 | End: 2023-11-04 | Stop reason: HOSPADM

## 2023-10-29 RX ADMIN — MORPHINE SULFATE 4 MG: 4 INJECTION, SOLUTION INTRAMUSCULAR; INTRAVENOUS at 01:10

## 2023-10-29 RX ADMIN — CYCLOBENZAPRINE 10 MG: 10 TABLET, FILM COATED ORAL at 09:10

## 2023-10-29 RX ADMIN — SODIUM CHLORIDE: 9 INJECTION, SOLUTION INTRAVENOUS at 09:10

## 2023-10-29 RX ADMIN — CYCLOBENZAPRINE 10 MG: 10 TABLET, FILM COATED ORAL at 03:10

## 2023-10-29 RX ADMIN — PIPERACILLIN AND TAZOBACTAM 4.5 G: 4; .5 INJECTION, POWDER, FOR SOLUTION INTRAVENOUS at 09:10

## 2023-10-29 RX ADMIN — OXYCODONE HYDROCHLORIDE AND ACETAMINOPHEN 1 TABLET: 10; 325 TABLET ORAL at 08:10

## 2023-10-29 RX ADMIN — GABAPENTIN 600 MG: 300 CAPSULE ORAL at 03:10

## 2023-10-29 RX ADMIN — OXYCODONE HYDROCHLORIDE AND ACETAMINOPHEN 1 TABLET: 10; 325 TABLET ORAL at 05:10

## 2023-10-29 RX ADMIN — VANCOMYCIN HYDROCHLORIDE 1000 MG: 1 INJECTION, POWDER, LYOPHILIZED, FOR SOLUTION INTRAVENOUS at 03:10

## 2023-10-29 RX ADMIN — PIPERACILLIN AND TAZOBACTAM 4.5 G: 4; .5 INJECTION, POWDER, FOR SOLUTION INTRAVENOUS at 01:10

## 2023-10-29 RX ADMIN — GABAPENTIN 600 MG: 300 CAPSULE ORAL at 08:10

## 2023-10-29 RX ADMIN — CALCIUM CARBONATE (ANTACID) CHEW TAB 500 MG 1000 MG: 500 CHEW TAB at 09:10

## 2023-10-29 RX ADMIN — OXYCODONE HYDROCHLORIDE AND ACETAMINOPHEN 1 TABLET: 10; 325 TABLET ORAL at 12:10

## 2023-10-29 RX ADMIN — MORPHINE SULFATE 4 MG: 4 INJECTION, SOLUTION INTRAMUSCULAR; INTRAVENOUS at 03:10

## 2023-10-29 RX ADMIN — TACROLIMUS 5 MG: 1 CAPSULE ORAL at 05:10

## 2023-10-29 RX ADMIN — CYCLOBENZAPRINE 10 MG: 10 TABLET, FILM COATED ORAL at 08:10

## 2023-10-29 RX ADMIN — TACROLIMUS 5 MG: 1 CAPSULE ORAL at 08:10

## 2023-10-29 RX ADMIN — FOLIC ACID 1000 MCG: 1 TABLET ORAL at 08:10

## 2023-10-29 RX ADMIN — MUPIROCIN: 20 OINTMENT TOPICAL at 09:10

## 2023-10-29 RX ADMIN — GABAPENTIN 600 MG: 300 CAPSULE ORAL at 09:10

## 2023-10-29 RX ADMIN — OXYCODONE HYDROCHLORIDE AND ACETAMINOPHEN 1 TABLET: 10; 325 TABLET ORAL at 03:10

## 2023-10-29 RX ADMIN — NICOTINE 1 PATCH: 21 PATCH, EXTENDED RELEASE TRANSDERMAL at 09:10

## 2023-10-29 RX ADMIN — SODIUM CHLORIDE, PRESERVATIVE FREE 10 ML: 5 INJECTION INTRAVENOUS at 06:10

## 2023-10-29 RX ADMIN — PROCHLORPERAZINE EDISYLATE 5 MG: 5 INJECTION INTRAMUSCULAR; INTRAVENOUS at 03:10

## 2023-10-29 RX ADMIN — LABETALOL HYDROCHLORIDE 10 MG: 5 INJECTION, SOLUTION INTRAVENOUS at 10:10

## 2023-10-29 RX ADMIN — OXYCODONE HYDROCHLORIDE AND ACETAMINOPHEN 1 TABLET: 10; 325 TABLET ORAL at 09:10

## 2023-10-29 RX ADMIN — SODIUM CHLORIDE, PRESERVATIVE FREE 10 ML: 5 INJECTION INTRAVENOUS at 12:10

## 2023-10-29 RX ADMIN — POLYETHYLENE GLYCOL 3350 17 G: 17 POWDER, FOR SOLUTION ORAL at 08:10

## 2023-10-29 NOTE — PROGRESS NOTES
"Pharmacokinetic Assessment Follow Up: IV Vancomycin    Vancomycin serum concentration assessment(s):    The trough level was drawn correctly and can be used to guide therapy at this time. The measurement is within the desired definitive target range.    Vancomycin Regimen Plan:    Continue 1 gram q12h and check trough on 10/31 at 1400.    Drug levels (last 3 results):  Recent Labs   Lab Result Units 10/27/23  1409 10/29/23  1440   Vancomycin Trough ug/ml 15.5 14.4*          Patient brief summary:  Kojo Sheikh III is a 43 y.o. male initiated on antimicrobial therapy with IV Vancomycin for treatment of skin & soft tissue infection    Drug Allergies:   Review of patient's allergies indicates:   Allergen Reactions    Zofran [ondansetron hcl]      Interaction with tacrolimus       Actual Body Weight:   105 kg    Renal Function:   Estimated Creatinine Clearance: 128.9 mL/min (based on SCr of 0.92 mg/dL).,     Dialysis Method (if applicable):  N/A    CBC (last 72 hours):  No results for input(s): "WHITE BLOOD CELL COUNT", "HEMOGLOBIN", "HEMATOCRIT", "PLATELETS", "GRAN%", "LYMPH%", "MONO%", "EOSINOPHIL%", "BASOPHIL%", "DIFFERENTIAL METHOD" in the last 72 hours.    Metabolic Panel (last 72 hours):  No results for input(s): "SODIUM", "POTASSIUM", "CHLORIDE", "CO2", "GLUCOSE", "BUN BLD", "CREATININE", "ALBUMIN", "BILIRUBIN TOTAL", "ALK PHOS", "AST", "ALT", "MAGNESIUM", "PHOSPHORUS" in the last 72 hours.    Vancomycin Administrations:  vancomycin given in the last 96 hours                     vancomycin in dextrose 5 % 1 gram/250 mL IVPB 1,000 mg (mg) 1,000 mg New Bag 10/29/23 0315     1,000 mg New Bag 10/28/23 1454     1,000 mg New Bag  0300     1,000 mg New Bag 10/27/23 1407     1,000 mg New Bag  0347     1,000 mg New Bag 10/26/23 1438     1,000 mg New Bag  0330                    Microbiologic Results:  Microbiology Results (last 7 days)       Procedure Component Value Units Date/Time    Anaerobic Culture " [[id number]]  (Abnormal) Collected: 10/21/23 1334    Order Status: Completed Specimen: Abscess from Leg, Right Updated: 10/27/23 1240     Anaerobe Culture Finegoldia magna     Comment: Anaerobic sensitivity is not usually indicated except on single isolates from sterile body sites.         PREVOTELLA SPECIES    Anaerobic Culture [[id number]]  (Abnormal) Collected: 10/21/23 1334    Order Status: Completed Specimen: Abscess from Leg, Right Updated: 10/26/23 1102     Anaerobe Culture Finegoldia magna     Comment: Anaerobic sensitivity is not usually indicated except on single isolates from sterile body sites.       Wound Culture [[id number]]  (Abnormal) Collected: 10/21/23 1334    Order Status: Completed Specimen: Abscess from Leg, Right Updated: 10/25/23 0955     Wound Culture Rare Gram-positive Rods     Comment: Sent to Reference Lab for Identification  Susceptibility sent to reference lab. Results to follow on separate report.       Narrative:      For sensitivity results refer to [id number].    Blood culture #1 **CANNOT BE ORDERED STAT** [[id number]]  (Normal) Collected: 10/19/23 1834    Order Status: Completed Specimen: Blood Updated: 10/24/23 2001     CULTURE, BLOOD ([facility]) No Growth at 5 days    Blood culture #2 **CANNOT BE ORDERED STAT** [[id number]]  (Normal) Collected: 10/19/23 1834    Order Status: Completed Specimen: Blood Updated: 10/24/23 2001     CULTURE, BLOOD ([facility]) No Growth at 5 days    Wound Culture [[id number]]  (Abnormal) Collected: 10/21/23 1334    Order Status: Completed Specimen: Abscess from Leg, Right Updated: 10/24/23 1433     Wound Culture Few Gram-positive Rods    Narrative:      Refer to [id number] for identification and sensitivity.

## 2023-10-29 NOTE — PROGRESS NOTES
[facility]  Hospital Medicine Progress Note        Chief Complaint: Wound Check (Pt seen at [facility] and sent by cardiologist for leg pain/wound to E. Pt has pustule on R calf. Unable to obtain US of leg. )         Patient information was obtained from patient, patient's family, past medical records and ER records.      HISTORY OF PRESENT ILLNESS:   Kojo Sheikh III is a 43 y.o. male who PMH includes alcoholic cirrhosis of the liver status post liver transplant, thrombocytopenia, anemia, chronic back pain, anxiety; presents to the ED sent by his cardiologist for right lower extremity leg wound nonhealing.  Patient was seen and not elicit general for the leg wound however it was reported patient was unable to have ultrasound done.  Patient presented here at [facility] on 10/19/2023 for evaluation of right lower extremity wound.  It was reported patient has had 3 vascular surgeries to the right leg in the past.  Patient was seen in the ED in [place] on Tuesday which he was prescribed oral clindamycin therapy.  Patient reports worsening redness and warmth to the right lower extremity with associated open wound erupted prior to arrival in the ED. patient is on long-term anticoagulation therapy with Xarelto which his last dose was 2 days ago.  Patient does report pain to the right lower extremity he was seen by Cardiology Services and was instructed to come to the ED for further evaluation.  No reports of chest pain, shortness a breath, fever, chills, cough, congestion, or any sick contacts.  Lab work reviewed demonstrated H&H 12.7/35.9, sodium 126, chloride 94, CO2 19, BUN 5.5, creatinine 0.67; sed rate 21, AST 84, CRP 26.40, lactic acid 1.2; other indices unremarkable.  X-ray of the right lower extremity was done and pending at this time.  Initial vital signs /85 pulse 110 respirations 19 temperature 98.5° F O2 saturation 97% on room air.  Blood cultures were collected x2 sets.   Patient was started on IV vancomycin and Zosyn therapy.  Patient is admitted to hospital medicine services for further management.        Patient currently admitted for acute right lower extremity cellulitis .  status post I&D by vascular MD on 10/21 with findings of necrotic substance sent for pathology and negative for malignancy. ID on board      Today's information  10/25/23-Patient seen and examined at bedside   Patient has no new complaints would like his hepatic function test checked since it has been a few days   Vitals reviewed with blood pressure elevated likely due to ongoing pain  Ultrasound right lower extremity shows superficial greater saphenous vein thrombosis but no DVT   Pathology reports is back shows no malignancy, hemorrhagic organizing abscess, reported to patient   Appreciate ID input to continue IV vancomycin and Zosyn with options of transitioning to p.o. doxycycline and Augmentin   Patient voices concern and would like to remain on IV therapy and also remain in the hospital given immunocompromised state   Cultures currently growing Gram-positive cocci and Gram-positive rods will follow-up with results       10/26/23 dr kramer - Mr ring shows me pic from hospital course. Much improved since debridement. Final recs given for po abx. Growing finegoldia magna and other gram negs. Continue vanco and zosyn w plans for discharge Saturday.     10/27/2023 Dr. Kramer-chart reviewed patient examined.  Patient very compliant and motivated to get better.  Patient is a nurse.  Will make arrangements for discharge Monday.  He is requesting to continue IV antibiotics at least until Monday since he is immune compromise.  Id gave option to discharge patient on doxy and Augmentin to complete a 14 day course.    10/28/23 amanda kramer - asking to stay until completion of abx's     10/29/23 dr kramer - RUE swollen/ venous us with partially occluding dvt while on rivaroxaban 20 mgs . Will dc picc and get  peripheral         PHYSICAL EXAM:  HENT:  Normocephalic and atraumatic.   Neck supple  Pupils are equal, round, and reactive to light.   Heart sounds: Normal heart sounds. s1s2  Pulmonary:  Normal breath sounds.   Abdominal: Bowel sounds are normal.  no distension.  soft.  no abdominal tenderness  Musculoskeletal:  No deformity. Normal range of motion.   Skin:RLE wound dressed  Neurological: alert and oriented to person, place, and time.   Psychiatric: Calm and cooperative         ASSESSMENT:  Acute right lower extremity cellulitis/abscess-failed outpatient treatment-POA   -Patient is status post I&D by vascular MD on 10/21,  -finegoldia magna   -prevotella species  - IV vancomycin and Zosyn with options of transitioning to p.o. doxycycline and Augmentin  until 11-4    Immunocompromised state-status post liver transplant    Anemia chronic disease    Thrombocytopenia-chronic    Long-term anticoagulation therapy-on Xarelto    Chronic back pain on chronic opioid therapy    Weakness    Hx of alcoholic cirrhosis of liver- s/p transplant 2021- POA    RUE  brachial dvt  - dc picc and get peripheral     PLAN:  RUE DVTw picc while on xarelto- will dc line. Continue xarelto....failed xarelto?  Ultrasound right lower extremity shows superficial greater saphenous vein thrombosis but no DVT   Pathology reports is back shows no malignancy, hemorrhagic organizing abscess, reported to patient   Appreciate ID input to continue IV vancomycin and Zosyn with options of transitioning to p.o. doxycycline and Augmentin    Patient is status post I&D by vascular MD on 10/21, to follow-up outpatient in 3 weeks   Wound care on board PRN pain management   Neurovascular checks right lower extremity every 4 hours     VTE Prophylaxis: Home Xarelto for DVT prophylaxis and will be advised to be as mobile as possible and sit in a chair as tolerated      dispo- plans for discharge on augmentin and doxy but pt requesting to stay secondary to failed  outpt po abx's      VITAL SIGNS: 24 HRS MIN & MAX LAST   Temp  Min: 98.3 °F (36.8 °C)  Max: 98.9 °F (37.2 °C) 98.8 °F (37.1 °C)   BP  Min: 100/56  Max: 158/97 118/78   Pulse  Min: 75  Max: 101  97   Resp  Min: 18  Max: 20 18   SpO2  Min: 95 %  Max: 99 % 98 %      General: not in acute distress.obese. not toxic-appearing.            I have reviewed the following labs:  Recent Labs   Lab 10/25/23  0936   WBC 5.13   RBC 3.35*   HGB 11.8*   HCT 35.6*   .3*   MCH 35.2*   MCHC 33.1   RDW 13.6      MPV 8.4       Recent Labs   Lab 10/25/23  0936 10/26/23  0213    136   K 3.9 4.5   CO2 29 26   BUN 7.5* 8.3*   CREATININE 0.94 0.92   CALCIUM 9.0 8.7   ALBUMIN 3.4* 3.2*   ALKPHOS 108 95   ALT 34 29   AST 37* 30   BILITOT 0.5 0.4       Microbiology Results (last 7 days)       Procedure Component Value Units Date/Time    Anaerobic Culture [[id number]]  (Abnormal) Collected: 10/21/23 1334    Order Status: Completed Specimen: Abscess from Leg, Right Updated: 10/27/23 1240     Anaerobe Culture Finegoldia magna     Comment: Anaerobic sensitivity is not usually indicated except on single isolates from sterile body sites.         PREVOTELLA SPECIES    Anaerobic Culture [[id number]]  (Abnormal) Collected: 10/21/23 1334    Order Status: Completed Specimen: Abscess from Leg, Right Updated: 10/26/23 1102     Anaerobe Culture Finegoldia magna     Comment: Anaerobic sensitivity is not usually indicated except on single isolates from sterile body sites.       Wound Culture [[id number]]  (Abnormal) Collected: 10/21/23 1334    Order Status: Completed Specimen: Abscess from Leg, Right Updated: 10/25/23 0955     Wound Culture Rare Gram-positive Rods     Comment: Sent to Reference Lab for Identification  Susceptibility sent to reference lab. Results to follow on separate report.       Narrative:      For sensitivity results refer to [id number].    Blood culture #1 **CANNOT BE ORDERED STAT** [[id number]]  (Normal) Collected:  10/19/23 1834    Order Status: Completed Specimen: Blood Updated: 10/24/23 2001     CULTURE, BLOOD ([facility]) No Growth at 5 days    Blood culture #2 **CANNOT BE ORDERED STAT** [[id number]]  (Normal) Collected: 10/19/23 1834    Order Status: Completed Specimen: Blood Updated: 10/24/23 2001     CULTURE, BLOOD ([facility]) No Growth at 5 days    Wound Culture [[id number]]  (Abnormal) Collected: 10/21/23 1334    Order Status: Completed Specimen: Abscess from Leg, Right Updated: 10/24/23 1433     Wound Culture Few Gram-positive Rods    Narrative:      Refer to [id number] for identification and sensitivity.             See below for Radiology    Scheduled Med:   alteplase  2 mg Intra-Catheter Once    calcium carbonate  1,000 mg Oral BID    cyclobenzaprine  10 mg Oral TID    diphenhydrAMINE  25 mg Oral QHS    ergocalciferol  50,000 Units Oral Q7 Days    folic acid  1,000 mcg Oral Daily    gabapentin  600 mg Oral TID    mupirocin   Nasal BID    nicotine  1 patch Transdermal Daily    piperacillin-tazobactam (Zosyn) IV (PEDS and ADULTS) (extended infusion is not appropriate)  4.5 g Intravenous Q8H    polyethylene glycol  17 g Oral Daily    rivaroxaban  20 mg Oral with dinner    sodium chloride 0.9%  10 mL Intravenous Q6H    tacrolimus  5 mg Oral BID    vancomycin (VANCOCIN) IV (PEDS and ADULTS)  1,000 mg Intravenous Q12H       Patient condition:  Stable    Anticipated discharge and Disposition:         All diagnosis and differential diagnosis have been reviewed; assessment and plan has been documented; I have personally reviewed the labs and test results that are presently available; I have reviewed the patients medication list; I have reviewed the consulting providers response and recommendations. I have reviewed or attempted to review medical records based upon their availability    All of the patient's questions have been  addressed and answered. Patient's is agreeable to the above stated plan. I will continue to monitor closely  and make adjustments to medical management as needed.  _____________________________________________________________________    Nutrition Status:    Radiology:  I have personally reviewed the following imaging and agree with the radiologist.     X-Ray Chest 1 View for Line/Tube Placement  EXAMINATION  XR CHEST 1 VIEW FOR LINE/TUBE PLACEMENT    CLINICAL HISTORY  picc placement;    TECHNIQUE  A total of 1 frontal image(s) of the chest.    COMPARISON  24 October 2023    FINDINGS  Lines/tubes/devices: Right upper extremity PICC unchanged when allowing for variation in patient positioning.    The cardiac silhouette and central vascular structures are unchanged.  The trachea is midline. No new or worsening consolidation is identified. There is no enlarging pleural effusion or convincing pneumothorax.    Regional osseous structures and extrathoracic soft tissues are similar.    IMPRESSION  No significant interval change.    Electronically signed by: Tito Mejias  Date:    10/29/2023  Time:    11:59      Jim Kramer MD   10/29/2023

## 2023-10-30 LAB
ANION GAP SERPL CALC-SCNC: 7 MEQ/L
BASOPHILS # BLD AUTO: 0.04 X10(3)/MCL
BASOPHILS NFR BLD AUTO: 0.7 %
BUN SERPL-MCNC: 8.5 MG/DL (ref 8.9–20.6)
CALCIUM SERPL-MCNC: 8.8 MG/DL (ref 8.4–10.2)
CHLORIDE SERPL-SCNC: 105 MMOL/L (ref 98–107)
CO2 SERPL-SCNC: 27 MMOL/L (ref 22–29)
CREAT SERPL-MCNC: 0.9 MG/DL (ref 0.73–1.18)
CREAT/UREA NIT SERPL: 9
EOSINOPHIL # BLD AUTO: 0.14 X10(3)/MCL (ref 0–0.9)
EOSINOPHIL NFR BLD AUTO: 2.6 %
ERYTHROCYTE [DISTWIDTH] IN BLOOD BY AUTOMATED COUNT: 13.2 % (ref 11.5–17)
GFR SERPLBLD CREATININE-BSD FMLA CKD-EPI: >60 MLS/MIN/1.73/M2
GLUCOSE SERPL-MCNC: 121 MG/DL (ref 74–100)
HCT VFR BLD AUTO: 32.9 % (ref 42–52)
HGB BLD-MCNC: 10.7 G/DL (ref 14–18)
IMM GRANULOCYTES # BLD AUTO: 0.02 X10(3)/MCL (ref 0–0.04)
IMM GRANULOCYTES NFR BLD AUTO: 0.4 %
INR PPP: 1.5
LYMPHOCYTES # BLD AUTO: 2.11 X10(3)/MCL (ref 0.6–4.6)
LYMPHOCYTES NFR BLD AUTO: 38.7 %
MAGNESIUM SERPL-MCNC: 1.8 MG/DL (ref 1.6–2.6)
MCH RBC QN AUTO: 34.7 PG (ref 27–31)
MCHC RBC AUTO-ENTMCNC: 32.5 G/DL (ref 33–36)
MCV RBC AUTO: 106.8 FL (ref 80–94)
MONOCYTES # BLD AUTO: 1.05 X10(3)/MCL (ref 0.1–1.3)
MONOCYTES NFR BLD AUTO: 19.3 %
NEUTROPHILS # BLD AUTO: 2.09 X10(3)/MCL (ref 2.1–9.2)
NEUTROPHILS NFR BLD AUTO: 38.3 %
NRBC BLD AUTO-RTO: 0 %
PHOSPHATE SERPL-MCNC: 4.3 MG/DL (ref 2.3–4.7)
PLATELET # BLD AUTO: 180 X10(3)/MCL (ref 130–400)
PMV BLD AUTO: 8.9 FL (ref 7.4–10.4)
POTASSIUM SERPL-SCNC: 4.5 MMOL/L (ref 3.5–5.1)
PROTHROMBIN TIME: 18.3 SECONDS (ref 12.5–14.5)
RBC # BLD AUTO: 3.08 X10(6)/MCL (ref 4.7–6.1)
SODIUM SERPL-SCNC: 139 MMOL/L (ref 136–145)
WBC # SPEC AUTO: 5.45 X10(3)/MCL (ref 4.5–11.5)

## 2023-10-30 PROCEDURE — 80048 BASIC METABOLIC PNL TOTAL CA: CPT | Performed by: INTERNAL MEDICINE

## 2023-10-30 PROCEDURE — 63600175 PHARM REV CODE 636 W HCPCS: Performed by: INTERNAL MEDICINE

## 2023-10-30 PROCEDURE — 25000003 PHARM REV CODE 250: Performed by: INTERNAL MEDICINE

## 2023-10-30 PROCEDURE — 25000003 PHARM REV CODE 250: Performed by: NURSE PRACTITIONER

## 2023-10-30 PROCEDURE — 85610 PROTHROMBIN TIME: CPT | Performed by: INTERNAL MEDICINE

## 2023-10-30 PROCEDURE — 83735 ASSAY OF MAGNESIUM: CPT | Performed by: INTERNAL MEDICINE

## 2023-10-30 PROCEDURE — 11000001 HC ACUTE MED/SURG PRIVATE ROOM

## 2023-10-30 PROCEDURE — S4991 NICOTINE PATCH NONLEGEND: HCPCS | Performed by: INTERNAL MEDICINE

## 2023-10-30 PROCEDURE — 85025 COMPLETE CBC W/AUTO DIFF WBC: CPT | Performed by: INTERNAL MEDICINE

## 2023-10-30 PROCEDURE — 84100 ASSAY OF PHOSPHORUS: CPT | Performed by: INTERNAL MEDICINE

## 2023-10-30 RX ADMIN — CALCIUM CARBONATE (ANTACID) CHEW TAB 500 MG 1000 MG: 500 CHEW TAB at 08:10

## 2023-10-30 RX ADMIN — POLYETHYLENE GLYCOL 3350 17 G: 17 POWDER, FOR SOLUTION ORAL at 08:10

## 2023-10-30 RX ADMIN — OXYCODONE HYDROCHLORIDE AND ACETAMINOPHEN 1 TABLET: 10; 325 TABLET ORAL at 02:10

## 2023-10-30 RX ADMIN — CYCLOBENZAPRINE 10 MG: 10 TABLET, FILM COATED ORAL at 08:10

## 2023-10-30 RX ADMIN — PIPERACILLIN AND TAZOBACTAM 4.5 G: 4; .5 INJECTION, POWDER, FOR SOLUTION INTRAVENOUS at 09:10

## 2023-10-30 RX ADMIN — NICOTINE 1 PATCH: 21 PATCH, EXTENDED RELEASE TRANSDERMAL at 08:10

## 2023-10-30 RX ADMIN — OXYCODONE HYDROCHLORIDE AND ACETAMINOPHEN 1 TABLET: 10; 325 TABLET ORAL at 08:10

## 2023-10-30 RX ADMIN — PIPERACILLIN AND TAZOBACTAM 4.5 G: 4; .5 INJECTION, POWDER, FOR SOLUTION INTRAVENOUS at 02:10

## 2023-10-30 RX ADMIN — DIPHENHYDRAMINE HYDROCHLORIDE 25 MG: 25 CAPSULE ORAL at 08:10

## 2023-10-30 RX ADMIN — GABAPENTIN 600 MG: 300 CAPSULE ORAL at 08:10

## 2023-10-30 RX ADMIN — RIVAROXABAN 15 MG: 15 TABLET, FILM COATED ORAL at 11:10

## 2023-10-30 RX ADMIN — MORPHINE SULFATE 4 MG: 4 INJECTION, SOLUTION INTRAMUSCULAR; INTRAVENOUS at 05:10

## 2023-10-30 RX ADMIN — MORPHINE SULFATE 4 MG: 4 INJECTION, SOLUTION INTRAMUSCULAR; INTRAVENOUS at 04:10

## 2023-10-30 RX ADMIN — FOLIC ACID 1000 MCG: 1 TABLET ORAL at 08:10

## 2023-10-30 RX ADMIN — VANCOMYCIN HYDROCHLORIDE 1000 MG: 1 INJECTION, POWDER, LYOPHILIZED, FOR SOLUTION INTRAVENOUS at 02:10

## 2023-10-30 RX ADMIN — PIPERACILLIN AND TAZOBACTAM 4.5 G: 4; .5 INJECTION, POWDER, FOR SOLUTION INTRAVENOUS at 06:10

## 2023-10-30 RX ADMIN — RIVAROXABAN 15 MG: 15 TABLET, FILM COATED ORAL at 06:10

## 2023-10-30 RX ADMIN — CYCLOBENZAPRINE 10 MG: 10 TABLET, FILM COATED ORAL at 02:10

## 2023-10-30 RX ADMIN — TACROLIMUS 5 MG: 1 CAPSULE ORAL at 08:10

## 2023-10-30 RX ADMIN — TACROLIMUS 5 MG: 1 CAPSULE ORAL at 06:10

## 2023-10-30 RX ADMIN — GABAPENTIN 600 MG: 300 CAPSULE ORAL at 02:10

## 2023-10-30 NOTE — PLAN OF CARE
Problem: Adult Inpatient Plan of Care  Goal: Patient-Specific Goal (Individualized)  Outcome: Ongoing, Progressing  Goal: Absence of Hospital-Acquired Illness or Injury  Outcome: Ongoing, Progressing  Goal: Optimal Comfort and Wellbeing  Outcome: Ongoing, Progressing  Goal: Readiness for Transition of Care  Outcome: Ongoing, Progressing     Problem: Impaired Wound Healing  Goal: Optimal Wound Healing  Outcome: Ongoing, Progressing     Problem: Fall Injury Risk  Goal: Absence of Fall and Fall-Related Injury  Outcome: Ongoing, Progressing     Problem: Infection  Goal: Absence of Infection Signs and Symptoms  Outcome: Ongoing, Progressing     Problem: Pain Acute  Goal: Acceptable Pain Control and Functional Ability  Outcome: Ongoing, Progressing

## 2023-10-30 NOTE — PLAN OF CARE
Problem: Adult Inpatient Plan of Care  Goal: Plan of Care Review  10/30/2023 0136 by Hailey Moses RN  Outcome: Ongoing, Progressing  10/30/2023 0136 by Hailey Moses RN  Outcome: Ongoing, Progressing  Goal: Patient-Specific Goal (Individualized)  10/30/2023 0136 by Hailey Moses RN  Outcome: Ongoing, Progressing  10/30/2023 0136 by Hailey Moses RN  Outcome: Ongoing, Progressing  Goal: Absence of Hospital-Acquired Illness or Injury  10/30/2023 0136 by Hailey Moses RN  Outcome: Ongoing, Progressing  10/30/2023 0136 by Hailey Moses RN  Outcome: Ongoing, Progressing  Goal: Optimal Comfort and Wellbeing  10/30/2023 0136 by Hailey Moses RN  Outcome: Ongoing, Progressing  10/30/2023 0136 by Hailey Moses RN  Outcome: Ongoing, Progressing  Goal: Readiness for Transition of Care  10/30/2023 0136 by Hailey Moses RN  Outcome: Ongoing, Progressing  10/30/2023 0136 by Hailey Moses RN  Outcome: Ongoing, Progressing     Problem: Impaired Wound Healing  Goal: Optimal Wound Healing  10/30/2023 0136 by Hailey Moses RN  Outcome: Ongoing, Progressing  10/30/2023 0136 by Hailey Moses RN  Outcome: Ongoing, Progressing     Problem: Fall Injury Risk  Goal: Absence of Fall and Fall-Related Injury  10/30/2023 0136 by Hailey Moses RN  Outcome: Ongoing, Progressing  10/30/2023 0136 by Hailey Moses RN  Outcome: Ongoing, Progressing     Problem: Infection  Goal: Absence of Infection Signs and Symptoms  10/30/2023 0136 by Hailey Moses RN  Outcome: Ongoing, Progressing  10/30/2023 0136 by Hailey Moses RN  Outcome: Ongoing, Progressing     Problem: Pain Acute  Goal: Acceptable Pain Control and Functional Ability  Outcome: Ongoing, Progressing

## 2023-10-30 NOTE — PROGRESS NOTES
[facility] Hospital Medicine Progress Note        Chief Complaint: Inpatient Follow-up for Rt leg cellulitis and abscess     HPI:   The pt is a 43 y.o. male with PMHx  includes alcoholic cirrhosis of the liver status post liver transplant, thrombocytopenia, anemia, chronic back pain, anxiety; presents to the ED at advised of his  his cardiologist for right lower extremity redness, swelling and an associated wound. It was reported patient has had 3 vascular surgeries to the right leg in the past.  Patient was seen in the ED in [place] on Tuesday which he was prescribed oral clindamycin therapy.  Patient reports worsening redness and warmth to the right lower extremity with associated open wound erupted prior to arrival in the ED. patient is on long-term anticoagulation therapy with Xarelto. Pt denies chest pain, SOB, fever, chills, cough, congestion, or any sick contacts.      Lab showed WBC 9.5, H&H 12.7/35.9, sodium 126, chloride 94, CO2 19, BUN 5.5, creatinine 0.67; sed rate 21, AST 84, CRP 26.40, lactic acid 1.2; other indices unremarkable.  X-ray of the right lower extremity showed no osseous abnormality.  Initial vital signs /85 pulse 110 respirations 19 temperature 98.5° F O2 saturation 97% on room air.  Blood cultures were collected x2 sets. Patient was started on IV vancomycin and Zosyn therapy.  Patient is admitted to hospital medicine services for further management.    Ultrasound right lower extremity shows superficial greater saphenous vein thrombosis but no DVT. Vascular surgery was consulted and pt underwent OR  irrigation and debridement of abscess of right lower extremity on 10/21  with findings of hemorrhagic organizing abscess, no malignancy.   Blood cultures have remained negative. Wound culture grew GPR, anaerobic culture grew Finegoldia magna and Prevotella species. ID consulted and suggested Vancomycin and Zosyn with ebd date 11/4 with option of  transitioning to oral antibiotics on discharge with doxycycline and Augmentin. On 10/29 pt developed pain in his Rt arm where he had PICC line . Venous U/S showed presence of DVT of mid right brachial vein. PICC line removed and Xarelto dose increased to 15 mg bid.       Interval Hx:   Afebrile , On RA  Other vitals are stable   Labs showed WBC 5.4, Hgb 10.7, Plt 180, INR 1.5, Na 139, Cr 0.9    Case was discussed with patient's nurse and  on the floor.    Objective/physical exam:  General: In no acute distress, afebrile  Chest: Clear to auscultation bilaterally  Heart: RRR, +S1, S2, no appreciable murmur  Abdomen: Soft, nontender, BS +  MSK: Warm, no lower extremity edema, no clubbing or cyanosis- LLE, RLE - no redness noted. wound dressings to lower leg in palce. (+) venous stasis   Neurologic: Alert and oriented x4, Cranial nerve II-XII intact, Strength 4/5 in all 4 extremities    VITAL SIGNS: 24 HRS MIN & MAX LAST   Temp  Min: 97.3 °F (36.3 °C)  Max: 98.6 °F (37 °C) 97.3 °F (36.3 °C)   BP  Min: 109/70  Max: 153/101 138/88   Pulse  Min: 87  Max: 96  96   Resp  Min: 18  Max: 20 19   SpO2  Min: 96 %  Max: 100 % 100 %     I have reviewed the following labs:  Recent Labs   Lab 10/25/23  0936 10/30/23  0525   WBC 5.13 5.45   RBC 3.35* 3.08*   HGB 11.8* 10.7*   HCT 35.6* 32.9*   .3* 106.8*   MCH 35.2* 34.7*   MCHC 33.1 32.5*   RDW 13.6 13.2    180   MPV 8.4 8.9     Recent Labs   Lab 10/25/23  0936 10/26/23  0213 10/30/23  0525    136 139   K 3.9 4.5 4.5   CO2 29 26 27   BUN 7.5* 8.3* 8.5*   CREATININE 0.94 0.92 0.90   CALCIUM 9.0 8.7 8.8   MG  --   --  1.80   ALBUMIN 3.4* 3.2*  --    ALKPHOS 108 95  --    ALT 34 29  --    AST 37* 30  --    BILITOT 0.5 0.4  --      Microbiology Results (last 7 days)       Procedure Component Value Units Date/Time    Anaerobic Culture [[id number]]  (Abnormal) Collected: 10/21/23 1334    Order Status: Completed Specimen: Abscess from Leg, Right Updated:  10/27/23 1240     Anaerobe Culture Finegoldia magna     Comment: Anaerobic sensitivity is not usually indicated except on single isolates from sterile body sites.         PREVOTELLA SPECIES    Anaerobic Culture [[id number]]  (Abnormal) Collected: 10/21/23 1334    Order Status: Completed Specimen: Abscess from Leg, Right Updated: 10/26/23 1102     Anaerobe Culture Finegoldia magna     Comment: Anaerobic sensitivity is not usually indicated except on single isolates from sterile body sites.       Wound Culture [[id number]]  (Abnormal) Collected: 10/21/23 1334    Order Status: Completed Specimen: Abscess from Leg, Right Updated: 10/25/23 0955     Wound Culture Rare Gram-positive Rods     Comment: Sent to Reference Lab for Identification  Susceptibility sent to reference lab. Results to follow on separate report.       Narrative:      For sensitivity results refer to 23[id number].    Blood culture #1 **CANNOT BE ORDERED STAT** [[id number]]  (Normal) Collected: 10/19/23 1834    Order Status: Completed Specimen: Blood Updated: 10/24/23 2001     CULTURE, BLOOD ([facility]) No Growth at 5 days    Blood culture #2 **CANNOT BE ORDERED STAT** [[id number]]  (Normal) Collected: 10/19/23 1834    Order Status: Completed Specimen: Blood Updated: 10/24/23 2001     CULTURE, BLOOD ([facility]) No Growth at 5 days    Wound Culture [[id number]]  (Abnormal) Collected: 10/21/23 1334    Order Status: Completed Specimen: Abscess from Leg, Right Updated: 10/24/23 1433     Wound Culture Few Gram-positive Rods    Narrative:      Refer to [address] [place], [place] [id number] for identification and sensitivity.             See below for Radiology    Scheduled Med:   alteplase  2 mg Intra-Catheter Once    calcium carbonate  1,000 mg Oral BID    cyclobenzaprine  10 mg Oral TID    diphenhydrAMINE  25 mg Oral QHS    ergocalciferol  50,000 Units Oral Q7 Days    folic acid  1,000 mcg Oral Daily    gabapentin  600 mg Oral TID    nicotine  1 patch Transdermal Daily     piperacillin-tazobactam (Zosyn) IV (PEDS and ADULTS) (extended infusion is not appropriate)  4.5 g Intravenous Q8H    polyethylene glycol  17 g Oral Daily    rivaroxaban  15 mg Oral BID WM    tacrolimus  5 mg Oral BID    vancomycin (VANCOCIN) IV (PEDS and ADULTS)  1,000 mg Intravenous Q12H      Continuous Infusions:     PRN Meds:  sodium chloride 0.9%, acetaminophen, acetaminophen, cloNIDine, hydrALAZINE, labetaloL, melatonin, morphine, naloxegoL, naloxone, oxyCODONE-acetaminophen, prochlorperazine, vancomycin - pharmacy to dose     Assessment/Plan:  Right lower extremity cellulitis and abscess with infected wound   Superficial vein thrombosis with chronic venous insufficiency , RLE  Immunocompromised host-  s/p liver transplant on immunosuppressive drugs  Anemia with macrocytosis - stable  Hyponatremia , POA- resolved   Acute DVT, RUE- PICC line associated   Long term anticoagulation treatment   Obesity , BMI 30.0    History of- alcoholic cirrhosis,S/P liver transplant 2021, Thrombocytopenia,  Chronic back pain, opioid dependent    Plan-  Continue Zosyn and Vancomycin as recommended per ID  PICC line removed   Increase Xarelto to 15 mg bid x 21 days , then 20 mg daily   Continue wound care and dressing change   Home medicine are reviewed and resumed - Tacrolimus , Gabapentin, folic acid, Vit D, Flexeril,   Hold - Cellcept       VTE prophylaxis: Xarelto     Patient condition:  Fair     Anticipated discharge and Disposition:     Home with family     All diagnosis and differential diagnosis have been reviewed; assessment and plan has been documented; I have personally reviewed the labs and test results that are presently available; I have reviewed the patients medication list; I have reviewed the consulting providers response and recommendations. I have reviewed or attempted to review medical records based upon their availability    All of the patient's questions have been  addressed and answered. Patient's is  agreeable to the above stated plan. I will continue to monitor closely and make adjustments to medical management as needed.        Kevin Arenas MD   10/30/2023

## 2023-10-30 NOTE — NURSING
Patient refused xarelto with day nurse then stated to me he took his own home med of xarelto 20mg before nurse came in. 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

## 2023-10-30 NOTE — PROGRESS NOTES
Inpatient Nutrition Assessment    Admit Date: 10/19/2023   Total duration of encounter: 11 days     Nutrition Recommendation/Prescription     Continue current diet as tolerated  Encouraged adequate PO intake  Monitor appetite/PO intake, weight, and labs    Communication of Recommendations: reviewed with patient    Nutrition Assessment     Malnutrition Assessment/Nutrition-Focused Physical Exam    Malnutrition Context: chronic illness (10/30/23 1703)  Malnutrition Level: mild (10/30/23 1703)  Energy Intake (Malnutrition): less than 75% for greater than or equal to 1 month (10/25/23 1425)  Weight Loss (Malnutrition): other (see comments) (Does not meet criteria) (10/25/23 1425)  Subcutaneous Fat (Malnutrition): other (see comments) (Does not meet criteria) (10/30/23 1703)           Muscle Mass (Malnutrition): other (see comments) (Does not meet criteria) (10/30/23 1703)                          Fluid Accumulation (Malnutrition): mild (10/30/23 1703)        A minimum of two characteristics is recommended for diagnosis of either severe or non-severe malnutrition.    Chart Review    Reason Seen: length of stay and follow-up    Malnutrition Screening Tool Results   Have you recently lost weight without trying?: No  Have you been eating poorly because of a decreased appetite?: No   MST Score: 0   Diagnosis:  Acute right lower extremity cellulitis-failed outpatient treatment-POA   Hyponatremia-POA   Anemia chronic disease-POA   Thrombocytopenia-chronic-POA  Immunocompromised state-status post liver transplant-POA   Long-term anticoagulation therapy-on Xarelto-POA  Chronic back pain on chronic opioid therapy-POA   Weakness-POA  Hx of alcoholic cirrhosis of liver- s/p transplant 2021- POA    Relevant Medical History:   ETOH abuse  Chronic cirrhosis of liver- s/p liver transplant  Thrombocytopenia/pancytopenia  Anemia chronic disease   Chronic back pain  Anxiety  Depression  Neutropenia    Nutrition-Related Medications:    Scheduled Meds:   alteplase  2 mg Intra-Catheter Once    calcium carbonate  1,000 mg Oral BID    cyclobenzaprine  10 mg Oral TID    diphenhydrAMINE  25 mg Oral QHS    ergocalciferol  50,000 Units Oral Q7 Days    folic acid  1,000 mcg Oral Daily    gabapentin  600 mg Oral TID    nicotine  1 patch Transdermal Daily    piperacillin-tazobactam (Zosyn) IV (PEDS and ADULTS) (extended infusion is not appropriate)  4.5 g Intravenous Q8H    polyethylene glycol  17 g Oral Daily    rivaroxaban  15 mg Oral BID WM    tacrolimus  5 mg Oral BID    vancomycin (VANCOCIN) IV (PEDS and ADULTS)  1,000 mg Intravenous Q12H     Continuous Infusions:  PRN Meds:.sodium chloride 0.9%, acetaminophen, acetaminophen, cloNIDine, hydrALAZINE, labetaloL, melatonin, morphine, naloxegoL, naloxone, oxyCODONE-acetaminophen, prochlorperazine, vancomycin - pharmacy to dose    Calorie Containing IV Medications: no significant kcals from medications at this time    Nutrition-Related Labs:  10/25/2023: BUN 7.5, Alb 3.4, AST 37, Gluc 113  10/30/2023: BUN 8.5, Gluc 121    Diet/PN Order: Diet Adult Regular  Oral Supplement Order: none  Tube Feeding Order: none  Appetite/Oral Intake: good/% of meals  Factors Affecting Nutritional Intake: none identified  Food/Episcopal/Cultural Preferences: none reported  Food Allergies: none reported    Wound(s):      Altered Skin Integrity 10/20/23 0015 Right lower;medial Leg #1 Other (comment) Partial thickness tissue loss. Shallow open ulcer with a red or pink wound bed, without slough. Intact or Open/Ruptured Serum-filled blister.-Tissue loss description: Full thickness noted    Last Bowel Movement: 10/29/23    Comments    10/25/2023: Pt reports a poor appetite/PO intake for ~1 month. Pt reports a good appetite/PO intake currently. Pt denies N/V/D/C and chewing/swallowing difficulties. Pt reports UBW as 97.7 kg. Pt declined ONS and double protein diet. Encouraged adequate PO intake. Last BM documented as  "10/24/2023. Will monitor.    10/30/2023: Pt reports a good appetite/PO intake and denies N/V/D/C. Last BM documented as 10/29/2023. Encouraged adequate PO intake. Will monitor.    Anthropometrics    Height: 6' 1" (185.4 cm)    Last Weight: 104.5 kg (230 lb 4.8 oz) (10/30/23 0542) Weight Method: Standard Scale  BMI (Calculated): 30.4  BMI Classification: obese grade I (BMI 30-34.9)        Ideal Body Weight (IBW), Male: 184 lb     % Ideal Body Weight, Male (lb): 120.78 %                 Usual Body Weight (UBW), k.7 kg  % Usual Body Weight: 107.7     Usual Weight Provided By: patient    Wt Readings from Last 5 Encounters:   10/30/23 104.5 kg (230 lb 4.8 oz)   22 94 kg (207 lb 3.7 oz)   21 81.6 kg (180 lb)   10/11/21 79 kg (174 lb 2.6 oz)   21 78 kg (171 lb 15.3 oz)     Weight Change(s) Since Admission:  Admit Weight: 100.7 kg (222 lb) (10/19/23 1819)  10/25/2023: 105 kg  10/30/2023: 104.5 kg    Estimated Needs    Weight Used For Calorie Calculations: 105 kg (231 lb 7.7 oz)  Energy Calorie Requirements (kcal): 5506-9224 (MSJ x 1.2-1.3)  Energy Need Method: [place]-St Jeor  Weight Used For Protein Calculations: 105 kg (231 lb 7.7 oz)  Protein Requirements: 105-126 (1.0-1.2 g/kg)  Fluid Requirements (mL): 2336 (1 mL/kcal) or per MD orders  Temp (24hrs), Av °F (36.7 °C), Min:97.3 °F (36.3 °C), Max:98.6 °F (37 °C)       Enteral Nutrition    Patient not receiving enteral nutrition at this time.    Parenteral Nutrition    Patient not receiving parenteral nutrition support at this time.    Evaluation of Received Nutrient Intake    Calories: meeting estimated needs  Protein: meeting estimated needs    Patient Education    Not applicable.    Nutrition Diagnosis     PES: Malnutrition related to inability to consume sufficient nutrients as evidenced by poor PO intake >1 month and mild fluid accumulation (new)    Interventions/Goals     Intervention(s): general/healthful diet  Goal: Meet greater than 75% " of nutritional needs by follow-up. (goal progressing)    Monitoring & Evaluation     Dietitian will monitor food and beverage intake, weight, food/nutrition knowledge skill, glucose/endocrine profile, and gastrointestinal profile.  Nutrition Risk/Follow-Up: moderate (follow-up in 3-5 days)   Please consult if re-assessment needed sooner.

## 2023-10-31 LAB — VANCOMYCIN TROUGH SERPL-MCNC: 15.1 UG/ML (ref 15–20)

## 2023-10-31 PROCEDURE — 25000003 PHARM REV CODE 250: Performed by: INTERNAL MEDICINE

## 2023-10-31 PROCEDURE — S4991 NICOTINE PATCH NONLEGEND: HCPCS | Performed by: INTERNAL MEDICINE

## 2023-10-31 PROCEDURE — 25000003 PHARM REV CODE 250: Performed by: NURSE PRACTITIONER

## 2023-10-31 PROCEDURE — 63600175 PHARM REV CODE 636 W HCPCS: Performed by: NURSE PRACTITIONER

## 2023-10-31 PROCEDURE — 63600175 PHARM REV CODE 636 W HCPCS: Performed by: INTERNAL MEDICINE

## 2023-10-31 PROCEDURE — 11000001 HC ACUTE MED/SURG PRIVATE ROOM

## 2023-10-31 PROCEDURE — 80202 ASSAY OF VANCOMYCIN: CPT | Performed by: INTERNAL MEDICINE

## 2023-10-31 RX ORDER — AMLODIPINE BESYLATE 5 MG/1
5 TABLET ORAL DAILY
Status: DISCONTINUED | OUTPATIENT
Start: 2023-10-31 | End: 2023-11-04 | Stop reason: HOSPADM

## 2023-10-31 RX ADMIN — PIPERACILLIN AND TAZOBACTAM 4.5 G: 4; .5 INJECTION, POWDER, FOR SOLUTION INTRAVENOUS at 01:10

## 2023-10-31 RX ADMIN — OXYCODONE HYDROCHLORIDE AND ACETAMINOPHEN 1 TABLET: 10; 325 TABLET ORAL at 05:10

## 2023-10-31 RX ADMIN — RIVAROXABAN 15 MG: 15 TABLET, FILM COATED ORAL at 05:10

## 2023-10-31 RX ADMIN — MORPHINE SULFATE 4 MG: 4 INJECTION, SOLUTION INTRAMUSCULAR; INTRAVENOUS at 03:10

## 2023-10-31 RX ADMIN — CALCIUM CARBONATE (ANTACID) CHEW TAB 500 MG 1000 MG: 500 CHEW TAB at 08:10

## 2023-10-31 RX ADMIN — GABAPENTIN 600 MG: 300 CAPSULE ORAL at 08:10

## 2023-10-31 RX ADMIN — VANCOMYCIN HYDROCHLORIDE 1000 MG: 1 INJECTION, POWDER, LYOPHILIZED, FOR SOLUTION INTRAVENOUS at 02:10

## 2023-10-31 RX ADMIN — OXYCODONE HYDROCHLORIDE AND ACETAMINOPHEN 1 TABLET: 10; 325 TABLET ORAL at 01:10

## 2023-10-31 RX ADMIN — OXYCODONE HYDROCHLORIDE AND ACETAMINOPHEN 1 TABLET: 10; 325 TABLET ORAL at 10:10

## 2023-10-31 RX ADMIN — PIPERACILLIN AND TAZOBACTAM 4.5 G: 4; .5 INJECTION, POWDER, FOR SOLUTION INTRAVENOUS at 11:10

## 2023-10-31 RX ADMIN — DIPHENHYDRAMINE HYDROCHLORIDE 25 MG: 25 CAPSULE ORAL at 10:10

## 2023-10-31 RX ADMIN — RIVAROXABAN 15 MG: 15 TABLET, FILM COATED ORAL at 08:10

## 2023-10-31 RX ADMIN — TACROLIMUS 5 MG: 1 CAPSULE ORAL at 08:10

## 2023-10-31 RX ADMIN — CALCIUM CARBONATE (ANTACID) CHEW TAB 500 MG 1000 MG: 500 CHEW TAB at 10:10

## 2023-10-31 RX ADMIN — PIPERACILLIN AND TAZOBACTAM 4.5 G: 4; .5 INJECTION, POWDER, FOR SOLUTION INTRAVENOUS at 05:10

## 2023-10-31 RX ADMIN — GABAPENTIN 600 MG: 300 CAPSULE ORAL at 02:10

## 2023-10-31 RX ADMIN — CYCLOBENZAPRINE 10 MG: 10 TABLET, FILM COATED ORAL at 08:10

## 2023-10-31 RX ADMIN — CYCLOBENZAPRINE 10 MG: 10 TABLET, FILM COATED ORAL at 10:10

## 2023-10-31 RX ADMIN — OXYCODONE HYDROCHLORIDE AND ACETAMINOPHEN 1 TABLET: 10; 325 TABLET ORAL at 04:10

## 2023-10-31 RX ADMIN — AMLODIPINE BESYLATE 5 MG: 5 TABLET ORAL at 11:10

## 2023-10-31 RX ADMIN — FOLIC ACID 1000 MCG: 1 TABLET ORAL at 08:10

## 2023-10-31 RX ADMIN — POLYETHYLENE GLYCOL 3350 17 G: 17 POWDER, FOR SOLUTION ORAL at 08:10

## 2023-10-31 RX ADMIN — PROCHLORPERAZINE EDISYLATE 5 MG: 5 INJECTION INTRAMUSCULAR; INTRAVENOUS at 03:10

## 2023-10-31 RX ADMIN — OXYCODONE HYDROCHLORIDE AND ACETAMINOPHEN 1 TABLET: 10; 325 TABLET ORAL at 08:10

## 2023-10-31 RX ADMIN — Medication 6 MG: at 10:10

## 2023-10-31 RX ADMIN — TACROLIMUS 5 MG: 1 CAPSULE ORAL at 05:10

## 2023-10-31 RX ADMIN — MORPHINE SULFATE 4 MG: 4 INJECTION, SOLUTION INTRAMUSCULAR; INTRAVENOUS at 02:10

## 2023-10-31 RX ADMIN — GABAPENTIN 600 MG: 300 CAPSULE ORAL at 10:10

## 2023-10-31 RX ADMIN — NICOTINE 1 PATCH: 21 PATCH, EXTENDED RELEASE TRANSDERMAL at 08:10

## 2023-10-31 RX ADMIN — CYCLOBENZAPRINE 10 MG: 10 TABLET, FILM COATED ORAL at 02:10

## 2023-10-31 NOTE — PLAN OF CARE
Problem: Adult Inpatient Plan of Care  Goal: Plan of Care Review  Outcome: Ongoing, Progressing  Goal: Patient-Specific Goal (Individualized)  Outcome: Ongoing, Progressing  Goal: Absence of Hospital-Acquired Illness or Injury  Outcome: Ongoing, Progressing  Goal: Optimal Comfort and Wellbeing  Outcome: Ongoing, Progressing  Goal: Readiness for Transition of Care  Outcome: Ongoing, Progressing     Problem: Impaired Wound Healing  Goal: Optimal Wound Healing  Outcome: Ongoing, Progressing     Problem: Fall Injury Risk  Goal: Absence of Fall and Fall-Related Injury  Outcome: Ongoing, Progressing     Problem: Infection  Goal: Absence of Infection Signs and Symptoms  Outcome: Ongoing, Progressing     Problem: Pain Acute  Goal: Acceptable Pain Control and Functional Ability  Outcome: Ongoing, Progressing

## 2023-10-31 NOTE — PROGRESS NOTES
[facility] Hospital Medicine Progress Note        Chief Complaint: Inpatient Follow-up for Rt leg cellulitis and abscess        HPI:   The pt is a 43 year old male with PMHx  includes alcoholic cirrhosis of the liver status post liver transplant, thrombocytopenia, anemia, chronic back pain, anxiety; presents to the ED at advised of his  his cardiologist for right lower extremity redness, swelling and an associated wound. It was reported patient has had 3 vascular surgeries to the right leg in the past.  Patient was seen in the ED in [place] on Tuesday and was prescribed oral clindamycin therapy.  Patient reports worsening redness and warmth to the right lower extremity with associated open wound erupted prior to arrival in the ED. patient is on long-term anticoagulation therapy with Xarelto. Pt denies chest pain, SOB, fever, chills, cough, congestion, or any sick contacts.       Lab showed WBC 9.5, H&H 12.7/35.9, sodium 126, chloride 94, CO2 19, BUN 5.5, creatinine 0.67; sed rate 21, AST 84, CRP 26.40, lactic acid 1.2; other indices unremarkable.  X-ray of the right lower extremity showed no osseous abnormality.  Initial vital signs /85 pulse 110 respirations 19 temperature 98.5° F O2 saturation 97% on room air.  Blood cultures were collected x2 sets. Patient was started on IV vancomycin and Zosyn therapy.  Patient is admitted to hospital medicine services for further management.     Ultrasound right lower extremity shows superficial greater saphenous vein thrombosis but no DVT. Vascular surgery was consulted and pt underwent OR  irrigation and debridement of abscess of right lower extremity on 10/21  with findings of hemorrhagic organizing abscess, no malignancy.     Blood cultures have remained negative. Wound culture grew GPR, anaerobic culture grew Finegoldia magna and Prevotella species. ID consulted and suggested Vancomycin and Zosyn with end date 11/4 with option of  transitioning to oral antibiotics on discharge with doxycycline and Augmentin. On 10/29 pt developed pain in his Rt arm where he had PICC line . Venous U/S showed presence of DVT of mid right brachial vein. PICC line removed and Xarelto dose increased to 15 mg bid.        Interval Hx:   BP noted to be elevated. Mild intermittent tachycardia is noted as well.   Discussed with the pt and willing to start antihypertensive.   Otherwise no new c/o today      Case was discussed with patient's nurse and  on the floor.    Objective/physical exam:  General: In no acute distress, afebrile  Chest: Clear to auscultation bilaterally  Heart: RRR, +S1, S2, no appreciable murmur  Abdomen: Soft, nontender, BS +  MSK: Warm, no lower extremity edema, no clubbing or cyanosis- LLE, RLE - no redness noted. wound dressings to lower leg in palce. (+) venous stasis   Neurologic: Alert and oriented x4, Cranial nerve II-XII intact, Strength 4/5 in all 4 extremities         VITAL SIGNS: 24 HRS MIN & MAX LAST   Temp  Min: 97.7 °F (36.5 °C)  Max: 98.6 °F (37 °C) 97.9 °F (36.6 °C)   BP  Min: 123/71  Max: 176/113 (!) 141/81   Pulse  Min: 91  Max: 103  100   Resp  Min: 16  Max: 20 17   SpO2  Min: 98 %  Max: 100 % 100 %     I have reviewed the following labs:  Recent Labs   Lab 10/25/23  0936 10/30/23  0525   WBC 5.13 5.45   RBC 3.35* 3.08*   HGB 11.8* 10.7*   HCT 35.6* 32.9*   .3* 106.8*   MCH 35.2* 34.7*   MCHC 33.1 32.5*   RDW 13.6 13.2    180   MPV 8.4 8.9     Recent Labs   Lab 10/25/23  0936 10/26/23  0213 10/30/23  0525    136 139   K 3.9 4.5 4.5   CO2 29 26 27   BUN 7.5* 8.3* 8.5*   CREATININE 0.94 0.92 0.90   CALCIUM 9.0 8.7 8.8   MG  --   --  1.80   ALBUMIN 3.4* 3.2*  --    ALKPHOS 108 95  --    ALT 34 29  --    AST 37* 30  --    BILITOT 0.5 0.4  --      Microbiology Results (last 7 days)       Procedure Component Value Units Date/Time    Anaerobic Culture [[id number]]  (Abnormal) Collected: 10/21/23 0392     Order Status: Completed Specimen: Abscess from Leg, Right Updated: 10/27/23 1240     Anaerobe Culture Finegoldia magna     Comment: Anaerobic sensitivity is not usually indicated except on single isolates from sterile body sites.         PREVOTELLA SPECIES    Anaerobic Culture [[id number]]  (Abnormal) Collected: 10/21/23 1334    Order Status: Completed Specimen: Abscess from Leg, Right Updated: 10/26/23 1102     Anaerobe Culture Finegoldia magna     Comment: Anaerobic sensitivity is not usually indicated except on single isolates from sterile body sites.       Wound Culture [[id number]]  (Abnormal) Collected: 10/21/23 1334    Order Status: Completed Specimen: Abscess from Leg, Right Updated: 10/25/23 0955     Wound Culture Rare Gram-positive Rods     Comment: Sent to Reference Lab for Identification  Susceptibility sent to reference lab. Results to follow on separate report.       Narrative:      For sensitivity results refer to [address] [place], [place] [id number].    Blood culture #1 **CANNOT BE ORDERED STAT** [[id number]]  (Normal) Collected: 10/19/23 1834    Order Status: Completed Specimen: Blood Updated: 10/24/23 2001     CULTURE, BLOOD ([facility]) No Growth at 5 days    Blood culture #2 **CANNOT BE ORDERED STAT** [[id number]]  (Normal) Collected: 10/19/23 1834    Order Status: Completed Specimen: Blood Updated: 10/24/23 2001     CULTURE, BLOOD ([facility]) No Growth at 5 days             See below for Radiology    Scheduled Med:   alteplase  2 mg Intra-Catheter Once    amLODIPine  5 mg Oral Daily    calcium carbonate  1,000 mg Oral BID    cyclobenzaprine  10 mg Oral TID    diphenhydrAMINE  25 mg Oral QHS    ergocalciferol  50,000 Units Oral Q7 Days    folic acid  1,000 mcg Oral Daily    gabapentin  600 mg Oral TID    nicotine  1 patch Transdermal Daily    piperacillin-tazobactam (Zosyn) IV (PEDS and ADULTS) (extended infusion is not appropriate)  4.5 g Intravenous Q8H    polyethylene glycol  17 g Oral Daily    rivaroxaban  15 mg Oral BID WM     tacrolimus  5 mg Oral BID    vancomycin (VANCOCIN) IV (PEDS and ADULTS)  1,000 mg Intravenous Q12H      Continuous Infusions:     PRN Meds:  sodium chloride 0.9%, acetaminophen, acetaminophen, cloNIDine, hydrALAZINE, labetaloL, melatonin, morphine, naloxegoL, naloxone, oxyCODONE-acetaminophen, prochlorperazine, vancomycin - pharmacy to dose     Assessment/Plan:  Right lower extremity cellulitis and abscess with infected wound   Superficial vein thrombosis with chronic venous insufficiency , RLE  Immunocompromised host-  s/p liver transplant on immunosuppressive drugs  Anemia with macrocytosis - stable  Hyponatremia , POA- resolved   Acute DVT, RUE- PICC line associated   Long term anticoagulation treatment   Elevated BP/ Essential HTN  Obesity , BMI 30.0     History of- alcoholic cirrhosis,S/P liver transplant 2021, Thrombocytopenia,  Chronic back pain, opioid dependent     Plan-  Start amlodipine 5 mg one po daily   Continue Zosyn and Vancomycin as recommended per ID  PICC line removed   Increase Xarelto to 15 mg bid x 21 days , then 20 mg daily   Continue wound care and dressing change   Home medicine are reviewed and resumed - Tacrolimus , Gabapentin, folic acid, Vit D, Flexeril,   Hold - Cellcept        VTE prophylaxis: Xarelto     Patient condition:  Fair    Anticipated discharge and Disposition:     Home with family     All diagnosis and differential diagnosis have been reviewed; assessment and plan has been documented; I have personally reviewed the labs and test results that are presently available; I have reviewed the patients medication list; I have reviewed the consulting providers response and recommendations. I have reviewed or attempted to review medical records based upon their availability    All of the patient's questions have been  addressed and answered. Patient's is agreeable to the above stated plan. I will continue to monitor closely and make adjustments to medical management as  needed.  _________________    Kevin Arenas MD   10/31/2023                  none

## 2023-10-31 NOTE — PROGRESS NOTES
Pharmacokinetic Assessment Follow Up: IV Vancomycin    Vancomycin serum concentration assessment(s):    The trough level was drawn correctly and can be used to guide therapy at this time. The measurement is within the desired definitive target range of 15 to 20 mcg/mL.    Vancomycin Regimen Plan:    Continue regimen to Vancomycin 1000 mg IV every 12 hours with next serum trough concentration measured at 1400 prior to the  dose on 11/03    Drug levels (last 3 results):  Recent Labs   Lab Result Units 10/29/23  1440 10/31/23  1433   Vancomycin Trough ug/ml 14.4* 15.1       Pharmacy will continue to follow and monitor vancomycin.    Please contact pharmacy at extension [phone] for questions regarding this assessment.    Thank you for the consult,   Farhana Felix       Patient brief summary:  Kojo Sheikh III is a 43 y.o. male initiated on antimicrobial therapy with IV Vancomycin for treatment of skin & soft tissue infection    The patient's current regimen is 1gm q12h    Drug Allergies:   Review of patient's allergies indicates:   Allergen Reactions    Zofran [ondansetron hcl]      Interaction with tacrolimus       Actual Body Weight:   104.5kg    Renal Function:   Estimated Creatinine Clearance: 131.5 mL/min (based on SCr of 0.9 mg/dL).,     Dialysis Method (if applicable):  N/A    CBC (last 72 hours):  Recent Labs   Lab Result Units 10/30/23  0525   WBC x10(3)/mcL 5.45   Hgb g/dL 10.7*   Hct % 32.9*   Platelet x10(3)/mcL 180   Mono % % 19.3   Eos % % 2.6   Basophil % % 0.7       Metabolic Panel (last 72 hours):  Recent Labs   Lab Result Units 10/30/23  0525   Sodium Level mmol/L 139   Potassium Level mmol/L 4.5   Chloride mmol/L 105   Carbon Dioxide mmol/L 27   Glucose Level mg/dL 121*   Blood Urea Nitrogen mg/dL 8.5*   Creatinine mg/dL 0.90   Magnesium Level mg/dL 1.80   Phosphorus Level mg/dL 4.3       Vancomycin Administrations:  vancomycin given in the last 96 hours                     vancomycin in  dextrose 5 % 1 gram/250 mL IVPB 1,000 mg (mg) 1,000 mg New Bag 10/31/23 1456     1,000 mg New Bag  0244     1,000 mg New Bag 10/30/23 1446     1,000 mg New Bag  0207      Restarted 10/29/23 1638     1,000 mg New Bag  1554     1,000 mg New Bag  0315     1,000 mg New Bag 10/28/23 1454     1,000 mg New Bag  0300                    Microbiologic Results:  Microbiology Results (last 7 days)       Procedure Component Value Units Date/Time    Anaerobic Culture [[id number]]  (Abnormal) Collected: 10/21/23 1334    Order Status: Completed Specimen: Abscess from Leg, Right Updated: 10/27/23 1240     Anaerobe Culture Finegoldia magna     Comment: Anaerobic sensitivity is not usually indicated except on single isolates from sterile body sites.         PREVOTELLA SPECIES    Anaerobic Culture [[id number]]  (Abnormal) Collected: 10/21/23 1334    Order Status: Completed Specimen: Abscess from Leg, Right Updated: 10/26/23 1102     Anaerobe Culture Finegoldia magna     Comment: Anaerobic sensitivity is not usually indicated except on single isolates from sterile body sites.       Wound Culture [[id number]]  (Abnormal) Collected: 10/21/23 1334    Order Status: Completed Specimen: Abscess from Leg, Right Updated: 10/25/23 0955     Wound Culture Rare Gram-positive Rods     Comment: Sent to Reference Lab for Identification  Susceptibility sent to reference lab. Results to follow on separate report.       Narrative:      For sensitivity results refer to [address] [place], [place] [id number].    Blood culture #1 **CANNOT BE ORDERED STAT** [[id number]]  (Normal) Collected: 10/19/23 1834    Order Status: Completed Specimen: Blood Updated: 10/24/23 2001     CULTURE, BLOOD ([facility]) No Growth at 5 days    Blood culture #2 **CANNOT BE ORDERED STAT** [[id number]]  (Normal) Collected: 10/19/23 1834    Order Status: Completed Specimen: Blood Updated: 10/24/23 2001     CULTURE, BLOOD ([facility]) No Growth at 5 days

## 2023-11-01 LAB
ALBUMIN SERPL-MCNC: 3.3 G/DL (ref 3.5–5)
ALBUMIN/GLOB SERPL: 1.1 RATIO (ref 1.1–2)
ALP SERPL-CCNC: 81 UNIT/L (ref 40–150)
ALT SERPL-CCNC: 26 UNIT/L (ref 0–55)
AST SERPL-CCNC: 30 UNIT/L (ref 5–34)
BASOPHILS # BLD AUTO: 0.06 X10(3)/MCL
BASOPHILS NFR BLD AUTO: 1 %
BILIRUB SERPL-MCNC: 0.4 MG/DL
BUN SERPL-MCNC: 8.5 MG/DL (ref 8.9–20.6)
CALCIUM SERPL-MCNC: 8.6 MG/DL (ref 8.4–10.2)
CHLORIDE SERPL-SCNC: 105 MMOL/L (ref 98–107)
CO2 SERPL-SCNC: 23 MMOL/L (ref 22–29)
CREAT SERPL-MCNC: 0.85 MG/DL (ref 0.73–1.18)
EOSINOPHIL # BLD AUTO: 0.15 X10(3)/MCL (ref 0–0.9)
EOSINOPHIL NFR BLD AUTO: 2.5 %
ERYTHROCYTE [DISTWIDTH] IN BLOOD BY AUTOMATED COUNT: 13.1 % (ref 11.5–17)
GFR SERPLBLD CREATININE-BSD FMLA CKD-EPI: >60 MLS/MIN/1.73/M2
GLOBULIN SER-MCNC: 2.9 GM/DL (ref 2.4–3.5)
GLUCOSE SERPL-MCNC: 102 MG/DL (ref 74–100)
HCT VFR BLD AUTO: 31.8 % (ref 42–52)
HGB BLD-MCNC: 10.6 G/DL (ref 14–18)
IMM GRANULOCYTES # BLD AUTO: 0.02 X10(3)/MCL (ref 0–0.04)
IMM GRANULOCYTES NFR BLD AUTO: 0.3 %
LYMPHOCYTES # BLD AUTO: 2.39 X10(3)/MCL (ref 0.6–4.6)
LYMPHOCYTES NFR BLD AUTO: 39.4 %
MAGNESIUM SERPL-MCNC: 1.7 MG/DL (ref 1.6–2.6)
MCH RBC QN AUTO: 35.1 PG (ref 27–31)
MCHC RBC AUTO-ENTMCNC: 33.3 G/DL (ref 33–36)
MCV RBC AUTO: 105.3 FL (ref 80–94)
MONOCYTES # BLD AUTO: 0.89 X10(3)/MCL (ref 0.1–1.3)
MONOCYTES NFR BLD AUTO: 14.7 %
NEUTROPHILS # BLD AUTO: 2.55 X10(3)/MCL (ref 2.1–9.2)
NEUTROPHILS NFR BLD AUTO: 42.1 %
NRBC BLD AUTO-RTO: 0 %
PHOSPHATE SERPL-MCNC: 4.1 MG/DL (ref 2.3–4.7)
PLATELET # BLD AUTO: 200 X10(3)/MCL (ref 130–400)
PMV BLD AUTO: 9 FL (ref 7.4–10.4)
POTASSIUM SERPL-SCNC: 4.6 MMOL/L (ref 3.5–5.1)
PROT SERPL-MCNC: 6.2 GM/DL (ref 6.4–8.3)
RBC # BLD AUTO: 3.02 X10(6)/MCL (ref 4.7–6.1)
SODIUM SERPL-SCNC: 134 MMOL/L (ref 136–145)
WBC # SPEC AUTO: 6.06 X10(3)/MCL (ref 4.5–11.5)

## 2023-11-01 PROCEDURE — 80053 COMPREHEN METABOLIC PANEL: CPT | Performed by: INTERNAL MEDICINE

## 2023-11-01 PROCEDURE — 25000003 PHARM REV CODE 250: Performed by: INTERNAL MEDICINE

## 2023-11-01 PROCEDURE — 63600175 PHARM REV CODE 636 W HCPCS: Performed by: INTERNAL MEDICINE

## 2023-11-01 PROCEDURE — 84100 ASSAY OF PHOSPHORUS: CPT | Performed by: INTERNAL MEDICINE

## 2023-11-01 PROCEDURE — 83735 ASSAY OF MAGNESIUM: CPT | Performed by: INTERNAL MEDICINE

## 2023-11-01 PROCEDURE — 25000003 PHARM REV CODE 250: Performed by: NURSE PRACTITIONER

## 2023-11-01 PROCEDURE — S4991 NICOTINE PATCH NONLEGEND: HCPCS | Performed by: INTERNAL MEDICINE

## 2023-11-01 PROCEDURE — 11000001 HC ACUTE MED/SURG PRIVATE ROOM

## 2023-11-01 PROCEDURE — 85025 COMPLETE CBC W/AUTO DIFF WBC: CPT | Performed by: INTERNAL MEDICINE

## 2023-11-01 RX ORDER — PROMETHAZINE HYDROCHLORIDE 25 MG/1
25 TABLET ORAL EVERY 6 HOURS PRN
Status: DISCONTINUED | OUTPATIENT
Start: 2023-11-01 | End: 2023-11-04 | Stop reason: HOSPADM

## 2023-11-01 RX ADMIN — VANCOMYCIN HYDROCHLORIDE 1000 MG: 1 INJECTION, POWDER, LYOPHILIZED, FOR SOLUTION INTRAVENOUS at 04:11

## 2023-11-01 RX ADMIN — RIVAROXABAN 15 MG: 15 TABLET, FILM COATED ORAL at 08:11

## 2023-11-01 RX ADMIN — PROMETHAZINE HYDROCHLORIDE 25 MG: 25 TABLET ORAL at 01:11

## 2023-11-01 RX ADMIN — GABAPENTIN 600 MG: 300 CAPSULE ORAL at 08:11

## 2023-11-01 RX ADMIN — GABAPENTIN 600 MG: 300 CAPSULE ORAL at 02:11

## 2023-11-01 RX ADMIN — MORPHINE SULFATE 4 MG: 4 INJECTION, SOLUTION INTRAMUSCULAR; INTRAVENOUS at 01:11

## 2023-11-01 RX ADMIN — OXYCODONE HYDROCHLORIDE AND ACETAMINOPHEN 1 TABLET: 10; 325 TABLET ORAL at 08:11

## 2023-11-01 RX ADMIN — CYCLOBENZAPRINE 10 MG: 10 TABLET, FILM COATED ORAL at 02:11

## 2023-11-01 RX ADMIN — OXYCODONE HYDROCHLORIDE AND ACETAMINOPHEN 1 TABLET: 10; 325 TABLET ORAL at 04:11

## 2023-11-01 RX ADMIN — TACROLIMUS 5 MG: 1 CAPSULE ORAL at 05:11

## 2023-11-01 RX ADMIN — CYCLOBENZAPRINE 10 MG: 10 TABLET, FILM COATED ORAL at 08:11

## 2023-11-01 RX ADMIN — OXYCODONE HYDROCHLORIDE AND ACETAMINOPHEN 1 TABLET: 10; 325 TABLET ORAL at 12:11

## 2023-11-01 RX ADMIN — SODIUM CHLORIDE: 9 INJECTION, SOLUTION INTRAVENOUS at 02:11

## 2023-11-01 RX ADMIN — CALCIUM CARBONATE (ANTACID) CHEW TAB 500 MG 1000 MG: 500 CHEW TAB at 08:11

## 2023-11-01 RX ADMIN — FOLIC ACID 1000 MCG: 1 TABLET ORAL at 08:11

## 2023-11-01 RX ADMIN — AMLODIPINE BESYLATE 5 MG: 5 TABLET ORAL at 08:11

## 2023-11-01 RX ADMIN — VANCOMYCIN HYDROCHLORIDE 1000 MG: 1 INJECTION, POWDER, LYOPHILIZED, FOR SOLUTION INTRAVENOUS at 02:11

## 2023-11-01 RX ADMIN — PIPERACILLIN AND TAZOBACTAM 4.5 G: 4; .5 INJECTION, POWDER, FOR SOLUTION INTRAVENOUS at 02:11

## 2023-11-01 RX ADMIN — PIPERACILLIN AND TAZOBACTAM 4.5 G: 4; .5 INJECTION, POWDER, FOR SOLUTION INTRAVENOUS at 09:11

## 2023-11-01 RX ADMIN — NICOTINE 1 PATCH: 21 PATCH, EXTENDED RELEASE TRANSDERMAL at 08:11

## 2023-11-01 RX ADMIN — PIPERACILLIN AND TAZOBACTAM 4.5 G: 4; .5 INJECTION, POWDER, FOR SOLUTION INTRAVENOUS at 06:11

## 2023-11-01 RX ADMIN — TACROLIMUS 5 MG: 1 CAPSULE ORAL at 08:11

## 2023-11-01 RX ADMIN — MORPHINE SULFATE 4 MG: 4 INJECTION, SOLUTION INTRAMUSCULAR; INTRAVENOUS at 12:11

## 2023-11-01 RX ADMIN — RIVAROXABAN 15 MG: 15 TABLET, FILM COATED ORAL at 04:11

## 2023-11-01 NOTE — PLAN OF CARE
Problem: Infection  Goal: Absence of Infection Signs and Symptoms  Outcome: Ongoing, Progressing     Problem: Fall Injury Risk  Goal: Absence of Fall and Fall-Related Injury  Outcome: Ongoing, Progressing     Problem: Impaired Wound Healing  Goal: Optimal Wound Healing  Outcome: Ongoing, Progressing     Problem: Adult Inpatient Plan of Care  Goal: Absence of Hospital-Acquired Illness or Injury  Outcome: Ongoing, Progressing  Goal: Optimal Comfort and Wellbeing  Outcome: Ongoing, Progressing  Goal: Readiness for Transition of Care  Outcome: Ongoing, Progressing

## 2023-11-01 NOTE — PROGRESS NOTES
[facility] Hospital Medicine Progress Note        Chief Complaint: Inpatient Follow-up for  Rt leg cellulitis and abscess        HPI:   The pt is a 43 year old male with PMHx  includes alcoholic cirrhosis of the liver status post liver transplant 4/2021, thrombocytopenia, anemia, chronic back pain, anxiety; presents to the ED at advised of his  his cardiologist for right lower extremity redness, swelling and an associated wound. It was reported patient has had 3 vascular surgeries to the right leg in the past details unknown.  Patient was seen in the ED in [place] on Tuesday and was prescribed oral clindamycin therapy.  Patient reports worsening redness and warmth to the right lower extremity with associated open wound erupted prior to arrival to the ED. patient is on long-term anticoagulation therapy with Xarelto. Pt denies chest pain, SOB, fever, chills, cough, congestion, or any sick contacts.       Lab showed WBC 9.5, H&H 12.7/35.9, sodium 126, chloride 94, CO2 19, BUN 5.5, creatinine 0.67; sed rate 21, AST 84, CRP 26.40, lactic acid 1.2; other indices unremarkable.  X-ray of the right lower extremity showed no osseous abnormality.  Initial vital signs /85 pulse 110 respirations 19 temperature 98.5° F O2 saturation 97% on room air.  Blood cultures were collected x2 sets. Patient was started on IV vancomycin and Zosyn therapy.  Patient is admitted to hospital medicine services for further management.     Ultrasound right lower extremity shows superficial greater saphenous vein thrombosis but no DVT. Vascular surgery was consulted and pt underwent OR  irrigation and debridement of abscess of right lower extremity on 10/21  with findings of hemorrhagic organizing abscess, no malignancy.      Blood cultures have remained negative. Wound culture grew GPR, anaerobic culture grew Finegoldia magna and Prevotella species. ID consulted and suggested Vancomycin and Zosyn with end date  11/4 with option of transitioning to oral antibiotics on discharge with doxycycline and Augmentin. On 10/29 pt developed pain in his Rt arm where he had PICC line . Venous U/S showed presence of DVT of mid right brachial vein. PICC line removed and Xarelto dose increased to 15 mg bid x 21 days, then 20 mg daily        Interval Hx:   Afebrile.   BP improved with initiation of amlodipine. Tolerating without adverse effects   Labs today WBC 6.0, Hgb 10.6, Na 134, K 4.6, Cr 0.8, LFTs normal     Case was discussed with patient's nurse and  on the floor.    Objective/physical exam:  General: In no acute distress, afebrile  Chest: Clear to auscultation bilaterally  Heart: RRR, +S1, S2, no appreciable murmur  Abdomen: Soft, nontender, BS +  MSK: Warm, no lower extremity edema, no clubbing or cyanosis- LLE, RLE - no redness noted. wound dressings to lower leg in palce. (+) venous stasis   Neurologic: Alert and oriented x4, Cranial nerve II-XII intact, Strength 4/5 in all 4 extremities      VITAL SIGNS: 24 HRS MIN & MAX LAST   Temp  Min: 97.3 °F (36.3 °C)  Max: 98.5 °F (36.9 °C) 98.3 °F (36.8 °C)   BP  Min: 131/84  Max: 168/98 131/84   Pulse  Min: 86  Max: 100  94   Resp  Min: 16  Max: 18 18   SpO2  Min: 96 %  Max: 100 % 100 %     I have reviewed the following labs:  Recent Labs   Lab 10/30/23  0525 11/01/23  0428   WBC 5.45 6.06   RBC 3.08* 3.02*   HGB 10.7* 10.6*   HCT 32.9* 31.8*   .8* 105.3*   MCH 34.7* 35.1*   MCHC 32.5* 33.3   RDW 13.2 13.1    200   MPV 8.9 9.0     Recent Labs   Lab 10/26/23  0213 10/30/23  0525 11/01/23  0428    139 134*   K 4.5 4.5 4.6   CO2 26 27 23   BUN 8.3* 8.5* 8.5*   CREATININE 0.92 0.90 0.85   CALCIUM 8.7 8.8 8.6   MG  --  1.80 1.70   ALBUMIN 3.2*  --  3.3*   ALKPHOS 95  --  81   ALT 29  --  26   AST 30  --  30   BILITOT 0.4  --  0.4     Microbiology Results (last 7 days)       Procedure Component Value Units Date/Time    Anaerobic Culture [[id number]]   (Abnormal) Collected: 10/21/23 133    Order Status: Completed Specimen: Abscess from Leg, Right Updated: 10/27/23 1240     Anaerobe Culture Finegoldia magna     Comment: Anaerobic sensitivity is not usually indicated except on single isolates from sterile body sites.         PREVOTELLA SPECIES    Anaerobic Culture [[id number]]  (Abnormal) Collected: 10/21/23 1335    Order Status: Completed Specimen: Abscess from Leg, Right Updated: 10/26/23 1102     Anaerobe Culture Finegoldia magna     Comment: Anaerobic sensitivity is not usually indicated except on single isolates from sterile body sites.                See below for Radiology    Scheduled Med:   alteplase  2 mg Intra-Catheter Once    amLODIPine  5 mg Oral Daily    calcium carbonate  1,000 mg Oral BID    cyclobenzaprine  10 mg Oral TID    diphenhydrAMINE  25 mg Oral QHS    ergocalciferol  50,000 Units Oral Q7 Days    folic acid  1,000 mcg Oral Daily    gabapentin  600 mg Oral TID    nicotine  1 patch Transdermal Daily    piperacillin-tazobactam (Zosyn) IV (PEDS and ADULTS) (extended infusion is not appropriate)  4.5 g Intravenous Q8H    polyethylene glycol  17 g Oral Daily    rivaroxaban  15 mg Oral BID WM    tacrolimus  5 mg Oral BID    vancomycin (VANCOCIN) IV (PEDS and ADULTS)  1,000 mg Intravenous Q12H      Continuous Infusions:     PRN Meds:  sodium chloride 0.9%, acetaminophen, acetaminophen, cloNIDine, hydrALAZINE, labetaloL, melatonin, morphine, naloxegoL, naloxone, oxyCODONE-acetaminophen, promethazine, vancomycin - pharmacy to dose     Assessment/Plan:  Right lower extremity cellulitis and abscess with infected wound   Superficial vein thrombosis with chronic venous insufficiency , RLE  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   Continue Zosyn and Vancomycin as recommended per ID  I discussed with the pt to transition antibiotic to oral and discharge home, however pt expressed he will think about it.   PICC line removed   Increase Xarelto to 15 mg bid x 21 days , then 20 mg daily   Continue wound care and dressing change   Home medicine are reviewed and resumed - Tacrolimus , Gabapentin, folic acid, Vit D, Flexeril,   Hold - Cellcept           VTE prophylaxis: Xarelto    Patient condition:  Fair    Anticipated discharge and Disposition:     Home with family    All diagnosis and differential diagnosis have been reviewed; assessment and plan has been documented; I have personally reviewed the labs and test results that are presently available; I have reviewed the patients medication list; I have reviewed the consulting providers response and recommendations. I have reviewed or attempted to review medical records based upon their availability    All of the patient's questions have been  addressed and answered. Patient's is agreeable to the above stated plan. I will continue to monitor closely and make adjustments to medical management as needed.    Kevin Arenas MD   11/01/2023

## 2023-11-02 LAB — MAYO GENERIC ORDERABLE RESULT: ABNORMAL

## 2023-11-02 PROCEDURE — 25000003 PHARM REV CODE 250: Performed by: NURSE PRACTITIONER

## 2023-11-02 PROCEDURE — 25000003 PHARM REV CODE 250: Performed by: INTERNAL MEDICINE

## 2023-11-02 PROCEDURE — 11000001 HC ACUTE MED/SURG PRIVATE ROOM

## 2023-11-02 PROCEDURE — 63600175 PHARM REV CODE 636 W HCPCS: Performed by: INTERNAL MEDICINE

## 2023-11-02 PROCEDURE — S4991 NICOTINE PATCH NONLEGEND: HCPCS | Performed by: INTERNAL MEDICINE

## 2023-11-02 RX ORDER — LIDOCAINE HYDROCHLORIDE 10 MG/ML
1 INJECTION, SOLUTION EPIDURAL; INFILTRATION; INTRACAUDAL; PERINEURAL ONCE
Status: DISCONTINUED | OUTPATIENT
Start: 2023-11-02 | End: 2023-11-02

## 2023-11-02 RX ORDER — SODIUM CHLORIDE 0.9 % (FLUSH) 0.9 %
3 SYRINGE (ML) INJECTION EVERY 6 HOURS PRN
Status: DISCONTINUED | OUTPATIENT
Start: 2023-11-02 | End: 2023-11-04 | Stop reason: HOSPADM

## 2023-11-02 RX ORDER — MIDAZOLAM HYDROCHLORIDE 1 MG/ML
2 INJECTION INTRAMUSCULAR; INTRAVENOUS ONCE AS NEEDED
Status: DISCONTINUED | OUTPATIENT
Start: 2023-11-02 | End: 2023-11-04 | Stop reason: HOSPADM

## 2023-11-02 RX ORDER — SODIUM CHLORIDE, SODIUM GLUCONATE, SODIUM ACETATE, POTASSIUM CHLORIDE AND MAGNESIUM CHLORIDE 30; 37; 368; 526; 502 MG/100ML; MG/100ML; MG/100ML; MG/100ML; MG/100ML
INJECTION, SOLUTION INTRAVENOUS CONTINUOUS
Status: DISCONTINUED | OUTPATIENT
Start: 2023-11-02 | End: 2023-11-02

## 2023-11-02 RX ADMIN — CYCLOBENZAPRINE 10 MG: 10 TABLET, FILM COATED ORAL at 08:11

## 2023-11-02 RX ADMIN — PIPERACILLIN AND TAZOBACTAM 4.5 G: 4; .5 INJECTION, POWDER, FOR SOLUTION INTRAVENOUS at 06:11

## 2023-11-02 RX ADMIN — FOLIC ACID 1000 MCG: 1 TABLET ORAL at 08:11

## 2023-11-02 RX ADMIN — RIVAROXABAN 15 MG: 15 TABLET, FILM COATED ORAL at 07:11

## 2023-11-02 RX ADMIN — PIPERACILLIN AND TAZOBACTAM 4.5 G: 4; .5 INJECTION, POWDER, FOR SOLUTION INTRAVENOUS at 10:11

## 2023-11-02 RX ADMIN — CALCIUM CARBONATE (ANTACID) CHEW TAB 500 MG 1000 MG: 500 CHEW TAB at 08:11

## 2023-11-02 RX ADMIN — GABAPENTIN 600 MG: 300 CAPSULE ORAL at 03:11

## 2023-11-02 RX ADMIN — CYCLOBENZAPRINE 10 MG: 10 TABLET, FILM COATED ORAL at 03:11

## 2023-11-02 RX ADMIN — TACROLIMUS 5 MG: 1 CAPSULE ORAL at 06:11

## 2023-11-02 RX ADMIN — OXYCODONE HYDROCHLORIDE AND ACETAMINOPHEN 1 TABLET: 10; 325 TABLET ORAL at 01:11

## 2023-11-02 RX ADMIN — VANCOMYCIN HYDROCHLORIDE 1000 MG: 1 INJECTION, POWDER, LYOPHILIZED, FOR SOLUTION INTRAVENOUS at 03:11

## 2023-11-02 RX ADMIN — AMLODIPINE BESYLATE 5 MG: 5 TABLET ORAL at 08:11

## 2023-11-02 RX ADMIN — OXYCODONE HYDROCHLORIDE AND ACETAMINOPHEN 1 TABLET: 10; 325 TABLET ORAL at 08:11

## 2023-11-02 RX ADMIN — PROMETHAZINE HYDROCHLORIDE 25 MG: 25 TABLET ORAL at 10:11

## 2023-11-02 RX ADMIN — PIPERACILLIN AND TAZOBACTAM 4.5 G: 4; .5 INJECTION, POWDER, FOR SOLUTION INTRAVENOUS at 01:11

## 2023-11-02 RX ADMIN — MORPHINE SULFATE 4 MG: 4 INJECTION, SOLUTION INTRAMUSCULAR; INTRAVENOUS at 11:11

## 2023-11-02 RX ADMIN — POLYETHYLENE GLYCOL 3350 17 G: 17 POWDER, FOR SOLUTION ORAL at 08:11

## 2023-11-02 RX ADMIN — NICOTINE 1 PATCH: 21 PATCH, EXTENDED RELEASE TRANSDERMAL at 08:11

## 2023-11-02 RX ADMIN — NALOXEGOL OXALATE 25 MG: 25 TABLET, FILM COATED ORAL at 01:11

## 2023-11-02 RX ADMIN — TACROLIMUS 5 MG: 1 CAPSULE ORAL at 07:11

## 2023-11-02 RX ADMIN — RIVAROXABAN 15 MG: 15 TABLET, FILM COATED ORAL at 05:11

## 2023-11-02 RX ADMIN — GABAPENTIN 600 MG: 300 CAPSULE ORAL at 08:11

## 2023-11-02 RX ADMIN — MORPHINE SULFATE 4 MG: 4 INJECTION, SOLUTION INTRAMUSCULAR; INTRAVENOUS at 01:11

## 2023-11-02 NOTE — PROGRESS NOTES
Infectious Diseases Progress Note  43-year-old male with past medical history of anxiety, chronic back pain, alcoholic cirrhosis of the liver s/p liver transplant on tacrolimus, thrombocytopenia, history of prior vascular surgeries in the right lower extremity, on long-term anticoagulation therapy with Xarelto, is admitted to [facility] on 10/19/2023, presenting through the ED, sent by his cardiologist for right lower extremity nonhealing leg wound with cellulitis.  He apparently was initially seen at outside facility, [facility] on 10/17, prescribed oral clindamycin without improvement, reporting worsening redness and warmth to the right lower extremity.  He was seen by the Cardiology Service and was instructed to present to the ED for further evaluation.  On presentation he was noted to have no fevers and no leukocytosis, noted with thrombocytopenia, anemic with low albumin.  Urine toxicology test positive for opiates.  Blood cultures have remained negative.  CRP 26.40.  X-ray of the tibia fibula with no acute osseous abnormality.  He was seen by vascular surgery, taken to OR on 10/21 for irrigation and debridement of abscess of right lower extremity with findings of purulence blood cultures have remained negative and Gram-positive cocci noted on Gram stain.    He is on antibiotic coverage with vancomycin and Zosyn.    Subjective:  No new complaints, no fevers, doing about the same. In no acute distress      Past Medical History:   Diagnosis Date    Alcohol abuse     Chronic back pain      Past Surgical History:   Procedure Laterality Date    BACK SURGERY  2011    DIAGNOSTIC ULTRASOUND N/A 4/14/2021    Procedure: ULTRASOUND,INTRAOPERATIVE;  Surgeon: Jose Anderson MD;  Location: [facility] OR [address] [place] [address];  Service: Transplant;  Laterality: N/A;    ESOPHAGOGASTRODUODENOSCOPY N/A 8/5/2021    Procedure: EGD (ESOPHAGOGASTRODUODENOSCOPY);  Surgeon: Judy De La Garza MD;  Location: [facility] ([facility]  FLR);  Service: Endoscopy;  Laterality: N/A;    IRRIGATION AND DEBRIDEMENT Right 10/21/2023    Procedure: IRRIGATION AND DEBRIDEMENT- i&d abscess right lower extremity;  Surgeon: Shirlene Durán MD;  Location: [facility] OR;  Service: Peripheral Vascular;  Laterality: Right;    LIVER TRANSPLANT N/A 4/11/2021    Procedure: TRANSPLANT, LIVER;  Surgeon: Joe Baker MD;  Location: [facility] OR 2ND FLR;  Service: Transplant;  Laterality: N/A;     Social History     Socioeconomic History    Marital status: Single   Tobacco Use    Smoking status: Never    Smokeless tobacco: Never   Substance and Sexual Activity    Drug use: Never       ROS  HENT: Negative.     Respiratory: Negative.     Gastrointestinal: Negative.    Genitourinary: Negative.    Musculoskeletal: Negative.    Skin:         Right leg wound   Neurological:  Negative for weakness.   Endo/Heme/Allergies: Negative.    Psychiatric/Behavioral: Negative.     All other Systems review done and negative.    Review of patient's allergies indicates:   Allergen Reactions    Zofran [ondansetron hcl]      Interaction with tacrolimus         Scheduled Meds:   alteplase  2 mg Intra-Catheter Once    amLODIPine  5 mg Oral Daily    calcium carbonate  1,000 mg Oral BID    cyclobenzaprine  10 mg Oral TID    diphenhydrAMINE  25 mg Oral QHS    ergocalciferol  50,000 Units Oral Q7 Days    folic acid  1,000 mcg Oral Daily    gabapentin  600 mg Oral TID    nicotine  1 patch Transdermal Daily    piperacillin-tazobactam (Zosyn) IV (PEDS and ADULTS) (extended infusion is not appropriate)  4.5 g Intravenous Q8H    polyethylene glycol  17 g Oral Daily    rivaroxaban  15 mg Oral BID WM    tacrolimus  5 mg Oral BID    vancomycin (VANCOCIN) IV (PEDS and ADULTS)  1,000 mg Intravenous Q12H     Continuous Infusions:  PRN Meds:sodium chloride 0.9%, acetaminophen, acetaminophen, cloNIDine, hydrALAZINE, labetaloL, melatonin, morphine, naloxegoL, naloxone, oxyCODONE-acetaminophen, promethazine,  "vancomycin - pharmacy to dose    Objective:  /87   Pulse 95   Temp 98 °F (36.7 °C) (Oral)   Resp 20   Ht 6' 1" (1.854 m)   Wt 104.5 kg (230 lb 4.8 oz)   SpO2 99%   BMI 30.38 kg/m²     Physical Exam:   Physical Exam  Vitals reviewed.   Constitutional:       General: He is not in acute distress.     Appearance: He is obese. He is not toxic-appearing.   HENT:      Head: Normocephalic and atraumatic.   Eyes:      Pupils: Pupils are equal, round, and reactive to light.   Cardiovascular:      Rate and Rhythm: Normal rate and regular rhythm.      Heart sounds: Normal heart sounds.   Pulmonary:      Effort: Pulmonary effort is normal. No respiratory distress.      Breath sounds: Normal breath sounds.   Abdominal:      General: Bowel sounds are normal. There is no distension.      Palpations: Abdomen is soft.      Tenderness: There is no abdominal tenderness.   Genitourinary:     Comments: No lesions reported  Musculoskeletal:         General: No deformity. Normal range of motion.      Cervical back: Neck supple.   Skin:     Findings: No rash.      Comments: RLE wound dressed  Neurological:      Mental Status: He is alert and oriented to person, place, and time.   Psychiatric:      Comments: Calm and cooperative      Imaging  Imaging Results              X-Ray Tibia Fibula 2 View Right (Final result)  Result time 10/20/23 12:40:35      Final result by Manasa Murphy MD (10/20/23 12:40:35)                   Impression:      No acute osseous abnormality.      Electronically signed by: Manasa Murphy  Date:    10/20/2023  Time:    12:40               Narrative:    EXAMINATION:  XR TIBIA FIBULA 2 VIEW RIGHT    CLINICAL HISTORY:  Pain, unspecified    TECHNIQUE:  AP and lateral views of the right tibia and fibula were performed.    COMPARISON:  None.    FINDINGS:  No fracture. No dislocation.    Nonfocal soft tissue swelling.                                       Lab Review   Recent Results (from the past 24 " hour(s))   Comprehensive Metabolic Panel    Collection Time: 11/01/23  4:28 AM   Result Value Ref Range    Sodium Level 134 (L) 136 - 145 mmol/L    Potassium Level 4.6 3.5 - 5.1 mmol/L    Chloride 105 98 - 107 mmol/L    Carbon Dioxide 23 22 - 29 mmol/L    Glucose Level 102 (H) 74 - 100 mg/dL    Blood Urea Nitrogen 8.5 (L) 8.9 - 20.6 mg/dL    Creatinine 0.85 0.73 - 1.18 mg/dL    Calcium Level Total 8.6 8.4 - 10.2 mg/dL    Protein Total 6.2 (L) 6.4 - 8.3 gm/dL    Albumin Level 3.3 (L) 3.5 - 5.0 g/dL    Globulin 2.9 2.4 - 3.5 gm/dL    Albumin/Globulin Ratio 1.1 1.1 - 2.0 ratio    Bilirubin Total 0.4 <=1.5 mg/dL    Alkaline Phosphatase 81 40 - 150 unit/L    Alanine Aminotransferase 26 0 - 55 unit/L    Aspartate Aminotransferase 30 5 - 34 unit/L    eGFR >60 mls/min/1.73/m2   Magnesium    Collection Time: 11/01/23  4:28 AM   Result Value Ref Range    Magnesium Level 1.70 1.60 - 2.60 mg/dL   Phosphorus    Collection Time: 11/01/23  4:28 AM   Result Value Ref Range    Phosphorus Level 4.1 2.3 - 4.7 mg/dL   CBC with Differential    Collection Time: 11/01/23  4:28 AM   Result Value Ref Range    WBC 6.06 4.50 - 11.50 x10(3)/mcL    RBC 3.02 (L) 4.70 - 6.10 x10(6)/mcL    Hgb 10.6 (L) 14.0 - 18.0 g/dL    Hct 31.8 (L) 42.0 - 52.0 %    .3 (H) 80.0 - 94.0 fL    MCH 35.1 (H) 27.0 - 31.0 pg    MCHC 33.3 33.0 - 36.0 g/dL    RDW 13.1 11.5 - 17.0 %    Platelet 200 130 - 400 x10(3)/mcL    MPV 9.0 7.4 - 10.4 fL    Neut % 42.1 %    Lymph % 39.4 %    Mono % 14.7 %    Eos % 2.5 %    Basophil % 1.0 %    Lymph # 2.39 0.6 - 4.6 x10(3)/mcL    Neut # 2.55 2.1 - 9.2 x10(3)/mcL    Mono # 0.89 0.1 - 1.3 x10(3)/mcL    Eos # 0.15 0 - 0.9 x10(3)/mcL    Baso # 0.06 <=0.2 x10(3)/mcL    IG# 0.02 0 - 0.04 x10(3)/mcL    IG% 0.3 %    NRBC% 0.0 %             Assessment/Plan:  1. Right lower extremity abscess/cellulitis/wound infection-Finegoldia  2. Immunocompromised s/p liver transplant on immunosuppressive drugs  3. History of alcoholic cirrhosis  4.   Anemia and thrombocytopenia  5.  Protein calorie malnutrition  6. Chronic back pain, opioid dependent  6.  Morbid obesity      -Continue vancomycin and Zosyn, plan end date of 11/4 with option of transitioning to oral antibiotics on discharge with doxycycline and Augmentin  -Remains afebrile without leukocytosis   -S/p abscess drainage on 10/21 with cultures yielding Finegoldia magna  -10/19 blood cultures negative  -10/24 chest x-ray results noted with PICC line placement  -We will continue wound care per surgery and Wound Care team  -Keep the extremity elevated at all times while sitting or supine  -Weight loss is encouraged  -Discussed with patient and nursing staff. Disposition per primary team

## 2023-11-02 NOTE — NURSING
PATIENT NOTED OUTSIDE SMOKING A CIGARETTE, LIGHTER GIVEN TO NURSE. PATIENT RE-EDUCATED ON SMOKING WITH NICOTINE PATCH AND LEAVING UNIT W/O LETTING STAFF KNOW.

## 2023-11-02 NOTE — PROGRESS NOTES
[facility] Hospital Medicine Progress Note        Chief Complaint: Inpatient Follow-up for   Rt leg cellulitis and abscess        HPI:   The pt is a 43 year old male with PMHx  includes alcoholic cirrhosis of the liver status post liver transplant 4/2021, thrombocytopenia, anemia, chronic back pain, anxiety; presents to the ED at advised of his  his cardiologist for right lower extremity redness, swelling and an associated wound. It was reported patient has had 3 vascular ablation surgeries to the right leg in the past. Patient was seen in the ED in [place] on Tuesday and was prescribed oral clindamycin therapy.  Patient reports worsening redness and warmth to the right lower extremity with associated open wound erupted prior to arrival to the ED. patient is on long-term anticoagulation therapy with Xarelto. Pt denies chest pain, SOB, fever, chills, cough, congestion, or any sick contacts.       Lab showed WBC 9.5, H&H 12.7/35.9, sodium 126, chloride 94, CO2 19, BUN 5.5, creatinine 0.67; sed rate 21, AST 84, CRP 26.40, lactic acid 1.2; other indices unremarkable.  X-ray of the right lower extremity showed no osseous abnormality.  Initial vital signs /85 pulse 110 respirations 19 temperature 98.5° F O2 saturation 97% on room air.  Blood cultures were collected x2 sets. Patient was started on IV vancomycin and Zosyn therapy.  Patient is admitted to hospital medicine services for further management.     Ultrasound right lower extremity shows superficial greater saphenous vein thrombosis but no DVT. Vascular surgery was consulted and pt underwent OR  irrigation and debridement of abscess of right lower extremity on 10/21  with findings of hemorrhagic organizing abscess, no malignancy.      Blood cultures have remained negative. Wound culture grew GPR, anaerobic culture grew Finegoldia magna and Prevotella species. ID consulted and suggested IV Vancomycin and Zosyn with end date  11/4 with option of transitioning to oral antibiotics on discharge with doxycycline and Augmentin. Pt is very concerned and would like to continue antibiotic in IV form. On 10/29 pt developed pain in his Rt arm where he had PICC line . Venous U/S showed presence of DVT of mid right brachial vein. PICC line removed and Xarelto dose increased to 15 mg bid x 21 days, then 20 mg daily thereafter. Additionally pt is noted to have elevated BP on multiple occasions and started on Amlodipine 5 mg daily with further dose adjustment as outpatient per pt's Cardiologist.       Interval Hx:   Afebrile. No new c/o or concern   BP control is better with initiation of amlodipine 5 mg daily   Labs from 11/1 stable at baseline with no leukocytosis.   Pt was given hospital privilege but was found outside smoking cigarettes.      Case was discussed with patient's nurse and  on the floor.    Objective/physical exam:  General: In no acute distress, afebrile  Chest: Clear to auscultation bilaterally  Heart: RRR, +S1, S2, no appreciable murmur  Abdomen: Soft, nontender, BS +  MSK: Warm, Left lower extremity with no edema, clubbing or cyanosis, RLE - no redness noted. 1+ pitting edema with venous stasis changes. wound dressings to lower leg in palce.  Neurologic: Alert and oriented x4, Cranial nerve II-XII intact, Strength 4/5 in all 4 extremities       VITAL SIGNS: 24 HRS MIN & MAX LAST   Temp  Min: 97.8 °F (36.6 °C)  Max: 99.1 °F (37.3 °C) 99.1 °F (37.3 °C)   BP  Min: 122/81  Max: 148/92 122/81   Pulse  Min: 85  Max: 96  96   Resp  Min: 18  Max: 20 18   SpO2  Min: 93 %  Max: 100 % 100 %     I have reviewed the following labs:  Recent Labs   Lab 10/30/23  0525 11/01/23  0428   WBC 5.45 6.06   RBC 3.08* 3.02*   HGB 10.7* 10.6*   HCT 32.9* 31.8*   .8* 105.3*   MCH 34.7* 35.1*   MCHC 32.5* 33.3   RDW 13.2 13.1    200   MPV 8.9 9.0     Recent Labs   Lab 10/30/23  0525 11/01/23  0428    134*   K 4.5 4.6   CO2 27  23   BUN 8.5* 8.5*   CREATININE 0.90 0.85   CALCIUM 8.8 8.6   MG 1.80 1.70   ALBUMIN  --  3.3*   ALKPHOS  --  81   ALT  --  26   AST  --  30   BILITOT  --  0.4     Microbiology Results (last 7 days)       Procedure Component Value Units Date/Time    Anaerobic Culture [[id number]]  (Abnormal) Collected: 10/21/23 1334    Order Status: Completed Specimen: Abscess from Leg, Right Updated: 10/27/23 1240     Anaerobe Culture Finegoldia magna     Comment: Anaerobic sensitivity is not usually indicated except on single isolates from sterile body sites.         PREVOTELLA SPECIES             See below for Radiology    Scheduled Med:   alteplase  2 mg Intra-Catheter Once    amLODIPine  5 mg Oral Daily    calcium carbonate  1,000 mg Oral BID    cyclobenzaprine  10 mg Oral TID    diphenhydrAMINE  25 mg Oral QHS    ergocalciferol  50,000 Units Oral Q7 Days    folic acid  1,000 mcg Oral Daily    gabapentin  600 mg Oral TID    nicotine  1 patch Transdermal Daily    piperacillin-tazobactam (Zosyn) IV (PEDS and ADULTS) (extended infusion is not appropriate)  4.5 g Intravenous Q8H    polyethylene glycol  17 g Oral Daily    rivaroxaban  15 mg Oral BID WM    tacrolimus  5 mg Oral BID    vancomycin (VANCOCIN) IV (PEDS and ADULTS)  1,000 mg Intravenous Q12H      Continuous Infusions:     PRN Meds:  sodium chloride 0.9%, acetaminophen, acetaminophen, cloNIDine, hydrALAZINE, labetaloL, melatonin, midazolam, morphine, naloxegoL, naloxone, oxyCODONE-acetaminophen, promethazine, sodium chloride 0.9%, vancomycin - pharmacy to dose     Assessment/Plan:  Right lower extremity cellulitis and abscess with infected wound   Superficial vein thrombosis with chronic venous insufficiency , RLE  Immunocompromised host-  s/p liver transplant on immunosuppressive drugs  Anemia with macrocytosis - stable  Hyponatremia , POA- resolved   Acute DVT, RUE- PICC line associated   Long term anticoagulation treatment   Elevated BP/ Essential HTN  Current everyday  smoker and tobacco dependency   Obesity , BMI 30.0     History of- alcoholic cirrhosis,S/P liver transplant 2021, Thrombocytopenia,  Chronic back pain, opioid dependent     Plan-  Amlodipine 5 mg one po daily initiated for elevated BP and tolerating.   Continue Zosyn and Vancomycin as recommended per ID  I discussed with the pt to transition antibiotic to oral and discharge home, however pt would like to continue IV antibiotic   PICC line removed   Increase Xarelto to 15 mg bid x 21 days , then 20 mg daily   Continue wound care and dressing change   Home medicine are reviewed and resumed - Tacrolimus , Gabapentin, folic acid, Vit D, Flexeril,   Hold - Cellcept   Assistance with smoking cessation was offered, including:  []  Medications  [x]  Counseling  []  Printed Information on Smoking Cessation  []  Referral to a Smoking Cessation Program    Patient was counseled regarding smoking for >10 minutes.       VTE prophylaxis: Xarelto     Patient condition:  Fair     Anticipated discharge and Disposition:     Home with family          All diagnosis and differential diagnosis have been reviewed; assessment and plan has been documented; I have personally reviewed the labs and test results that are presently available; I have reviewed the patients medication list; I have reviewed the consulting providers response and recommendations. I have reviewed or attempted to review medical records based upon their availability    All of the patient's questions have been  addressed and answered. Patient's is agreeable to the above stated plan. I will continue to monitor closely and make adjustments to medical management as needed.    Kevin Arenas MD   11/02/2023

## 2023-11-02 NOTE — NURSING
PATIENT NOTED WITH HALF FULL BOX OF CIGARETTES ON BED DURING SHIFT CHANGE. PATIENT HANDED CIGARETTES TO NURSE W/O INCIDENT. REEDUCATED ON SMOKING CESSATION D/T TRANSPLANT RECIPIENT AND NICOTINE PATCHES USE.

## 2023-11-03 LAB
ALBUMIN SERPL-MCNC: 3.2 G/DL (ref 3.5–5)
ALBUMIN/GLOB SERPL: 1.1 RATIO (ref 1.1–2)
ALP SERPL-CCNC: 73 UNIT/L (ref 40–150)
ALT SERPL-CCNC: 23 UNIT/L (ref 0–55)
AST SERPL-CCNC: 24 UNIT/L (ref 5–34)
BACTERIA WND CULT: ABNORMAL
BASOPHILS # BLD AUTO: 0.06 X10(3)/MCL
BASOPHILS NFR BLD AUTO: 0.9 %
BILIRUB SERPL-MCNC: 0.3 MG/DL
BUN SERPL-MCNC: 7.9 MG/DL (ref 8.9–20.6)
CALCIUM SERPL-MCNC: 8.8 MG/DL (ref 8.4–10.2)
CHLORIDE SERPL-SCNC: 104 MMOL/L (ref 98–107)
CO2 SERPL-SCNC: 25 MMOL/L (ref 22–29)
CREAT SERPL-MCNC: 0.88 MG/DL (ref 0.73–1.18)
CRP SERPL-MCNC: 1 MG/L
EOSINOPHIL # BLD AUTO: 0.2 X10(3)/MCL (ref 0–0.9)
EOSINOPHIL NFR BLD AUTO: 3.1 %
ERYTHROCYTE [DISTWIDTH] IN BLOOD BY AUTOMATED COUNT: 13 % (ref 11.5–17)
ERYTHROCYTE [SEDIMENTATION RATE] IN BLOOD: 22 MM/HR (ref 0–15)
GFR SERPLBLD CREATININE-BSD FMLA CKD-EPI: >60 MLS/MIN/1.73/M2
GLOBULIN SER-MCNC: 2.9 GM/DL (ref 2.4–3.5)
GLUCOSE SERPL-MCNC: 99 MG/DL (ref 74–100)
HCT VFR BLD AUTO: 32.3 % (ref 42–52)
HGB BLD-MCNC: 10.9 G/DL (ref 14–18)
IMM GRANULOCYTES # BLD AUTO: 0.02 X10(3)/MCL (ref 0–0.04)
IMM GRANULOCYTES NFR BLD AUTO: 0.3 %
LYMPHOCYTES # BLD AUTO: 2.44 X10(3)/MCL (ref 0.6–4.6)
LYMPHOCYTES NFR BLD AUTO: 38 %
MCH RBC QN AUTO: 34.9 PG (ref 27–31)
MCHC RBC AUTO-ENTMCNC: 33.7 G/DL (ref 33–36)
MCV RBC AUTO: 103.5 FL (ref 80–94)
MONOCYTES # BLD AUTO: 0.79 X10(3)/MCL (ref 0.1–1.3)
MONOCYTES NFR BLD AUTO: 12.3 %
NEUTROPHILS # BLD AUTO: 2.91 X10(3)/MCL (ref 2.1–9.2)
NEUTROPHILS NFR BLD AUTO: 45.4 %
NRBC BLD AUTO-RTO: 0 %
PLATELET # BLD AUTO: 201 X10(3)/MCL (ref 130–400)
PMV BLD AUTO: 8.9 FL (ref 7.4–10.4)
POTASSIUM SERPL-SCNC: 4.3 MMOL/L (ref 3.5–5.1)
PROT SERPL-MCNC: 6.1 GM/DL (ref 6.4–8.3)
RBC # BLD AUTO: 3.12 X10(6)/MCL (ref 4.7–6.1)
SODIUM SERPL-SCNC: 136 MMOL/L (ref 136–145)
VANCOMYCIN TROUGH SERPL-MCNC: 15.4 UG/ML (ref 15–20)
WBC # SPEC AUTO: 6.42 X10(3)/MCL (ref 4.5–11.5)

## 2023-11-03 PROCEDURE — 25000003 PHARM REV CODE 250: Performed by: INTERNAL MEDICINE

## 2023-11-03 PROCEDURE — 80053 COMPREHEN METABOLIC PANEL: CPT | Performed by: INTERNAL MEDICINE

## 2023-11-03 PROCEDURE — 86140 C-REACTIVE PROTEIN: CPT | Performed by: INTERNAL MEDICINE

## 2023-11-03 PROCEDURE — 85652 RBC SED RATE AUTOMATED: CPT | Performed by: INTERNAL MEDICINE

## 2023-11-03 PROCEDURE — 63600175 PHARM REV CODE 636 W HCPCS: Performed by: INTERNAL MEDICINE

## 2023-11-03 PROCEDURE — 85025 COMPLETE CBC W/AUTO DIFF WBC: CPT | Performed by: INTERNAL MEDICINE

## 2023-11-03 PROCEDURE — 80202 ASSAY OF VANCOMYCIN: CPT | Performed by: INTERNAL MEDICINE

## 2023-11-03 PROCEDURE — 25000003 PHARM REV CODE 250: Performed by: NURSE PRACTITIONER

## 2023-11-03 PROCEDURE — S4991 NICOTINE PATCH NONLEGEND: HCPCS | Performed by: INTERNAL MEDICINE

## 2023-11-03 PROCEDURE — 11000001 HC ACUTE MED/SURG PRIVATE ROOM

## 2023-11-03 RX ADMIN — FOLIC ACID 1000 MCG: 1 TABLET ORAL at 08:11

## 2023-11-03 RX ADMIN — VANCOMYCIN HYDROCHLORIDE 1000 MG: 1 INJECTION, POWDER, LYOPHILIZED, FOR SOLUTION INTRAVENOUS at 06:11

## 2023-11-03 RX ADMIN — AMLODIPINE BESYLATE 5 MG: 5 TABLET ORAL at 08:11

## 2023-11-03 RX ADMIN — OXYCODONE HYDROCHLORIDE AND ACETAMINOPHEN 1 TABLET: 10; 325 TABLET ORAL at 02:11

## 2023-11-03 RX ADMIN — TACROLIMUS 5 MG: 1 CAPSULE ORAL at 08:11

## 2023-11-03 RX ADMIN — RIVAROXABAN 15 MG: 15 TABLET, FILM COATED ORAL at 06:11

## 2023-11-03 RX ADMIN — CYCLOBENZAPRINE 10 MG: 10 TABLET, FILM COATED ORAL at 03:11

## 2023-11-03 RX ADMIN — CYCLOBENZAPRINE 10 MG: 10 TABLET, FILM COATED ORAL at 08:11

## 2023-11-03 RX ADMIN — CALCIUM CARBONATE (ANTACID) CHEW TAB 500 MG 1000 MG: 500 CHEW TAB at 08:11

## 2023-11-03 RX ADMIN — CYCLOBENZAPRINE 10 MG: 10 TABLET, FILM COATED ORAL at 07:11

## 2023-11-03 RX ADMIN — PIPERACILLIN AND TAZOBACTAM 4.5 G: 4; .5 INJECTION, POWDER, FOR SOLUTION INTRAVENOUS at 06:11

## 2023-11-03 RX ADMIN — MORPHINE SULFATE 4 MG: 4 INJECTION, SOLUTION INTRAMUSCULAR; INTRAVENOUS at 01:11

## 2023-11-03 RX ADMIN — GABAPENTIN 600 MG: 300 CAPSULE ORAL at 03:11

## 2023-11-03 RX ADMIN — POLYETHYLENE GLYCOL 3350 17 G: 17 POWDER, FOR SOLUTION ORAL at 08:11

## 2023-11-03 RX ADMIN — TACROLIMUS 5 MG: 1 CAPSULE ORAL at 06:11

## 2023-11-03 RX ADMIN — NICOTINE 1 PATCH: 21 PATCH, EXTENDED RELEASE TRANSDERMAL at 08:11

## 2023-11-03 RX ADMIN — OXYCODONE HYDROCHLORIDE AND ACETAMINOPHEN 1 TABLET: 10; 325 TABLET ORAL at 07:11

## 2023-11-03 RX ADMIN — ERGOCALCIFEROL 50000 UNITS: 1.25 CAPSULE ORAL at 08:11

## 2023-11-03 RX ADMIN — GABAPENTIN 600 MG: 300 CAPSULE ORAL at 08:11

## 2023-11-03 RX ADMIN — CALCIUM CARBONATE (ANTACID) CHEW TAB 500 MG 1000 MG: 500 CHEW TAB at 07:11

## 2023-11-03 RX ADMIN — OXYCODONE HYDROCHLORIDE AND ACETAMINOPHEN 1 TABLET: 10; 325 TABLET ORAL at 08:11

## 2023-11-03 RX ADMIN — OXYCODONE HYDROCHLORIDE AND ACETAMINOPHEN 1 TABLET: 10; 325 TABLET ORAL at 03:11

## 2023-11-03 RX ADMIN — MORPHINE SULFATE 4 MG: 4 INJECTION, SOLUTION INTRAMUSCULAR; INTRAVENOUS at 11:11

## 2023-11-03 RX ADMIN — RIVAROXABAN 15 MG: 15 TABLET, FILM COATED ORAL at 08:11

## 2023-11-03 NOTE — PROGRESS NOTES
Pharmacokinetic Assessment Follow Up: IV Vancomycin    Vancomycin serum concentration assessment(s):    Trough of 15.4 mcg/ml is within goal range.    Vancomycin Regimen Plan:    Continue 1 gram q12h and until end date on 11/4/23.    Drug levels (last 3 results):  Recent Labs   Lab Result Units 11/03/23  1740   Vancomycin Trough ug/ml 15.4       Pharmacy will continue to follow and monitor vancomycin.    Actual Body Weight:   104.5 kg    Renal Function:   Estimated Creatinine Clearance: 134.5 mL/min (based on SCr of 0.88 mg/dL).,     Dialysis Method (if applicable):  N/A    CBC (last 72 hours):  Recent Labs   Lab Result Units 11/01/23  0428 11/03/23  0359   WBC x10(3)/mcL 6.06 6.42   Hgb g/dL 10.6* 10.9*   Hct % 31.8* 32.3*   Platelet x10(3)/mcL 200 201   Mono % % 14.7 12.3   Eos % % 2.5 3.1   Basophil % % 1.0 0.9       Metabolic Panel (last 72 hours):  Recent Labs   Lab Result Units 11/01/23  0428 11/03/23  0359   Sodium Level mmol/L 134* 136   Potassium Level mmol/L 4.6 4.3   Chloride mmol/L 105 104   Carbon Dioxide mmol/L 23 25   Glucose Level mg/dL 102* 99   Blood Urea Nitrogen mg/dL 8.5* 7.9*   Creatinine mg/dL 0.85 0.88   Albumin Level g/dL 3.3* 3.2*   Bilirubin Total mg/dL 0.4 0.3   Alkaline Phosphatase unit/L 81 73   Aspartate Aminotransferase unit/L 30 24   Alanine Aminotransferase unit/L 26 23   Magnesium Level mg/dL 1.70  --    Phosphorus Level mg/dL 4.1  --        Vancomycin Administrations:  vancomycin given in the last 96 hours                     vancomycin in dextrose 5 % 1 gram/250 mL IVPB 1,000 mg (mg) 1,000 mg New Bag 11/03/23 1816     1,000 mg New Bag  0612     1,000 mg New Bag 11/02/23 0323      Restarted 11/01/23 1548     1,000 mg New Bag  1435     1,000 mg New Bag  0407     1,000 mg New Bag 10/31/23 1456     1,000 mg New Bag  0244                    Microbiologic Results:  Microbiology Results (last 7 days)       Procedure Component Value Units Date/Time    Wound Culture [[id number]]   (Abnormal) Collected: 10/21/23 1332    Order Status: Completed Specimen: Abscess from Leg, Right Updated: 11/03/23 1436     Wound Culture Rare Gram-positive Rods     Comment: Sent to Reference Lab for Identification  Susceptibility sent to reference lab. Results to follow on separate report.       Narrative:      For sensitivity results refer to [id number].

## 2023-11-03 NOTE — PROGRESS NOTES
Inpatient Nutrition Assessment    Admit Date: 10/19/2023   Total duration of encounter: 15 days     Nutrition Recommendation/Prescription     Continue current diet as tolerated  Encouraged adequate PO intake  Monitor appetite/PO intake, weight, and labs    Communication of Recommendations: reviewed with patient    Nutrition Assessment     Malnutrition Assessment/Nutrition-Focused Physical Exam    Malnutrition Context: chronic illness (10/30/23 1703)  Malnutrition Level: mild (10/30/23 1703)  Energy Intake (Malnutrition): less than 75% for greater than or equal to 1 month (10/25/23 1425)  Weight Loss (Malnutrition): other (see comments) (Does not meet criteria) (10/25/23 1425)  Subcutaneous Fat (Malnutrition): other (see comments) (Does not meet criteria) (10/30/23 1703)           Muscle Mass (Malnutrition): other (see comments) (Does not meet criteria) (10/30/23 1703)                          Fluid Accumulation (Malnutrition): mild (10/30/23 1703)        A minimum of two characteristics is recommended for diagnosis of either severe or non-severe malnutrition.    Chart Review    Reason Seen: length of stay and follow-up    Malnutrition Screening Tool Results   Have you recently lost weight without trying?: No  Have you been eating poorly because of a decreased appetite?: No   MST Score: 0   Diagnosis:  Acute right lower extremity cellulitis-failed outpatient treatment-POA   Hyponatremia-POA   Anemia chronic disease-POA   Thrombocytopenia-chronic-POA  Immunocompromised state-status post liver transplant-POA   Long-term anticoagulation therapy-on Xarelto-POA  Chronic back pain on chronic opioid therapy-POA   Weakness-POA  Hx of alcoholic cirrhosis of liver- s/p transplant 2021- POA    Relevant Medical History:   ETOH abuse  Chronic cirrhosis of liver- s/p liver transplant  Thrombocytopenia/pancytopenia  Anemia chronic disease   Chronic back pain  Anxiety  Depression  Neutropenia    Nutrition-Related Medications:    Scheduled Meds:   alteplase  2 mg Intra-Catheter Once    amLODIPine  5 mg Oral Daily    calcium carbonate  1,000 mg Oral BID    cyclobenzaprine  10 mg Oral TID    diphenhydrAMINE  25 mg Oral QHS    ergocalciferol  50,000 Units Oral Q7 Days    folic acid  1,000 mcg Oral Daily    gabapentin  600 mg Oral TID    nicotine  1 patch Transdermal Daily    polyethylene glycol  17 g Oral Daily    rivaroxaban  15 mg Oral BID WM    tacrolimus  5 mg Oral BID    vancomycin (VANCOCIN) IV (PEDS and ADULTS)  1,000 mg Intravenous Q12H     Continuous Infusions:  PRN Meds:.sodium chloride 0.9%, acetaminophen, acetaminophen, cloNIDine, hydrALAZINE, labetaloL, melatonin, midazolam, morphine, naloxegoL, naloxone, oxyCODONE-acetaminophen, promethazine, sodium chloride 0.9%, vancomycin - pharmacy to dose    Calorie Containing IV Medications: no significant kcals from medications at this time    Nutrition-Related Labs:  10/25/2023: BUN 7.5, Alb 3.4, AST 37, Gluc 113  10/30/2023: BUN 8.5, Gluc 121  11/3/2023: BUN 7.9    Diet/PN Order: Diet Adult Regular  Oral Supplement Order: none  Tube Feeding Order: none  Appetite/Oral Intake: good/% of meals  Factors Affecting Nutritional Intake: none identified  Food/Jehovah's witness/Cultural Preferences: none reported  Food Allergies: none reported    Wound(s):      Altered Skin Integrity 10/20/23 0015 Right lower;medial Leg #1 Other (comment) Partial thickness tissue loss. Shallow open ulcer with a red or pink wound bed, without slough. Intact or Open/Ruptured Serum-filled blister.-Tissue loss description: Full thickness noted    Last Bowel Movement: 11/01/23    Comments    10/25/2023: Pt reports a poor appetite/PO intake for ~1 month. Pt reports a good appetite/PO intake currently. Pt denies N/V/D/C and chewing/swallowing difficulties. Pt reports UBW as 97.7 kg. Pt declined ONS and double protein diet. Encouraged adequate PO intake. Last BM documented as 10/24/2023. Will monitor.    10/30/2023: Pt  "reports a good appetite/PO intake and denies N/V/D/C. Last BM documented as 10/29/2023. Encouraged adequate PO intake. Will monitor.    11/3/2023: Patient reports good appetite and oral intakes. Anticipate discharge tomorrow.     Anthropometrics    Height: 6' 1" (185.4 cm)    Last Weight: 104.5 kg (230 lb 4.8 oz) (10/30/23 0542) Weight Method: Standard Scale  BMI (Calculated): 30.4  BMI Classification: obese grade I (BMI 30-34.9)        Ideal Body Weight (IBW), Male: 184 lb     % Ideal Body Weight, Male (lb): 120.78 %                 Usual Body Weight (UBW), k.7 kg  % Usual Body Weight: 107.7     Usual Weight Provided By: patient    Wt Readings from Last 5 Encounters:   10/30/23 104.5 kg (230 lb 4.8 oz)   22 94 kg (207 lb 3.7 oz)   21 81.6 kg (180 lb)   10/11/21 79 kg (174 lb 2.6 oz)   21 78 kg (171 lb 15.3 oz)     Weight Change(s) Since Admission:  Admit Weight: 100.7 kg (222 lb) (10/19/23 1819)  10/25/2023: 105 kg  10/30/2023: 104.5 kg    Estimated Needs    Weight Used For Calorie Calculations: 105 kg (231 lb 7.7 oz)  Energy Calorie Requirements (kcal): 5631-6910 (MSJ x 1.2-1.3)  Energy Need Method: [facility]  Weight Used For Protein Calculations: 105 kg (231 lb 7.7 oz)  Protein Requirements: 105-126 (1.0-1.2 g/kg)  Fluid Requirements (mL): 2336 (1 mL/kcal) or per MD orders  Temp (24hrs), Av °F (36.7 °C), Min:97.8 °F (36.6 °C), Max:98.5 °F (36.9 °C)       Enteral Nutrition    Patient not receiving enteral nutrition at this time.    Parenteral Nutrition    Patient not receiving parenteral nutrition support at this time.    Evaluation of Received Nutrient Intake    Calories: meeting estimated needs  Protein: meeting estimated needs    Patient Education    Not applicable.    Nutrition Diagnosis     PES: Malnutrition related to inability to consume sufficient nutrients as evidenced by poor PO intake >1 month and mild fluid accumulation (active)    Interventions/Goals "     Intervention(s): general/healthful diet  Goal: Meet greater than 75% of nutritional needs by follow-up. (goal met)    Monitoring & Evaluation     Dietitian will monitor food and beverage intake, weight, food/nutrition knowledge skill, glucose/endocrine profile, and gastrointestinal profile.  Nutrition Risk/Follow-Up: moderate (follow-up in 3-5 days)   Please consult if re-assessment needed sooner.

## 2023-11-03 NOTE — NURSING
[facility] - 9th Floor Med Surg  Wound Care    Patient Name:  Kojo Shiekh III   MRN:  [id number]  Date: 11/3/2023  Diagnosis: Abscess of right leg    History:     Past Medical History:   Diagnosis Date    Alcohol abuse     Chronic back pain        Social History     Socioeconomic History    Marital status: Single   Tobacco Use    Smoking status: Never    Smokeless tobacco: Never   Substance and Sexual Activity    Drug use: Never       Precautions:     Allergies as of 10/19/2023 - Reviewed 10/19/2023   Allergen Reaction Noted    Zofran [ondansetron hcl]  10/19/2023       WOC Assessment Details/Treatment   Patient seen for follow up wound assessment. Patient has been attending to his own dressing changes and performing well. Wound improving. States he will be discharged tomorrow.      11/03/23 1350   WOCN Assessment   WOCN Total Time (mins) 35   Visit Date 11/03/23   Visit Time 1350   Consult Type Follow Up   WOCN Speciality Wound   Wound surgical   Number of Wounds 1   Intervention assessed;changed;applied;chart review   Skin Interventions   Pressure Reduction Techniques frequent weight shift encouraged   Positioning   Body Position position changed independently   Head of Bed (HOB) Positioning HOB at 30-45 degrees        Altered Skin Integrity 10/20/23 0015 Right lower;medial Leg #1 Other (comment) Partial thickness tissue loss. Shallow open ulcer with a red or pink wound bed, without slough. Intact or Open/Ruptured Serum-filled blister.   Date First Assessed/Time First Assessed: 10/20/23 0015   Altered Skin Integrity Present on Admission - Did Patient arrive to the hospital with altered skin?: yes  Side: Right  Orientation: lower;medial  Location: Leg  Wound Number: #1  Is this injury ...   Wound Image    Dressing Appearance Intact   Drainage Amount Moderate   Drainage Characteristics/Odor Clear;Serous   Appearance Granulating   Tissue loss description Full thickness   Periwound Area Edematous    Wound Edges Defined   Wound Length (cm) 2.6 cm   Wound Width (cm) 2.2 cm   Wound Depth (cm) 0.8 cm   Wound Volume (cm^3) 4.576 cm^3   Wound Surface Area (cm^2) 5.72 cm^2   Care Wound cleanser   Dressing Applied  (Vashe moistened packing gauze, gauze, kerlix, tape)         Recommendations made to primary team to continue with present treatment coarse.     11/03/2023

## 2023-11-03 NOTE — PROGRESS NOTES
[facility] Hospital Medicine Progress Note        Chief Complaint: Inpatient follow-up on right lower extremity cellulitis and abscess    HPI:   The pt is a 43 year old male with PMHx  includes alcoholic cirrhosis of the liver status post liver transplant 4/2021, thrombocytopenia, anemia, chronic back pain, anxiety; presents to the ED at advised of his  his cardiologist for right lower extremity redness, swelling and an associated wound. It was reported patient has had 3 vascular ablation surgeries to the right leg in the past. Patient was seen in the ED in [place] on Tuesday and was prescribed oral clindamycin therapy.  Patient reports worsening redness and warmth to the right lower extremity with associated open wound erupted prior to arrival to the ED. patient is on long-term anticoagulation therapy with Xarelto. Pt denies chest pain, SOB, fever, chills, cough, congestion, or any sick contacts.       Lab showed WBC 9.5, H&H 12.7/35.9, sodium 126, chloride 94, CO2 19, BUN 5.5, creatinine 0.67; sed rate 21, AST 84, CRP 26.40, lactic acid 1.2; other indices unremarkable.  X-ray of the right lower extremity showed no osseous abnormality.  Initial vital signs /85 pulse 110 respirations 19 temperature 98.5° F O2 saturation 97% on room air.  Blood cultures were collected x2 sets. Patient was started on IV vancomycin and Zosyn therapy.  Patient is admitted to hospital medicine services for further management.     Ultrasound right lower extremity shows superficial greater saphenous vein thrombosis but no DVT. Vascular surgery was consulted and pt underwent OR  irrigation and debridement of abscess of right lower extremity on 10/21  with findings of hemorrhagic organizing abscess, no malignancy.      Blood cultures have remained negative. Wound culture grew GPR, anaerobic culture grew Finegoldia magna and Prevotella species. ID consulted and suggested IV Vancomycin and Zosyn with end  date 11/4 with option of transitioning to oral antibiotics on discharge with doxycycline and Augmentin. Pt is very concerned and would like to continue antibiotic in IV form. On 10/29 pt developed pain in his Rt arm where he had PICC line . Venous U/S showed presence of DVT of mid right brachial vein. PICC line removed and Xarelto dose increased to 15 mg bid x 21 days, then 20 mg daily thereafter. Additionally pt is noted to have elevated BP on multiple occasions and started on amlodipine 5 mg daily with further dose adjustment as outpatient per pt's Cardiologist.      Interval Hx:   Patient is ambulating in the room.  He has no new complaints.  He is eager to show me photographs of his leg on his tablet device.  Patient is afebrile, on room air, and hemodynamically stable.      Objective/physical exam:  General: in no acute distress  HENT: normocephalic, atraumatic  Eye: PERRL, EOMI, clear conjunctiva  Neck: full ROM, no thyromegaly, no JVD  Respiratory: clear to auscultation bilaterally  Cardiovascular: regular rate and rhythm  Gastrointestinal: non-distended, positive bowel sounds, non-tender  Musculoskeletal:  Right lower extremity edema and chronic venous stasis skin changes with dressing in place  Integumentary: warm, dry, intact, no rashes  Neurological: cranial nerves grossly intact, no focal neurological deficit  Psychiatric: cooperative, very anxious and tremulous      VITAL SIGNS: 24 HRS MIN & MAX LAST   Temp  Min: 97.8 °F (36.6 °C)  Max: 98.5 °F (36.9 °C) 97.8 °F (36.6 °C)   BP  Min: 124/78  Max: 155/97 (!) 149/92   Pulse  Min: 87  Max: 108  87   Resp  Min: 17  Max: 20 18   SpO2  Min: 97 %  Max: 100 % 100 %     I have reviewed the following labs:  Recent Labs   Lab 10/30/23  0525 11/01/23  0428 11/03/23  0359   WBC 5.45 6.06 6.42   RBC 3.08* 3.02* 3.12*   HGB 10.7* 10.6* 10.9*   HCT 32.9* 31.8* 32.3*   .8* 105.3* 103.5*   MCH 34.7* 35.1* 34.9*   MCHC 32.5* 33.3 33.7   RDW 13.2 13.1 13.0     200 201   MPV 8.9 9.0 8.9     Recent Labs   Lab 10/30/23  0525 11/01/23  0428 11/03/23  0359    134* 136   K 4.5 4.6 4.3   CO2 27 23 25   BUN 8.5* 8.5* 7.9*   CREATININE 0.90 0.85 0.88   CALCIUM 8.8 8.6 8.8   MG 1.80 1.70  --    ALBUMIN  --  3.3* 3.2*   ALKPHOS  --  81 73   ALT  --  26 23   AST  --  30 24   BILITOT  --  0.4 0.3     Microbiology Results (last 7 days)       ** No results found for the last 168 hours. **             See below for Radiology    Scheduled Med:   alteplase  2 mg Intra-Catheter Once    amLODIPine  5 mg Oral Daily    calcium carbonate  1,000 mg Oral BID    cyclobenzaprine  10 mg Oral TID    diphenhydrAMINE  25 mg Oral QHS    ergocalciferol  50,000 Units Oral Q7 Days    folic acid  1,000 mcg Oral Daily    gabapentin  600 mg Oral TID    nicotine  1 patch Transdermal Daily    piperacillin-tazobactam (Zosyn) IV (PEDS and ADULTS) (extended infusion is not appropriate)  4.5 g Intravenous Q8H    polyethylene glycol  17 g Oral Daily    rivaroxaban  15 mg Oral BID WM    tacrolimus  5 mg Oral BID    vancomycin (VANCOCIN) IV (PEDS and ADULTS)  1,000 mg Intravenous Q12H      Continuous Infusions:  None.       PRN Meds:  sodium chloride 0.9%, acetaminophen, acetaminophen, cloNIDine, hydrALAZINE, labetaloL, melatonin, midazolam, morphine, naloxegoL, naloxone, oxyCODONE-acetaminophen, promethazine, sodium chloride 0.9%, vancomycin - pharmacy to dose     Assessment/Plan:  Right leg cellulitis and abscess status post irrigation and debridement  Deep venous thrombosis of mid right brachial vein, PICC line related  Alcoholic cirrhosis status post liver transplant  Immunocompromised  Essential hypertension  Anxiety disorder   Tobacco abuse  Chronic pain syndrome, opiate dependent      Plan:  Continue current antibiotic therapy, wound care, appropriate home medications, and other supportive care      VTE prophylaxis:  Xarelto    Patient condition:  Stable    Anticipated discharge and Disposition:    Hopefully home tomorrow      All diagnosis and differential diagnosis have been reviewed; assessment and plan has been documented; I have personally reviewed the labs and test results that are presently available; I have reviewed the patients medication list; I have reviewed the consulting providers response and recommendations. I have reviewed or attempted to review medical records based upon their availability    All of the patient's questions have been  addressed and answered. Patient's is agreeable to the above stated plan. I will continue to monitor closely and make adjustments to medical management as needed.  _____________________________________________________________________        Radiology:  I have personally reviewed the following imaging and agree with the radiologist.     CV Ultrasound doppler venous arm right  A partially occluding deep vein thrombosis was identified in the mid right   brachial vein.  All other vessels of the right upper extremity compressed and augmented   well.      Redd Hodge MD   11/03/2023

## 2023-11-04 VITALS
HEIGHT: 73 IN | HEART RATE: 92 BPM | SYSTOLIC BLOOD PRESSURE: 122 MMHG | RESPIRATION RATE: 20 BRPM | TEMPERATURE: 98 F | WEIGHT: 230.31 LBS | BODY MASS INDEX: 30.52 KG/M2 | OXYGEN SATURATION: 96 % | DIASTOLIC BLOOD PRESSURE: 77 MMHG

## 2023-11-04 PROCEDURE — 63600175 PHARM REV CODE 636 W HCPCS: Performed by: INTERNAL MEDICINE

## 2023-11-04 PROCEDURE — 25000003 PHARM REV CODE 250: Performed by: INTERNAL MEDICINE

## 2023-11-04 PROCEDURE — S4991 NICOTINE PATCH NONLEGEND: HCPCS | Performed by: INTERNAL MEDICINE

## 2023-11-04 PROCEDURE — 25000003 PHARM REV CODE 250: Performed by: NURSE PRACTITIONER

## 2023-11-04 RX ORDER — DOXYCYCLINE 100 MG/1
100 CAPSULE ORAL
Qty: 28 CAPSULE | Refills: 0 | Status: SHIPPED | OUTPATIENT
Start: 2023-11-04 | End: 2023-11-18

## 2023-11-04 RX ORDER — AMOXICILLIN AND CLAVULANATE POTASSIUM 875; 125 MG/1; MG/1
1 TABLET, FILM COATED ORAL EVERY 12 HOURS
Qty: 28 TABLET | Refills: 0 | Status: SHIPPED | OUTPATIENT
Start: 2023-11-04 | End: 2023-11-18

## 2023-11-04 RX ORDER — PROMETHAZINE HYDROCHLORIDE 25 MG/1
25 TABLET ORAL EVERY 6 HOURS PRN
Qty: 28 TABLET | Refills: 0 | Status: SHIPPED | OUTPATIENT
Start: 2023-11-04

## 2023-11-04 RX ADMIN — POLYETHYLENE GLYCOL 3350 17 G: 17 POWDER, FOR SOLUTION ORAL at 09:11

## 2023-11-04 RX ADMIN — PROMETHAZINE HYDROCHLORIDE 25 MG: 25 TABLET ORAL at 03:11

## 2023-11-04 RX ADMIN — AMLODIPINE BESYLATE 5 MG: 5 TABLET ORAL at 09:11

## 2023-11-04 RX ADMIN — MORPHINE SULFATE 4 MG: 4 INJECTION, SOLUTION INTRAMUSCULAR; INTRAVENOUS at 11:11

## 2023-11-04 RX ADMIN — RIVAROXABAN 15 MG: 15 TABLET, FILM COATED ORAL at 09:11

## 2023-11-04 RX ADMIN — OXYCODONE HYDROCHLORIDE AND ACETAMINOPHEN 1 TABLET: 10; 325 TABLET ORAL at 09:11

## 2023-11-04 RX ADMIN — PROMETHAZINE HYDROCHLORIDE 25 MG: 25 TABLET ORAL at 11:11

## 2023-11-04 RX ADMIN — CYCLOBENZAPRINE 10 MG: 10 TABLET, FILM COATED ORAL at 09:11

## 2023-11-04 RX ADMIN — TACROLIMUS 5 MG: 1 CAPSULE ORAL at 09:11

## 2023-11-04 RX ADMIN — OXYCODONE HYDROCHLORIDE AND ACETAMINOPHEN 1 TABLET: 10; 325 TABLET ORAL at 03:11

## 2023-11-04 RX ADMIN — VANCOMYCIN HYDROCHLORIDE 1000 MG: 1 INJECTION, POWDER, LYOPHILIZED, FOR SOLUTION INTRAVENOUS at 05:11

## 2023-11-04 RX ADMIN — GABAPENTIN 600 MG: 300 CAPSULE ORAL at 09:11

## 2023-11-04 RX ADMIN — FOLIC ACID 1000 MCG: 1 TABLET ORAL at 09:11

## 2023-11-04 RX ADMIN — NICOTINE 1 PATCH: 21 PATCH, EXTENDED RELEASE TRANSDERMAL at 09:11

## 2023-11-04 RX ADMIN — CALCIUM CARBONATE (ANTACID) CHEW TAB 500 MG 1000 MG: 500 CHEW TAB at 09:11

## 2023-11-04 NOTE — PROGRESS NOTES
Pt left at ED exit with significant other, did not require transport. Pt acknowledged discharge instructions and importance of attending follow-up appointments.

## 2023-11-06 ENCOUNTER — PATIENT OUTREACH (OUTPATIENT)
Dept: ADMINISTRATIVE | Facility: CLINIC | Age: 43
End: 2023-11-06
Payer: MEDICARE

## 2023-11-06 ENCOUNTER — PATIENT MESSAGE (OUTPATIENT)
Dept: ADMINISTRATIVE | Facility: CLINIC | Age: 43
End: 2023-11-06
Payer: MEDICARE

## 2023-11-06 NOTE — PROGRESS NOTES
C3 nurse attempted to contact Kojo Sheikh III for a TCC post hospital discharge follow up call. No answer. Left voicemail with callback information. The patient does not have a scheduled HOSFU appointment, and the pt does not have an [facility] PCP.

## 2023-11-07 NOTE — PROGRESS NOTES
3rd attempt-C3 nurse attempted to contact Kojo Sheikh III for a TCC post hospital discharge follow up call. No answer. Left voicemail with callback information, OOC#. The patient does not have a scheduled HOSFU appointment, and [facility].

## 2023-11-09 LAB
EXT ALBUMIN: 4.5
EXT ALKALINE PHOSPHATASE: 101
EXT ALT: 29
EXT AST: 41
EXT BASOPHIL%: 0.8
EXT BILIRUBIN TOTAL: 0.5
EXT BUN: 10
EXT CALCIUM: 9.7
EXT CHLORIDE: 107
EXT CO2: 23
EXT CREATININE: 0.86 MG/DL
EXT EOSINOPHIL%: 1.5
EXT GFR MDRD AF AMER: 123.7
EXT GLUCOSE: 112
EXT HBV DNA QUANT PCR: NORMAL
EXT HEMATOCRIT: 40.6
EXT HEMOGLOBIN: 13.8
EXT LYMPH%: 26.2
EXT MONOCYTES%: 9.9
EXT PLATELETS: 275
EXT POTASSIUM: 4.4
EXT PROTEIN TOTAL: 7.5
EXT SEGS%: 61.2
EXT SODIUM: 137 MMOL/L
EXT TACROLIMUS LVL: 4.2
EXT WBC: 10.8
MAYO GENERIC ORDERABLE RESULT: NORMAL

## 2023-11-20 LAB
FUNGUS SPEC CULT: NORMAL
FUNGUS SPEC CULT: NORMAL

## 2023-11-21 DIAGNOSIS — Z94.4 LIVER TRANSPLANTED: ICD-10-CM

## 2023-11-21 NOTE — TELEPHONE ENCOUNTER
Voice message left instructing pt to start taking Prograf 6 mg in AM and 5 mg in PM.  Requested pt callback to verify receipt of message.  ----- Message from Beny Snow MD sent at 11/21/2023 12:02 PM CST -----  Liver tests at upper limit of normal. Please increase tac to 6/5 and repeat labs next week.

## 2023-11-22 RX ORDER — TACROLIMUS 1 MG/1
CAPSULE ORAL
Qty: 330 CAPSULE | Refills: 11 | Status: SHIPPED | OUTPATIENT
Start: 2023-11-22

## 2023-11-27 NOTE — DISCHARGE SUMMARY
[facility]  Hospital Medicine Discharge Summary    Admit Date: 10/19/2023  Discharge Date and Time: 11/04/2023 2:00 PM  Admitting Physician:  Team  Discharging Physician: Redd Hodge MD.  Primary Care Physician: No primary care provider on file.  Consults: Infectious Disease, Vascular Surgery    Discharge Diagnoses:  Right leg cellulitis and abscess status post irrigation and debridement  Deep venous thrombosis of mid right brachial vein, PICC line related  Alcoholic cirrhosis status post liver transplant  Immunocompromised  Essential hypertension  Anxiety disorder   Tobacco abuse  Chronic pain syndrome, opiate dependent    Hospital Course:   Patient is a 43 year old male with PMH which includes alcoholic cirrhosis of the liver status post liver transplant 4/2021, thrombocytopenia, anemia, chronic back pain, anxiety; presents to the ED at advised of his  his cardiologist for right lower extremity redness, swelling and an associated wound. It was reported patient has had 3 vascular ablation surgeries to the right leg in the past. Patient was seen in the ED in [place] on Tuesday and was prescribed oral clindamycin therapy.  Patient reports worsening redness and warmth to the right lower extremity with associated open wound erupted prior to arrival to the ED. patient is on long-term anticoagulation therapy with Xarelto. Pt denies chest pain, SOB, fever, chills, cough, congestion, or any sick contacts.       Lab showed WBC 9.5, H&H 12.7/35.9, sodium 126, chloride 94, CO2 19, BUN 5.5, creatinine 0.67; sed rate 21, AST 84, CRP 26.40, lactic acid 1.2; other indices unremarkable.  X-ray of the right lower extremity showed no osseous abnormality.  Initial vital signs /85 pulse 110 respirations 19 temperature 98.5° F O2 saturation 97% on room air.  Blood cultures were collected x2 sets. Patient was started on IV vancomycin and Zosyn therapy.  Patient is admitted to hospital  medicine services for further management.     Ultrasound right lower extremity shows superficial greater saphenous vein thrombosis but no DVT. Vascular surgery was consulted and pt underwent OR  irrigation and debridement of abscess of right lower extremity on 10/21  with findings of hemorrhagic organizing abscess, no malignancy.      Blood cultures have remained negative. Wound culture grew GPR, anaerobic culture grew Finegoldia magna and Prevotella species. ID consulted and suggested IV Vancomycin and Zosyn with end date 11/4 with option of transitioning to oral antibiotics on discharge with doxycycline and Augmentin. Pt is very concerned and would like to continue antibiotic in IV form. On 10/29 pt developed pain in his Rt arm where he had PICC line . Venous U/S showed presence of DVT of mid right brachial vein. PICC line removed and Xarelto dose increased to 15 mg bid x 21 days, then 20 mg daily thereafter. Additionally pt is noted to have elevated BP on multiple occasions and started on amlodipine 5 mg daily with further dose adjustment as outpatient per pt's Cardiologist.  Patient was discharged home in stable condition.    Patient was seen and examined on the day of discharge.    Vitals:  VITAL SIGNS: 24 HRS MIN & MAX LAST   No data recorded 98.2 °F (36.8 °C)   No data recorded 122/77   No data recorded  92   No data recorded 20   No data recorded 96 %       Physical Exam:  General: in no acute distress  HENT: normocephalic, atraumatic  Eye: PERRL, EOMI, clear conjunctiva  Neck: full ROM, no thyromegaly, no JVD  Respiratory: clear to auscultation bilaterally  Cardiovascular: regular rate and rhythm  Gastrointestinal: non-distended, positive bowel sounds, non-tender  Musculoskeletal:  Right lower extremity edema and chronic venous stasis skin changes with dressing in place  Integumentary: warm, dry, intact, no rashes  Neurological: cranial nerves grossly intact, no focal neurological deficit  Psychiatric:  "cooperative, very anxious and tremulous       Procedures Performed: No admission procedures for hospital encounter.     Significant Diagnostic Studies: See Full reports for all details    No results for input(s): "WBC", "RBC", "HGB", "HCT", "MCV", "MCH", "MCHC", "RDW", "PLT", "MPV", "GRAN", "LYMPH", "MONO", "BASO", "NRBC" in the last 168 hours.    No results for input(s): "NA", "K", "CL", "CO2", "ANIONGAP", "BUN", "CREATININE", "GLU", "CALCIUM", "PH", "MG", "ALBUMIN", "PROT", "ALKPHOS", "ALT", "AST", "BILITOT" in the last 168 hours.     Microbiology Results (last 7 days)       Procedure Component Value Units Date/Time    Wound Culture [[id number]]  (Abnormal) Collected: 10/21/23 1334    Order Status: Completed Specimen: Abscess from Leg, Right Updated: 11/03/23 1436     Wound Culture Rare Gram-positive Rods     Comment: Sent to Reference Lab for Identification  Susceptibility sent to reference lab. Results to follow on separate report.       Narrative:      For sensitivity results refer to [address] [place], [place] [id number].             CV Ultrasound doppler venous arm right  A partially occluding deep vein thrombosis was identified in the mid right   brachial vein.  All other vessels of the right upper extremity compressed and augmented   well.         Medication List        START taking these medications      naloxegoL 25 mg tablet  Commonly known as: MOVANTIK  Take 25 mg by mouth daily as needed (constipation).     promethazine 25 MG tablet  Commonly known as: PHENERGAN  Take 1 tablet (25 mg total) by mouth every 6 (six) hours as needed for Nausea.            CONTINUE taking these medications      albuterol 90 mcg/actuation inhaler  Commonly known as: PROVENTIL/VENTOLIN HFA  Inhale 2 puffs into the lungs every 6 (six) hours as needed for Wheezing. Rescue     bisacodyL 5 mg EC tablet  Commonly known as: DULCOLAX  Take 2 tablets (10 mg total) by mouth daily as needed for Constipation.     calcium carbonate 500 mg calcium (1,250 mg) " tablet  Commonly known as: OS-BRISSA  Take 2 tablets (1,000 mg total) by mouth once daily.     cetirizine 5 MG tablet  Commonly known as: ZYRTEC  Take 1 tablet (5 mg total) by mouth daily as needed for Allergies (and before Zarxio doses).     cyclobenzaprine 10 MG tablet  Commonly known as: FLEXERIL     diphenhydrAMINE 25 mg tablet  Commonly known as: SOMINEX     ergocalciferol 50,000 unit Cap  Commonly known as: ERGOCALCIFEROL  TAKE ONE CAPSULE BY MOUTH EVERY 7 DAYS     folic acid 1 MG tablet  Commonly known as: FOLVITE  TAKE 1 TABLET(1000 MCG) BY MOUTH EVERY DAY     gabapentin 300 MG capsule  Commonly known as: NEURONTIN  Take 2 capsules (600 mg total) by mouth 3 (three) times daily.     LIDOcaine 5 %  Commonly known as: LIDODERM  UNWRAP AND APPLY 1 PATCH TO SKIN EVERY 24 HOURS AS NEEDED FOR PAIN AS DIRECTED     loratadine 10 mg tablet  Commonly known as: CLARITIN     mycophenolate 250 mg Cap  Commonly known as: CELLCEPT  TAKE 4 CAPSULES(1000 MG) BY MOUTH TWICE DAILY     nicotine 14 mg/24 hr  Commonly known as: NICODERM CQ  Place 1 patch onto the skin once daily.     oxyCODONE-acetaminophen  mg per tablet  Commonly known as: PERCOCET     XARELTO 20 mg Tab  Generic drug: rivaroxaban            STOP taking these medications      aspirin 81 MG EC tablet  Commonly known as: ECOTRIN     clindamycin 300 MG capsule  Commonly known as: CLEOCIN            ASK your doctor about these medications      amoxicillin-clavulanate 875-125mg 875-125 mg per tablet  Commonly known as: AUGMENTIN  Take 1 tablet by mouth every 12 (twelve) hours. for 14 days  Ask about: Should I take this medication?     doxycycline 100 MG Cap  Commonly known as: VIBRAMYCIN  Take 1 capsule (100 mg total) by mouth 2 times daily 2 hours after meal. for 14 days  Ask about: Should I take this medication?               Where to Get Your Medications        These medications were sent to [id number] - [place] - [address]  [facility] [place] [address]      Phone: [phone]   amoxicillin-clavulanate 875-125mg 875-125 mg per tablet  doxycycline 100 MG Cap  naloxegoL 25 mg tablet  promethazine 25 MG tablet          Explained in detail to the patient about the discharge plan, medications, and follow-up visits.  Patient understands and agrees with the treatment plan.  Discharge Disposition: Home or Self Care   Discharged Condition: stable   Follow-up Information       Shirlene Durán MD Follow up in 3 week(s).    Specialty: Vascular Surgery  Contact information:  [address]  [facility] [address] [phone]                           For further questions contact your vascular surgeon    Discharge time 35 minutes    For worsening symptoms, chest pain, shortness of breath, increased abdominal pain, high grade fever, stroke or stroke like symptoms, immediately go to the nearest Emergency Room or call 911 as soon as possible.      Redd Mcclelland M.D, on 11/27/2023. at 2:11 PM.

## 2023-12-08 ENCOUNTER — TELEPHONE (OUTPATIENT)
Dept: TRANSPLANT | Facility: CLINIC | Age: 43
End: 2023-12-08
Payer: MEDICARE

## 2023-12-12 ENCOUNTER — HOSPITAL ENCOUNTER (OUTPATIENT)
Facility: HOSPITAL | Age: 43
LOS: 1 days | Discharge: HOME | End: 2023-12-14
Attending: STUDENT IN AN ORGANIZED HEALTH CARE EDUCATION/TRAINING PROGRAM | Admitting: INTERNAL MEDICINE
Payer: MEDICARE

## 2023-12-12 DIAGNOSIS — M79.661 PAIN AND SWELLING OF LOWER LEG, RIGHT: ICD-10-CM

## 2023-12-12 DIAGNOSIS — R53.1 WEAKNESS: ICD-10-CM

## 2023-12-12 DIAGNOSIS — Z94.4 HX OF LIVER TRANSPLANT: ICD-10-CM

## 2023-12-12 DIAGNOSIS — M79.89 PAIN AND SWELLING OF LOWER LEG, RIGHT: ICD-10-CM

## 2023-12-12 DIAGNOSIS — L03.115 CELLULITIS OF RIGHT LOWER EXTREMITY: Primary | ICD-10-CM

## 2023-12-12 LAB
ALBUMIN SERPL-MCNC: 4 G/DL (ref 3.5–5)
ALBUMIN/GLOB SERPL: 1.4 RATIO (ref 1.1–2)
ALP SERPL-CCNC: 120 UNIT/L (ref 40–150)
ALT SERPL-CCNC: 42 UNIT/L (ref 0–55)
AST SERPL-CCNC: 75 UNIT/L (ref 5–34)
BASOPHILS # BLD AUTO: 0.04 X10(3)/MCL
BASOPHILS NFR BLD AUTO: 0.7 %
BILIRUB SERPL-MCNC: 0.9 MG/DL
BUN SERPL-MCNC: 5 MG/DL (ref 8.9–20.6)
CALCIUM SERPL-MCNC: 9 MG/DL (ref 8.4–10.2)
CHLORIDE SERPL-SCNC: 97 MMOL/L (ref 98–107)
CO2 SERPL-SCNC: 24 MMOL/L (ref 22–29)
CREAT SERPL-MCNC: 0.68 MG/DL (ref 0.73–1.18)
EOSINOPHIL # BLD AUTO: 0.1 X10(3)/MCL (ref 0–0.9)
EOSINOPHIL NFR BLD AUTO: 1.6 %
ERYTHROCYTE [DISTWIDTH] IN BLOOD BY AUTOMATED COUNT: 14.1 % (ref 11.5–17)
GFR SERPLBLD CREATININE-BSD FMLA CKD-EPI: >60 MLS/MIN/1.73/M2
GLOBULIN SER-MCNC: 2.9 GM/DL (ref 2.4–3.5)
GLUCOSE SERPL-MCNC: 85 MG/DL (ref 74–100)
HCT VFR BLD AUTO: 36.1 % (ref 42–52)
HGB BLD-MCNC: 12.8 G/DL (ref 14–18)
IMM GRANULOCYTES # BLD AUTO: 0.02 X10(3)/MCL (ref 0–0.04)
IMM GRANULOCYTES NFR BLD AUTO: 0.3 %
LACTATE SERPL-SCNC: 1 MMOL/L (ref 0.5–2.2)
LYMPHOCYTES # BLD AUTO: 2.54 X10(3)/MCL (ref 0.6–4.6)
LYMPHOCYTES NFR BLD AUTO: 41.7 %
MCH RBC QN AUTO: 35.7 PG (ref 27–31)
MCHC RBC AUTO-ENTMCNC: 35.5 G/DL (ref 33–36)
MCV RBC AUTO: 100.6 FL (ref 80–94)
MONOCYTES # BLD AUTO: 0.84 X10(3)/MCL (ref 0.1–1.3)
MONOCYTES NFR BLD AUTO: 13.8 %
NEUTROPHILS # BLD AUTO: 2.55 X10(3)/MCL (ref 2.1–9.2)
NEUTROPHILS NFR BLD AUTO: 41.9 %
NRBC BLD AUTO-RTO: 0 %
PLATELET # BLD AUTO: 167 X10(3)/MCL (ref 130–400)
PMV BLD AUTO: 8.8 FL (ref 7.4–10.4)
POTASSIUM SERPL-SCNC: 4.5 MMOL/L (ref 3.5–5.1)
PROT SERPL-MCNC: 6.9 GM/DL (ref 6.4–8.3)
RBC # BLD AUTO: 3.59 X10(6)/MCL (ref 4.7–6.1)
SODIUM SERPL-SCNC: 130 MMOL/L (ref 136–145)
WBC # SPEC AUTO: 6.09 X10(3)/MCL (ref 4.5–11.5)

## 2023-12-12 PROCEDURE — 96365 THER/PROPH/DIAG IV INF INIT: CPT

## 2023-12-12 PROCEDURE — 85025 COMPLETE CBC W/AUTO DIFF WBC: CPT | Performed by: PHYSICIAN ASSISTANT

## 2023-12-12 PROCEDURE — 96366 THER/PROPH/DIAG IV INF ADDON: CPT

## 2023-12-12 PROCEDURE — 99285 EMERGENCY DEPT VISIT HI MDM: CPT | Mod: 25

## 2023-12-12 PROCEDURE — 87040 BLOOD CULTURE FOR BACTERIA: CPT | Mod: 91 | Performed by: PHYSICIAN ASSISTANT

## 2023-12-12 PROCEDURE — 25000003 PHARM REV CODE 250: Performed by: STUDENT IN AN ORGANIZED HEALTH CARE EDUCATION/TRAINING PROGRAM

## 2023-12-12 PROCEDURE — 83605 ASSAY OF LACTIC ACID: CPT | Performed by: PHYSICIAN ASSISTANT

## 2023-12-12 PROCEDURE — 80053 COMPREHEN METABOLIC PANEL: CPT | Performed by: PHYSICIAN ASSISTANT

## 2023-12-12 PROCEDURE — 96361 HYDRATE IV INFUSION ADD-ON: CPT

## 2023-12-12 RX ORDER — SODIUM CHLORIDE 9 MG/ML
INJECTION, SOLUTION INTRAVENOUS CONTINUOUS
Status: DISCONTINUED | OUTPATIENT
Start: 2023-12-12 | End: 2023-12-13

## 2023-12-12 RX ADMIN — SODIUM CHLORIDE: 9 INJECTION, SOLUTION INTRAVENOUS at 11:12

## 2023-12-12 NOTE — Clinical Note
Diagnosis: Cellulitis of right lower extremity [594336]   Future Attending Provider: HAILEE MCPHERSON [[id number]]   Admitting Provider:: HAILEE MCPHERSON [[id number]]   Admit to which facility:: [facility] [[id number]]   Reason for IP Medical Treatment  (Clinical interventions that can only be accomplished in the IP setting? ) :: IV abx   I certify that Inpatient services for greater than or equal to 2 midnights are medically necessary:: Yes   Plans for Post-Acute care--if anticipated (pick the single best option):: A. No post acute care anticipated at this time

## 2023-12-12 NOTE — FIRST PROVIDER EVALUATION
"Medical screening examination initiated.  I have conducted a focused provider triage encounter, findings are as follows:    Brief history of present illness:  43-year-old male presents to ED for evaluation of right leg pain and swelling.  Patient reports having cellulitis with recent admission.  Reports having redness and subjective fever.     Vitals:    12/12/23 1646   BP: (!) 145/83   BP Location: Left arm   Patient Position: Sitting   Pulse: 104   Resp: 20   Temp: 98.2 °F (36.8 °C)   TempSrc: Temporal   SpO2: 96%   Weight: 104.2 kg (229 lb 11.5 oz)   Height: 6' 2" (1.88 m)       Pertinent physical exam:  Patient is awake and alert and oriented.  Ambulatory to triage.  In no acute distress.      Brief workup plan:  labs, lactic, blood cultures     Preliminary workup initiated; this workup will be continued and followed by the physician or advanced practice provider that is assigned to the patient when roomed.  "

## 2023-12-13 LAB
ALBUMIN SERPL-MCNC: 3.3 G/DL (ref 3.5–5)
ALBUMIN/GLOB SERPL: 1.4 RATIO (ref 1.1–2)
ALP SERPL-CCNC: 102 UNIT/L (ref 40–150)
ALT SERPL-CCNC: 33 UNIT/L (ref 0–55)
AST SERPL-CCNC: 62 UNIT/L (ref 5–34)
BASOPHILS # BLD AUTO: 0.02 X10(3)/MCL
BASOPHILS NFR BLD AUTO: 0.5 %
BILIRUB SERPL-MCNC: 0.5 MG/DL
BUN SERPL-MCNC: 6 MG/DL (ref 8.9–20.6)
CALCIUM SERPL-MCNC: 8.2 MG/DL (ref 8.4–10.2)
CHLORIDE SERPL-SCNC: 106 MMOL/L (ref 98–107)
CO2 SERPL-SCNC: 25 MMOL/L (ref 22–29)
CREAT SERPL-MCNC: 0.75 MG/DL (ref 0.73–1.18)
EOSINOPHIL # BLD AUTO: 0.07 X10(3)/MCL (ref 0–0.9)
EOSINOPHIL NFR BLD AUTO: 1.8 %
ERYTHROCYTE [DISTWIDTH] IN BLOOD BY AUTOMATED COUNT: 14.4 % (ref 11.5–17)
GFR SERPLBLD CREATININE-BSD FMLA CKD-EPI: >60 MLS/MIN/1.73/M2
GLOBULIN SER-MCNC: 2.4 GM/DL (ref 2.4–3.5)
GLUCOSE SERPL-MCNC: 85 MG/DL (ref 74–100)
HCT VFR BLD AUTO: 32.5 % (ref 42–52)
HGB BLD-MCNC: 11.3 G/DL (ref 14–18)
IMM GRANULOCYTES # BLD AUTO: 0.01 X10(3)/MCL (ref 0–0.04)
IMM GRANULOCYTES NFR BLD AUTO: 0.3 %
LYMPHOCYTES # BLD AUTO: 2.12 X10(3)/MCL (ref 0.6–4.6)
LYMPHOCYTES NFR BLD AUTO: 54.1 %
MCH RBC QN AUTO: 35.3 PG (ref 27–31)
MCHC RBC AUTO-ENTMCNC: 34.8 G/DL (ref 33–36)
MCV RBC AUTO: 101.6 FL (ref 80–94)
MONOCYTES # BLD AUTO: 0.54 X10(3)/MCL (ref 0.1–1.3)
MONOCYTES NFR BLD AUTO: 13.8 %
NEUTROPHILS # BLD AUTO: 1.16 X10(3)/MCL (ref 2.1–9.2)
NEUTROPHILS NFR BLD AUTO: 29.5 %
NRBC BLD AUTO-RTO: 0 %
PLATELET # BLD AUTO: 134 X10(3)/MCL (ref 130–400)
PMV BLD AUTO: 8.7 FL (ref 7.4–10.4)
POTASSIUM SERPL-SCNC: 3.9 MMOL/L (ref 3.5–5.1)
PROT SERPL-MCNC: 5.7 GM/DL (ref 6.4–8.3)
RBC # BLD AUTO: 3.2 X10(6)/MCL (ref 4.7–6.1)
SODIUM SERPL-SCNC: 137 MMOL/L (ref 136–145)
WBC # SPEC AUTO: 3.92 X10(3)/MCL (ref 4.5–11.5)

## 2023-12-13 PROCEDURE — 63600175 PHARM REV CODE 636 W HCPCS: Performed by: STUDENT IN AN ORGANIZED HEALTH CARE EDUCATION/TRAINING PROGRAM

## 2023-12-13 PROCEDURE — 96366 THER/PROPH/DIAG IV INF ADDON: CPT

## 2023-12-13 PROCEDURE — 85025 COMPLETE CBC W/AUTO DIFF WBC: CPT | Performed by: INTERNAL MEDICINE

## 2023-12-13 PROCEDURE — 63600175 PHARM REV CODE 636 W HCPCS: Performed by: INTERNAL MEDICINE

## 2023-12-13 PROCEDURE — 80053 COMPREHEN METABOLIC PANEL: CPT | Performed by: INTERNAL MEDICINE

## 2023-12-13 PROCEDURE — G0378 HOSPITAL OBSERVATION PER HR: HCPCS

## 2023-12-13 PROCEDURE — 25000003 PHARM REV CODE 250: Performed by: INTERNAL MEDICINE

## 2023-12-13 PROCEDURE — 96361 HYDRATE IV INFUSION ADD-ON: CPT

## 2023-12-13 PROCEDURE — 25000003 PHARM REV CODE 250: Performed by: STUDENT IN AN ORGANIZED HEALTH CARE EDUCATION/TRAINING PROGRAM

## 2023-12-13 PROCEDURE — 93005 ELECTROCARDIOGRAM TRACING: CPT

## 2023-12-13 PROCEDURE — 96367 TX/PROPH/DG ADDL SEQ IV INF: CPT

## 2023-12-13 RX ORDER — FOLIC ACID 1 MG/1
1000 TABLET ORAL DAILY
Status: DISCONTINUED | OUTPATIENT
Start: 2023-12-13 | End: 2023-12-14 | Stop reason: HOSPADM

## 2023-12-13 RX ORDER — TACROLIMUS 1 MG/1
1 CAPSULE ORAL EVERY MORNING
Status: DISCONTINUED | OUTPATIENT
Start: 2023-12-13 | End: 2023-12-14 | Stop reason: HOSPADM

## 2023-12-13 RX ORDER — TALC
6 POWDER (GRAM) TOPICAL NIGHTLY PRN
Status: DISCONTINUED | OUTPATIENT
Start: 2023-12-13 | End: 2023-12-14 | Stop reason: HOSPADM

## 2023-12-13 RX ORDER — CYCLOBENZAPRINE HCL 10 MG
10 TABLET ORAL 3 TIMES DAILY PRN
Status: DISCONTINUED | OUTPATIENT
Start: 2023-12-13 | End: 2023-12-14 | Stop reason: HOSPADM

## 2023-12-13 RX ORDER — TACROLIMUS 1 MG/1
5 CAPSULE ORAL 2 TIMES DAILY
Status: DISCONTINUED | OUTPATIENT
Start: 2023-12-13 | End: 2023-12-14 | Stop reason: HOSPADM

## 2023-12-13 RX ORDER — SODIUM CHLORIDE 9 MG/ML
INJECTION, SOLUTION INTRAVENOUS CONTINUOUS
Status: DISCONTINUED | OUTPATIENT
Start: 2023-12-13 | End: 2023-12-14 | Stop reason: HOSPADM

## 2023-12-13 RX ORDER — SODIUM CHLORIDE 0.9 % (FLUSH) 0.9 %
10 SYRINGE (ML) INJECTION
Status: DISCONTINUED | OUTPATIENT
Start: 2023-12-13 | End: 2023-12-14 | Stop reason: HOSPADM

## 2023-12-13 RX ORDER — OXYCODONE AND ACETAMINOPHEN 10; 325 MG/1; MG/1
1 TABLET ORAL EVERY 4 HOURS PRN
Status: DISCONTINUED | OUTPATIENT
Start: 2023-12-13 | End: 2023-12-14 | Stop reason: HOSPADM

## 2023-12-13 RX ADMIN — VANCOMYCIN HYDROCHLORIDE 1750 MG: 500 INJECTION, POWDER, LYOPHILIZED, FOR SOLUTION INTRAVENOUS at 03:12

## 2023-12-13 RX ADMIN — OXYCODONE AND ACETAMINOPHEN 1 TABLET: 10; 325 TABLET ORAL at 10:12

## 2023-12-13 RX ADMIN — TACROLIMUS 5 MG: 1 CAPSULE ORAL at 08:12

## 2023-12-13 RX ADMIN — OXYCODONE AND ACETAMINOPHEN 1 TABLET: 10; 325 TABLET ORAL at 02:12

## 2023-12-13 RX ADMIN — PIPERACILLIN AND TAZOBACTAM 4.5 G: 4; .5 INJECTION, POWDER, LYOPHILIZED, FOR SOLUTION INTRAVENOUS; PARENTERAL at 11:12

## 2023-12-13 RX ADMIN — CYCLOBENZAPRINE 10 MG: 10 TABLET, FILM COATED ORAL at 06:12

## 2023-12-13 RX ADMIN — TACROLIMUS 5 MG: 1 CAPSULE ORAL at 06:12

## 2023-12-13 RX ADMIN — RIVAROXABAN 20 MG: 10 TABLET, FILM COATED ORAL at 08:12

## 2023-12-13 RX ADMIN — OXYCODONE AND ACETAMINOPHEN 1 TABLET: 10; 325 TABLET ORAL at 08:12

## 2023-12-13 RX ADMIN — SODIUM CHLORIDE: 9 INJECTION, SOLUTION INTRAVENOUS at 08:12

## 2023-12-13 RX ADMIN — FOLIC ACID 1000 MCG: 1 TABLET ORAL at 08:12

## 2023-12-13 RX ADMIN — OXYCODONE AND ACETAMINOPHEN 1 TABLET: 10; 325 TABLET ORAL at 06:12

## 2023-12-13 RX ADMIN — PIPERACILLIN AND TAZOBACTAM 4.5 G: 4; .5 INJECTION, POWDER, LYOPHILIZED, FOR SOLUTION INTRAVENOUS; PARENTERAL at 01:12

## 2023-12-13 RX ADMIN — Medication 6 MG: at 10:12

## 2023-12-13 RX ADMIN — TACROLIMUS 1 MG: 1 CAPSULE ORAL at 08:12

## 2023-12-13 RX ADMIN — VANCOMYCIN HYDROCHLORIDE 1750 MG: 500 INJECTION, POWDER, LYOPHILIZED, FOR SOLUTION INTRAVENOUS at 02:12

## 2023-12-13 RX ADMIN — PIPERACILLIN AND TAZOBACTAM 4.5 G: 4; .5 INJECTION, POWDER, LYOPHILIZED, FOR SOLUTION INTRAVENOUS; PARENTERAL at 08:12

## 2023-12-13 NOTE — PROGRESS NOTES
"Pharmacokinetic Initial Assessment: IV Vancomycin    Assessment/Plan:    Initiate intravenous vancomycin with loading dose of 1750 mg once followed by a maintenance dose of vancomycin 1750mg IV every 12 hours  Desired empiric serum trough concentration is 15 to 20 mcg/mL  Draw vancomycin trough level 60 min prior to fourth dose on 12/14 at approximately 1300  Pharmacy will continue to follow and monitor vancomycin.      Please contact pharmacy at extension [phone] with any questions regarding this assessment.     Thank you for the consult,   Redd Victor       Patient brief summary:  Kojo Sheikh III is a 43 y.o. male initiated on antimicrobial therapy with IV Vancomycin for treatment of suspected skin & soft tissue infection    Drug Allergies:   Review of patient's allergies indicates:   Allergen Reactions    Zofran [ondansetron hcl]      Interaction with tacrolimus       Actual Body Weight:   104kg    Renal Function:   Estimated Creatinine Clearance: 176.6 mL/min (A) (based on SCr of 0.68 mg/dL (L)).,     Dialysis Method (if applicable):  N/A    CBC (last 72 hours):  Recent Labs   Lab Result Units 12/12/23  1724   WBC x10(3)/mcL 6.09   Hgb g/dL 12.8*   Hct % 36.1*   Platelet x10(3)/mcL 167   Mono % % 13.8   Eos % % 1.6   Basophil % % 0.7       Metabolic Panel (last 72 hours):  Recent Labs   Lab Result Units 12/12/23  1724   Sodium Level mmol/L 130*   Potassium Level mmol/L 4.5   Chloride mmol/L 97*   Carbon Dioxide mmol/L 24   Glucose Level mg/dL 85   Blood Urea Nitrogen mg/dL 5.0*   Creatinine mg/dL 0.68*   Albumin Level g/dL 4.0   Bilirubin Total mg/dL 0.9   Alkaline Phosphatase unit/L 120   Aspartate Aminotransferase unit/L 75*   Alanine Aminotransferase unit/L 42       Drug levels (last 3 results):  No results for input(s): "VANCOMYCINRA", "VANCORANDOM", "VANCOMYCINPE", "VANCOPEAK", "VANCOMYCINTR", "VANCOTROUGH" in the last 72 hours.    Microbiologic Results:  Microbiology Results (last 7 days)       " Procedure Component Value Units Date/Time    Blood culture x two cultures. Draw prior to antibiotics. [[id number]]     Order Status: Canceled Specimen: Blood     Blood culture #1 **CANNOT BE ORDERED STAT** [[id number]] Collected: 12/12/23 1724    Order Status: Resulted Specimen: Blood Updated: 12/12/23 1758    Blood culture #2 **CANNOT BE ORDERED STAT** [[id number]] Collected: 12/12/23 1724    Order Status: Resulted Specimen: Blood Updated: 12/12/23 1758

## 2023-12-13 NOTE — PROGRESS NOTES
[facility]  Hospital Medicine Progress Note        Chief Complaint: Inpatient follow-up on right lower extremity cellulitis     HPI:   Patient is a 43-year-old male with a PMH which includes alcoholic cirrhosis of the liver status post liver transplant 4/2021, DVT on Xarelto, chronic back pain, anxiety who was recently admitted in November for cellulitis with abscess to the right lower extremity that required surgical drainage 10/21/2023.  His wound cultures grew Finegoldia inpatient completed extended course of vancomycin and Zosyn through 11/04/2023.  He was discharged home and followed up with [facility] every Friday since.  Tonight he comes to the ER due to worsening erythema of his right lower extremity as well as worsening swelling and pain.     He arrived afebrile hemodynamically stable maintaining normal sats on room air.  Laboratory work was unremarkable.  X-rays were obtained for which official read pending but venous ultrasound of that extremity again just notes superficial venous vein thrombosis (similarly to prior venous ultrasound on October.  He was placed back on IV vancomycin and Zosyn very promptly admitted to the hospitalist service for further management.    Interval Hx:   Patient is sleeping soundly in the ED.  Nursing staff reports no acute issues.  Patient is afebrile, on room air, and hemodynamically stable.      Objective/physical exam:  General: in no acute distress  HENT: normocephalic, atraumatic  Eye: PERRL, EOMI, clear conjunctiva  Neck: full ROM, no thyromegaly, no JVD  Respiratory: clear to auscultation bilaterally  Cardiovascular: regular rate and rhythm  Gastrointestinal: non-distended, positive bowel sounds, non-tender  Musculoskeletal:  Erythema and edema noted to the distal right lower extremity; there is a small medial opening with some eschar from his prior incision and drainage.  There is no obvious purulent drainage   Neurological: cranial  nerves grossly intact, no focal neurological deficit  Psychiatric: cooperative      VITAL SIGNS: 24 HRS MIN & MAX LAST   Temp  Min: 98.2 °F (36.8 °C)  Max: 98.2 °F (36.8 °C) 98.2 °F (36.8 °C)   BP  Min: 102/55  Max: 145/83 139/86   Pulse  Min: 100  Max: 111  100   Resp  Min: 19  Max: 20 20   SpO2  Min: 89 %  Max: 98 % 96 %     I have reviewed the following labs:  Recent Labs   Lab 12/12/23 1724 12/13/23 0517   WBC 6.09 3.92*   RBC 3.59* 3.20*   HGB 12.8* 11.3*   HCT 36.1* 32.5*   .6* 101.6*   MCH 35.7* 35.3*   MCHC 35.5 34.8   RDW 14.1 14.4    134   MPV 8.8 8.7     Recent Labs   Lab 12/12/23 1724 12/13/23 0517   * 137   K 4.5 3.9   CO2 24 25   BUN 5.0* 6.0*   CREATININE 0.68* 0.75   CALCIUM 9.0 8.2*   ALBUMIN 4.0 3.3*   ALKPHOS 120 102   ALT 42 33   AST 75* 62*   BILITOT 0.9 0.5     Microbiology Results (last 7 days)       Procedure Component Value Units Date/Time    Blood culture x two cultures. Draw prior to antibiotics. [[id number]]     Order Status: Canceled Specimen: Blood     Blood culture #1 **CANNOT BE ORDERED STAT** [[id number]] Collected: 12/12/23 1724    Order Status: Resulted Specimen: Blood Updated: 12/12/23 1758    Blood culture #2 **CANNOT BE ORDERED STAT** [[id number]] Collected: 12/12/23 1724    Order Status: Resulted Specimen: Blood Updated: 12/12/23 1758             See below for Radiology    Scheduled Med:   folic acid  1,000 mcg Oral Daily    piperacillin-tazobactam (Zosyn) IV (PEDS and ADULTS) (extended infusion is not appropriate)  4.5 g Intravenous Q8H    rivaroxaban  20 mg Oral Daily    tacrolimus  1 mg Oral Daily AM    tacrolimus  5 mg Oral BID    vancomycin (VANCOCIN) IV (PEDS and ADULTS)  1,750 mg Intravenous Q12H      Continuous Infusions:   sodium chloride 0.9% 500 mL/hr at 12/12/23 9722      PRN Meds:  cyclobenzaprine, melatonin, oxyCODONE-acetaminophen, sodium chloride 0.9%, Pharmacy to dose Vancomycin consult **AND** vancomycin - pharmacy to dose      Assessment/Plan:  Cellulitis of the right lower extremity, recurrent  Alcoholic cirrhosis status post liver transplant 2021 (on tacrolimus)  History of right brachial vein venous thrombosis on Xarelto  Essential hypertension   Anxiety disorder   Tobacco abuse   Chronic pain syndrome, opioid dependent      Plan:   Continue intravenous Zosyn while inpatient then transition to oral Augmentin/doxycycline for 14 day course as per prior infectious Disease recommendations at time of last discharge.  If patient continues to show no systemic signs of infection thin he likely can be discharged in the next 24-48 hours.  Continue appropriate home medications  Discontinue intravenous fluids      Prolonged Care Diagnosis: Cellulitis of the right lower extremit  Prolonged Care Interventions: Hands-on evaluation, review of labs, radiographs, medical records, and discussion with the patient and medical staff in order to assess and manage the high probability of imminent or life-threatening deterioration of cardio-respiratory status requiring vasopressor support and/or intubation and mechanical ventilation.  Prolonged Care Time Spent: 35 minutes      VTE prophylaxis:  Xarelto    Patient condition:  Stable    Anticipated discharge and Disposition:   Home      All diagnosis and differential diagnosis have been reviewed; assessment and plan has been documented; I have personally reviewed the labs and test results that are presently available; I have reviewed the patients medication list; I have reviewed the consulting providers response and recommendations. I have reviewed or attempted to review medical records based upon their availability    All of the patient's questions have been  addressed and answered. Patient's is agreeable to the above stated plan. I will continue to monitor closely and make adjustments to medical management as needed.  _____________________________________________________________________      Radiology:  I  have personally reviewed the following imaging and agree with the radiologist.     X-Ray Tibia Fibula 2 View Right  Narrative: EXAMINATION:  XR TIBIA FIBULA 2 VIEW RIGHT    CLINICAL HISTORY:  Pain in right lower leg    COMPARISON:  None.    FINDINGS:  No acute displaced fractures or dislocations.    There is some narrowing of the lateral compartment of the knee joint articular spaces otherwise preserved with smooth articular surfaces    No blastic or lytic lesions.    Soft tissues within normal limits.  Impression: Minimal degenerative changes.    No other significant abnormality identified.    Electronically signed by: Esvin Zee  Date:    12/13/2023  Time:    09:47  X-Ray Chest AP Portable  Narrative: EXAMINATION:  XR CHEST AP PORTABLE    CLINICAL HISTORY:  Sepsis;, .    COMPARISON:  October 29, 2023    FINDINGS:  No alveolar consolidation, effusion, or pneumothorax is seen.   The thoracic aorta is normal  cardiac silhouette, central pulmonary vessels and mediastinum are normal in size and are grossly unremarkable.   visualized osseous structures are grossly unremarkable.  Impression: No acute chest disease is identified.    Electronically signed by: Esvin Zee  Date:    12/13/2023  Time:    08:21      Redd Hodge MD   12/13/2023

## 2023-12-13 NOTE — PLAN OF CARE
Pt is single, no children, sister Naga CASANOVA [phone].    12/13/23 1532   Discharge Assessment   Assessment Type Discharge Planning Assessment   Confirmed/corrected address, phone number and insurance Yes   Confirmed Demographics Correct on Facesheet   Source of Information patient   When was your last doctors appointment?   (sees Dr. Snow and Dr. Cox)   Communicated HARJINDER with patient/caregiver Date not available/Unable to determine   People in Home alone   Do you expect to return to your current living situation? No  (dc to girlfriends mothers home [address] [facility])   Do you have help at home or someone to help you manage your care at home? Yes   Who are your caregiver(s) and their phone number(s)? Gretched girlfriend [phone]   Prior to hospitilization cognitive status: Unable to Assess   Current cognitive status: Alert/Oriented   Walking or Climbing Stairs Difficulty no   Dressing/Bathing Difficulty no   Home Layout Able to live on 1st floor   Equipment Currently Used at Home none   Readmission within 30 days? No   Patient currently being followed by outpatient case management? No   Do you currently have service(s) that help you manage your care at home? No   Do you take prescription medications? Yes  (asked that meds be called into [facility])   Do you have prescription coverage? Yes   Coverage [facility] Managed Medicare Dual Complete   Do you have any problems affording any of your prescribed medications? No   Is the patient taking medications as prescribed? yes   Who is going to help you get home at discharge? Janki significant other   How do you get to doctors appointments? car, drives self;family or friend will provide   Are you on dialysis? No   Do you take coumadin? No   Discharge Plan A Home with family   DME Needed Upon Discharge  none   Discharge Plan discussed with: Patient   Transition of Care Barriers None   Transportation Anticipated family or friend  will provide   Physical Activity   On average, how many days per week do you engage in moderate to strenuous exercise (like a brisk walk)? 3 days   On average, how many minutes do you engage in exercise at this level? 40 min   Financial Resource Strain   How hard is it for you to pay for the very basics like food, housing, medical care, and heating? Not very   Housing Stability   In the last 12 months, was there a time when you were not able to pay the mortgage or rent on time? N   In the last 12 months, how many places have you lived? 1   In the last 12 months, was there a time when you did not have a steady place to sleep or slept in a shelter (including now)? N   Transportation Needs   In the past 12 months, has lack of transportation kept you from medical appointments or from getting medications? no   In the past 12 months, has lack of transportation kept you from meetings, work, or from getting things needed for daily living? No   Food Insecurity   Within the past 12 months, you worried that your food would run out before you got the money to buy more. Never true   Within the past 12 months, the food you bought just didn't last and you didn't have money to get more. Never true   Stress   Do you feel stress - tense, restless, nervous, or anxious, or unable to sleep at night because your mind is troubled all the time - these days? Rather much  (Voiced ability to cope/ declined mental health resources)   Social Connections   In a typical week, how many times do you talk on the phone with family, friends, or neighbors? More than 3   How often do you get together with friends or relatives? More than 3   How often do you attend Pentecostal or [employer] services? Never   Do you belong to any clubs or organizations such as Pentecostal groups, unions, fraternal or athletic groups, or school groups? No   How often do you attend meetings of the clubs or organizations you belong to? Never   Are you , , ,  , never , or living with a partner? Never marrie   Alcohol Use   Q1: How often do you have a drink containing alcohol? Never   Q2: How many drinks containing alcohol do you have on a typical day when you are drinking? None   Q3: How often do you have six or more drinks on one occasion? Never   OTHER   Name(s) of People in Home Janki significant other

## 2023-12-13 NOTE — H&P
[facility] Hospital Medicine History & Physical Examination       Patient Name: Kojo Sheikh III  MRN: [id number]  Patient Class: IP- Inpatient   Admission Date: 12/12/2023  4:49 PM  Length of Stay: 0  Admitting Service: Hospital Medicine   Attending Physician: Esau Barger MD   Primary Care Provider: No primary care provider on file.  History source: EMR, patient and/or patient's family    CHIEF COMPLAINT   Wound Infection (Pt was discharged 1 week ago after treatment of infection on R leg. Pt reports subjective fever, swelling, pain, and redness to site. Pt reports nausea and diarrhea.)    HISTORY OF PRESENT ILLNESS:   Patient is a 43-year-old male with a PMH which includes alcoholic cirrhosis of the liver status post liver transplant 4/2021, DVT on Xarelto, chronic back pain, anxiety who was recently admitted in November for cellulitis with abscess to the right lower extremity that required surgical drainage 10/21/2023.  His wound cultures grew Finegoldia inpatient completed extended course of vancomycin and Zosyn through 11/04/2023.  He was discharged home and followed up with [facility] every Friday since.  Tonight he comes to the ER due to worsening erythema of his right lower extremity as well as worsening swelling and pain.    He arrived afebrile hemodynamically stable maintaining normal sats on room air.  Laboratory work was unremarkable.  X-rays were obtained for which official read pending but venous ultrasound of that extremity again just notes superficial venous vein thrombosis (similarly to prior venous ultrasound on October.  He was placed back on IV vancomycin and Zosyn admitted to the hospitalist service for further management.    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  Essential hypertension   Anxiety disorder   Tobacco abuse   Chronic pain syndrome, opioid  dependent    PAST SURGICAL HISTORY:     Past Surgical History:   Procedure Laterality Date    BACK SURGERY  2011    DIAGNOSTIC ULTRASOUND N/A 4/14/2021    Procedure: ULTRASOUND,INTRAOPERATIVE;  Surgeon: Jose Anderson MD;  Location: [facility] OR 2ND FLR;  Service: Transplant;  Laterality: N/A;    ESOPHAGOGASTRODUODENOSCOPY N/A 8/5/2021    Procedure: EGD (ESOPHAGOGASTRODUODENOSCOPY);  Surgeon: Judy De La Garza MD;  Location: [facility] ENDO (2ND FLR);  Service: Endoscopy;  Laterality: N/A;    IRRIGATION AND DEBRIDEMENT Right 10/21/2023    Procedure: IRRIGATION AND DEBRIDEMENT- i&d abscess right lower extremity;  Surgeon: Shirlene Durán MD;  Location: [facility] OR;  Service: Peripheral Vascular;  Laterality: Right;    LIVER TRANSPLANT N/A 4/11/2021    Procedure: TRANSPLANT, LIVER;  Surgeon: Joe Baker MD;  Location: [facility] OR 2ND FLR;  Service: Transplant;  Laterality: N/A;       ALLERGIES:   Zofran [ondansetron hcl]    FAMILY HISTORY:   Reviewed and non-contributory     SOCIAL HISTORY:     Social History     Tobacco Use    Smoking status: Never    Smokeless tobacco: Never   Substance Use Topics    Alcohol use: Not on file        HOME MEDICATIONS:     Prior to Admission medications    Medication Sig Start Date End Date Taking? Authorizing Provider   cyclobenzaprine (FLEXERIL) 10 MG tablet Take 10 mg by mouth 3 (three) times daily as needed for Muscle spasms.   Yes Provider, Historical   ergocalciferol (ERGOCALCIFEROL) 50,000 unit Cap TAKE ONE CAPSULE BY MOUTH EVERY 7 DAYS 12/8/21  Yes Peter Dent MD   folic acid (FOLVITE) 1 MG tablet TAKE 1 TABLET(1000 MCG) BY MOUTH EVERY DAY 1/18/23  Yes Beny Snow MD   LIDOcaine (LIDODERM) 5 % UNWRAP AND APPLY 1 PATCH TO SKIN EVERY 24 HOURS AS NEEDED FOR PAIN AS DIRECTED 10/20/22  Yes Beny Snow MD   nicotine (NICODERM CQ) 14 mg/24 hr Place 1 patch onto the skin once daily. 4/26/21  Yes Zach Davila MD   oxyCODONE-acetaminophen (PERCOCET)  mg per tablet  Take 1 tablet by mouth every 4 (four) hours as needed for Pain.   Yes Provider, Historical   tacrolimus (PROGRAF) 1 MG Cap Take 6 capsules (6 mg total) by mouth every morning AND 5 capsules (5 mg total) every evening. 11/22/23  Yes Beny Snow MD   XARELTO 20 mg Tab Take 20 mg by mouth once daily. 4/11/22  Yes Provider, Historical   albuterol (PROVENTIL/VENTOLIN HFA) 90 mcg/actuation inhaler Inhale 2 puffs into the lungs every 6 (six) hours as needed for Wheezing. Rescue 8/6/21 8/6/22  Jose Anderson MD   bisacodyL (DULCOLAX) 5 mg EC tablet Take 2 tablets (10 mg total) by mouth daily as needed for Constipation. 4/23/21   Jose Anderson MD   calcium carbonate (OS-BRISSA) 500 mg calcium (1,250 mg) tablet Take 2 tablets (1,000 mg total) by mouth once daily. 5/19/21   Peter Dent MD   cetirizine (ZYRTEC) 5 MG tablet Take 1 tablet (5 mg total) by mouth daily as needed for Allergies (and before Zarxio doses). 8/6/21   Jose Anderson MD   diphenhydrAMINE (SOMINEX) 25 mg tablet Take 25 mg by mouth nightly as needed.    Provider, Historical   gabapentin (NEURONTIN) 300 MG capsule Take 2 capsules (600 mg total) by mouth 3 (three) times daily. 4/19/21 5/13/22  Dorothy Whitley MD   loratadine (CLARITIN) 10 mg tablet Take 10 mg by mouth once daily.    Provider, Historical   mycophenolate (CELLCEPT) 250 mg Cap TAKE 4 CAPSULES(1000 MG) BY MOUTH TWICE DAILY 4/6/22   Beny Snow MD   naloxegoL (MOVANTIK) 25 mg tablet Take 25 mg by mouth daily as needed (constipation). 11/4/23   Redd Hodge MD   promethazine (PHENERGAN) 25 MG tablet Take 1 tablet (25 mg total) by mouth every 6 (six) hours as needed for Nausea. 11/4/23   Redd Hodge MD       REVIEW OF SYSTEMS:   Except as documented, all other systems reviewed and negative     PHYSICAL EXAM:   T 98.2 °F (36.8 °C)   BP (!) 102/55   P 107   RR 19   O2 (!) 93 % (pt sleeping supine, placed on NC)  GENERAL: awake, alert, oriented  "and in no acute distress, non-toxic appearing   HEENT: normocephalic atraumatic   NECK: supple   LUNGS: Clear bilaterally, no wheezing or rales, no accessory muscle use   CVS: Regular rate and rhythm, normal peripheral perfusion  ABD: Soft, non-tender, non-distended, bowel sounds present  EXTREMITIES: no clubbing or cyanosis  SKIN: Warm, dry.  Erythema/swelling noted to the distal right lower extremity, he has a small medial opening with some eschar from his prior incision and drainage.  There is no obvious purulent drainage.  NEURO: alert and oriented, grossly without focal deficits   PSYCHIATRIC: Cooperative    LABS AND IMAGING:     Recent Labs     12/12/23  1724   WBC 6.09   RBC 3.59*   HGB 12.8*   HCT 36.1*   .6*   MCH 35.7*   MCHC 35.5   RDW 14.1        Recent Labs     12/12/23  1724   LACTIC 1.0     No results for input(s): "INR", "APTT", "D-DIMER" in the last 72 hours.  No results for input(s): "HGBA1C", "CHOL", "TRIG", "LDL", "VLDL", "HDL" in the last 72 hours.   Recent Labs     12/12/23  1724   *   K 4.5   CHLORIDE 97*   CO2 24   BUN 5.0*   CREATININE 0.68*   GLUCOSE 85   CALCIUM 9.0   ALBUMIN 4.0   GLOBULIN 2.9   ALKPHOS 120   ALT 42   AST 75*   BILITOT 0.9     No results for input(s): "BNP", "CPK", "TROPONINI" in the last 72 hours.         ASSESSMENT & PLAN:   Cellulitis right lower extremity, recurrent  Alcoholic cirrhosis status post liver transplant 2021 (on tacrolimus)  Right brachial vein DVT on Xarelto  Essential hypertension   Anxiety disorder   Tobacco abuse   Chronic pain syndrome, opioid dependent    -continue vanc/Zosyn while inpatient then transitioned to oral Augmentin/doxycycline for 14 day course as per prior ID recs at time of last discharge.  If he continues to show no systemic signs of infection he likely can be discharged in the next 24-48 hours.  -continue home medications as appropriate    DVT prophylaxis:  Continue Xarelto  Code status:  Full code    If patient " was admitted under observational status it is with my approval/permission.     At least 55 min was spent on this history and physical.  Time seen: 3AM 12/13/23  Esau Barger MD

## 2023-12-13 NOTE — ED PROVIDER NOTES
Encounter Date: 12/12/2023    SCRIBE #1 NOTE: I, Susan Bustos, am scribing for, and in the presence of,  Landen Frank MD. I have scribed the following portions of the note - Other sections scribed: HPI, ROS, PE.       History     Chief Complaint   Patient presents with    Wound Infection     Pt was discharged 1 week ago after treatment of infection on R leg. Pt reports subjective fever, swelling, pain, and redness to site. Pt reports nausea and diarrhea.     A 43 year old male with a history of a liver transplant d/t alcohol abuse is presenting to the ED with c/o right lower extremity pain. The pt was discharged last week after being admitted for cellulitis to the right lower extremity. The pt reports increasing redness, pain, and swelling to the site. He denies any drainage. He states that he had a virus last week and has not been ambulating as much as usual. He reports being on amoxicillin and doxycycline for the past 5 days and states that it has become more erythematous while on them. The pt is currently seeing wound care.     The history is provided by the patient. No  was used.     Review of patient's allergies indicates:   Allergen Reactions    Zofran [ondansetron hcl]      Interaction with tacrolimus     Past Medical History:   Diagnosis Date    Alcohol abuse     Chronic back pain      Past Surgical History:   Procedure Laterality Date    BACK SURGERY  2011    DIAGNOSTIC ULTRASOUND N/A 4/14/2021    Procedure: ULTRASOUND,INTRAOPERATIVE;  Surgeon: Jose Anderson MD;  Location: [facility] OR [address] [place] [address];  Service: Transplant;  Laterality: N/A;    ESOPHAGOGASTRODUODENOSCOPY N/A 8/5/2021    Procedure: EGD (ESOPHAGOGASTRODUODENOSCOPY);  Surgeon: Judy De La Garza MD;  Location: [address]);  Service: Endoscopy;  Laterality: N/A;    IRRIGATION AND DEBRIDEMENT Right 10/21/2023    Procedure: IRRIGATION AND DEBRIDEMENT- i&d abscess right lower extremity;  Surgeon: Shirlene Durán MD;   Location: [facility] OR;  Service: Peripheral Vascular;  Laterality: Right;    LIVER TRANSPLANT N/A 4/11/2021    Procedure: TRANSPLANT, LIVER;  Surgeon: Joe Baker MD;  Location: [facility] OR [address] [place] [address];  Service: Transplant;  Laterality: N/A;     No family history on file.  Social History     Tobacco Use    Smoking status: Never    Smokeless tobacco: Never   Substance Use Topics    Drug use: Never     Review of Systems   Constitutional:  Positive for chills and fever.   Musculoskeletal:         Right lower extremity redness, pain, and swelling.       Physical Exam     Initial Vitals [12/12/23 1646]   BP Pulse Resp Temp SpO2   (!) 145/83 104 20 98.2 °F (36.8 °C) 96 %      MAP       --         Physical Exam    Constitutional: He appears well-developed and well-nourished. He is not diaphoretic. No distress.   The pt is ambulatory.    HENT:   Head: Normocephalic and atraumatic.   Right Ear: External ear normal.   Left Ear: External ear normal.   Nose: Nose normal.   Eyes: EOM are normal. Pupils are equal, round, and reactive to light. Right eye exhibits no discharge. Left eye exhibits no discharge.   Cardiovascular:  Normal rate, regular rhythm and normal heart sounds.     Exam reveals no gallop and no friction rub.       No murmur heard.  Pulmonary/Chest: Effort normal and breath sounds normal. No respiratory distress. He has no wheezes. He has no rhonchi. He has no rales. He exhibits no tenderness.   Abdominal: Abdomen is soft. Bowel sounds are normal. He exhibits no distension and no mass. There is no abdominal tenderness. There is no rebound and no guarding.   Musculoskeletal:         General: No edema. Normal range of motion.      Comments: The right lower extremity is warmer compared to the left.   There is some erythema and edema to the right lower extremity.  1 x 1 cm wound with fibrinous exudate. There is no drainage.        Neurological: He is alert and oriented to person, place, and time. No cranial nerve deficit or  sensory deficit.   Skin: Skin is warm and dry. Capillary refill takes less than 2 seconds.         ED Course   Procedures  Labs Reviewed   COMPREHENSIVE METABOLIC PANEL - Abnormal; Notable for the following components:       Result Value    Sodium Level 130 (*)     Chloride 97 (*)     Blood Urea Nitrogen 5.0 (*)     Creatinine 0.68 (*)     Aspartate Aminotransferase 75 (*)     All other components within normal limits   CBC WITH DIFFERENTIAL - Abnormal; Notable for the following components:    RBC 3.59 (*)     Hgb 12.8 (*)     Hct 36.1 (*)     .6 (*)     MCH 35.7 (*)     All other components within normal limits   LACTIC ACID, PLASMA - Normal   BLOOD CULTURE [facility]   BLOOD CULTURE [facility]   CBC W/ AUTO DIFFERENTIAL    Narrative:     The following orders were created for panel order CBC auto differential.  Procedure                               Abnormality         Status                     ---------                               -----------         ------                     CBC with Differential[[id number]]       Abnormal            Final result                 Please view results for these tests on the individual orders.   URINALYSIS, REFLEX TO URINE CULTURE          Imaging Results              X-Ray Tibia Fibula 2 View Right (In process)                      X-Ray Chest AP Portable (In process)                      Medications   0.9%  NaCl infusion ( Intravenous New Bag 12/12/23 7776)   piperacillin-tazobactam (ZOSYN) 4.5 g in dextrose 5 % in water (D5W) 100 mL IVPB (MB+) (4.5 g Intravenous New Bag 12/13/23 0108)   vancomycin (VANCOCIN) 1,750 mg in dextrose 5 % (D5W) 500 mL IVPB (has no administration in time range)     Medical Decision Making    Differential diagnosis include but are not limited to:  Cellulitis, necrotizing fasciitis, DVT, arterial occlusion.      Patient is awake alert well-appearing.  Able to ambulate.  Evidently he is he is had worsening erythema swelling and tenderness to his right lower  extremity.  He was a wound but it does not appear to be acutely infected.  Seems he was failing outpatient treatment, reports he is on doxycycline and Augmentin.  Was recently admitted for same.  We will readmit for antibiotics.  Further evaluation monitoring.  There is concern for immunocompromised state secondary to being on Prograf secondary to liver transplant.  Discussed with the patient.  He is comfortable with plan.  Nontoxic appearing.  On re-evaluation ambulating around the room without any difficulty.  Comfortable with admission.  Care to be transferred.      Amount and/or Complexity of Data Reviewed  External Data Reviewed: notes.     Details: See ED course  Labs: ordered. Decision-making details documented in ED Course.  Radiology: ordered and independent interpretation performed. Decision-making details documented in ED Course.    Risk  Prescription drug management.  Decision regarding hospitalization.            Scribe Attestation:   Scribe #1: I performed the above scribed service and the documentation accurately describes the services I performed. I attest to the accuracy of the note.    Attending Attestation:           Physician Attestation for Scribe:  Physician Attestation Statement for Scribe #1: I, Landen Frank MD, reviewed documentation, as scribed by Susan Bustos in my presence, and it is both accurate and complete.             ED Course as of 12/13/23 0132   Tue Dec 12, 2023   2153 Comprehensive metabolic panel(!)  All hyponatremia.  No significant electrolyte abnormality otherwise.  No significant renal dysfunction.  Mildly elevated AST 75. [MM]   2154 Lactate, Luis Daniel: 1.0 [MM]   2154 CBC auto differential(!)  No leukocytosis.  Stable anemia. [MM]   2228 Chart review reveals discharge summary from 11/04/2023.  Patient was admitted for right leg cellulitis and abscess status post irrigation debridement.  DVT mid right brachial vein, alcohol cirrhosis status post liver transplant,  "immunocompromised state, hypertension, anxiety, tobacco, chronic pain.  Admission he would leukocytosis 9.5.  Lactic acid 1.2.  X-ray was unrevealing.  Ultrasound of right lower extremity so superficial greater saphenous vein thrombosis.  No DVT.  Vascular surgery was consulted patient underwent OR irrigation debridement of abscess in the right lower extremity on 10/21.  Finding of the hemorrhagic organizing abscess no malignancy.  Blood cultures negative.  Wound culture positive for GPR, anaerobic culture grew Finegoldia magna and Prevotella species. ID consulted and suggested IV Vancomycin and Zosyn with end date 11/4 with option of transitioning to oral antibiotics on discharge with doxycycline and Augmentin. [MM]   2246 Accordance with sepsis protocol with concern for fluid overload being patient we will give 30 milliliters/kilogram based on ideal body weight of 82 kg of normal saline at 500 mL an hour. [MM]      ED Course User Index  [MM] Landen Frank MD               Medical Decision Making:   Clinical Tests:   Sepsis Perfusion Assessment: "I attest a sepsis perfusion exam was performed within 6 hours of sepsis, severe sepsis, or septic shock presentation, following fluid resuscitation."             Clinical Impression:  Final diagnoses:  [R53.1] Weakness  [M79.661, M79.89] Pain and swelling of lower leg, right  [L03.115] Cellulitis of right lower extremity (Primary)  [Z94.4] Hx of liver transplant          ED Disposition Condition    Admit Stable                Landen Frank MD  12/13/23 0132    "

## 2023-12-13 NOTE — NURSING
Nurses Note -- 4 Eyes      12/13/2023   5:38 PM      Skin assessed during: Admit      [] No Altered Skin Integrity Present    []Prevention Measures Documented      [x] Yes- Altered Skin Integrity Present or Discovered   [x] LDA Added if Not in Epic (Describe Wound)   [x] New Altered Skin Integrity was Present on Admit and Documented in LDA   [x] Wound Image Taken    Wound Care Consulted? Yes    Attending Nurse:  Karma Lovett RN/Staff Member:   Neisha Dior LPN

## 2023-12-14 VITALS
SYSTOLIC BLOOD PRESSURE: 148 MMHG | OXYGEN SATURATION: 97 % | HEART RATE: 93 BPM | HEIGHT: 74 IN | RESPIRATION RATE: 18 BRPM | BODY MASS INDEX: 29.48 KG/M2 | DIASTOLIC BLOOD PRESSURE: 84 MMHG | TEMPERATURE: 98 F | WEIGHT: 229.75 LBS

## 2023-12-14 PROBLEM — L03.115 CELLULITIS OF RIGHT LOWER EXTREMITY: Status: ACTIVE | Noted: 2023-12-14

## 2023-12-14 LAB
APPEARANCE UR: CLEAR
BACTERIA #/AREA URNS AUTO: ABNORMAL /HPF
BILIRUB UR QL STRIP.AUTO: NEGATIVE
COLOR UR AUTO: COLORLESS
GLUCOSE UR QL STRIP.AUTO: NORMAL
KETONES UR QL STRIP.AUTO: NEGATIVE
LEUKOCYTE ESTERASE UR QL STRIP.AUTO: NEGATIVE
NITRITE UR QL STRIP.AUTO: NEGATIVE
PH UR STRIP.AUTO: 6.5 [PH]
PROT UR QL STRIP.AUTO: NEGATIVE
RBC #/AREA URNS AUTO: ABNORMAL /HPF
RBC UR QL AUTO: NEGATIVE
SP GR UR STRIP.AUTO: 1 (ref 1–1.03)
SQUAMOUS #/AREA URNS LPF: ABNORMAL /HPF
UROBILINOGEN UR STRIP-ACNC: NORMAL
WBC #/AREA URNS AUTO: ABNORMAL /HPF

## 2023-12-14 PROCEDURE — 63600175 PHARM REV CODE 636 W HCPCS: Performed by: INTERNAL MEDICINE

## 2023-12-14 PROCEDURE — G0378 HOSPITAL OBSERVATION PER HR: HCPCS

## 2023-12-14 PROCEDURE — 96366 THER/PROPH/DIAG IV INF ADDON: CPT

## 2023-12-14 PROCEDURE — 25000003 PHARM REV CODE 250: Performed by: INTERNAL MEDICINE

## 2023-12-14 PROCEDURE — 81001 URINALYSIS AUTO W/SCOPE: CPT | Performed by: STUDENT IN AN ORGANIZED HEALTH CARE EDUCATION/TRAINING PROGRAM

## 2023-12-14 RX ORDER — DOXYCYCLINE 100 MG/1
100 CAPSULE ORAL 2 TIMES DAILY
Qty: 14 CAPSULE | Refills: 0 | Status: SHIPPED | OUTPATIENT
Start: 2023-12-14 | End: 2023-12-21

## 2023-12-14 RX ORDER — AMLODIPINE BESYLATE 5 MG/1
5 TABLET ORAL DAILY
Status: DISCONTINUED | OUTPATIENT
Start: 2023-12-14 | End: 2023-12-14 | Stop reason: HOSPADM

## 2023-12-14 RX ADMIN — CYCLOBENZAPRINE 10 MG: 10 TABLET, FILM COATED ORAL at 09:12

## 2023-12-14 RX ADMIN — OXYCODONE AND ACETAMINOPHEN 1 TABLET: 10; 325 TABLET ORAL at 02:12

## 2023-12-14 RX ADMIN — OXYCODONE AND ACETAMINOPHEN 1 TABLET: 10; 325 TABLET ORAL at 09:12

## 2023-12-14 RX ADMIN — FOLIC ACID 1000 MCG: 1 TABLET ORAL at 09:12

## 2023-12-14 RX ADMIN — TACROLIMUS 5 MG: 1 CAPSULE ORAL at 09:12

## 2023-12-14 RX ADMIN — CYCLOBENZAPRINE 10 MG: 10 TABLET, FILM COATED ORAL at 02:12

## 2023-12-14 RX ADMIN — PIPERACILLIN AND TAZOBACTAM 4.5 G: 4; .5 INJECTION, POWDER, LYOPHILIZED, FOR SOLUTION INTRAVENOUS; PARENTERAL at 04:12

## 2023-12-14 RX ADMIN — RIVAROXABAN 20 MG: 10 TABLET, FILM COATED ORAL at 09:12

## 2023-12-14 RX ADMIN — AMLODIPINE BESYLATE 5 MG: 5 TABLET ORAL at 09:12

## 2023-12-14 RX ADMIN — VANCOMYCIN HYDROCHLORIDE 1750 MG: 500 INJECTION, POWDER, LYOPHILIZED, FOR SOLUTION INTRAVENOUS at 02:12

## 2023-12-14 NOTE — DISCHARGE SUMMARY
[facility] Hospital Medicine Discharge Summary    Admit Date: 12/12/2023  Discharge Date and Time: 12/14/20239:23 AM  Admitting Physician: RASHARD Team  Discharging Physician: Moisés Ho MD.  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  His wound cultures grew [place] inpatient completed extended course of vancomycin and Zosyn through 11/04/2023.  He was discharged home and followed up with Wound Care Clinic every Friday since.  Tonight he comes to the ER due to worsening erythema of his right lower extremity as well as worsening swelling and pain.  Patient reported that he was supposed to do his dressing change every day at home but he and his all household got a viral infection and he had high-grade fever and he could not change his wound dressing.  Patient reports he put the Tegaderm on it to go take a shower but he never made it to the shower and the Tegaderm stayed on his wound for 4 days prior changing the dressing, when he removed Tegaderm,  he realized that there was some drainage and redness around the wound on the right lower extremity.  Reported he has completed his oral antibiotic as prescribed upon discharge from the hospital.  Stated last Thursday on 12/07/2023,  his girlfriend's brother prescribed him Augmentin x14 days and he already had doxycycline at home from his previous prescriptions.  He started antibiotic but felt it did not improved so he came to ED    He arrived afebrile hemodynamically stable maintaining normal sats on room air.  Laboratory work was unremarkable.  X-rays were obtained for which official read pending but venous ultrasound of that extremity again just notes superficial venous vein thrombosis (similarly to prior venous ultrasound on October.  He was placed back on IV vancomycin and Zosyn very promptly admitted to the hospitalist service for further management  It was discussed with patient that if his cultures are negative at 24 hours, after wound nurse eval, patient can be discharged home on oral antibiotics to complete 14 days course of Augmentin and doxycycline and to follow with his wound care clinic that he missed last week  Inhouse started on vanc and zosyn  No signs of infection  Wound care as evaluated the patient, reported the wound bed is clean and healthy, no sign of infection, no pus or drainage.  Recommended wound care as outpatient  I spoke with patient regarding alcohol and he reported he has not drank in 3 years.  Patient reported he does not have doxycycline prescription at home, discussed will order 7 days to complete total of 14 days to his pharmacy [facility] in [place] per patient's request    Pt was seen and examined on the day of discharge  Vitals:  VITAL SIGNS: 24 HRS MIN & MAX LAST   Temp  Min: 98.2 °F (36.8 °C)  Max: 99.1 °F (37.3 °C) 98.2 °F (36.8 °C)   BP  Min: 136/80  Max: 165/95 136/80   Pulse  Min: 92  Max: 114  92   Resp  Min: 15  Max: 19 19   SpO2  Min: 96 %  Max: 99 % 96 %       Physical Exam:  General: in no acute distress  Respiratory: clear to auscultation bilaterally to the bases  Cardiovascular: regular rate and rhythm, no murmur heard  Gastrointestinal: non-distended, positive bowel sounds,  non-tender  Musculoskeletal:  Erythema and edema noted to the distal right lower extremity; small wound with a healthy base There is no obvious purulent drainage   Neurological: cranial nerves grossly intact, no focal neurological deficit  Psychiatric: cooperative    Recent Labs   Lab 12/12/23 1724 12/13/23  0517   WBC 6.09 3.92*   RBC 3.59* 3.20*   HGB 12.8* 11.3*   HCT 36.1* 32.5*   .6* 101.6*   MCH 35.7* 35.3*   MCHC 35.5 34.8   RDW 14.1 14.4    134   MPV 8.8 8.7       Recent Labs   Lab 12/12/23  1724 12/13/23  0517   * 137   K 4.5 3.9   CO2 24 25   BUN 5.0* 6.0*   CREATININE 0.68* 0.75   CALCIUM 9.0 8.2*   ALBUMIN 4.0 3.3*   ALKPHOS 120 102   ALT 42 33   AST 75* 62*   BILITOT 0.9 0.5        Microbiology Results (last 7 days)       Procedure Component Value Units Date/Time    Blood culture #1 **CANNOT BE ORDERED STAT** [[id number]]  (Normal) Collected: 12/12/23 1724    Order Status: Completed Specimen: Blood Updated: 12/13/23 1900     CULTURE, BLOOD ([facility]) No Growth At 24 Hours    Blood culture #2 **CANNOT BE ORDERED STAT** [[id number]]  (Normal) Collected: 12/12/23 1724    Order Status: Completed Specimen: Blood Updated: 12/13/23 1900     CULTURE, BLOOD ([facility]) No Growth At 24 Hours    Blood culture x two cultures. Draw prior to antibiotics. [[id number]]     Order Status: Canceled Specimen: Blood              X-Ray Tibia Fibula 2 View Right  Narrative: EXAMINATION:  XR TIBIA FIBULA 2 VIEW RIGHT    CLINICAL HISTORY:  Pain in right lower leg    COMPARISON:  None.    FINDINGS:  No acute displaced fractures or dislocations.    There is some narrowing of the lateral compartment of the knee joint articular spaces otherwise preserved with smooth articular surfaces    No blastic or lytic lesions.    Soft tissues within normal limits.  Impression: Minimal degenerative changes.    No other significant abnormality identified.    Electronically signed by: Esvin  Yayo  Date:    12/13/2023  Time:    09:47  X-Ray Chest AP Portable  Narrative: EXAMINATION:  XR CHEST AP PORTABLE    CLINICAL HISTORY:  Sepsis;, .    COMPARISON:  October 29, 2023    FINDINGS:  No alveolar consolidation, effusion, or pneumothorax is seen.   The thoracic aorta is normal  cardiac silhouette, central pulmonary vessels and mediastinum are normal in size and are grossly unremarkable.   visualized osseous structures are grossly unremarkable.  Impression: No acute chest disease is identified.    Electronically signed by: Esvin Zee  Date:    12/13/2023  Time:    08:21         Medication List        START taking these medications      doxycycline 100 MG Cap  Commonly known as: VIBRAMYCIN  Take 1 capsule (100 mg total) by mouth 2 (two) times daily. for 7 days            CONTINUE taking these medications      albuterol 90 mcg/actuation inhaler  Commonly known as: PROVENTIL/VENTOLIN HFA  Inhale 2 puffs into the lungs every 6 (six) hours as needed for Wheezing. Rescue     bisacodyL 5 mg EC tablet  Commonly known as: DULCOLAX  Take 2 tablets (10 mg total) by mouth daily as needed for Constipation.     calcium carbonate 500 mg calcium (1,250 mg) tablet  Commonly known as: OS-RBISSA  Take 2 tablets (1,000 mg total) by mouth once daily.     cetirizine 5 MG tablet  Commonly known as: ZYRTEC  Take 1 tablet (5 mg total) by mouth daily as needed for Allergies (and before Zarxio doses).     cyclobenzaprine 10 MG tablet  Commonly known as: FLEXERIL     ergocalciferol 50,000 unit Cap  Commonly known as: ERGOCALCIFEROL  TAKE ONE CAPSULE BY MOUTH EVERY 7 DAYS     folic acid 1 MG tablet  Commonly known as: FOLVITE  TAKE 1 TABLET(1000 MCG) BY MOUTH EVERY DAY     gabapentin 300 MG capsule  Commonly known as: NEURONTIN  Take 2 capsules (600 mg total) by mouth 3 (three) times daily.     LIDOcaine 5 %  Commonly known as: LIDODERM  UNWRAP AND APPLY 1 PATCH TO SKIN EVERY 24 HOURS AS NEEDED FOR PAIN AS DIRECTED      loratadine 10 mg tablet  Commonly known as: CLARITIN     mycophenolate 250 mg Cap  Commonly known as: CELLCEPT  TAKE 4 CAPSULES(1000 MG) BY MOUTH TWICE DAILY     naloxegoL 25 mg tablet  Commonly known as: MOVANTIK  Take 25 mg by mouth daily as needed (constipation).     nicotine 14 mg/24 hr  Commonly known as: NICODERM CQ  Place 1 patch onto the skin once daily.     oxyCODONE-acetaminophen  mg per tablet  Commonly known as: PERCOCET     promethazine 25 MG tablet  Commonly known as: PHENERGAN  Take 1 tablet (25 mg total) by mouth every 6 (six) hours as needed for Nausea.     tacrolimus 1 MG Cap  Commonly known as: PROGRAF  Take 6 capsules (6 mg total) by mouth every morning AND 5 capsules (5 mg total) every evening.     XARELTO 20 mg Tab  Generic drug: rivaroxaban            STOP taking these medications      diphenhydrAMINE 25 mg tablet  Commonly known as: SOMINEX               Where to Get Your Medications        These medications were sent to [id number] - [place] [address] [facility] [place] [address] [address] [id number]      Phone: [phone]   doxycycline 100 MG Cap          Explained in detail to the patient about the discharge plan, medications, and follow-up visits. Pt understands and agrees with the treatment plan  Discharge Disposition: Home or Self Care, resume out patient [place] care [place] [facility]   Discharged Condition: stable  Diet-   Dietary Orders (From admission, onward)       Start     Ordered    12/13/23 0219  Diet heart healthy  (Diet/Nutrition [facility])  Diet effective now         12/13/23 0219                   Medications Per DC med rec  Activities as tolerated   Follow-up Information       f/u with your PCP in [place] Follow up.                           For further questions contact hospitalist office    Discharge time 34 minutes    For worsening symptoms, chest pain, shortness of breath, increased abdominal pain, high grade fever, stroke or  stroke like symptoms, immediately go to the nearest Emergency Room or call 911 as soon as possible.      Moisés Ho MD  Department of Hospital Medicine   [facility]   12/14/2023 9:23 AM

## 2023-12-14 NOTE — NURSING
[facility] - [facility]ation [facility]  Wound Care    Patient Name:  Kojo Sheikh III   MRN:  [id number]  Date: 12/14/2023  Diagnosis: Cellulitis of right lower extremity    History:     Past Medical History:   Diagnosis Date    Alcohol abuse     Chronic back pain        Social History     Socioeconomic History    Marital status: Single   Tobacco Use    Smoking status: Never    Smokeless tobacco: Never   Substance and Sexual Activity    Drug use: Never     Social Determinants of Health     Financial Resource Strain: Low Risk  (12/13/2023)    Overall Financial Resource Strain (CARDIA)     Difficulty of Paying Living Expenses: Not very hard   Food Insecurity: No Food Insecurity (12/13/2023)    Hunger Vital Sign     Worried About Running Out of Food in the Last Year: Never true     Ran Out of Food in the Last Year: Never true   Transportation Needs: No Transportation Needs (12/13/2023)    PRAPARE - Transportation     Lack of Transportation (Medical): No     Lack of Transportation (Non-Medical): No   Physical Activity: Insufficiently Active (12/13/2023)    Exercise Vital Sign     Days of Exercise per Week: 3 days     Minutes of Exercise per Session: 40 min   Stress: Stress Concern Present (12/13/2023)    [employer] [place] [employer] - Occupational Stress Questionnaire     Feeling of Stress : Rather much   Social Connections: Socially Isolated (12/13/2023)    Social Connection and Isolation Panel [NHANES]     Frequency of Communication with Friends and Family: More than three times a week     Frequency of Social Gatherings with Friends and Family: More than three times a week     Attends [employer] Services: Never     Active Member of Clubs or Organizations: No     Attends Club or Organization Meetings: Never     Marital Status: Never    Housing Stability: Low Risk  (12/13/2023)    Housing Stability Vital Sign     Unable to Pay for Housing in the Last Year: No     Number of Places Lived in the  Last Year: 1     Unstable Housing in the Last Year: No       Precautions:     Allergies as of 12/12/2023 - Reviewed 12/12/2023   Allergen Reaction Noted    Zofran [ondansetron hcl]  10/19/2023       WO Assessment Details/Treatment   Consult received for right lower leg wound. Patient with history of wound to right medial lower leg. States he has been going to wound care in [place], but became ill with a virus and had left a collagen dressing on for 4 days and fell like the wound is infected now. Wound with minimal drainage, lower leg red with edema, hemosiderin staining noted. Wound cleansed with vashe, loose fibrin removed, vashe moistened gauze applied to wound bed, covered with gauze, wrapped with kerlix, secured with tape and ace wrap per patient request.      12/14/23 0830   WOCN Assessment   WOCN Total Time (mins) 45   Visit Date 12/14/23   Visit Time 0830   Consult Type New   WOCN Speciality Wound   Wound surgical;venous   Number of Wounds 1   Intervention assessed;changed;applied;chart review;orders   Positioning   Body Position position changed independently   Head of Bed (HOB) Positioning HOB at 30 degrees        Altered Skin Integrity 10/20/23 0015 Right lower;medial Leg #1 Other (comment)   Date First Assessed/Time First Assessed: 10/20/23 0015   Altered Skin Integrity Present on Admission - Did Patient arrive to the hospital with altered skin?: yes  Side: Right  Orientation: lower;medial  Location: Leg  Wound Number: #1  Is this injury ...   Wound Image    Dressing Appearance Open to air   Drainage Amount Scant   Drainage Characteristics/Odor Clear;Serous   Appearance Fibrin;Granulating;Slough   Tissue loss description Full thickness   Periwound Area Intact;Hemosiderin Staining   Wound Edges Defined   Wound Length (cm) 2.2 cm   Wound Width (cm) 1.2 cm   Wound Depth (cm) 0.2 cm   Wound Volume (cm^3) 0.528 cm^3   Wound Surface Area (cm^2) 2.64 cm^2   Care Wound cleanser   Dressing Applied  (Vashe  moistened gauze, kerlix, ace wrap)         Recommendations made to primary team for wound care with Vashe cleansing, vashe moistened gauze, gauze, kerlix . Orders placed.     12/14/2023

## 2023-12-15 ENCOUNTER — TELEPHONE (OUTPATIENT)
Dept: TRANSPLANT | Facility: CLINIC | Age: 43
End: 2023-12-15
Payer: MEDICARE

## 2023-12-17 LAB
BACTERIA BLD CULT: NORMAL
BACTERIA BLD CULT: NORMAL

## 2023-12-18 ENCOUNTER — TELEPHONE (OUTPATIENT)
Dept: TRANSPLANT | Facility: CLINIC | Age: 43
End: 2023-12-18
Payer: MEDICARE

## 2024-02-15 ENCOUNTER — TELEPHONE (OUTPATIENT)
Dept: TRANSPLANT | Facility: CLINIC | Age: 44
End: 2024-02-15
Payer: MEDICARE

## 2024-02-15 NOTE — LETTER
February 15, 2024    Kojo Sheikh  [address] [facility] [facility] [address]          Dear Kojo Sheikh:  MRN: [id number]    Your lab work was due to be drawn on 12/11/23.  We contacted your lab and were unable to get test results.  It is very important to get your labs drawn as scheduled.  We cannot monitor you for rejection, infections, or drug toxicity side effects without lab results.  Please call us at [phone] as soon as possible to let us know when you plan to have labs drawn.    Sincerely,      Juan C SHARIFN, RN, CCTC  Your Liver Transplant Coordinator    [facility]  [address] [place] [address] [phone]

## 2024-05-27 ENCOUNTER — TELEPHONE (OUTPATIENT)
Dept: TRANSPLANT | Facility: CLINIC | Age: 44
End: 2024-05-27
Payer: MEDICARE

## 2024-05-27 NOTE — LETTER
May 27, 2024    Kojo Sheikh  [address] [facility] [address] [facility] [address]          Dear Kojo Sheikh:  MRN: [id number]    Your lab work was due to be drawn in March.  We contacted your lab and were unable to get test results.  It is very important to get your labs drawn as scheduled.  We cannot monitor you for rejection, infections, or drug toxicity side effects without lab results.  Please call us at [phone] as soon as possible to let us know when you plan to have labs drawn.    Sincerely,      Juan C SHARIFN, RN, CCTC  Your Liver Transplant Coordinator    [facility]  [address] [place] [address] [phone]

## 2024-08-13 ENCOUNTER — TELEPHONE (OUTPATIENT)
Dept: TRANSPLANT | Facility: CLINIC | Age: 44
End: 2024-08-13
Payer: MEDICARE

## 2024-08-13 NOTE — TELEPHONE ENCOUNTER
Writer left patient VM and sent letter today stating if patient has not provided liver transplant team with labs for review (last set December 2023), his chart will be considered for closure at the end of the year.    Remind me card placed for 12/3/24 to review patients chart again if he has not yet contacted txp team.

## 2024-08-22 ENCOUNTER — TELEPHONE (OUTPATIENT)
Dept: TRANSPLANT | Facility: CLINIC | Age: 44
End: 2024-08-22
Payer: MEDICARE

## 2024-09-27 ENCOUNTER — TELEPHONE (OUTPATIENT)
Dept: TRANSPLANT | Facility: CLINIC | Age: 44
End: 2024-09-27
Payer: MEDICARE

## 2024-11-21 ENCOUNTER — TELEPHONE (OUTPATIENT)
Dept: TRANSPLANT | Facility: CLINIC | Age: 44
End: 2024-11-21
Payer: MEDICARE

## 2024-12-10 ENCOUNTER — TELEPHONE (OUTPATIENT)
Dept: TRANSPLANT | Facility: CLINIC | Age: 44
End: 2024-12-10
Payer: MEDICARE

## 2024-12-19 ENCOUNTER — TELEPHONE (OUTPATIENT)
Dept: TRANSPLANT | Facility: CLINIC | Age: 44
End: 2024-12-19
Payer: MEDICARE

## 2024-12-31 ENCOUNTER — TELEPHONE (OUTPATIENT)
Dept: TRANSPLANT | Facility: CLINIC | Age: 44
End: 2024-12-31
Payer: MEDICARE

## 2025-01-14 ENCOUNTER — TELEPHONE (OUTPATIENT)
Dept: TRANSPLANT | Facility: CLINIC | Age: 45
End: 2025-01-14
Payer: MEDICARE

## 2025-01-14 ENCOUNTER — TELEPHONE (OUTPATIENT)
Dept: TRANSPLANT | Facility: CLINIC | Age: 45
End: 2025-01-14

## 2025-01-14 NOTE — Clinical Note
January 14, 2025    Kojo Sheikh  [address] [facility] [address]          Dear Kojo Sheikh:  MRN: [id number]    Your lab work was due to be drawn on ***.  We contacted your lab and were unable to get test results.  It is very important to get your labs drawn as scheduled.  We cannot monitor you for rejection, infections, or drug toxicity side effects without lab results.  Please call us at [phone] as soon as possible to let us know when you plan to have labs drawn.    Sincerely,        Your Liver Transplant Coordinator    [facility]  [address] [place] [id number] [phone]

## 2025-01-14 NOTE — LETTER
January 14, 2025    Kojo Sheikh III  [address] [place] [address]             Dear Kojo Sheikh III:  MRN: [id number]    This is a letter to confirm that you have not contacted us for two or more months for lab appointments.  Your labs are performed to let us tell how well your liver is working and to rule out rejection.  It is critical that you get your labs drawn when you are instructed.  You may be risking your health and your life by not having your labs drawn.  Compliance is a very important issue in transplant patients.  We expect you to help yourself at least as much as we are willing to help you.  If you do not contact your transplant coordinator within one week of receiving this letter, we will no longer try to contact you.  It is your responsibility to maintain compliance with post-transplant care.      If you have any questions, please contact your coordinator at [phone].    Sincerely,    Peter Dent MD, MSc, FRCSC, FACS  Medical Director, [facility]  Head, Abdominal Transplant Surgery  Surgical Director, Kidney Transplant Program    [facility]  [address] [place] [address] [phone]

## 2025-02-03 ENCOUNTER — OFFICE VISIT (OUTPATIENT)
Dept: TRANSPLANT | Facility: CLINIC | Age: 45
End: 2025-02-03
Payer: MEDICARE

## 2025-02-03 VITALS
SYSTOLIC BLOOD PRESSURE: 120 MMHG | HEART RATE: 96 BPM | WEIGHT: 219.13 LBS | HEIGHT: 74 IN | DIASTOLIC BLOOD PRESSURE: 78 MMHG | BODY MASS INDEX: 28.12 KG/M2

## 2025-02-03 DIAGNOSIS — Z79.60 LONG-TERM USE OF IMMUNOSUPPRESSANT MEDICATION: ICD-10-CM

## 2025-02-03 DIAGNOSIS — R10.9 ABDOMINAL PAIN, UNSPECIFIED ABDOMINAL LOCATION: ICD-10-CM

## 2025-02-03 DIAGNOSIS — Z94.4 LIVER TRANSPLANTED: Primary | ICD-10-CM

## 2025-02-03 PROCEDURE — 99214 OFFICE O/P EST MOD 30 MIN: CPT | Mod: S$GLB,,, | Performed by: STUDENT IN AN ORGANIZED HEALTH CARE EDUCATION/TRAINING PROGRAM

## 2025-02-03 PROCEDURE — 1160F RVW MEDS BY RX/DR IN RCRD: CPT | Mod: CPTII,S$GLB,, | Performed by: STUDENT IN AN ORGANIZED HEALTH CARE EDUCATION/TRAINING PROGRAM

## 2025-02-03 PROCEDURE — 3078F DIAST BP <80 MM HG: CPT | Mod: CPTII,S$GLB,, | Performed by: STUDENT IN AN ORGANIZED HEALTH CARE EDUCATION/TRAINING PROGRAM

## 2025-02-03 PROCEDURE — 1159F MED LIST DOCD IN RCRD: CPT | Mod: CPTII,S$GLB,, | Performed by: STUDENT IN AN ORGANIZED HEALTH CARE EDUCATION/TRAINING PROGRAM

## 2025-02-03 PROCEDURE — 3074F SYST BP LT 130 MM HG: CPT | Mod: CPTII,S$GLB,, | Performed by: STUDENT IN AN ORGANIZED HEALTH CARE EDUCATION/TRAINING PROGRAM

## 2025-02-03 PROCEDURE — 3008F BODY MASS INDEX DOCD: CPT | Mod: CPTII,S$GLB,, | Performed by: STUDENT IN AN ORGANIZED HEALTH CARE EDUCATION/TRAINING PROGRAM

## 2025-02-03 RX ORDER — ESZOPICLONE 2 MG/1
2 TABLET, FILM COATED ORAL NIGHTLY
COMMUNITY

## 2025-02-03 RX ORDER — LIDOCAINE 50 MG/G
PATCH TOPICAL DAILY PRN
Qty: 30 PATCH | Refills: 11 | Status: SHIPPED | OUTPATIENT
Start: 2025-02-03 | End: 2026-02-03

## 2025-02-03 NOTE — PROGRESS NOTES
Transplant Hepatology  Liver Transplant Recipient Follow Up    PCP: Unable, To Obtain    Transplant History  Liver transplant performed 4/11/2021 for the primary diagnosis (UNOS) of Alcoholic Cirrhosis     ORGAN: LIVER  Whole or Partial: whole liver  Donor Type: donation after brain death  PHS Increased Risk: no  Donor CMV Status: Positive  Donor HCV Status: Positive  Donor HBcAb: Negative  Donor HBV EDWARD: Negative  Donor HCV EDWARD: Negative     Biliary Anastomosis: end to end  Arterial Anatomy: replaced right hepatic from sma  IVC reconstruction: end to end ivc  Portal vein status: patent    He has had the following complications since transplant: edema and volume overload.     Chief complaint: follow up, history of OLT    HPI:  Kojo Sheikh III is a 44 y.o. male with history of OLT in 04/2021 for alcohol related cirrhosis who presents to re[facility] care.    Last seen in 09/2023. He reports continued neuropathic pain that improves with lidocaine. He is otherwise without complaints today.  No recent hospitalizations or ED visits. Compliant with Prograf. He denies recent fever, chills, nausea, vomiting, diarrhea, headache, tremors.    Past Medical History:   Diagnosis Date    Alcohol abuse     Chronic back pain        Past Surgical History:   Procedure Laterality Date    BACK SURGERY  2011    DIAGNOSTIC ULTRASOUND N/A 4/14/2021    Procedure: ULTRASOUND,INTRAOPERATIVE;  Surgeon: Jose Anderson MD;  Location: [address];  Service: Transplant;  Laterality: N/A;    ESOPHAGOGASTRODUODENOSCOPY N/A 8/5/2021    Procedure: EGD (ESOPHAGOGASTRODUODENOSCOPY);  Surgeon: Judy De La Garza MD;  Location: [facility] ([address] [place] [address]);  Service: Endoscopy;  Laterality: N/A;    IRRIGATION AND DEBRIDEMENT Right 10/21/2023    Procedure: IRRIGATION AND DEBRIDEMENT- i&d abscess right lower extremity;  Surgeon: Shirlene Durán MD;  Location: [facility];  Service: Peripheral Vascular;  Laterality: Right;    LIVER TRANSPLANT N/A  4/11/2021    Procedure: TRANSPLANT, LIVER;  Surgeon: Joe Baker MD;  Location: [facility] OR [address] [place] [address];  Service: Transplant;  Laterality: N/A;       No family history on file.    Social History     Tobacco Use    Smoking status: Every Day     Current packs/day: 0.25     Types: Cigarettes    Smokeless tobacco: Never   Substance Use Topics    Drug use: Never       Current Outpatient Medications   Medication Sig Dispense Refill    calcium carbonate (OS-BRISSA) 500 mg calcium (1,250 mg) tablet Take 2 tablets (1,000 mg total) by mouth once daily. 60 tablet 11    cyclobenzaprine (FLEXERIL) 10 MG tablet Take 10 mg by mouth 3 (three) times daily as needed for Muscle spasms.      oxyCODONE-acetaminophen (PERCOCET)  mg per tablet Take 1 tablet by mouth every 4 (four) hours as needed for Pain.      tacrolimus (PROGRAF) 1 MG Cap Take 6 capsules (6 mg total) by mouth every morning AND 5 capsules (5 mg total) every evening. 330 capsule 11    XARELTO 20 mg Tab Take 20 mg by mouth once daily.      albuterol (PROVENTIL/VENTOLIN HFA) 90 mcg/actuation inhaler Inhale 2 puffs into the lungs every 6 (six) hours as needed for Wheezing. Rescue (Patient not taking: Reported on 2/3/2025) 18 g 0    bisacodyL (DULCOLAX) 5 mg EC tablet Take 2 tablets (10 mg total) by mouth daily as needed for Constipation. (Patient not taking: Reported on 2/3/2025)  0    cetirizine (ZYRTEC) 5 MG tablet Take 1 tablet (5 mg total) by mouth daily as needed for Allergies (and before Zarxio doses). (Patient not taking: Reported on 2/3/2025)  0    ergocalciferol (ERGOCALCIFEROL) 50,000 unit Cap TAKE ONE CAPSULE BY MOUTH EVERY 7 DAYS (Patient not taking: Reported on 2/3/2025) 4 capsule 6    eszopiclone (LUNESTA) 2 MG Tab Take 2 mg by mouth every evening. (Patient not taking: Reported on 2/3/2025)      folic acid (FOLVITE) 1 MG tablet TAKE 1 TABLET(1000 MCG) BY MOUTH EVERY DAY (Patient not taking: Reported on 2/3/2025) 30 tablet 3    gabapentin (NEURONTIN) 300 MG  "capsule Take 2 capsules (600 mg total) by mouth 3 (three) times daily. 180 capsule 11    LIDOcaine (LIDODERM) 5 % Place onto the skin daily as needed (pain). Remove & Discard patch within 12 hours or as directed by MD 30 patch 11    loratadine (CLARITIN) 10 mg tablet Take 10 mg by mouth once daily. (Patient not taking: Reported on 2/3/2025)      mycophenolate (CELLCEPT) 250 mg Cap TAKE 4 CAPSULES(1000 MG) BY MOUTH TWICE DAILY (Patient not taking: Reported on 2/3/2025) 120 capsule 2    naloxegoL (MOVANTIK) 25 mg tablet Take 25 mg by mouth daily as needed (constipation). (Patient not taking: Reported on 2/3/2025) 30 tablet 0    nicotine (NICODERM CQ) 14 mg/24 hr Place 1 patch onto the skin once daily. (Patient not taking: Reported on 2/3/2025) 28 patch 2    promethazine (PHENERGAN) 25 MG tablet Take 1 tablet (25 mg total) by mouth every 6 (six) hours as needed for Nausea. (Patient not taking: Reported on 2/3/2025) 28 tablet 0     No current facility-administered medications for this visit.       Review of patient's allergies indicates:   Allergen Reactions    Zofran [ondansetron hcl]      Interaction with tacrolimus       Review of Systems   Constitutional:  Negative for fever and weight loss.   Gastrointestinal:  Negative for abdominal pain, blood in stool, constipation, diarrhea, heartburn, melena, nausea and vomiting.   Neurological:  Negative for tremors and headaches.       Vitals:    02/03/25 1345   BP: 120/78   Pulse: 96   Weight: 99.4 kg (219 lb 1.6 oz)   Height: 6' 2" (1.88 m)         Physical Exam  Constitutional:       General: He is not in acute distress.     Appearance: He is not ill-appearing.   HENT:      Head: Normocephalic and atraumatic.   Eyes:      General: No scleral icterus.     Extraocular Movements: Extraocular movements intact.   Cardiovascular:      Rate and Rhythm: Normal rate and regular rhythm.   Pulmonary:      Effort: Pulmonary effort is normal. No respiratory distress.   Abdominal:      " General: Bowel sounds are normal. There is no distension.      Palpations: Abdomen is soft.      Tenderness: There is no abdominal tenderness. There is no guarding or rebound.   Musculoskeletal:      Right lower leg: No edema.      Left lower leg: No edema.   Skin:     Coloration: Skin is not jaundiced.   Neurological:      Mental Status: He is alert.         LABS:  Lab Results   Component Value Date    WBC 3.92 (L) 12/13/2023    HGB 11.3 (L) 12/13/2023    HCT 32.5 (L) 12/13/2023    .6 (H) 12/13/2023     12/13/2023       Lab Results   Component Value Date     12/13/2023    K 3.9 12/13/2023     12/13/2023    CO2 25 12/13/2023    BUN 6.0 (L) 12/13/2023    CREATININE 0.75 12/13/2023    CALCIUM 8.2 (L) 12/13/2023    ANIONGAP 6 (L) 11/29/2021    ESTGFRAFRICA >60.0 11/29/2021    EGFRNONAA >60 03/09/2022       Lab Results   Component Value Date    ALT 33 12/13/2023    AST 62 (H) 12/13/2023    ALKPHOS 102 12/13/2023    BILITOT 0.5 12/13/2023       Lab Results   Component Value Date    TACROLIMUS 5.3 11/29/2021       Assessment:  44 y.o. male with history of OLT in 04/2021 for alcohol related cirrhosis who presents for scheduled follow up.    1. Liver transplanted    2. Long-term use of immunosuppressant medication    3. Abdominal pain, unspecified abdominal location        Recommendations:  Allograft Function  - No labs since 12/2023 though he reports having outside labs last week    Immunosuppression   - Continue Prograf 6 mg in AM and 5mg in PM.    Kidney function   - No labs since 12/2023 though he reports having outside labs last week  - Avoid NSAIDs as able and maintain adequate hydration    Neuropathic pain near incision: Continue lidocaine patches as needed.    Health Maintenance/Screening:  - Recommend age appropriate cancer screening  - Skin cancer: Recommend use of sunscreen SPF 30 or higher, hat and sunglasses while outside, dermatologist visit annually or sooner if any concerning  lesions.  - Osteoporosis: Recommend bone density testing every 2-3 years if previously normal or annually if previously abnormal.    Return to clinic in 12 months.    UNOS Patient Status  Functional Status: 100% - Normal, no complaints, no evidence of disease  Physical Capacity: No Limitations    I spent a total of 30 minutes on the day of the visit. This includes face to face time and non-face to face time preparing to see the patient (eg, review of tests), obtaining and/or reviewing separately obtained history, documenting clinical information in the electronic or other health record, independently interpreting results, and communicating results to the patient/family/caregiver, or care coordination.    Beny Snow MD  Staff Physician  Hepatology and Liver Transplant  [facility]  [facility]

## 2025-02-03 NOTE — LETTER
February 3, 2025                      [facility]  [address] [place] [address], [address]  [facility] [address]  Phone: [phone]   Patient: Kojo Sheikh III   MR Number: [id number]   YOB: 1980   Date of Visit: 2/3/2025       Dear       Thank you for referring Kojo Sheikh to me for evaluation. Attached you will find relevant portions of my assessment and plan of care.    If you have questions, please do not hesitate to call me. I look forward to following Kojo Sheikh along with you.    Sincerely,    Beny Snow MD    Enclosure    If you would like to receive this communication electronically, please contact [email or web address] or [phone] to request [employer] access.    [employer] is a tool which provides read-only access to select patient information with whom you have a relationship. Its easy to use and provides real time access to review your patients record including encounter summaries, notes, results, and demographic information.    If you feel you have received this communication in error or would no longer like to receive these types of communications, please e-mail [email or web address]

## 2025-02-13 LAB
EXT ALBUMIN: 4.3
EXT ALKALINE PHOSPHATASE: 90
EXT ALT: 22
EXT AST: 26
EXT BASOPHIL%: 0.8
EXT BILIRUBIN TOTAL: 0.4
EXT BUN: 9
EXT CALCIUM: 9.6
EXT CHLORIDE: 99
EXT CO2: 25
EXT CREATININE: 0.78 MG/DL
EXT EOSINOPHIL%: 3
EXT GLUCOSE: 108
EXT HEMATOCRIT: 38.2
EXT HEMOGLOBIN: 13.1
EXT LYMPH%: 32.6
EXT MONOCYTES%: 12.1
EXT PLATELETS: 208
EXT POTASSIUM: 4
EXT PROTEIN TOTAL: 6.7
EXT SEGS%: 51.3
EXT SODIUM: 130 MMOL/L
EXT TACROLIMUS LVL: NORMAL
EXT WBC: 6.1

## 2025-02-18 ENCOUNTER — TELEPHONE (OUTPATIENT)
Dept: TRANSPLANT | Facility: CLINIC | Age: 45
End: 2025-02-18
Payer: MEDICARE

## 2025-02-18 NOTE — TELEPHONE ENCOUNTER
"----- Message from Jan sent at 2/18/2025  2:30 PM CST -----  Regarding: call back  Consult/Advisory:  Name Of Caller: Self Contact Preference?:[phone] What is the nature of the call?: Calling to speak w/  Theresa tell let her know he will be getting labs done on Monday 24   Additional Notes:"Thank you for all that you do for our patients"  "

## 2025-02-19 ENCOUNTER — RESULTS FOLLOW-UP (OUTPATIENT)
Dept: TRANSPLANT | Facility: CLINIC | Age: 45
End: 2025-02-19
Payer: MEDICARE

## 2025-02-19 NOTE — LETTER
February 28, 2025    Kojo Sheikh  [address] [facility] [address]          Dear Kojo Sheikh:  MRN: [id number]    This is a follow up to your recent labs, your lab results were stable.  There are no medicine changes.  Please have your labs drawn again on 5/12/25 since your last labs did not include a Prograf level.      If you cannot have your labs drawn on the scheduled date, it is your responsibility to call the transplant department to reschedule.  Please call [phone] and ask to speak to Theresa HOLLOWAY -  for all scheduling requests.     Sincerely,      Juan C SHARIFN, RN, CCTC  Your Liver Transplant Coordinator    [facility] [place]  [address] [place] [id number] [phone]

## 2025-02-28 NOTE — TELEPHONE ENCOUNTER
Stable labs, no medication changes.  Prograf level not drawn, MD aware.  Next labs 5/17/25, letter sent.  ----- Message from Beny Snow MD sent at 2/19/2025 12:58 PM CST -----  Liver tests are stable.   ----- Message -----  From: Juan C Ordonez RN  Sent: 2/13/2025   2:53 PM CST  To: Beny Snow MD

## 2025-03-02 DIAGNOSIS — Z94.4 LIVER TRANSPLANTED: ICD-10-CM

## 2025-03-06 RX ORDER — TACROLIMUS 1 MG/1
CAPSULE ORAL
Qty: 330 CAPSULE | Refills: 11 | Status: SHIPPED | OUTPATIENT
Start: 2025-03-06

## 2025-03-28 ENCOUNTER — TELEPHONE (OUTPATIENT)
Dept: TRANSPLANT | Facility: CLINIC | Age: 45
End: 2025-03-28
Payer: MEDICARE

## 2025-03-28 NOTE — TELEPHONE ENCOUNTER
Called pt to discuss x2.  No answer, unable to leave message.  ----- Message from Lashonda sent at 3/28/2025  1:16 PM CDT -----  Regarding: Refill  Contact: Pt  [phone]  Rx Name:   tacrolimus (PROGRAF) 1 MG CapPreferred Pharmacy with number:  [id number] [place] - [address] AT [facility] [address] [place] [address] [address] [address]: [phone] Fax: [phone] Ordering Provider: Beny Snow, MDContact preference:[phone]/Notes:  Requesting a refill

## 2025-05-22 ENCOUNTER — TELEPHONE (OUTPATIENT)
Dept: TRANSPLANT | Facility: CLINIC | Age: 45
End: 2025-05-22
Payer: MEDICARE

## 2025-05-28 ENCOUNTER — TELEPHONE (OUTPATIENT)
Dept: TRANSPLANT | Facility: CLINIC | Age: 45
End: 2025-05-28
Payer: MEDICARE

## 2025-06-04 ENCOUNTER — TELEPHONE (OUTPATIENT)
Dept: TRANSPLANT | Facility: CLINIC | Age: 45
End: 2025-06-04
Payer: MEDICARE

## (undated) DEVICE — SEE MEDLINE ITEM 157128

## (undated) DEVICE — CLIP LIGATING HEMOCLP SMALL

## (undated) DEVICE — EVACUATOR WOUND BULB 100CC

## (undated) DEVICE — SUT PROLENE 4-0 SH BLU 36IN

## (undated) DEVICE — NDL MONOPTY BIOPSY 14GX10CM

## (undated) DEVICE — RELOAD PROXIMATE CUT BLUE 75MM

## (undated) DEVICE — STAPLER SKIN PROXIMATE WIDE

## (undated) DEVICE — SUT 1 36IN PDS II VIO MONO

## (undated) DEVICE — SUT CTD VICRYL VIL BR SH 27

## (undated) DEVICE — DRAPE CORETEMP FLD WRM 56X62IN

## (undated) DEVICE — SUT 2-0 12-18IN SILK

## (undated) DEVICE — TUBE FEED. 8FRX45IN FLEXI

## (undated) DEVICE — KIT FIBRIN SEALANT EVICEL 5 ML

## (undated) DEVICE — DRAPE BAG ISOLATION 20 X 20

## (undated) DEVICE — TOWEL OR XRAY WHITE 17X26IN

## (undated) DEVICE — SEE MEDLINE ITEM 152622

## (undated) DEVICE — CONTAINER SPECIMEN STRL 4OZ

## (undated) DEVICE — CATH URETHRAL RED RUBBER 18FR

## (undated) DEVICE — ELECTRODE REM PLYHSV RETURN 9

## (undated) DEVICE — ELECTRODE PAD DEFIB STERILE

## (undated) DEVICE — SUT 3-0 12-18IN SILK

## (undated) DEVICE — TRAY FOLEY 16FR INFECTION CONT

## (undated) DEVICE — BLADE 4 INCH EDGE UN-INS

## (undated) DEVICE — SEE MEDLINE ITEM 156902

## (undated) DEVICE — GOWN SURGICAL X-LARGE

## (undated) DEVICE — BANDAGE COMPR VELCLOSE 4INX5YD

## (undated) DEVICE — SYR SLIP TIP 1CC

## (undated) DEVICE — SUTURE PROLENE 5-0

## (undated) DEVICE — PACK UNIVERSAL SPLIT II

## (undated) DEVICE — KIT COLLECTION E SWAB REGULAR

## (undated) DEVICE — KIT SURGICAL TURNOVER

## (undated) DEVICE — KIT SAHARA DRAPE DRAW/LIFT

## (undated) DEVICE — SET DECANTER MEDICHOICE

## (undated) DEVICE — SEE MEDLINE ITEM 152512

## (undated) DEVICE — SUT SILK 2-0 STRANDS 30IN

## (undated) DEVICE — FIBRILLAR ABS HEMOSTAT 4X4

## (undated) DEVICE — SPONGE LAP STRL 18X18IN

## (undated) DEVICE — SPONGE LAP 18X18 PREWASHED

## (undated) DEVICE — SUT PROLENE 6-0 BV-1 30IN

## (undated) DEVICE — SUT PROLENE 3-0 SH DA 36 BL

## (undated) DEVICE — SOL VASHE SKN WOUND BURN 8.5OZ

## (undated) DEVICE — SPONGE GAUZE 4X4 12 PLY STRL

## (undated) DEVICE — DRESSING TELFA + RECT 6X10IN

## (undated) DEVICE — BOOT SUTURE AID

## (undated) DEVICE — Device

## (undated) DEVICE — CLIP SPRING 6MM

## (undated) DEVICE — APPLICATOR CHLORAPREP ORN 26ML

## (undated) DEVICE — SUT SILK 0 STRANDS 30IN BLK

## (undated) DEVICE — DRAIN CHANNEL ROUND 19FR

## (undated) DEVICE — SUT 4-0 12-30IN SILK

## (undated) DEVICE — WIPE ESENTA BARR STNG FREE 3ML

## (undated) DEVICE — SUT PROLENE 6-0 BV-1

## (undated) DEVICE — CUTTER PROXIMATE BLUE 75MM

## (undated) DEVICE — HANDSET ARGON PLUS

## (undated) DEVICE — SOL NACL IRR 1000ML BTL

## (undated) DEVICE — CLIP LIGATING MEDIUM

## (undated) DEVICE — TUBE FEEDING PURPLE 8FRX40CM

## (undated) DEVICE — CLIP HORIZON LIG TI MED-LG

## (undated) DEVICE — SEE MEDLINE ITEM 146420

## (undated) DEVICE — SEE MEDLINE ITEM 146417

## (undated) DEVICE — SET IV ADMIN 15DROP 3 CARESITE

## (undated) DEVICE — SEE MEDLINE ITEM 156901

## (undated) DEVICE — SOL NS 1000CC

## (undated) DEVICE — COVER CLAMP FABRIC RADIOPAQUE

## (undated) DEVICE — SET EXTENSION STERILE 30IN

## (undated) DEVICE — SYR ONLY LUER LOCK 20CC

## (undated) DEVICE — SUT ETHILON 3-0 PS2 18 BLK

## (undated) DEVICE — TUBE KANGAROO FEED 12FR 109CM

## (undated) DEVICE — COVER LIGHT HANDLE 80/CA

## (undated) DEVICE — SUT 4-0 12-18IN SILK BLACK

## (undated) DEVICE — PAD K-THERMIA 24IN X 60IN

## (undated) DEVICE — ELECTRODE PATIENT RETURN DISP

## (undated) DEVICE — SUT SILK 3-0 SH 18IN BLACK

## (undated) DEVICE — SUT SILK 3-0 STRANDS 30IN

## (undated) DEVICE — DRESSING ADH ISLAND 3.6 X 14

## (undated) DEVICE — DRESSING AQUACEL SACRAL 9 X 9

## (undated) DEVICE — SUT PROLENE 5-0 36IN C-1

## (undated) DEVICE — GLOVE PROTEXIS HYDROGEL SZ6.5

## (undated) DEVICE — BANDAGE GAUZE COT STRL 4.5X4.1

## (undated) DEVICE — CATH EMBOLECTOMY 2F 60CM

## (undated) DEVICE — BOOT AIR FLUID HEEL ADLT STD

## (undated) DEVICE — SUT PDS BV 6-0

## (undated) DEVICE — HEMOSTAT SURGICEL NU-KNIT 6X9

## (undated) DEVICE — SUT SILK 3-0 SH DETACH 30IN